# Patient Record
Sex: FEMALE | Race: WHITE | NOT HISPANIC OR LATINO | Employment: FULL TIME | URBAN - METROPOLITAN AREA
[De-identification: names, ages, dates, MRNs, and addresses within clinical notes are randomized per-mention and may not be internally consistent; named-entity substitution may affect disease eponyms.]

---

## 2017-02-26 ENCOUNTER — TELEPHONE (OUTPATIENT)
Dept: NEUROLOGY | Facility: CLINIC | Age: 47
End: 2017-02-26

## 2017-02-26 NOTE — TELEPHONE ENCOUNTER
Yesterday, L ear began humming. Today, humming is ongoing, and she is not able to hear as well out of her ear. The patient actually cut me off at this point, as she was waiting in line at the urgent care and had just been called in. She asked me to call back.    I did call back later this evening. She was told by the urgent care provider that she probably had menieres, and was given some antihistamines.    I explained that hearing loss, while not impossible, is very uncommon to result from multiple sclerosis. If her hearing loss does not improve, she should follow-up with her PCP, who can evaluate her hearing loss and refer her back to neurology, if indicated.

## 2017-02-27 ENCOUNTER — TELEPHONE (OUTPATIENT)
Dept: PEDIATRICS | Facility: CLINIC | Age: 47
End: 2017-02-27

## 2017-02-27 ENCOUNTER — TELEPHONE (OUTPATIENT)
Dept: ENDOCRINOLOGY | Facility: CLINIC | Age: 47
End: 2017-02-27

## 2017-02-27 DIAGNOSIS — H93.12 TINNITUS, LEFT: Primary | ICD-10-CM

## 2017-02-27 DIAGNOSIS — H91.92 HEARING LOSS, LEFT: ICD-10-CM

## 2017-02-27 NOTE — TELEPHONE ENCOUNTER
Reviewed.  I would recommend to continue the nasal steroid along with consulting ENT and audiology for further evaluation of her hearing loss/tinnitus   both the referrals made, Renea can call to schedule

## 2017-02-27 NOTE — TELEPHONE ENCOUNTER
[facility] Call Center    Phone Message    Name of Caller: SELF    Phone Number: Cell number on file:    Telephone Information:   Mobile [phone]       Best time to return call: Soon    May a detailed message be left on voicemail: yes    Relation to patient: Self    Reason for Call: Other: pt is requesting a call back from dr. reeves or care team (pt did not state what she wanted)     Action Taken: Message routed to:  Primary Care p [id number]

## 2017-02-27 NOTE — TELEPHONE ENCOUNTER
I called patient to get more information.  Patient just wants your opinion on the tinnitus diagnosis.  Referral to go somewhere else?  Do you have anymore insight?  She went to  [facility] urgent care on 02/26/17 because her ears were making a humming sound/mainly the left ear.  Noticing hearing loss.  Provider told her there was water in her left ear, otherwise everything looks perfect.  He put her on a nasal steroid and an allergy pill and said hopefully this will work.  Patient isn't having any other symptoms.      Message routed to Dr. Tyree Turner CMA

## 2017-02-27 NOTE — TELEPHONE ENCOUNTER
Patient was seen on Sunday 02/26/17 at  [facility].  Diagnosed with tinnitus and wants a call back from Dr. Clements to discuss this dx.    Nicole Turner CMA

## 2017-02-27 NOTE — TELEPHONE ENCOUNTER
Patient advised of the information below per Dr. Clements.  Patient states understanding.    Nicole Turner CMA

## 2017-02-28 ENCOUNTER — PRE VISIT (OUTPATIENT)
Dept: OTOLARYNGOLOGY | Facility: CLINIC | Age: 47
End: 2017-02-28

## 2017-02-28 NOTE — TELEPHONE ENCOUNTER
Was seen in [facility] a few days ago, patient is very concerned about having long term hearing loss.  I'm forwarding a message for the RN to speak with her Wed since her appointment is still 2 weeks out.        February 28, 2017              Marcelle Chaidez     Chief Complaint   Patient presents with     Pre Visit Planning - Unable To Reach       Pre-Visit Documentation: Renea Trotter is a 46 year old female      Current Outpatient Prescriptions   Medication Sig Dispense Refill     temazepam (RESTORIL) 15 MG capsule TAKE ONE TO TWO CAPSULES BY MOUTH AT BEDTIME 60 capsule 3     hydrochlorothiazide (HYDRODIURIL) 25 MG tablet Take 1 tablet (25 mg) by mouth daily 90 tablet 3     albuterol (PROAIR HFA, PROVENTIL HFA, VENTOLIN HFA) 108 (90 BASE) MCG/ACT inhaler Inhale 2 puffs into the lungs every 6 hours as needed for shortness of breath / dyspnea or wheezing 1 Inhaler 2     Glatiramer Acetate 40 MG/ML SOSY Inject 40 mg Subcutaneous three times a week 36 Syringe 3     Cholecalciferol (VITAMIN D3 PO) Take 5,000 Units by mouth daily       cyclobenzaprine (FLEXERIL) 5 MG tablet Take 0.5-1 tablets (2.5-5 mg) by mouth nightly as needed for muscle spasms 30 tablet 0     MULTIPLE VITAMIN PO Take 1 tablet by mouth daily.         ASSESSMENT / PLAN:  No diagnosis found. Vis

## 2017-03-30 ENCOUNTER — OFFICE VISIT (OUTPATIENT)
Dept: NEUROLOGY | Facility: CLINIC | Age: 47
End: 2017-03-30
Attending: PSYCHIATRY & NEUROLOGY
Payer: COMMERCIAL

## 2017-03-30 VITALS
HEIGHT: 63 IN | RESPIRATION RATE: 20 BRPM | SYSTOLIC BLOOD PRESSURE: 168 MMHG | BODY MASS INDEX: 26.56 KG/M2 | OXYGEN SATURATION: 96 % | WEIGHT: 149.9 LBS | HEART RATE: 125 BPM | DIASTOLIC BLOOD PRESSURE: 116 MMHG

## 2017-03-30 DIAGNOSIS — G35 MULTIPLE SCLEROSIS (H): Primary | ICD-10-CM

## 2017-03-30 PROCEDURE — 82306 VITAMIN D 25 HYDROXY: CPT | Performed by: PSYCHIATRY & NEUROLOGY

## 2017-03-30 PROCEDURE — 99212 OFFICE O/P EST SF 10 MIN: CPT

## 2017-03-30 PROCEDURE — 36415 COLL VENOUS BLD VENIPUNCTURE: CPT | Performed by: PSYCHIATRY & NEUROLOGY

## 2017-03-30 ASSESSMENT — PAIN SCALES - GENERAL: PAINLEVEL: NO PAIN (0)

## 2017-03-30 NOTE — MR AVS SNAPSHOT
After Visit Summary   3/30/2017    Renea Trotter    MRN: [id number]           Patient Information     Date Of Birth          1970        Visit Information        Provider Department      3/30/2017 6:00 PM Gustavo Butler DO [facility]        Today's Diagnoses     Multiple sclerosis (H)    -  1       Follow-ups after your visit        Follow-up notes from your care team     Return in about 6 months (around 9/30/2017).      Your next 10 appointments already scheduled     Sep 22, 2017  3:00 PM CDT   (Arrive by 2:45 PM)   New Multiple Sclerosis with SIM Glasgow CNP   [facility] ([facility])    [address]  [address] [facility] [address] [phone]              Who to contact     If you have questions or need follow up information about today's clinic visit or your schedule please contact [facility] MULTIPLE SCLEROSIS directly at [phone].  Normal or non-critical lab and imaging results will be communicated to you by [facility] [employer], letter or phone within 4 business days after the clinic has received the results. If you do not hear from us within 7 days, please contact the clinic through [employer] [employer] or phone. If you have a critical or abnormal lab result, we will notify you by phone as soon as possible.  Submit refill requests through [employer] or call your pharmacy and they will forward the refill request to us. Please allow 3 business days for your refill to be completed.          Additional Information About Your Visit        MyChart Information     [employer] gives you secure access to your electronic health record. If you see a primary care provider, you can also send messages to your care team and make appointments. If you have questions, please call your primary care clinic.  If you do not have a primary care provider, please call [phone] and they will assist you.        Care EveryWhere ID     This is your  "Care EveryWhere ID. This could be used by other organizations to access your [facility] medical records  [id number]        Your Vitals Were     Pulse Respirations Height Pulse Oximetry Breastfeeding? BMI (Body Mass Index)    125 20 1.6 m (5' 3\") 96% No 26.55 kg/m2       Blood Pressure from Last 3 Encounters:   03/30/17 (!) 168/116   10/12/16 131/78   09/20/16 139/88    Weight from Last 3 Encounters:   03/30/17 68 kg (149 lb 14.4 oz)   10/12/16 64.6 kg (142 lb 8 oz)   06/15/16 65.2 kg (143 lb 11.2 oz)              We Performed the Following     Vitamin D Deficiency        Primary Care Provider Office Phone # Fax #    Cara Clements [phone]       [facility] [address]  [facility] [address]        Thank you!     Thank you for choosing [facility] MULTIPLE SCLEROSIS  for your care. Our goal is always to provide you with excellent care. Hearing back from our patients is one way we can continue to improve our services. Please take a few minutes to complete the written survey that you may receive in the mail after your visit with us. Thank you!             Your Updated Medication List - Protect others around you: Learn how to safely use, store and throw away your medicines at www.disposemymeds.org.          This list is accurate as of: 3/30/17  6:51 PM.  Always use your most recent med list.                   Brand Name Dispense Instructions for use    albuterol 108 (90 BASE) MCG/ACT Inhaler    PROAIR HFA/PROVENTIL HFA/VENTOLIN HFA    1 Inhaler    Inhale 2 puffs into the lungs every 6 hours as needed for shortness of breath / dyspnea or wheezing       cyclobenzaprine 5 MG tablet    FLEXERIL    30 tablet    Take 0.5-1 tablets (2.5-5 mg) by mouth nightly as needed for muscle spasms       Glatiramer Acetate 40 MG/ML Sosy     36 Syringe    Inject 40 mg Subcutaneous three times a week       hydrochlorothiazide 25 MG tablet    HYDRODIURIL    90 tablet    Take 1 tablet (25 mg) by mouth " daily       MULTIPLE VITAMIN PO      Take 1 tablet by mouth daily.       temazepam 15 MG capsule    RESTORIL    60 capsule    TAKE ONE TO TWO CAPSULES BY MOUTH AT BEDTIME       VITAMIN D3 PO      Take 5,000 Units by mouth daily

## 2017-03-30 NOTE — LETTER
3/30/2017       RE: Renea Trotter  [address] [address]     Dear Colleague,    Thank you for referring your patient, Renea Trotter, to the [facility] at [facility]. Please see a copy of my visit note below.    This office note has been dictated.     Again, thank you for allowing me to participate in the care of your patient.      Sincerely,    Gustavo Butler, DO

## 2017-03-30 NOTE — LETTER
3/30/2017      RE: Renea Trotter  [address] [address]       This office note has been dictated.     INTERVAL HISTORY:   Followup appointment on Renea Trotter who I see for relapsing remitting multiple sclerosis.  Injections with Copaxone are going well.  MRI of the brain and cervical spine were stable in December.  The patient states that overall she feels like she pretty much about the same as when she last saw me.  She is upset as 15 minutes before she left come to her appointment today she found out that she was let go from her job so she is distraught over this, but she states that with her not working she is going to actually work harder training for the .  She states that she has noticed her mood has been worse, she is more irritable and we did explain that exercise certainly could be beneficial and she is happy about that aspect of it.  She states she has some urinary urgency but it is not to the point where she would want to be on any medication.  She has no other acute issues or concerns.      CURRENT MEDICATIONS:  Reviewed and up-to-date in Epic.      REVIEW OF SYSTEMS:  As per History of Present Illness.      PHYSICAL EXAMINATION:   VITAL SIGNS:  Blood pressure is elevated due to her stress from recently being let go from her job.  Pulse 125.  Respirations 27.   GENERAL:  Pleasant, well-developed, well-nourished female in no apparent distress.   CRANIAL NERVES:  Cranial nerves II-XII are intact.   MOTOR:  Strength is 5/5 all extremities.   CEREBELLAR:  Intact finger-to-nose.   GAIT:  Unremarkable.      IMPRESSION:  Relapsing remitting multiple sclerosis, responding favorably to glatiramer acetate.      PLAN:   We are going to go ahead and check a vitamin D level today.  She is going to follow up in 6 months.  Call if issues or concerns.      Gustavo Butler DO        D: 03/30/2017 18:48   T: 03/30/2017 21:43   MT: AKA      Name:     RENEA TROTTER   MRN:      [id number]         Account:      [id number]   :      1970           Service Date: 2017      Document: [id number]

## 2017-03-30 NOTE — NURSING NOTE
Chief Complaint   Patient presents with     RECHECK     [facility]- MS, 6 MONTHS F/U     Patient's Blood Pressure is considerably high today. She is feeling upset (crying) about issues related with her job.

## 2017-03-31 LAB — DEPRECATED CALCIDIOL+CALCIFEROL SERPL-MC: 81 UG/L (ref 20–75)

## 2017-03-31 NOTE — PROGRESS NOTES
INTERVAL HISTORY:   Followup appointment on Reinier Trotter who I see for relapsing remitting multiple sclerosis.  Injections with Copaxone are going well.  MRI of the brain and cervical spine were stable in December.  The patient states that overall she feels like she pretty much about the same as when she last saw me.  She is upset as 15 minutes before she left come to her appointment today she found out that she was let go from her job so she is distraught over this, but she states that with her not working she is going to actually work harder training for the .  She states that she has noticed her mood has been worse, she is more irritable and we did explain that exercise certainly could be beneficial and she is happy about that aspect of it.  She states she has some urinary urgency but it is not to the point where she would want to be on any medication.  She has no other acute issues or concerns.      CURRENT MEDICATIONS:  Reviewed and up-to-date in Epic.      REVIEW OF SYSTEMS:  As per History of Present Illness.      PHYSICAL EXAMINATION:   VITAL SIGNS:  Blood pressure is elevated due to her stress from recently being let go from her job.  Pulse 125.  Respirations 27.   GENERAL:  Pleasant, well-developed, well-nourished female in no apparent distress.   CRANIAL NERVES:  Cranial nerves II-XII are intact.   MOTOR:  Strength is 5/5 all extremities.   CEREBELLAR:  Intact finger-to-nose.   GAIT:  Unremarkable.      IMPRESSION:  Relapsing remitting multiple sclerosis, responding favorably to glatiramer acetate.      PLAN:   We are going to go ahead and check a vitamin D level today.  She is going to follow up in 6 months.  Call if issues or concerns.      DO MIRELA Underwood DO             D: 2017 18:48   T: 2017 21:43   MT: AKA      Name:     REINIER TROTTER   MRN:      [id number]        Account:      [id number]   :      1970           Service Date: 2017       Document: [id number]

## 2017-04-10 ENCOUNTER — RADIANT APPOINTMENT (OUTPATIENT)
Dept: MAMMOGRAPHY | Facility: CLINIC | Age: 47
End: 2017-04-10
Attending: FAMILY MEDICINE
Payer: COMMERCIAL

## 2017-04-10 DIAGNOSIS — Z12.31 VISIT FOR SCREENING MAMMOGRAM: ICD-10-CM

## 2017-04-10 DIAGNOSIS — Z00.01 ENCOUNTER FOR GENERAL ADULT MEDICAL EXAMINATION WITH ABNORMAL FINDINGS: ICD-10-CM

## 2017-04-10 PROCEDURE — G0202 SCR MAMMO BI INCL CAD: HCPCS | Performed by: RADIOLOGY

## 2017-05-19 DIAGNOSIS — G47.9 SLEEP DISORDER: ICD-10-CM

## 2017-05-19 RX ORDER — TEMAZEPAM 15 MG/1
CAPSULE ORAL
Qty: 60 CAPSULE | Refills: 3 | Status: SHIPPED | OUTPATIENT
Start: 2017-05-19 | End: 2017-09-28

## 2017-05-19 NOTE — TELEPHONE ENCOUNTER
Patient advised of information below per Dr. Clements.  Script for Restoril faxed to [facility].    Nicole Turner CMA

## 2017-05-19 NOTE — TELEPHONE ENCOUNTER
Routing refill request to provider for review/approval because:  Drug not on the [facility] refill protocol     temazepam (RESTORIL) 15 MG capsule  Last Written Prescription Date: 10/24/2016  Last Fill Quantity: 60,  # refills: 3   Last Office Visit with [facility] or [facility] prescribing provider: 10/12/2016    Perri Carrera RN

## 2017-06-16 DIAGNOSIS — G35 MULTIPLE SCLEROSIS (H): ICD-10-CM

## 2017-06-16 RX ORDER — GLATIRAMER 40 MG/ML
40 INJECTION, SOLUTION SUBCUTANEOUS
Qty: 36 SYRINGE | Refills: 3 | Status: SHIPPED | OUTPATIENT
Start: 2017-06-16 | End: 2018-03-15

## 2017-06-16 NOTE — TELEPHONE ENCOUNTER
Received faxed refill request for Copaxone from [facility]; Patient was last seen in March by Dr. Butler and has follow up appointment in September with Coni Gomez CNP; Refilled for 1 year per [place] refill protocol.    Gema Golden MS RN Care Coordinator

## 2017-07-07 ENCOUNTER — OFFICE VISIT (OUTPATIENT)
Dept: PEDIATRICS | Facility: CLINIC | Age: 47
End: 2017-07-07
Payer: COMMERCIAL

## 2017-07-07 VITALS
DIASTOLIC BLOOD PRESSURE: 80 MMHG | TEMPERATURE: 98.9 F | BODY MASS INDEX: 25.67 KG/M2 | WEIGHT: 144.9 LBS | OXYGEN SATURATION: 95 % | HEART RATE: 91 BPM | SYSTOLIC BLOOD PRESSURE: 124 MMHG

## 2017-07-07 DIAGNOSIS — L82.1 SEBORRHEIC KERATOSIS: ICD-10-CM

## 2017-07-07 DIAGNOSIS — I10 ESSENTIAL HYPERTENSION WITH GOAL BLOOD PRESSURE LESS THAN 140/90: ICD-10-CM

## 2017-07-07 DIAGNOSIS — L98.9 SKIN LESION: Primary | ICD-10-CM

## 2017-07-07 PROCEDURE — 99213 OFFICE O/P EST LOW 20 MIN: CPT | Performed by: FAMILY MEDICINE

## 2017-07-07 RX ORDER — HYDROCHLOROTHIAZIDE 25 MG/1
25 TABLET ORAL DAILY
Qty: 90 TABLET | Refills: 1 | Status: SHIPPED | OUTPATIENT
Start: 2017-07-07 | End: 2017-10-13

## 2017-07-07 ASSESSMENT — PAIN SCALES - GENERAL: PAINLEVEL: NO PAIN (0)

## 2017-07-07 NOTE — MR AVS SNAPSHOT
After Visit Summary   7/7/2017    Renea Trotter    MRN: [id number]           Patient Information     Date Of Birth          1970        Visit Information        Provider Department      7/7/2017 3:40 PM Cara Clements MD [facility]        Today's Diagnoses     Skin lesion    -  1      Care Instructions    Monitor the lesion for now and return for biopsy if itching or bleeding recurs..          Follow-ups after your visit        Your next 10 appointments already scheduled     Sep 22, 2017  3:00 PM CDT   (Arrive by 2:45 PM)   New Multiple Sclerosis with SIM Glasgow CNP   [facility] ([facility])    [address]  [address] [facility] [address] [phone]              Who to contact     If you have questions or need follow up information about today's clinic visit or your schedule please contact [facility] directly at [phone].  Normal or non-critical lab and imaging results will be communicated to you by W[employer]Xhart, letter or phone within 4 business days after the clinic has received the results. If you do not hear from us within 7 days, please contact the clinic through [employer] or phone. If you have a critical or abnormal lab result, we will notify you by phone as soon as possible.  Submit refill requests through ABS or call your pharmacy and they will forward the refill request to us. Please allow 3 business days for your refill to be completed.          Additional Information About Your Visit        WILEXhart Information     ABS gives you secure access to your electronic health record. If you see a primary care provider, you can also send messages to your care team and make appointments. If you have questions, please call your primary care clinic.  If you do not have a primary care provider, please call [phone] and they will assist you.      ABS is an electronic  gateway that provides easy, online access to your medical records. With [employer], you can request a clinic appointment, read your test results, renew a prescription or communicate with your care team.     To access your existing account, please contact your [facility] or call [phone] for assistance.        Care EveryWhere ID     This is your Care EveryWhere ID. This could be used by other organizations to access your [place] medical records  [id number]        Your Vitals Were     Pulse Temperature Pulse Oximetry BMI (Body Mass Index)          91 98.9  F (37.2  C) (Oral) 95% 25.67 kg/m2         Blood Pressure from Last 3 Encounters:   07/07/17 124/80   03/30/17 (!) 168/116   10/12/16 131/78    Weight from Last 3 Encounters:   07/07/17 144 lb 14.4 oz (65.7 kg)   03/30/17 149 lb 14.4 oz (68 kg)   10/12/16 142 lb 8 oz (64.6 kg)              Today, you had the following     No orders found for display       Primary Care Provider Office Phone # Fax #    Cara Clements [phone]       [facility] [address]  [facility] [address]        Equal Access to Services     BOUBACAR PAPPAS : Hadii aad ku hadasho Soomaali, waaxda luqadaha, qaybta kaalmada adeegyada, waxay idiin hayshajin mariceleg chelo lajoo . So [facility] [phone].    ATENCIÓN: Si habla español, tiene a ocampo disposición servicios gratuitos de asistencia lingüística. Llame al [phone].    We comply with applicable federal civil rights laws and [place] laws. We do not discriminate on the basis of race, color, national origin, age, disability sex, sexual orientation or gender identity.            Thank you!     Thank you for choosing [facility]  for your care. Our goal is always to provide you with excellent care. Hearing back from our patients is one way we can continue to improve our services. Please take a few minutes to complete the written survey that you may receive in  the mail after your visit with us. Thank you!             Your Updated Medication List - Protect others around you: Learn how to safely use, store and throw away your medicines at www.disposemymeds.org.          This list is accurate as of: 7/7/17  3:56 PM.  Always use your most recent med list.                   Brand Name Dispense Instructions for use Diagnosis    albuterol 108 (90 BASE) MCG/ACT Inhaler    PROAIR HFA/PROVENTIL HFA/VENTOLIN HFA    1 Inhaler    Inhale 2 puffs into the lungs every 6 hours as needed for shortness of breath / dyspnea or wheezing    Mild intermittent asthma, uncomplicated       cyclobenzaprine 5 MG tablet    FLEXERIL    30 tablet    Take 0.5-1 tablets (2.5-5 mg) by mouth nightly as needed for muscle spasms    Back strain, initial encounter       Glatiramer Acetate 40 MG/ML Sosy     36 Syringe    Inject 40 mg Subcutaneous three times a week    Multiple sclerosis (H)       hydrochlorothiazide 25 MG tablet    HYDRODIURIL    90 tablet    Take 1 tablet (25 mg) by mouth daily    Essential hypertension with goal blood pressure less than 140/90       MULTIPLE VITAMIN PO      Take 1 tablet by mouth daily.        temazepam 15 MG capsule    RESTORIL    60 capsule    TAKE ONE TO TWO CAPSULES BY MOUTH AT BEDTIME    Sleep disorder       VITAMIN D3 PO      Take 5,000 Units by mouth daily

## 2017-07-07 NOTE — PROGRESS NOTES
SUBJECTIVE:                                                    Renea Trotter is a 46 year old female who presents to clinic today for the following health issues:      Lesion check - Patient c/o mole on back that she recently scratched and had bleeding. Unable to view mole herself. Father has history of skin cancer.    Patient is here with concerns of having slight bleeding after scratching on at least a skin lesion that she had  For many years with no change on the right mid back.   Patient denies concerns for Itching, pigmentation, previous history of premalignant or malignant skin lesions          Problem list and histories reviewed & adjusted, as indicated.  Additional history: as documented    Patient Active Problem List   Diagnosis     Multiple sclerosis (H)     CARDIOVASCULAR SCREENING; LDL GOAL LESS THAN 160     BMI 26.0-26.9,adult     Urinary urgency     Headache     Low back pain without sciatica, unspecified back pain laterality     Sleep disorder     Plantar warts     Essential hypertension with goal blood pressure less than 140/90     Hemorrhoids, unspecified hemorrhoid type     Past Surgical History:   Procedure Laterality Date     HC TOOTH EXTRACTION W/FORCEP         Social History   Substance Use Topics     Smoking status: Never Smoker     Smokeless tobacco: Never Used     Alcohol use 0.6 - 1.2 oz/week     1 - 2 Standard drinks or equivalent per week      Comment: RARELY     Family History   Problem Relation Age of Onset     Hypertension Mother      Lipids Mother      Eye Disorder Father      CEREBROVASCULAR DISEASE Maternal Grandmother      CEREBROVASCULAR DISEASE Maternal Grandfather      Prostate Cancer Maternal Grandfather      Blood Disease Paternal Grandmother      Alzheimer Disease Paternal Grandfather          Current Outpatient Prescriptions   Medication Sig Dispense Refill     hydrochlorothiazide (HYDRODIURIL) 25 MG tablet Take 1 tablet (25 mg) by mouth daily Fill the prescription  when patient is due for refills 90 tablet 1     Glatiramer Acetate 40 MG/ML SOSY Inject 40 mg Subcutaneous three times a week 36 Syringe 3     temazepam (RESTORIL) 15 MG capsule TAKE ONE TO TWO CAPSULES BY MOUTH AT BEDTIME 60 capsule 3     albuterol (PROAIR HFA, PROVENTIL HFA, VENTOLIN HFA) 108 (90 BASE) MCG/ACT inhaler Inhale 2 puffs into the lungs every 6 hours as needed for shortness of breath / dyspnea or wheezing 1 Inhaler 2     Cholecalciferol (VITAMIN D3 PO) Take 5,000 Units by mouth daily       cyclobenzaprine (FLEXERIL) 5 MG tablet Take 0.5-1 tablets (2.5-5 mg) by mouth nightly as needed for muscle spasms 30 tablet 0     MULTIPLE VITAMIN PO Take 1 tablet by mouth daily.       [DISCONTINUED] hydrochlorothiazide (HYDRODIURIL) 25 MG tablet Take 1 tablet (25 mg) by mouth daily 90 tablet 3     No Known Allergies  Recent Labs   Lab Test  10/12/16   0732  10/07/15   0746  04/02/14   0717   04/12/12   1627   LDL  95  95  114   --    --    HDL  44*  49*  36*   --    --    TRIG  95  134  109   --    --    ALT  20   --    --    --    --    CR  0.98  0.80  0.84   < >   --    GFRESTIMATED  61  78  74   < >   --    GFRESTBLACK  74  >90   GFR Calc    90   < >   --    POTASSIUM  3.5  3.8  3.7   < >   --    TSH  1.38   --    --    --   1.44    < > = values in this interval not displayed.      BP Readings from Last 3 Encounters:   07/07/17 124/80   03/30/17 (!) 168/116   10/12/16 131/78    Wt Readings from Last 3 Encounters:   07/07/17 144 lb 14.4 oz (65.7 kg)   03/30/17 149 lb 14.4 oz (68 kg)   10/12/16 142 lb 8 oz (64.6 kg)                  Labs reviewed in EPIC    Reviewed and updated as needed this visit by clinical staff  Tobacco  Allergies  Meds  Med Hx  Surg Hx  Fam Hx  Soc Hx      Reviewed and updated as needed this visit by Provider         ROS:  C: NEGATIVE for fever, chills, change in weight  INTEGUMENTARY/SKIN: as above  CV: NEGATIVE for chest pain, palpitations or peripheral edema  CV: h/o  HTN    OBJECTIVE:     /80  Pulse 91  Temp 98.9  F (37.2  C) (Oral)  Wt 144 lb 14.4 oz (65.7 kg)  SpO2 95%  BMI 25.67 kg/m2  Body mass index is 25.67 kg/(m^2).  GENERAL: healthy, alert and no distress  SKIN: Seborrheic keratotic skin lesion ×2-Right mid back, one of them was partially removed from scratching  No abnormal pigmentation noted    Diagnostic Test Results:  none     ASSESSMENT/PLAN:             1. Skin lesion  Likely seborrheic keratotic skin lesion which was accidentally scratched Causing bleeding.   Otherwise Lesion looks benign, patient will monitor for now  ,, will return for excisional biopsy for recurrent concerns  Reviewed sunscreen use  2. Essential hypertension with goal blood pressure less than 140/90  Refills given per patient's request  - hydrochlorothiazide (HYDRODIURIL) 25 MG tablet; Take 1 tablet (25 mg) by mouth daily Fill the prescription when patient is due for refills  Dispense: 90 tablet; Refill: 1    3. Seborrheic keratosis  Reviewed the benign nature of the skin lesion, continue to monitor      Chart documentation done in part with Dragon Voice recognition Software. Although reviewed after completion, some word and grammatical error may remain.    See Patient Instructions    Cara Clements MD  [facility]

## 2017-07-07 NOTE — NURSING NOTE
"Chief Complaint   Patient presents with     Mole       Initial /80  Pulse 91  Temp 98.9  F (37.2  C) (Oral)  Wt 144 lb 14.4 oz (65.7 kg)  SpO2 95%  BMI 25.67 kg/m2 Estimated body mass index is 25.67 kg/(m^2) as calculated from the following:    Height as of 3/30/17: 5' 3\" (1.6 m).    Weight as of this encounter: 144 lb 14.4 oz (65.7 kg).  BP completed using cuff size: renetta Chaney CMA    "

## 2017-08-07 ENCOUNTER — TELEPHONE (OUTPATIENT)
Dept: PEDIATRICS | Facility: CLINIC | Age: 47
End: 2017-08-07

## 2017-08-07 NOTE — TELEPHONE ENCOUNTER
Summary:    Patient is due/failing the following:   PAP    Action needed:   Patient needs office visit for PAP.    Type of outreach:    Phone, spoke to patient.  patient will wait and complete in October     Questions for provider review:    None                                                                                                                                    Dorina Matamoros       Chart routed to Care Team .      Panel Management Review      Patient has the following on her problem list: None      Composite cancer screening  Chart review shows that this patient is due/due soon for the following Pap Smear

## 2017-08-15 ENCOUNTER — TELEPHONE (OUTPATIENT)
Dept: NEUROLOGY | Facility: CLINIC | Age: 47
End: 2017-08-15

## 2017-09-25 ENCOUNTER — TELEPHONE (OUTPATIENT)
Dept: PEDIATRICS | Facility: CLINIC | Age: 47
End: 2017-09-25

## 2017-09-25 DIAGNOSIS — Z13.29 SCREENING FOR THYROID DISORDER: ICD-10-CM

## 2017-09-25 DIAGNOSIS — Z00.00 ENCOUNTER FOR ROUTINE ADULT HEALTH EXAMINATION WITHOUT ABNORMAL FINDINGS: ICD-10-CM

## 2017-09-25 DIAGNOSIS — Z13.6 CARDIOVASCULAR SCREENING; LDL GOAL LESS THAN 130: ICD-10-CM

## 2017-09-25 DIAGNOSIS — G35 MS (MULTIPLE SCLEROSIS) (H): Primary | ICD-10-CM

## 2017-09-25 DIAGNOSIS — Z13.1 SCREENING FOR DIABETES MELLITUS: ICD-10-CM

## 2017-09-25 DIAGNOSIS — I10 ESSENTIAL HYPERTENSION WITH GOAL BLOOD PRESSURE LESS THAN 140/90: Primary | ICD-10-CM

## 2017-09-25 NOTE — TELEPHONE ENCOUNTER
[facility] Call Center    Phone Message    Name of Caller: Renea    Phone Number: Cell number on file:    Telephone Information:   Mobile [phone]       Best time to return call: ANY    May a detailed message be left on voicemail: yes    Relation to patient: Self    Reason for Call: Other: Patient needs a physical before the 15th of October for insurance purposes. No providers in [place] Have any openings for physicals before then. Patient asked that a message be sent. Please call to advise     Action Taken: Message routed to:  Primary Care p [phone]

## 2017-09-25 NOTE — PROGRESS NOTES
Date of Onset: 2003  Date of Diagnosis: 6/25/2012  Initial Clinical Course: Relapsing remitting  Current Clinical Course: Relapsing remitting  Past Disease Modifying Therapy(ies): NA   Current Disease Modifying Therapy(ies): Copaxone  Most Recent MRI of the Brain: 12/10/2016  Most Recent MRI of the Cervical Cord 12/2016  Most Recent MRI of the Thoracic Cord: NA  Most Recent Lumbar Puncture: 5/4/2012 Positive OCB        She first developed symptoms of numbness in the fingers of her right hand. She also had some numbness of her leg. These symptoms last for a couple of months. During this time she had a MRI with reportedly non-specific white spots. She did well until 11/2011. After going back to work, she had some increasing numbness in her right hand. Physical therapy and chiropracty did not help.   Because of this, the patient was referred to Neurology, was seen by Dr. Cielo Archuleta who evaluated the patient and the patient had EMG nerve conduction study which was performed 02/02, which was normal in the right upper extremity.  Next, she had an MRI of the brain that demonstrated greater than 30 T2 lesions in the white matter without  enhancement. She presented to Dr. Butler in 4/2012. Her MRI at the time demonstrated lesion in the cervical spine. She had an LP with OCBs.  She was started on Copaxone in June of 2012. Overall, she has done well on Copaxone.    MRI Cervical Spine 12/2016: A few T2 lesions without enhancement    MRI brain 12/10/2016: Multiple punctate T2 lesions without any enhancement. Lesions are consistent but not classic with the diagnosis of MS.

## 2017-09-25 NOTE — TELEPHONE ENCOUNTER
Last physical was 10/12/16 so patient isn't due until after 10/12/17 for her next physical. Patient is requesting work in for physical prior to next opening per below note.  Will route to Dr. Clements for review and orders.  Oneal Chaney, [facility]

## 2017-09-26 NOTE — TELEPHONE ENCOUNTER
Called patient on home number to inform. Unable to schedule patient with PCP for physical after the 10/12/17 and prior to the form due date of 10/15/17 due to provider out of office. Scheduled patient with another provider to complete physical with previsit fasting labs.    Future Office visit:    Next 5 appointments (look out 90 days)     Oct 13, 2017  7:50 AM CDT   Physical with SIM Zhang CNP   [facility]    [address] [place] [id number] [phone]                Routing to Milana Mcgregor NP for review and lab orders.    Oneal Chaney, CMA

## 2017-09-27 ENCOUNTER — PRE VISIT (OUTPATIENT)
Dept: NEUROLOGY | Facility: CLINIC | Age: 47
End: 2017-09-27

## 2017-09-27 NOTE — TELEPHONE ENCOUNTER
PREVISIT INFORMATION                                                    Renea Trotter scheduled for future visit at [facility] specialty clinics.    Patient is scheduled to see Dr hanna on 9/27  Reason for visit: MS, relapsing remitting. On Copaxone. No flairs since she was last seen.   Referring provider Re-establishing care  Has patient seen previous specialist? No  Medical Records:  Available in chart.  Patient was previously seen at a [facility] or [facility] facility.     REVIEW                                                      New patient packet mailed to patient: No, will   Medication reconciliation complete: Yes      Current Outpatient Prescriptions   Medication Sig Dispense Refill     hydrochlorothiazide (HYDRODIURIL) 25 MG tablet Take 1 tablet (25 mg) by mouth daily Fill the prescription when patient is due for refills 90 tablet 1     Glatiramer Acetate 40 MG/ML SOSY Inject 40 mg Subcutaneous three times a week 36 Syringe 3     temazepam (RESTORIL) 15 MG capsule TAKE ONE TO TWO CAPSULES BY MOUTH AT BEDTIME 60 capsule 3     albuterol (PROAIR HFA, PROVENTIL HFA, VENTOLIN HFA) 108 (90 BASE) MCG/ACT inhaler Inhale 2 puffs into the lungs every 6 hours as needed for shortness of breath / dyspnea or wheezing 1 Inhaler 2     Cholecalciferol (VITAMIN D3 PO) Take 5,000 Units by mouth daily       cyclobenzaprine (FLEXERIL) 5 MG tablet Take 0.5-1 tablets (2.5-5 mg) by mouth nightly as needed for muscle spasms 30 tablet 0     MULTIPLE VITAMIN PO Take 1 tablet by mouth daily.         Allergies: Review of patient's allergies indicates no known allergies.        PLAN/FOLLOW-UP NEEDED                                                      Previsit review complete.  Patient will see provider at future scheduled appointment.     Patient Reminders Given:  Please, make sure you bring an updated list of your medications.   If you are having a procedure, please, present 15 minutes early.  If  you need to cancel or reschedule,please call [phone].    Damari Rowe

## 2017-09-28 ENCOUNTER — OFFICE VISIT (OUTPATIENT)
Dept: NEUROLOGY | Facility: CLINIC | Age: 47
End: 2017-09-28
Payer: COMMERCIAL

## 2017-09-28 VITALS
OXYGEN SATURATION: 96 % | HEIGHT: 63 IN | BODY MASS INDEX: 26.58 KG/M2 | SYSTOLIC BLOOD PRESSURE: 137 MMHG | WEIGHT: 150 LBS | TEMPERATURE: 98.2 F | HEART RATE: 96 BPM | DIASTOLIC BLOOD PRESSURE: 89 MMHG

## 2017-09-28 DIAGNOSIS — G47.9 SLEEP DISORDER: ICD-10-CM

## 2017-09-28 DIAGNOSIS — Z23 NEED FOR PROPHYLACTIC VACCINATION AND INOCULATION AGAINST INFLUENZA: Primary | ICD-10-CM

## 2017-09-28 PROCEDURE — 99215 OFFICE O/P EST HI 40 MIN: CPT | Performed by: PSYCHIATRY & NEUROLOGY

## 2017-09-28 RX ORDER — TEMAZEPAM 15 MG/1
30 CAPSULE ORAL
Qty: 60 CAPSULE | Refills: 3 | Status: SHIPPED | OUTPATIENT
Start: 2017-09-28 | End: 2018-03-05

## 2017-09-28 ASSESSMENT — PAIN SCALES - GENERAL: PAINLEVEL: NO PAIN (0)

## 2017-09-28 NOTE — PROGRESS NOTES
MULTIPLE SCLEROSIS CLINIC AT THE [facility]  FOLLOWUP/ESTABLISHED PATIENT VISIT      PRINCIPAL NEUROLOGIC DIAGNOSIS: Multiple Sclerosis      Date of Onset: 2003  Date of Diagnosis: 6/25/2012  Initial Clinical Course: Relapsing remitting  Current Clinical Course: Relapsing remitting  Past Disease Modifying Therapy(ies): NA   Current Disease Modifying Therapy(ies): Copaxone  Most Recent MRI of the Brain: 12/10/2016  Most Recent MRI of the Cervical Cord 12/2016  Most Recent MRI of the Thoracic Cord: NA  Most Recent Lumbar Puncture: 5/4/2012 Positive OCB      CHIEF COMPLAINT: Multiple sclerosis    HPI:      She first developed symptoms of numbness in the fingers of her right hand. She also had some numbness of her leg. These symptoms last for a couple of months. During this time she had a MRI with reportedly non-specific white spots. She did well until 11/2011. After going back to work, she had some increasing numbness in her right hand. Physical therapy and chiropracty did not help.   Because of this, the patient was referred to Neurology, was seen by Dr. Cielo Archuleta who evaluated the patient and the patient had EMG nerve conduction study which was performed 02/12, which was normal in the right upper extremity.  Next, she had an MRI of the brain that demonstrated greater than 30 T2 lesions in the white matter without  enhancement. She presented to Dr. Butler in 4/2012. Her MRI at the time demonstrated lesion in the cervical spine. She had an LP with OCBs.  She was started on Copaxone in June of 2012. Overall, she has done well on Copaxone.       INTERVAL HISTORY:    Currently she report her symptoms are a little bit worse. She reports that she is having numbness in her arm. She still has a numbness a tingling in right hand. It does not bother her much. She describes     She reports that she has developed worsening weakness about a year ago. She reports that nothing makes her weakness better or wose.  This has slowly been getting worse over time.     Issues with current MS therapy: Tolerating copaxone without any side effects.    REVIEW OF SYSTEMS:    Mood: Ok, gets a little bit iratible occasionally at   Spasticity:None  Bladder: Frequency  Bowel: None problems  Pain related to today's visit:reviewed on nursing intake documentation  Fatigue: No complaints  Sleep: Difficulty falling asleep at night  Memory/Concentration: no change    PAST HISTORY was reviewed and updated:      MEDICATIONS and ALLERGIES were reviewed and updated.    SOCIAL HISTORY was reviewed and updated:    Current living situation: at home  Current vocational status: works    EXAM:    PHYSICAL EXAMINATION:   VITAL SIGNS:  B/P: 137/89, T: 98.2, P: 96, R: Data Unavailable    GENERAL: The patient is a well-nourished who presents to the evaluation alone.    NEUROLOGIC:   MENTAL STATUS: Alert,awake and oriented times four.   CRANIAL NERVES: Visual acuity is 20/20 in OD unable to see movement or shadows in OS. Visual fields are full to confrontation.  Extraocular movements are  intact with no  internuclear ophthalmoplegia. No nystagmus. Facial strength and sensation are  normal. Hearing is  normal. Palate elevation and tongue protrusion are normal.   POWER:     Motor    Upper      Right Left   Shoulder Abduction 5 5   Elbow Flexion 5 5   Elbow Extension 5 5   Wrist Extension 5 5   Digit Extension 5 5   Digit Flexion 4+ 5   APB 4+ 5   Tone 2 Normal 2 Normal   Lower       Right Left   Hip Flexion 5 5   Knee Extension 5 5   Knee Flexion 5 5   Foot Dorsiflexion 5 5   Foot Plantar Flexion 5 5   EH 5 5   Toe Flexion 5 5   Tone 2 Normal 2 Normal         SENSORY:   Light touch Intact  Vibration: Intact in all extremities  Temperature: Intact  \  REFLEXES:     Reflexes       Right  Left   Biceps 2  2   Triceps 2  2   Brachioradialis 2  2   Patellar  2  2   Achilles 2  2               MOTOR/CEREBELLAR:    Right Left   RRM 0 Normal 1 Abnormal   JANELL 0 Normal 1  Abnormal   FTN 0 Normal 1 Abnormal   RRM 0 Normal 0 Normal   HKS 0 Normal 0 Normal           GAIT: Gait is   narrow-based and steady and the patient is  able to walk on heels, toes and in tandem without difficulty.    [place] with eyes closed    RESULTS:  Monitoring labs:  LABS    Office Visit on 03/30/2017   Component Date Value Ref Range Status     Vitamin D Deficiency screening 03/30/2017 81* 20 - 75 ug/L Final   ]      MRI brain:    MRI Cervical Spine 12/2016: A few T2 lesions without enhancement     MRI brain 12/10/2016: Multiple punctate T2 lesions without any enhancement. Lesions are consistent but not classic with the diagnosis of MS.      ASSESSMENT/PLAN:  Patient is a 47-year-old woman with a past medical history multiple sclerosis is presented today establish care. She had previously seen by Dr. hunter. While her MRI of the brain is nonspecific, [place] in her cervical spine and positive all cold and strongly suggest that diagnosis is multiple sclerosis. Therefore, I agree with his diagnosis of multiple sclerosis and his management to date with contacts from. Patient has had no clinical relapses on Copaxone and her MRI has been stable. I will continue around by accident this time. Originally I wass planning on getting an MRI 2 months so she can have an annual MRI to ensure Copaxone s effectiveness. However, the patient wished to get an MRI when she followed up in 6 months. Consequently, I will follow the patient up in 6 months with member.    I will refer the patient sleeping aid at this time. Also per the patient s request, I will have the patient get her flu shot.  I spent extensive time counseling patients on issues of prognosis, menopause s effect on multiple sclerosis, potential alternative medicine treatments, diet, the flu shot and gut microbiom.    Plan  Referil Restoril  Get flu shot  Get MRI of brain in 6 months with follow up     I spent 60 minutes with the patient, greater than 50% in  counseling and coordination of care.    Regan Mcgregor MD [facility]  Staff Neurologist   09/28/17

## 2017-09-28 NOTE — PROGRESS NOTES
Injectable Influenza Immunization Documentation    1.  Is the person to be vaccinated sick today?   No    2. Does the person to be vaccinated have an allergy to a component   of the vaccine?   No    3. Has the person to be vaccinated ever had a serious reaction   to influenza vaccine in the past?   No    4. Has the person to be vaccinated ever had Guillain-Barré syndrome?   No    Form completed by Brandy Dawson CMA

## 2017-09-28 NOTE — MR AVS SNAPSHOT
After Visit Summary   9/28/2017    Renea Trotter    MRN: [id number]           Patient Information     Date Of Birth          1970        Visit Information        Provider Department      9/28/2017 9:00 AM Regan Mcgregor MD [facility]        Today's Diagnoses     Need for prophylactic vaccination and inoculation against influenza    -  1    Sleep disorder          Care Instructions    Will follow up 6 month    Will get a MRI in 6 months    You saw a neurology provider today at the [facility].  You may have also met with the MS RN Care Coordinator.  In order to get a message to your MS Center provider, you should contact [phone] option 3 for the triage nurse line.    You should contact us via this protocol if you have any of the following symptoms:    New or worsening neurologic symptoms that persist for 24-48 hours, such as:  o New onset of pain or marked worsening of pain  o Difficulty with speech, swallowing, or breathing  o New onset of vertigo or dizziness  o Change in bowel or bladder function (incontinence, difficulty urinating)    Increasing difficulty in self care    Marked changes in vision (double vision, blurred vision, graying of vision)    Change in mobility    Change in cognitive function    Falling    Worsening numbness, tingling or pain with a change in function    Worsening fatigue lasting more than 2 weeks  If you had labs completed today, we will contact you with the results.  If you are active in [employer], they will be released to you there.  Otherwise, your results will be provided to you via mail or telephone call.  Some results take up to 2 weeks for completion.  If you haven t heard anything about your lab results within 2 weeks, you can call or send a M[employer] message to obtain your results.  If you have an MRI scheduled in the week or two prior to your next appointment, we will go over the results at  your scheduled follow up appointment.  If you are not scheduled to see your MS Center provider within about 2 weeks after your MRI, please call or send a Hitlab message to obtain your results if you haven t heard anything within 2 weeks.  Please be aware that it takes at least 5 business days after routine MRIs for your results to be reviewed by both the radiologist and your doctor.  MRIs completed at facilities outside of the [facility] system take about 2 weeks in order for the MRI disc to be mailed to our clinic and uploaded into your medical record.    Prescription refills should be faxed to us by your pharmacy.  Our fax number for prescription refills is [phone].  Please do your best to come to your appointments, and to arrive 15 minutes early to allow time for checking in.  [facility] reserves the right to terminate care of established patients if a patient misses three or more appointments in a clinic without providing notification within a 12-month period.    Developing Your Care Team  Individuals living with chronic illnesses like MS may be unaware that they are at risk for the same range of medical problems as everyone else.  This is why you must establish a relationship with other health care providers in addition to your Multiple Sclerosis doctor.  It can be difficult at times to figure out whether a health concern is related to your MS, or whether it is related to something else, such as hormonal changes, pseduoexacerbations, changes in your core body temperature, flu-like reactions to interferons, exercise, or infections.  Urinary tract infections (UTIs) are common culprits that can cause fatigue, weakness, or other  MS attack -like symptoms without classic symptoms of a UTI.  For this reason, if you call or come in to discuss symptoms, you may be asked to get in touch with your primary provider or another specialist, so that you receive the comprehensive care you  need.  What is Multiple Sclerosis (MS)?  MS is a disease in which the nerve tissues in the brain and/or spinal cord are attacked by immune cells in the body.  These immune cells are present in everyone, and their normal role is to fight off infections.  In people with MS, these cells change the way they function and cross into the nervous system.  Once there, they cause inflammation that damages the myelin (or the protective coating of a nerve cell, much like the plastic covering on an electrical cord) and parts of the nerve cell itself.  So far, a clear cause for this immune system dysfunction has not been found.  MS often starts out as the  relapsing-remitting  form.  This means there are episodes when you have symptoms, and other times when you recover to normal or near-normal.  Over time, if the damage to the nervous system continues, the disease can cause additional disability, such as difficulty walking.  If the relapses and nerve damage can be prevented with available medications, many patients with MS can go many years between relapses and have relatively little disability.  Remember: MS is a condition that changes and must be evaluated on an ongoing basis!  What is a Relapse? (Also called flare-ups, attacks, or exacerbations)  Relapses are due to the occurrence of inflammation in some part of the brain and/or spinal cord.  A relapse is new or recurrent symptoms which persist for at least 24 hours and sometimes worsen over 48 hours.  New symptoms need to be  by at least 1 month in order to be considered separate relapses. Most of the time, symptoms reach their maximal intensity within 2 weeks and then begin to slowly resolve.  At times, your symptoms may not recover fully for up to 6 months, depending on the severity of the episode.  The frequency of relapses is generally higher early in the disease, but can vary greatly among individuals with MS.  Improvement of symptoms for an individual is  unpredictable with each relapse.    It is important to remember that an increase in symptoms and changes in function may not necessarily be a relapse.  There are other factors that contribute to such changes, such as hot weather, increased body temperature, infection/illness, stress and sleep deprivation.  The worsening of symptoms may feel like a relapse when in reality it is not.  These episodes are referred to as pseudorelapses.  Once the underlying cause is addressed, symptoms usually fade away and you feel better.  If you experience a worsening of symptoms that lasts more than 48 hours and does not improve with cooling down, decreasing stress, or treatment of an infection, please call us and we can help to better determine whether you are having a pseudorelapse versus a relapse.                Follow-ups after your visit        Follow-up notes from your care team     Return in about 1 year (around 9/28/2018).      Your next 10 appointments already scheduled     Oct 13, 2017  7:20 AM CDT   LAB with LAB FIRST FLOOR [facility])    [address] [place] [id number] [phone]           Patient must bring picture ID. Patient should be prepared to give a urine specimen  Please do not eat 10-12 hours before your appointment if you are coming in fasting for labs on lipids, cholesterol, or glucose (sugar). Pregnant women should follow their Care Team instructions. Water with medications is okay. Do not drink coffee or other fluids. If you have concerns about taking  your medications, please ask at office or if scheduling via Tipstar[employer], send a message by clicking on Secure Messaging, Message Your Care Team.            Oct 13, 2017  7:50 AM CDT   Physical with SIM Zhang [facility] ([facility])    [address] [place] [id number] [phone]            Mar 15, 2018  8:45 AM CDT    MR BRAIN W/O & W CONTRAST with MGMR1   [facility] ([facility])    [address] [address] [place] [id number] [phone]           Take your medicines as usual, unless your doctor tells you not to. Bring a list of your current medicines to your exam (including vitamins, minerals and over-the-counter drugs).  You will be given intravenous contrast for this exam. To prepare:   The day before your exam, drink extra fluids at least six 8-ounce glasses (unless your doctor tells you to restrict your fluids).   Have a blood test (creatinine test) within 30 days of your exam. Go to your clinic or Diagnostic Imaging Department for this test.  The MRI machine uses a strong magnet. Please wear clothes without metal (snaps, zippers). A sweatsuit works well, or we may give you a hospital gown.  Please remove any body piercings and hair extensions before you arrive. You will also remove watches, jewelry, hairpins, wallets, dentures, partial dental plates and hearing aids. You may wear contact lenses, and you may be able to wear your rings. We have a safe place to keep your personal items, but it is safer to leave them at home.   **IMPORTANT** THE INSTRUCTIONS BELOW ARE ONLY FOR THOSE PATIENTS WHO HAVE BEEN TOLD THEY WILL RECEIVE SEDATION OR GENERAL ANESTHESIA DURING THEIR MRI PROCEDURE:  IF YOU WILL RECEIVE SEDATION (take medicine to help you relax during your exam):   You must get the medicine from your doctor before you arrive. Bring the medicine to the exam. Do not take it at home.   Arrive one hour early. Bring someone who can take you home after the test. Your medicine will make you sleepy. After the exam, you may not drive, take a bus or take a taxi by yourself.   No eating 8 hours before your exam. You may have clear liquids up until 4 hours before your exam. (Clear liquids include water, clear tea, black coffee and fruit juice without pulp.)  IF YOU WILL RECEIVE ANESTHESIA (be  asleep for your exam):   Arrive 1 1/2 hours early. Bring someone who can take you home after the test. You may not drive, take a bus or take a taxi by yourself.   No eating 8 hours before your exam. You may have clear liquids up until 4 hours before your exam. (Clear liquids include water, clear tea, black coffee and fruit juice without pulp.)  Please call the Imaging Department at your exam site with any questions.            Mar 15, 2018 10:00 AM CDT   Return Visit with Regan Mcgregor MD   [facility]    [id number] [place] [id number] [phone]              Future tests that were ordered for you today     Open Future Orders        Priority Expected Expires Ordered    MR Brain w/o & w Contrast Routine 12/28/2017 9/28/2018 9/28/2017            Who to contact     If you have questions or need follow up information about today's clinic visit or your schedule please contact [facility] directly at [phone].  Normal or non-critical lab and imaging results will be communicated to you by Oktoposthart, letter or phone within 4 business days after the clinic has received the results. If you do not hear from us within 7 days, please contact the clinic through Invisible or phone. If you have a critical or abnormal lab result, we will notify you by phone as soon as possible.  Submit refill requests through Invisible or call your pharmacy and they will forward the refill request to us. Please allow 3 business days for your refill to be completed.          Additional Information About Your Visit        Invisible Information     Invisible gives you secure access to your electronic health record. If you see a primary care provider, you can also send messages to your care team and make appointments. If you have questions, please call your primary care clinic.  If you do not have a primary care provider, please call [phone] and they will assist  "you.      SpotXchange is an electronic gateway that provides easy, online access to your medical records. With SpotXchange, you can request a clinic appointment, read your test results, renew a prescription or communicate with your care team.     To access your existing account, please contact your [facility] or call [phone] for assistance.        Care EveryWhere ID     This is your Care EveryWhere ID. This could be used by other organizations to access your [facility] medical records  [id number]        Your Vitals Were     Pulse Temperature Height Pulse Oximetry BMI (Body Mass Index)       96 98.2  F (36.8  C) (Oral) 1.6 m (5' 3\") 96% 26.57 kg/m2        Blood Pressure from Last 3 Encounters:   09/28/17 137/89   07/07/17 124/80   03/30/17 (!) 168/116    Weight from Last 3 Encounters:   09/28/17 68 kg (150 lb)   07/07/17 65.7 kg (144 lb 14.4 oz)   03/30/17 68 kg (149 lb 14.4 oz)                 Today's Medication Changes          These changes are accurate as of: 9/28/17 10:03 AM.  If you have any questions, ask your nurse or doctor.               These medicines have changed or have updated prescriptions.        Dose/Directions    temazepam 15 MG capsule   Commonly known as:  RESTORIL   This may have changed:  See the new instructions.   Used for:  Sleep disorder   Changed by:  Regan Mcgregor MD        Dose:  30 mg   Take 2 capsules (30 mg) by mouth nightly as needed for sleep   Quantity:  60 capsule   Refills:  3            Where to get your medicines      Some of these will need a paper prescription and others can be bought over the counter.  Ask your nurse if you have questions.     Bring a paper prescription for each of these medications     temazepam 15 MG capsule                Primary Care Provider Office Phone # Fax #    Cara Clements [phone] [address]  [facility] [address]        Equal Access to Services     BOUBACAR PAPPAS AH: Hadtamar aad " karina Dominguez, wamatthewda luqadaha, qaybta kaalnilton rome, will lindsayin hayaan maricelmaxine whitney lakevoncallie dominic. So [facility] [phone].    ATENCIÓN: Si habla español, tiene a ocampo disposición servicios gratuitos de asistencia lingüística. Enriqueta al [phone].    We comply with applicable federal civil rights laws and [place] laws. We do not discriminate on the basis of race, color, national origin, age, disability sex, sexual orientation or gender identity.            Thank you!     Thank you for choosing [facility]  for your care. Our goal is always to provide you with excellent care. Hearing back from our patients is one way we can continue to improve our services. Please take a few minutes to complete the written survey that you may receive in the mail after your visit with us. Thank you!             Your Updated Medication List - Protect others around you: Learn how to safely use, store and throw away your medicines at www.disposemymeds.org.          This list is accurate as of: 9/28/17 10:03 AM.  Always use your most recent med list.                   Brand Name Dispense Instructions for use Diagnosis    albuterol 108 (90 BASE) MCG/ACT Inhaler    PROAIR HFA/PROVENTIL HFA/VENTOLIN HFA    1 Inhaler    Inhale 2 puffs into the lungs every 6 hours as needed for shortness of breath / dyspnea or wheezing    Mild intermittent asthma, uncomplicated       COCONUT OIL PO      Take 2,000 mg by mouth        Glatiramer Acetate 40 MG/ML Sosy     36 Syringe    Inject 40 mg Subcutaneous three times a week    Multiple sclerosis (H)       hydrochlorothiazide 25 MG tablet    HYDRODIURIL    90 tablet    Take 1 tablet (25 mg) by mouth daily Fill the prescription when patient is due for refills    Essential hypertension with goal blood pressure less than 140/90       MULTIPLE VITAMIN PO      Take 1 tablet by mouth daily.        temazepam 15 MG capsule    RESTORIL    60 capsule    Take 2 capsules (30 mg) by mouth nightly  as needed for sleep    Sleep disorder       VITAMIN D3 PO      Take 5,000 Units by mouth daily

## 2017-09-28 NOTE — LETTER
9/28/2017        RE: Renea Trotter  [address] [address] [address]          MULTIPLE SCLEROSIS CLINIC AT THE [facility]  FOLLOWUP/ESTABLISHED PATIENT VISIT      PRINCIPAL NEUROLOGIC DIAGNOSIS: Multiple Sclerosis      Date of Onset: 2003  Date of Diagnosis: 6/25/2012  Initial Clinical Course: Relapsing remitting  Current Clinical Course: Relapsing remitting  Past Disease Modifying Therapy(ies): NA   Current Disease Modifying Therapy(ies): Copaxone  Most Recent MRI of the Brain: 12/10/2016  Most Recent MRI of the Cervical Cord 12/2016  Most Recent MRI of the Thoracic Cord: NA  Most Recent Lumbar Puncture: 5/4/2012 Positive OCB      CHIEF COMPLAINT: Multiple sclerosis    HPI:      She first developed symptoms of numbness in the fingers of her right hand. She also had some numbness of her leg. These symptoms last for a couple of months. During this time she had a MRI with reportedly non-specific white spots. She did well until 11/2011. After going back to work, she had some increasing numbness in her right hand. Physical therapy and chiropracty did not help.   Because of this, the patient was referred to Neurology, was seen by Dr. Cielo Archuleta who evaluated the patient and the patient had EMG nerve conduction study which was performed 02/12, which was normal in the right upper extremity.  Next, she had an MRI of the brain that demonstrated greater than 30 T2 lesions in the white matter without  enhancement. She presented to Dr. Butler in 4/2012. Her MRI at the time demonstrated lesion in the cervical spine. She had an LP with OCBs.  She was started on Copaxone in June of 2012. Overall, she has done well on Copaxone.       INTERVAL HISTORY:    Currently she report her symptoms are a little bit worse. She reports that she is having numbness in her arm. She still has a numbness a tingling in right hand. It does not bother her much. She describes     She reports that she has developed  worsening weakness about a year ago. She reports that nothing makes her weakness better or wose. This has slowly been getting worse over time.     Issues with current MS therapy: Tolerating copaxone without any side effects.    REVIEW OF SYSTEMS:    Mood: Ok, gets a little bit iratible occasionally at   Spasticity:None  Bladder: Frequency  Bowel: None problems  Pain related to today's visit:reviewed on nursing intake documentation  Fatigue: No complaints  Sleep: Difficulty falling asleep at night  Memory/Concentration: no change    PAST HISTORY was reviewed and updated:      MEDICATIONS and ALLERGIES were reviewed and updated.    SOCIAL HISTORY was reviewed and updated:    Current living situation: at home  Current vocational status: works    EXAM:    PHYSICAL EXAMINATION:   VITAL SIGNS:  B/P: 137/89, T: 98.2, P: 96, R: Data Unavailable    GENERAL: The patient is a well-nourished who presents to the evaluation alone.    NEUROLOGIC:   MENTAL STATUS: Alert,awake and oriented times four.   CRANIAL NERVES: Visual acuity is 20/20 in OD unable to see movement or shadows in OS. Visual fields are full to confrontation.  Extraocular movements are  intact with no  internuclear ophthalmoplegia. No nystagmus. Facial strength and sensation are  normal. Hearing is  normal. Palate elevation and tongue protrusion are normal.   POWER:     Motor    Upper      Right Left   Shoulder Abduction 5 5   Elbow Flexion 5 5   Elbow Extension 5 5   Wrist Extension 5 5   Digit Extension 5 5   Digit Flexion 4+ 5   APB 4+ 5   Tone 2 Normal 2 Normal   Lower       Right Left   Hip Flexion 5 5   Knee Extension 5 5   Knee Flexion 5 5   Foot Dorsiflexion 5 5   Foot Plantar Flexion 5 5   EH 5 5   Toe Flexion 5 5   Tone 2 Normal 2 Normal         SENSORY:   Light touch Intact  Vibration: Intact in all extremities  Temperature: Intact  \  REFLEXES:     Reflexes       Right  Left   Biceps 2  2   Triceps 2  2   Brachioradialis 2  2   Patellar  2  2    Achilles 2  2               MOTOR/CEREBELLAR:    Right Left   RRM 0 Normal 1 Abnormal   JANELL 0 Normal 1 Abnormal   FTN 0 Normal 1 Abnormal   RRM 0 Normal 0 Normal   HKS 0 Normal 0 Normal           GAIT: Gait is   narrow-based and steady and the patient is  able to walk on heels, toes and in tandem without difficulty.    [place] with eyes closed    RESULTS:  Monitoring labs:  LABS    Office Visit on 03/30/2017   Component Date Value Ref Range Status     Vitamin D Deficiency screening 03/30/2017 81* 20 - 75 ug/L Final   ]      MRI brain:    MRI Cervical Spine 12/2016: A few T2 lesions without enhancement     MRI brain 12/10/2016: Multiple punctate T2 lesions without any enhancement. Lesions are consistent but not classic with the diagnosis of MS.      ASSESSMENT/PLAN:  Patient is a 47-year-old woman with a past medical history multiple sclerosis is presented today establish care. She had previously seen by Dr. hunter. While her MRI of the brain is nonspecific, [place] in her cervical spine and positive all cold and strongly suggest that diagnosis is multiple sclerosis. Therefore, I agree with his diagnosis of multiple sclerosis and his management to date with contacts from. Patient has had no clinical relapses on Copaxone and her MRI has been stable. I will continue around by accident this time. Originally I wass planning on getting an MRI 2 months so she can have an annual MRI to ensure Copaxone s effectiveness. However, the patient wished to get an MRI when she followed up in 6 months. Consequently, I will follow the patient up in 6 months with member.    I will refer the patient sleeping aid at this time. Also per the patient s request, I will have the patient get her flu shot.  I spent extensive time counseling patients on issues of prognosis, menopause s effect on multiple sclerosis, potential alternative medicine treatments, diet, the flu shot and gut microbiom.    Plan  Referil Restoril  Get flu  shot  Get MRI of brain in 6 months with follow up     I spent 60 minutes with the patient, greater than 50% in counseling and coordination of care.    Regan Mcgregor MD [facility]  Staff Neurologist   09/28/17               Injectable Influenza Immunization Documentation    1.  Is the person to be vaccinated sick today?   No    2. Does the person to be vaccinated have an allergy to a component   of the vaccine?   No    3. Has the person to be vaccinated ever had a serious reaction   to influenza vaccine in the past?   No    4. Has the person to be vaccinated ever had Guillain-Barré syndrome?   No    Form completed by Brandy Dawson CMA               Sincerely,        Regan Mcgregor MD

## 2017-09-28 NOTE — PATIENT INSTRUCTIONS
Will follow up 6 month    Will get a MRI in 6 months    You saw a neurology provider today at the [facility].  You may have also met with the MS RN Care Coordinator.  In order to get a message to your MS Center provider, you should contact [phone] option [phone] for the triage nurse line.    You should contact us via this protocol if you have any of the following symptoms:    New or worsening neurologic symptoms that persist for 24-48 hours, such as:  o New onset of pain or marked worsening of pain  o Difficulty with speech, swallowing, or breathing  o New onset of vertigo or dizziness  o Change in bowel or bladder function (incontinence, difficulty urinating)    Increasing difficulty in self care    Marked changes in vision (double vision, blurred vision, graying of vision)    Change in mobility    Change in cognitive function    Falling    Worsening numbness, tingling or pain with a change in function    Worsening fatigue lasting more than 2 weeks  If you had labs completed today, we will contact you with the results.  If you are active in [employer], they will be released to you there.  Otherwise, your results will be provided to you via mail or telephone call.  Some results take up to 2 weeks for completion.  If you haven t heard anything about your lab results within 2 weeks, you can call or send a [employer] message to obtain your results.  If you have an MRI scheduled in the week or two prior to your next appointment, we will go over the results at your scheduled follow up appointment.  If you are not scheduled to see your MS Center provider within about 2 weeks after your MRI, please call or send a [employer] message to obtain your results if you haven t heard anything within 2 weeks.  Please be aware that it takes at least 5 business days after routine MRIs for your results to be reviewed by both the radiologist and your doctor.  MRIs completed at facilities outside of the  [facility] system take about 2 weeks in order for the MRI disc to be mailed to our clinic and uploaded into your medical record.    Prescription refills should be faxed to us by your pharmacy.  Our fax number for prescription refills is [phone].  Please do your best to come to your appointments, and to arrive 15 minutes early to allow time for checking in.  [facility] reserves the right to terminate care of established patients if a patient misses three or more appointments in a clinic without providing notification within a 12-month period.    Developing Your Care Team  Individuals living with chronic illnesses like MS may be unaware that they are at risk for the same range of medical problems as everyone else.  This is why you must establish a relationship with other health care providers in addition to your Multiple Sclerosis doctor.  It can be difficult at times to figure out whether a health concern is related to your MS, or whether it is related to something else, such as hormonal changes, pseduoexacerbations, changes in your core body temperature, flu-like reactions to interferons, exercise, or infections.  Urinary tract infections (UTIs) are common culprits that can cause fatigue, weakness, or other  MS attack -like symptoms without classic symptoms of a UTI.  For this reason, if you call or come in to discuss symptoms, you may be asked to get in touch with your primary provider or another specialist, so that you receive the comprehensive care you need.  What is Multiple Sclerosis (MS)?  MS is a disease in which the nerve tissues in the brain and/or spinal cord are attacked by immune cells in the body.  These immune cells are present in everyone, and their normal role is to fight off infections.  In people with MS, these cells change the way they function and cross into the nervous system.  Once there, they cause inflammation that damages the myelin (or the protective coating of a  nerve cell, much like the plastic covering on an electrical cord) and parts of the nerve cell itself.  So far, a clear cause for this immune system dysfunction has not been found.  MS often starts out as the  relapsing-remitting  form.  This means there are episodes when you have symptoms, and other times when you recover to normal or near-normal.  Over time, if the damage to the nervous system continues, the disease can cause additional disability, such as difficulty walking.  If the relapses and nerve damage can be prevented with available medications, many patients with MS can go many years between relapses and have relatively little disability.  Remember: MS is a condition that changes and must be evaluated on an ongoing basis!  What is a Relapse? (Also called flare-ups, attacks, or exacerbations)  Relapses are due to the occurrence of inflammation in some part of the brain and/or spinal cord.  A relapse is new or recurrent symptoms which persist for at least 24 hours and sometimes worsen over 48 hours.  New symptoms need to be  by at least 1 month in order to be considered separate relapses. Most of the time, symptoms reach their maximal intensity within 2 weeks and then begin to slowly resolve.  At times, your symptoms may not recover fully for up to 6 months, depending on the severity of the episode.  The frequency of relapses is generally higher early in the disease, but can vary greatly among individuals with MS.  Improvement of symptoms for an individual is unpredictable with each relapse.    It is important to remember that an increase in symptoms and changes in function may not necessarily be a relapse.  There are other factors that contribute to such changes, such as hot weather, increased body temperature, infection/illness, stress and sleep deprivation.  The worsening of symptoms may feel like a relapse when in reality it is not.  These episodes are referred to as pseudorelapses.  Once the  underlying cause is addressed, symptoms usually fade away and you feel better.  If you experience a worsening of symptoms that lasts more than 48 hours and does not improve with cooling down, decreasing stress, or treatment of an infection, please call us and we can help to better determine whether you are having a pseudorelapse versus a relapse.

## 2017-10-07 ENCOUNTER — HEALTH MAINTENANCE LETTER (OUTPATIENT)
Age: 47
End: 2017-10-07

## 2017-10-13 ENCOUNTER — MYC MEDICAL ADVICE (OUTPATIENT)
Dept: NEUROLOGY | Facility: CLINIC | Age: 47
End: 2017-10-13

## 2017-10-13 ENCOUNTER — OFFICE VISIT (OUTPATIENT)
Dept: PEDIATRICS | Facility: CLINIC | Age: 47
End: 2017-10-13
Payer: COMMERCIAL

## 2017-10-13 VITALS
BODY MASS INDEX: 26.54 KG/M2 | WEIGHT: 149.8 LBS | DIASTOLIC BLOOD PRESSURE: 80 MMHG | OXYGEN SATURATION: 97 % | TEMPERATURE: 97.7 F | HEART RATE: 84 BPM | SYSTOLIC BLOOD PRESSURE: 120 MMHG

## 2017-10-13 DIAGNOSIS — E87.6 HYPOKALEMIA: ICD-10-CM

## 2017-10-13 DIAGNOSIS — M54.50 BILATERAL LOW BACK PAIN WITHOUT SCIATICA, UNSPECIFIED CHRONICITY: ICD-10-CM

## 2017-10-13 DIAGNOSIS — I10 ESSENTIAL HYPERTENSION WITH GOAL BLOOD PRESSURE LESS THAN 140/90: ICD-10-CM

## 2017-10-13 DIAGNOSIS — Z00.00 ENCOUNTER FOR ROUTINE ADULT HEALTH EXAMINATION WITHOUT ABNORMAL FINDINGS: Primary | ICD-10-CM

## 2017-10-13 DIAGNOSIS — Z00.00 ENCOUNTER FOR ROUTINE ADULT HEALTH EXAMINATION WITHOUT ABNORMAL FINDINGS: ICD-10-CM

## 2017-10-13 DIAGNOSIS — Z13.6 CARDIOVASCULAR SCREENING; LDL GOAL LESS THAN 130: ICD-10-CM

## 2017-10-13 DIAGNOSIS — J45.20 MILD INTERMITTENT ASTHMA, UNCOMPLICATED: ICD-10-CM

## 2017-10-13 DIAGNOSIS — Z12.4 SCREENING FOR MALIGNANT NEOPLASM OF CERVIX: ICD-10-CM

## 2017-10-13 DIAGNOSIS — Z13.29 SCREENING FOR THYROID DISORDER: ICD-10-CM

## 2017-10-13 DIAGNOSIS — G35 MS (MULTIPLE SCLEROSIS) (H): ICD-10-CM

## 2017-10-13 LAB
ALBUMIN SERPL-MCNC: 3.9 G/DL (ref 3.4–5)
ALP SERPL-CCNC: 64 U/L (ref 40–150)
ALT SERPL W P-5'-P-CCNC: 63 U/L (ref 0–50)
ANION GAP SERPL CALCULATED.3IONS-SCNC: 7 MMOL/L (ref 3–14)
AST SERPL W P-5'-P-CCNC: 42 U/L (ref 0–45)
BILIRUB SERPL-MCNC: 0.8 MG/DL (ref 0.2–1.3)
BUN SERPL-MCNC: 18 MG/DL (ref 7–30)
CALCIUM SERPL-MCNC: 9 MG/DL (ref 8.5–10.1)
CHLORIDE SERPL-SCNC: 101 MMOL/L (ref 94–109)
CHOLEST SERPL-MCNC: 199 MG/DL
CO2 SERPL-SCNC: 30 MMOL/L (ref 20–32)
CREAT SERPL-MCNC: 0.87 MG/DL (ref 0.52–1.04)
GFR SERPL CREATININE-BSD FRML MDRD: 69 ML/MIN/1.7M2
GLUCOSE SERPL-MCNC: 84 MG/DL (ref 70–99)
HDLC SERPL-MCNC: 52 MG/DL
LDLC SERPL CALC-MCNC: 113 MG/DL
NONHDLC SERPL-MCNC: 147 MG/DL
POTASSIUM SERPL-SCNC: 3.2 MMOL/L (ref 3.4–5.3)
PROT SERPL-MCNC: 7.8 G/DL (ref 6.8–8.8)
SODIUM SERPL-SCNC: 138 MMOL/L (ref 133–144)
TRIGL SERPL-MCNC: 169 MG/DL
TSH SERPL DL<=0.005 MIU/L-ACNC: 2.19 MU/L (ref 0.4–4)

## 2017-10-13 PROCEDURE — 99396 PREV VISIT EST AGE 40-64: CPT | Performed by: NURSE PRACTITIONER

## 2017-10-13 PROCEDURE — 87624 HPV HI-RISK TYP POOLED RSLT: CPT | Performed by: NURSE PRACTITIONER

## 2017-10-13 PROCEDURE — 80053 COMPREHEN METABOLIC PANEL: CPT | Performed by: PSYCHIATRY & NEUROLOGY

## 2017-10-13 PROCEDURE — 36415 COLL VENOUS BLD VENIPUNCTURE: CPT | Performed by: PSYCHIATRY & NEUROLOGY

## 2017-10-13 PROCEDURE — 84443 ASSAY THYROID STIM HORMONE: CPT | Performed by: PSYCHIATRY & NEUROLOGY

## 2017-10-13 PROCEDURE — 80061 LIPID PANEL: CPT | Performed by: PSYCHIATRY & NEUROLOGY

## 2017-10-13 PROCEDURE — G0145 SCR C/V CYTO,THINLAYER,RESCR: HCPCS | Performed by: NURSE PRACTITIONER

## 2017-10-13 RX ORDER — HYDROCHLOROTHIAZIDE 25 MG/1
25 TABLET ORAL DAILY
Qty: 90 TABLET | Refills: 1 | Status: SHIPPED | OUTPATIENT
Start: 2017-10-13 | End: 2018-04-11

## 2017-10-13 RX ORDER — ALBUTEROL SULFATE 90 UG/1
2 AEROSOL, METERED RESPIRATORY (INHALATION) EVERY 6 HOURS PRN
Qty: 1 INHALER | Refills: 2 | Status: SHIPPED | OUTPATIENT
Start: 2017-10-13 | End: 2019-05-04

## 2017-10-13 RX ORDER — POTASSIUM CHLORIDE 1125 MG/1
20 TABLET, EXTENDED RELEASE ORAL DAILY
Qty: 90 TABLET | Refills: 3 | Status: SHIPPED | OUTPATIENT
Start: 2017-10-13 | End: 2017-10-16

## 2017-10-13 NOTE — MR AVS SNAPSHOT
After Visit Summary   10/13/2017    Renea Trotter    MRN: [id number]           Patient Information     Date Of Birth          1970        Visit Information        Provider Department      10/13/2017 7:50 AM Milana Mcgregor APRN CNP [facility]        Today's Diagnoses     Encounter for routine adult health examination without abnormal findings    -  1    Essential hypertension with goal blood pressure less than 140/90        Screening for malignant neoplasm of cervix        Hypokalemia        Mild intermittent asthma, uncomplicated        Bilateral low back pain without sciatica, unspecified chronicity          Care Instructions    PLAN:   1.   Symptomatic therapy suggested: add on potassium once a day.    2.  Orders Placed This Encounter   Medications     hydrochlorothiazide (HYDRODIURIL) 25 MG tablet     Sig: Take 1 tablet (25 mg) by mouth daily Fill the prescription when patient is due for refills     Dispense:  90 tablet     Refill:  1     Potassium Chloride Drea CR 15 MEQ TBCR     Sig: Take 20 mEq by mouth daily     Dispense:  90 tablet     Refill:  3     albuterol (PROAIR HFA/PROVENTIL HFA/VENTOLIN HFA) 108 (90 BASE) MCG/ACT Inhaler     Sig: Inhale 2 puffs into the lungs every 6 hours as needed for shortness of breath / dyspnea or wheezing     Dispense:  1 Inhaler     Refill:  2     Orders Placed This Encounter   Procedures     Asthma Action Plan (AAP)     HPV High Risk Types DNA Cervical     Pap imaged thin layer screen with HPV - recommended age 30 - 65 years (select HPV order below)     PHYSICAL THERAPY REFERRAL       3. Patient needs to follow up in if no improvement,or sooner if worsening of symptoms or other symptoms develop.  Will follow up and/or notify patient of  results via My Chart to determine further need for followup  Follow up office visit in one year for annual health maintenance exam, sooner PRN.    Preventive Health Recommendations  Female Ages  40 to 49    Yearly exam:     See your health care provider every year in order to  1. Review health changes.   2. Discuss preventive care.    3. Review your medicines if your doctor prescribed any.      Get a Pap test every three years (unless you have an abnormal result and your provider advises testing more often).      If you get Pap tests with HPV test, you only need to test every 5 years, unless you have an abnormal result. You do not need a Pap test if your uterus was removed (hysterectomy) and you have not had cancer.      You should be tested each year for STDs (sexually transmitted diseases), if you're at risk.       Ask your doctor if you should have a mammogram.      Have a colonoscopy (test for colon cancer) if someone in your family has had colon cancer or polyps before age 50.       Have a cholesterol test every 5 years.       Have a diabetes test (fasting glucose) after age 45. If you are at risk for diabetes, you should have this test every 3 years.    Shots: Get a flu shot each year. Get a tetanus shot every 10 years.     Nutrition:     Eat at least 5 servings of fruits and vegetables each day.    Eat whole-grain bread, whole-wheat pasta and brown rice instead of white grains and rice.    Talk to your provider about Calcium and Vitamin D.     Lifestyle    Exercise at least 150 minutes a week (an average of 30 minutes a day, 5 days a week). This will help you control your weight and prevent disease.    Limit alcohol to one drink per day.    No smoking.     Wear sunscreen to prevent skin cancer.    See your dentist every six months for an exam and cleaning.  It was a pleasure seeing you today at the [facility]. Thank you for allowing us to care for you today. We truly hope we provided you with the excellent service you deserve. Please let us know if there is anything else we can do for you so we can be sure you are leaving completley satisfied with your care experience.        General information about your clinic   Clinic Hours Lab Hours (Appointments are required)   Mon-Thurs: 7:30 AM - 7 PM Mon-Thurs: 7:30 AM - 7 PM   Fri: 7:30 AM - 5 PM Fri: 7:30 AM - 5 PM        After Hours Nurse Advise & Appts:  [facility] Nurse Advisors: [phone]  [facility] On Call: to make appointments anytime: [phone] On Call Physician: call [phone] and answering service will page the on call physician.        For urgent appointments, please call [phone] and ask for the triage nurse or your care team clinic nurse.  How to contact my care team:  MyChart: [email or web address]   Phone: [phone]   Fax: [phone]       [facility]:   Phone: [phone]  Hours: 8:00 AM - 6:00 PM  Medication Refills:  Call your pharmacy and they will forward the refill to us. Please allow 3 business days for your refills to be completed.       Normal or non-critical lab and imaging results will be communicated to you by MyChart, letter or phone within 7 days.  If you do not hear from us within 10 days, please call the clinic. If you have a critical or abnormal lab result, we will notify you by phone as soon as possible.       We now have PWIC (Pediatric Walk in Care)  Monday-Friday from 7:30-4. Simply walk in and be seen for your urgent needs like cough, fever, rash, diarrhea or vomiting, pink eye, UTI. No appointments needed. Ask one of the team for more information      -Your Care Team:    Dr. John Jewell - Internal Medicine/Pediatrics   Dr. Cara Clements - Family Medicine  Dr. Henrietta Londono - Pediatrics  Dr. Massiel Krause - Pediatrics  Milana Mcgregor CNP - Family Practice Nurse Practitioner            Follow-ups after your visit        Additional Services     PHYSICAL THERAPY REFERRAL       *This therapy referral will be filtered to a centralized scheduling office at [facility] and the patient will receive a call to schedule an appointment at a [facility] location most  "convenient for them. *     [facility] provides Physical Therapy evaluation and treatment and many specialty services across the [facility] system.  If requesting a specialty program, please choose from the list below.    If you have not heard from the scheduling office within 2 business days, please call [phone] for all locations, with the exception of [place], please call [phone].  Treatment: Evaluation & Treatment  Special Instructions/Modalities:   Special Programs: None    Please be aware that coverage of these services is subject to the terms and limitations of your health insurance plan.  Call member services at your health plan with any benefit or coverage questions.      **Note to Provider:  If you are referring outside of [facility] for the therapy appointment, please list the name of the location in the \"special instructions\" above, print the referral and give to the patient to schedule the appointment.                  Your next 10 appointments already scheduled     Mar 15, 2018  8:45 AM CDT   MR BRAIN W/O & W CONTRAST with [facility]   [facility]    [address] [place] [id number] [phone]           Take your medicines as usual, unless your doctor tells you not to. Bring a list of your current medicines to your exam (including vitamins, minerals and over-the-counter drugs).  You will be given intravenous contrast for this exam. To prepare:   The day before your exam, drink extra fluids at least six 8-ounce glasses (unless your doctor tells you to restrict your fluids).   Have a blood test (creatinine test) within 30 days of your exam. Go to your clinic or Diagnostic Imaging Department for this test.  The MRI machine uses a strong magnet. Please wear clothes without metal (snaps, zippers). A sweatsuit works well, or we may give you a hospital gown.  Please remove any body piercings and hair extensions before you " arrive. You will also remove watches, jewelry, hairpins, wallets, dentures, partial dental plates and hearing aids. You may wear contact lenses, and you may be able to wear your rings. We have a safe place to keep your personal items, but it is safer to leave them at home.   **IMPORTANT** THE INSTRUCTIONS BELOW ARE ONLY FOR THOSE PATIENTS WHO HAVE BEEN TOLD THEY WILL RECEIVE SEDATION OR GENERAL ANESTHESIA DURING THEIR MRI PROCEDURE:  IF YOU WILL RECEIVE SEDATION (take medicine to help you relax during your exam):   You must get the medicine from your doctor before you arrive. Bring the medicine to the exam. Do not take it at home.   Arrive one hour early. Bring someone who can take you home after the test. Your medicine will make you sleepy. After the exam, you may not drive, take a bus or take a taxi by yourself.   No eating 8 hours before your exam. You may have clear liquids up until 4 hours before your exam. (Clear liquids include water, clear tea, black coffee and fruit juice without pulp.)  IF YOU WILL RECEIVE ANESTHESIA (be asleep for your exam):   Arrive 1 1/2 hours early. Bring someone who can take you home after the test. You may not drive, take a bus or take a taxi by yourself.   No eating 8 hours before your exam. You may have clear liquids up until 4 hours before your exam. (Clear liquids include water, clear tea, black coffee and fruit juice without pulp.)  Please call the Imaging Department at your exam site with any questions.            Mar 15, 2018 10:00 AM CDT   Return Visit with Regan Mcgregor MD   [facility] ([facility])    [id number] [place] [id number] [phone]              Who to contact     If you have questions or need follow up information about today's clinic visit or your schedule please contact [facility] directly at [phone].  Normal or non-critical lab and imaging results will be communicated to  you by MyChart, letter or phone within 4 business days after the clinic has received the results. If you do not hear from us within 7 days, please contact the clinic through [employer] or phone. If you have a critical or abnormal lab result, we will notify you by phone as soon as possible.  Submit refill requests through [employer] or call your pharmacy and they will forward the refill request to us. Please allow 3 business days for your refill to be completed.          Additional Information About Your Visit        [facility] [employer] Information     [employer] gives you secure access to your electronic health record. If you see a primary care provider, you can also send messages to your care team and make appointments. If you have questions, please call your primary care clinic.  If you do not have a primary care provider, please call [phone] and they will assist you.      [employer] is an electronic gateway that provides easy, online access to your medical records. With [employer], you can request a clinic appointment, read your test results, renew a prescription or communicate with your care team.     To access your existing account, please contact your [facility] or call [phone] for assistance.        Care EveryWhere ID     This is your Care EveryWhere ID. This could be used by other organizations to access your [place] medical records  [id number]        Your Vitals Were     Pulse Temperature Last Period Pulse Oximetry Breastfeeding? BMI (Body Mass Index)    84 97.7  F (36.5  C) (Temporal) 09/26/2017 97% No 26.54 kg/m2       Blood Pressure from Last 3 Encounters:   10/13/17 120/80   09/28/17 137/89   07/07/17 124/80    Weight from Last 3 Encounters:   10/13/17 149 lb 12.8 oz (67.9 kg)   09/28/17 150 lb (68 kg)   07/07/17 144 lb 14.4 oz (65.7 kg)              We Performed the Following     Asthma Action Plan (AAP)     HPV High Risk Types DNA Cervical     Pap imaged thin layer screen with HPV -  recommended age 30 - 65 years (select HPV order below)     PHYSICAL THERAPY REFERRAL          Today's Medication Changes          These changes are accurate as of: 10/13/17  8:35 AM.  If you have any questions, ask your nurse or doctor.               Start taking these medicines.        Dose/Directions    Potassium Chloride Drea CR 15 MEQ Tbcr   Commonly known as:  KLOR-CON   Used for:  Hypokalemia   Started by:  Milana Mcgregor APRN CNP        Dose:  20 mEq   Take 20 mEq by mouth daily   Quantity:  90 tablet   Refills:  3            Where to get your medicines      These medications were sent to [facility] - [place], [place] - [address] [place] [address]     Phone:  [phone]     albuterol 108 (90 BASE) MCG/ACT Inhaler    hydrochlorothiazide 25 MG tablet    Potassium Chloride Drea CR 15 MEQ Tbcr                Primary Care Provider Office Phone # Fax #    Cara Clements [phone] [address]  [facility] [address]        Equal Access to Services     [facility] AH: Hadii aad ku hadasho Soomaali, waaxda luqadaha, qaybta kaalmada adeegyada, waxay idiin hayshajin peter govea . So [facility] [phone].    ATENCIÓN: Si habla español, tiene a ocampo disposición servicios gratuitos de asistencia lingüística. [facility] [phone].    We comply with applicable federal civil rights laws and [place] laws. We do not discriminate on the basis of race, color, national origin, age, disability, sex, sexual orientation, or gender identity.            Thank you!     Thank you for choosing [facility]  for your care. Our goal is always to provide you with excellent care. Hearing back from our patients is one way we can continue to improve our services. Please take a few minutes to complete the written survey that you may receive in the mail after your visit with us. Thank you!             Your Updated Medication List -  Protect others around you: Learn how to safely use, store and throw away your medicines at www.disposemymeds.org.          This list is accurate as of: 10/13/17  8:35 AM.  Always use your most recent med list.                   Brand Name Dispense Instructions for use Diagnosis    albuterol 108 (90 BASE) MCG/ACT Inhaler    PROAIR HFA/PROVENTIL HFA/VENTOLIN HFA    1 Inhaler    Inhale 2 puffs into the lungs every 6 hours as needed for shortness of breath / dyspnea or wheezing    Mild intermittent asthma, uncomplicated       COCONUT OIL PO      Take 2,000 mg by mouth        Glatiramer Acetate 40 MG/ML Sosy     36 Syringe    Inject 40 mg Subcutaneous three times a week    Multiple sclerosis (H)       hydrochlorothiazide 25 MG tablet    HYDRODIURIL    90 tablet    Take 1 tablet (25 mg) by mouth daily Fill the prescription when patient is due for refills    Essential hypertension with goal blood pressure less than 140/90       MULTIPLE VITAMIN PO      Take 1 tablet by mouth daily.        Potassium Chloride Drea CR 15 MEQ Tbcr    KLOR-CON    90 tablet    Take 20 mEq by mouth daily    Hypokalemia       temazepam 15 MG capsule    RESTORIL    60 capsule    Take 2 capsules (30 mg) by mouth nightly as needed for sleep    Sleep disorder       VITAMIN D3 PO      Take 5,000 Units by mouth daily

## 2017-10-13 NOTE — PATIENT INSTRUCTIONS
PLAN:   1.   Symptomatic therapy suggested: add on potassium once a day.    2.  Orders Placed This Encounter   Medications     hydrochlorothiazide (HYDRODIURIL) 25 MG tablet     Sig: Take 1 tablet (25 mg) by mouth daily Fill the prescription when patient is due for refills     Dispense:  90 tablet     Refill:  1     Potassium Chloride Drea CR 15 MEQ TBCR     Sig: Take 20 mEq by mouth daily     Dispense:  90 tablet     Refill:  3     albuterol (PROAIR HFA/PROVENTIL HFA/VENTOLIN HFA) 108 (90 BASE) MCG/ACT Inhaler     Sig: Inhale 2 puffs into the lungs every 6 hours as needed for shortness of breath / dyspnea or wheezing     Dispense:  1 Inhaler     Refill:  2     Orders Placed This Encounter   Procedures     Asthma Action Plan (AAP)     HPV High Risk Types DNA Cervical     Pap imaged thin layer screen with HPV - recommended age 30 - 65 years (select HPV order below)     PHYSICAL THERAPY REFERRAL       3. Patient needs to follow up in if no improvement,or sooner if worsening of symptoms or other symptoms develop.  Will follow up and/or notify patient of  results via My Chart to determine further need for followup  Follow up office visit in one year for annual health maintenance exam, sooner PRN.    Preventive Health Recommendations  Female Ages 40 to 49    Yearly exam:     See your health care provider every year in order to  1. Review health changes.   2. Discuss preventive care.    3. Review your medicines if your doctor prescribed any.      Get a Pap test every three years (unless you have an abnormal result and your provider advises testing more often).      If you get Pap tests with HPV test, you only need to test every 5 years, unless you have an abnormal result. You do not need a Pap test if your uterus was removed (hysterectomy) and you have not had cancer.      You should be tested each year for STDs (sexually transmitted diseases), if you're at risk.       Ask your doctor if you should have a  mammogram.      Have a colonoscopy (test for colon cancer) if someone in your family has had colon cancer or polyps before age 50.       Have a cholesterol test every 5 years.       Have a diabetes test (fasting glucose) after age 45. If you are at risk for diabetes, you should have this test every 3 years.    Shots: Get a flu shot each year. Get a tetanus shot every 10 years.     Nutrition:     Eat at least 5 servings of fruits and vegetables each day.    Eat whole-grain bread, whole-wheat pasta and brown rice instead of white grains and rice.    Talk to your provider about Calcium and Vitamin D.     Lifestyle    Exercise at least 150 minutes a week (an average of 30 minutes a day, 5 days a week). This will help you control your weight and prevent disease.    Limit alcohol to one drink per day.    No smoking.     Wear sunscreen to prevent skin cancer.    See your dentist every six months for an exam and cleaning.  It was a pleasure seeing you today at the [facility]. Thank you for allowing us to care for you today. We truly hope we provided you with the excellent service you deserve. Please let us know if there is anything else we can do for you so we can be sure you are leaving completley satisfied with your care experience.       General information about your clinic   Clinic Hours Lab Hours (Appointments are required)   Mon-Thurs: 7:30 AM - 7 PM Mon-Thurs: 7:30 AM - 7 PM   Fri: 7:30 AM - 5 PM Fri: 7:30 AM - 5 PM        After Hours Nurse Advise & Appts:  [facility] Advisors: [phone]  [facility] On Call: to make appointments anytime: [phone] On Call Physician: call [phone] and answering service will page the on call physician.        For urgent appointments, please call [phone] and ask for the triage nurse or your care team clinic nurse.  How to contact my care team:  MyChart: [email or web address]   Phone: [phone]   Fax: [phone]       [facility]:   Phone: [phone]  Hours: 8:00 AM - 6:00 PM  Medication Refills:  Call your pharmacy and they will forward the refill to us. Please allow 3 business days for your refills to be completed.       Normal or non-critical lab and imaging results will be communicated to you by MyChart, letter or phone within 7 days.  If you do not hear from us within 10 days, please call the clinic. If you have a critical or abnormal lab result, we will notify you by phone as soon as possible.       We now have PWIC (Pediatric Walk in Care)  Monday-Friday from 7:30-4. Simply walk in and be seen for your urgent needs like cough, fever, rash, diarrhea or vomiting, pink eye, UTI. No appointments needed. Ask one of the team for more information      -Your Care Team:    Dr. John Jewell - Internal Medicine/Pediatrics   Dr. Cara Clements - Family Medicine  Dr. Henrietta Londono - Pediatrics  Dr. Massiel Krause - Pediatrics  Milana Mcgregor CNP - Family Practice Nurse Practitioner

## 2017-10-13 NOTE — NURSING NOTE
"Chief Complaint   Patient presents with     Physical     AFE       Initial /80 (BP Location: Right arm, Patient Position: Sitting, Cuff Size: Adult Regular)  Pulse 84  Temp 97.7  F (36.5  C) (Temporal)  Wt 149 lb 12.8 oz (67.9 kg)  LMP 09/26/2017  SpO2 97%  Breastfeeding? No  BMI 26.54 kg/m2 Estimated body mass index is 26.54 kg/(m^2) as calculated from the following:    Height as of 9/28/17: 5' 3\" (1.6 m).    Weight as of this encounter: 149 lb 12.8 oz (67.9 kg).  Medication Reconciliation: complete      MARTHA Gann      "

## 2017-10-13 NOTE — PROGRESS NOTES
SUBJECTIVE:   CC: Renea Trotter is an 47 year old woman who presents for preventive health visit.     Healthy Habits:    Do you get at least three servings of calcium containing foods daily (dairy, green leafy vegetables, etc.)? yes    Amount of exercise or daily activities, outside of work: varies on the weekend    Problems taking medications regularly No    Medication side effects: No    Have you had an eye exam in the past two years? yes    Do you see a dentist twice per year? yes    Do you have sleep apnea, excessive snoring or daytime drowsiness?no      Today's PHQ-2 Score:   PHQ-2 ( 1999 [employer]) 10/13/2017 10/12/2016   Q1: Little interest or pleasure in doing things 1 1   Q2: Feeling down, depressed or hopeless 1 1   PHQ-2 Score 2 2         Abuse: Current or Past(Physical, Sexual or Emotional)- No  Do you feel safe in your environment - Yes  Social History   Substance Use Topics     Smoking status: Never Smoker     Smokeless tobacco: Never Used     Alcohol use 0.6 - 1.2 oz/week     1 - 2 Standard drinks or equivalent per week      Comment: RARELY     The patient does not drink >3 drinks per day nor >7 drinks per week.    Reviewed orders with patient.  Reviewed health maintenance and updated orders accordingly - Yes  Labs reviewed in EPIC  BP Readings from Last 3 Encounters:   10/13/17 120/80   09/28/17 137/89   07/07/17 124/80    Wt Readings from Last 3 Encounters:   10/13/17 149 lb 12.8 oz (67.9 kg)   09/28/17 150 lb (68 kg)   07/07/17 144 lb 14.4 oz (65.7 kg)                  Patient Active Problem List   Diagnosis     Multiple sclerosis (H)     CARDIOVASCULAR SCREENING; LDL GOAL LESS THAN 160     BMI 26.0-26.9,adult     Urinary urgency     Headache     Low back pain without sciatica, unspecified back pain laterality     Sleep disorder     Plantar warts     Essential hypertension with goal blood pressure less than 140/90     Hemorrhoids, unspecified hemorrhoid type     Seborrheic keratosis     Past  Surgical History:   Procedure Laterality Date     HC TOOTH EXTRACTION W/FORCEP         Social History   Substance Use Topics     Smoking status: Never Smoker     Smokeless tobacco: Never Used     Alcohol use 0.6 - 1.2 oz/week     1 - 2 Standard drinks or equivalent per week      Comment: RARELY     Family History   Problem Relation Age of Onset     Hypertension Mother      Lipids Mother      Eye Disorder Father      CEREBROVASCULAR DISEASE Maternal Grandmother      CEREBROVASCULAR DISEASE Maternal Grandfather      Prostate Cancer Maternal Grandfather      Blood Disease Paternal Grandmother      Alzheimer Disease Paternal Grandfather      Multiple Sclerosis Cousin          Current Outpatient Prescriptions   Medication Sig Dispense Refill     hydrochlorothiazide (HYDRODIURIL) 25 MG tablet Take 1 tablet (25 mg) by mouth daily Fill the prescription when patient is due for refills 90 tablet 1     COCONUT OIL PO Take 2,000 mg by mouth       temazepam (RESTORIL) 15 MG capsule Take 2 capsules (30 mg) by mouth nightly as needed for sleep 60 capsule 3     Glatiramer Acetate 40 MG/ML SOSY Inject 40 mg Subcutaneous three times a week 36 Syringe 3     albuterol (PROAIR HFA, PROVENTIL HFA, VENTOLIN HFA) 108 (90 BASE) MCG/ACT inhaler Inhale 2 puffs into the lungs every 6 hours as needed for shortness of breath / dyspnea or wheezing 1 Inhaler 2     Cholecalciferol (VITAMIN D3 PO) Take 5,000 Units by mouth daily       MULTIPLE VITAMIN PO Take 1 tablet by mouth daily.       [DISCONTINUED] hydrochlorothiazide (HYDRODIURIL) 25 MG tablet Take 1 tablet (25 mg) by mouth daily Fill the prescription when patient is due for refills 90 tablet 1     No Known Allergies        Patient under age 50, mutual decision reflected in health maintenance.        Pertinent mammograms are reviewed under the imaging tab.  History of abnormal Pap smear: NO - age 30- 65 PAP every 3 years recommended    Review[facility] and [facility] as needed this visit [facility]  Meds  Med Hx  Surg Hx  Fam Hx  Soc Hx        Reviewed and updated as needed this visit by Provider        Past Medical History:   Diagnosis Date     HTN (hypertension)      MS (multiple sclerosis) (H)       Past Surgical History:   Procedure Laterality Date     HC TOOTH EXTRACTION W/FORCEP         ROS:  CONSTITUTIONAL:NEGATIVE for fever, chills, change in weight  INTEGUMENTARY/SKIN: NEGATIVE for worrisome rashes, moles or lesions  EYES: NEGATIVE for vision changes or irritation  ENT: NEGATIVE for ear, mouth and throat problems  RESP:NEGATIVE for significant cough or SOB  BREAST: NEGATIVE for masses, tenderness or discharge  CV: NEGATIVE for chest pain, palpitations or peripheral edema  GI: NEGATIVE for nausea, abdominal pain, heartburn, or change in bowel habits   female: normal menses, no unusual urinary symptoms and no unusual vaginal symptoms  MUSCULOSKELETAL:NEGATIVE for significant arthralgias or myalgia. Has issues with her low back almost every day   NEURO: NEGATIVE for weakness, dizziness or paresthesias  ENDOCRINE: NEGATIVE for temperature intolerance, skin/hair changes  HEME/ALLERGY/IMMUNE: NEGATIVE for bleeding problems  PSYCHIATRIC: NEGATIVE for changes in mood or affect     OBJECTIVE:   /80 (BP Location: Right arm, Patient Position: Sitting, Cuff Size: Adult Regular)  Pulse 84  Temp 97.7  F (36.5  C) (Temporal)  Wt 149 lb 12.8 oz (67.9 kg)  LMP 09/26/2017  SpO2 97%  Breastfeeding? No  BMI 26.54 kg/m2   Wt Readings from Last 4 Encounters:   10/13/17 149 lb 12.8 oz (67.9 kg)   09/28/17 150 lb (68 kg)   07/07/17 144 lb 14.4 oz (65.7 kg)   03/30/17 149 lb 14.4 oz (68 kg)       EXAM:  GENERAL: healthy, alert and no distress  EYES: Eyes grossly normal to inspection, PERRL and conjunctivae and sclerae normal  HENT: ear canals and TM's normal, nose and mouth without ulcers or lesions  NECK: no adenopathy, no asymmetry, masses, or scars and thyroid normal to palpation  RESP:  lungs clear to auscultation - no rales, rhonchi or wheezes  BREAST: normal without masses, tenderness or nipple discharge and no palpable axillary masses or adenopathy  CV: regular rate and rhythm, normal S1 S2, no S3 or S4, no murmur, click or rub, no peripheral edema and peripheral pulses strong  ABDOMEN: soft, nontender, no hepatosplenomegaly, no masses and bowel sounds normal   (female): normal female external genitalia, normal urethral meatus, vaginal mucosa pink, moist, well rugated, and normal cervix/adnexa/uterus without masses or discharge  MS: no gross musculoskeletal defects noted, no edema  Lumbosacral spine area reveals no local tenderness or mass.  Full and painless lumbosacral range of motion is noted. Straight leg raise is negative at 90 degrees on both sides. DTR's, motor strength and sensation normal, including heel and toe gait.  Peripheral pulses are palpable.SKIN: no suspicious lesions or rashes  NEURO: Normal strength and tone, mentation intact and speech normal  PSYCH: mentation appears normal, affect normal/bright  LYMPH: no cervical, supraclavicular, axillary, or inguinal adenopathy    ASSESSMENT/PLAN:   Renea was seen today for physical.    Diagnoses and all orders for this visit:    Encounter for routine adult health examination without abnormal findings    Essential hypertension with goal blood pressure less than 140/90  -     hydrochlorothiazide (HYDRODIURIL) 25 MG tablet; Take 1 tablet (25 mg) by mouth daily Fill the prescription when patient is due for refills    Screening for malignant neoplasm of cervix  -     HPV High Risk Types DNA Cervical  -     Pap imaged thin layer screen with HPV - recommended age 30 - 65 years (select HPV order below)    Hypokalemia  -     Add on :  Potassium Chloride Drea CR 15 MEQ TBCR; Take 20 mEq by mouth daily    Mild intermittent asthma, uncomplicated  -     albuterol (PROAIR HFA/PROVENTIL HFA/VENTOLIN HFA) 108 (90 BASE) MCG/ACT Inhaler; Inhale 2  "puffs into the lungs every 6 hours as needed for shortness of breath / dyspnea or wheezing  Continue current medications.    Bilateral low back pain without sciatica, unspecified chronicity  -     PHYSICAL THERAPY REFERRAL    PLAN:    Patient needs to follow up in if no improvement,or sooner if worsening of symptoms or other symptoms develop.  Will follow up and/or notify patient of  results via My Chart to determine further need for followup  Follow up office visit in one year for annual health maintenance exam, sooner PRN.    COUNSELING:   Reviewed preventive health counseling, as reflected in patient instructions  Special attention given to:        Regular exercise       Healthy diet/nutrition       Vision screening       Hearing screening       Contraception       Family planning       HIV screeninx in teen years, 1x in adult years, and at intervals if high risk       (Lily)menopause management       Consider lung cancer screening for ages 55-80 years and 30 pack-year smoking history        reports that she has never smoked. She has never used smokeless tobacco.    Estimated body mass index is 26.57 kg/(m^2) as calculated from the following:    Height as of 17: 5' 3\" (1.6 m).    Weight as of 17: 150 lb (68 kg).   Weight management plan: Discussed healthy diet and exercise guidelines and patient will follow up in 12 months in clinic to re-evaluate.    Counseling Resources:  ATP IV Guidelines  Pooled Cohorts Equation Calculator  Breast Cancer Risk Calculator  FRAX Risk Assessment  ICSI Preventive Guidelines  Dietary Guidelines for Americans, 2010  USDA's MyPlate  ASA Prophylaxis  Lung CA Screening    Milana Mcgregor, SIM CNP  M [facility]  "

## 2017-10-14 ASSESSMENT — ASTHMA QUESTIONNAIRES: ACT_TOTALSCORE: 23

## 2017-10-16 ENCOUNTER — TELEPHONE (OUTPATIENT)
Dept: PEDIATRICS | Facility: CLINIC | Age: 47
End: 2017-10-16

## 2017-10-16 DIAGNOSIS — E87.6 HYPOKALEMIA: ICD-10-CM

## 2017-10-16 RX ORDER — POTASSIUM CHLORIDE 1125 MG/1
15 TABLET, EXTENDED RELEASE ORAL DAILY
Qty: 90 TABLET | Refills: 3 | Status: SHIPPED | OUTPATIENT
Start: 2017-10-16 | End: 2018-09-18

## 2017-10-16 NOTE — PROGRESS NOTES
Shantell Trotter,    Attached are your test results.  -LDL(bad) cholesterol level is sightly elevated,  A diet high in fat and simple carbohydrates, genetics and being overweight can contribute to this. ADVISE: Exercise, a low fat, low carbohydrate diet and weight control are helpful to improve this.  Rechecking your fasting cholesterol panel in 12 months is recommended (Lipid w/ LDL reflex, DX:hyperlipidemia)  -TSH (thyroid stimulating hormone) level is normal which indicates normal thyroid function.   Please contact us if you have any questions.    Milana Mcgregor, CNP

## 2017-10-16 NOTE — TELEPHONE ENCOUNTER
"Received fax from pharmacy. They would like clarification for klor-con M15 ER tabs. \" please clarify the directions- potassium cloride 15 meq, but directions say to take 20 meq daily. Please clarify. Please advise    MARTHA Gann    "

## 2017-10-17 ENCOUNTER — NURSE TRIAGE (OUTPATIENT)
Dept: NURSING | Facility: CLINIC | Age: 47
End: 2017-10-17

## 2017-10-17 NOTE — TELEPHONE ENCOUNTER
"Rectal sex on Sunday (10/15/17), now for past two days Renea is having rectal bleeding with \"a couple small clots\".  No issues with constipation, does have hemorrhoids.  Slight discomfort also present.      Reason for Disposition    MILD rectal bleeding (more than just a few drops or streaks)    Protocols used: RECTAL BLEEDING-ADULT-    "

## 2017-10-18 LAB
COPATH REPORT: NORMAL
PAP: NORMAL

## 2017-10-20 LAB
FINAL DIAGNOSIS: NORMAL
HPV HR 12 DNA CVX QL NAA+PROBE: NEGATIVE
HPV16 DNA SPEC QL NAA+PROBE: NEGATIVE
HPV18 DNA SPEC QL NAA+PROBE: NEGATIVE
SPECIMEN DESCRIPTION: NORMAL

## 2017-10-24 ENCOUNTER — THERAPY VISIT (OUTPATIENT)
Dept: PHYSICAL THERAPY | Facility: CLINIC | Age: 47
End: 2017-10-24
Payer: COMMERCIAL

## 2017-10-24 DIAGNOSIS — G89.29 CHRONIC BILATERAL LOW BACK PAIN WITHOUT SCIATICA: Primary | ICD-10-CM

## 2017-10-24 DIAGNOSIS — M54.50 CHRONIC BILATERAL LOW BACK PAIN WITHOUT SCIATICA: Primary | ICD-10-CM

## 2017-10-24 PROCEDURE — 97112 NEUROMUSCULAR REEDUCATION: CPT | Mod: GP | Performed by: PHYSICAL THERAPIST

## 2017-10-24 PROCEDURE — 97161 PT EVAL LOW COMPLEX 20 MIN: CPT | Mod: GP | Performed by: PHYSICAL THERAPIST

## 2017-10-24 PROCEDURE — 97110 THERAPEUTIC EXERCISES: CPT | Mod: GP | Performed by: PHYSICAL THERAPIST

## 2017-10-24 NOTE — PROGRESS NOTES
HPI                        System    Physical Exam                                         Musculoskeletal:        Arms:      ROS

## 2017-10-24 NOTE — PROGRESS NOTES
[facility] Initial Evaluation -- Lumbar    Date: October 24, 2017  Renea Trotter is a 47 year old female with a lumbar condition.   Referral: primary care  Work mechanical stresses:  n/a  Employment status:  unemployed  Leisure mechanical stresses: yardwork  Functional disability score (DENISE/STarT Back):  See DENISE in flowsheet  VAS score (0-10): 2/10  Patient goals/expectations:  Decrease pain    HISTORY:    Present symptoms: low back stiffness, mild pain in central low back   Pain quality (sharp/shooting/stabbing/aching/burning/cramping):  achey   Paresthesia (yes/no):  R leg (associated with MS)    Present since (onset date): several years (saw MD for yearly physical in October 2017 and mentioned low back).     Symptoms (improving/unchanging/worsening):  unchanging.     Symptoms commenced as a result of: no event   Condition occurred in the following environment:   n/a     Symptoms at onset (back/thigh/leg): low back  Constant symptoms (back/thigh/leg): none  Intermittent symptoms (back/thigh/leg): low back/stiffness    Symptoms are made worse with the following: Sometimes Rising, sometimes biking, time of day not affected, sometimes a lot of bending/lifting  Symptoms are made better with the following: Other - movement    Disturbed sleep (yes/no):  sometimes Sleeping postures (prone/sup/side R/L): L side    Previous episodes (0/1-5/6-10/11+): none Year of first episode: around 2014    Previous history: none  Previous treatments: none      Specific Questions:  Cough/Sneeze/Strain (pos/neg): none  Bowel/Bladder (normal/abnormal): associated with MS   Gait (normal/abnormal): no  Medications (nil/NSAIDS/analg/steroids/anticoag/other):  Other - Sleep, High blood pressure and Disease modifying drug (for MS)  Medical allergies:  none  General health (excellent/good/fair/poor):  fair  Pertinent medical history:  High blood pressure and Multiple sclerosis  Imaging (None/Xray/MRI/Other):  xrays  "(DJD)  Recent or major surgery (yes/no):  no  Night pain (yes/no): no  Accidents (yes/no): no  Unexplained weight loss (yes/no): no  Barriers at home: no  Other red flags: no    EXAMINATION    Posture:   Sitting (good/fair/poor): fair  Standing (good/fair/poor):fair  Lordosis (red/acc/normal): normal  Correction of posture (better/worse/no effect): NE    Lateral Shift (right/left/nil): nil  Relevant (yes/no):  n/a  Other Observations: none    Neurological:    Motor deficit:  none  Reflexes:  n/a  Sensory deficit:  none  Dural signs:  n/a    Movement Loss:   Uli Mod Min Nil Pain   Flexion    x    Extension   x  P low back   Side Gliding R    x P low back   Side Gliding L    x P low back     Test Movements:   During: produces, abolishes, increases, decreases, no effect, centralizing, peripheralizing   After: better, worse, no better, no worse, no effect, centralized, peripheralized    Pretest symptoms standing:    Symptoms During Symptoms After ROM increased ROM decreased No Effect   FIS        Rep FIS        EIS        Rep EIS        Pretest symptoms lying: none    Symptoms During Symptoms After ROM increased ROM decreased No Effect   FREDERICK        Rep FREDERICK        EIL No Effect No Effect      Rep EIL \"Feels good\" Worse pain severity on flexion/extension AROM (centrally in low back) x     If required, pretest symptoms:    Symptoms During Symptoms After ROM increased ROM decreased No Effect   SGIS - R        Rep SGIS - R        SGIS - L        Rep SGIS - L          Static Tests:  Sitting slouched:  NE  Sitting erect:  Inc central LBP  Standing slouched n/a  Standing erect:    Lying prone in extension:  NE Long sitting:  n/a    Other Tests: 5 reps of seated lumbar flexion inc LBP    Provisional Classification:  Derangement - Bilateral, symmetrical, symptoms above knee    Principle of Management:  Education:  Lumbar support   Equipment provided:  none  Mechanical therapy (Y/N):  Y   Extension principle:  EIL with self " overpressure 10 reps every 2-3 hrs  Lateral Principle:    Flexion principle:    Other:      ASSESSMENT/PLAN:    Patient is a 47 year old female with lumbar complaints.    Patient has the following significant findings with corresponding treatment plan.                Diagnosis 1:  Central symmetrical lumbar derangement  Pain -  manual therapy, self management, education, directional preference exercise and home program  Decreased ROM/flexibility - manual therapy, therapeutic exercise, therapeutic activity and home program  Inflammation - self management/home program  Decreased function - therapeutic activities and home program  Impaired posture - neuro re-education, therapeutic activities and home program    Therapy Evaluation Codes:   1) History comprised of:   Personal factors that impact the plan of care:      None.    Comorbidity factors that impact the plan of care are:      Multiple sclerosis.     Medications impacting care: None.  2) Examination of Body Systems comprised of:   Body structures and functions that impact the plan of care:      Lumbar spine.   Activity limitations that impact the plan of care are:      Bending.  3) Clinical presentation characteristics are:   Stable/Uncomplicated.  4) Decision-Making    Low complexity using standardized patient assessment instrument and/or measureable assessment of functional outcome.  Cumulative Therapy Evaluation is: Low complexity.    Previous and current functional limitations:  (See Goal Flow Sheet for this information)    Short term and Long term goals: (See Goal Flow Sheet for this information)     Communication ability:  Patient appears to be able to clearly communicate and understand verbal and written communication and follow directions correctly.  Treatment Explanation - The following has been discussed with the patient:   RX ordered/plan of care  Anticipated outcomes  Possible risks and side effects  This patient would benefit from PT intervention to  resume normal activities.   Rehab potential is excellent.    Frequency:  1 X week, once daily  Duration:  for 4 weeks  Discharge Plan:  Achieve all LTG.  Independent in home treatment program.  Reach maximal therapeutic benefit.    Please refer to the daily flowsheet for treatment today, total treatment time and time spent performing 1:1 timed codes.

## 2017-10-24 NOTE — MR AVS SNAPSHOT
After Visit Summary   10/24/2017    Renea Trotter    MRN: [id number]           Patient Information     Date Of Birth          1970        Visit Information        Provider Department      10/24/2017 10:30 AM Salinas Meyer PT [facility]        Today's Diagnoses     Chronic bilateral low back pain without sciatica    -  1       Follow-ups after your visit        Your next 10 appointments already scheduled     Oct 31, 2017 10:30 AM CDT   CHRISTINA Spine with Salinas Meyer PT   [facility] ([facility]    [address]  [facility] [address]   [phone]            Mar 15, 2018  8:45 AM CDT   MR BRAIN W/O & W CONTRAST with MGMR1   [facility] ([facility])    [address] [place] [address] [phone]           Take your medicines as usual, unless your doctor tells you not to. Bring a list of your current medicines to your exam (including vitamins, minerals and over-the-counter drugs).  You will be given intravenous contrast for this exam. To prepare:   The day before your exam, drink extra fluids at least six 8-ounce glasses (unless your doctor tells you to restrict your fluids).   Have a blood test (creatinine test) within 30 days of your exam. Go to your clinic or Diagnostic Imaging Department for this test.  The MRI machine uses a strong magnet. Please wear clothes without metal (snaps, zippers). A sweatsuit works well, or we may give you a hospital gown.  Please remove any body piercings and hair extensions before you arrive. You will also remove watches, jewelry, hairpins, wallets, dentures, partial dental plates and hearing aids. You may wear contact lenses, and you may be able to wear your rings. We have a safe place to keep your personal items, but it is safer to leave them at home.   **IMPORTANT** THE  INSTRUCTIONS BELOW ARE ONLY FOR THOSE PATIENTS WHO HAVE BEEN TOLD THEY WILL RECEIVE SEDATION OR GENERAL ANESTHESIA DURING THEIR MRI PROCEDURE:  IF YOU WILL RECEIVE SEDATION (take medicine to help you relax during your exam):   You must get the medicine from your doctor before you arrive. Bring the medicine to the exam. Do not take it at home.   Arrive one hour early. Bring someone who can take you home after the test. Your medicine will make you sleepy. After the exam, you may not drive, take a bus or take a taxi by yourself.   No eating 8 hours before your exam. You may have clear liquids up until 4 hours before your exam. (Clear liquids include water, clear tea, black coffee and fruit juice without pulp.)  IF YOU WILL RECEIVE ANESTHESIA (be asleep for your exam):   Arrive 1 1/2 hours early. Bring someone who can take you home after the test. You may not drive, take a bus or take a taxi by yourself.   No eating 8 hours before your exam. You may have clear liquids up until 4 hours before your exam. (Clear liquids include water, clear tea, black coffee and fruit juice without pulp.)  Please call the Imaging Department at your exam site with any questions.            Mar 15, 2018 10:00 AM CDT   Return Visit with Regan Mcgregor MD   [facility] ([facility]    [address] [place] [address] [phone]              Who to contact     If you have questions or need follow up information about today's clinic visit or your schedule please contact [facility] PH[facility]ER[facility] directly at [phone].  Normal or non-critical lab and imaging results will be communicated to you by MyChart, letter or phone within 4 business days after the clinic has received the results. If you do not hear from us within 7 days, please contact the clinic through MyChart or phone. If you have a critical or abnormal lab result, we will notify you by  phone as soon as possible.  Submit refill requests through [email or web address] or call your pharmacy and they will forward the refill request to us. Please allow 3 business days for your refill to be completed.          Additional Information About Your Visit        Anytime DDhart Information     [email or web address] gives you secure access to your electronic health record. If you see a primary care provider, you can also send messages to your care team and make appointments. If you have questions, please call your primary care clinic.  If you do not have a primary care provider, please call [phone] and they will assist you.        Care EveryWhere ID     This is your Care EveryWhere ID. This could be used by other organizations to access your [place] medical records  [id number]        Your Vitals Were     Last Period                   09/26/2017            Blood Pressure from Last 3 Encounters:   10/13/17 120/80   09/28/17 137/89   07/07/17 124/80    Weight from Last 3 Encounters:   10/13/17 67.9 kg (149 lb 12.8 oz)   09/28/17 68 kg (150 lb)   07/07/17 65.7 kg (144 lb 14.4 oz)              We Performed the Following     HC PT EVAL, LOW COMPLEXITY     CHRISTINA INITIAL EVAL REPORT     NEUROMUSCULAR RE-EDUCATION     THERAPEUTIC EXERCISES        Primary Care Provider Office Phone # Fax #    Cara Clements [phone]       [address]  [facility] [address]        Equal Access to Services     [facility]: Hadii aad ku hadasho Soomaali, waaxda luqadaha, qaybta kaalmada peteryada, will alfaro. So [facility] [phone].    ATENCIÓN: Si habla español, tiene a ocampo disposición servicios gratuitos de asistencia lingüística. Enriqueta al [phone].    We comply with applicable federal civil rights laws and [place] laws. We do not discriminate on the basis of race, color, national origin, age, disability, sex, sexual orientation, or gender identity.            Thank you!     Thank you for choosing  [facility]  for your care. Our goal is always to provide you with excellent care. Hearing back from our patients is one way we can continue to improve our services. Please take a few minutes to complete the written survey that you may receive in the mail after your visit with us. Thank you!             Your Updated Medication List - Protect others around you: Learn how to safely use, store and throw away your medicines at www.disposemymeds.org.          This list is accurate as of: 10/24/17  2:12 PM.  Always use your most recent med list.                   Brand Name Dispense Instructions for use Diagnosis    albuterol 108 (90 BASE) MCG/ACT Inhaler    PROAIR HFA/PROVENTIL HFA/VENTOLIN HFA    1 Inhaler    Inhale 2 puffs into the lungs every 6 hours as needed for shortness of breath / dyspnea or wheezing    Mild intermittent asthma, uncomplicated       COCONUT OIL PO      Take 2,000 mg by mouth        Glatiramer Acetate 40 MG/ML Sosy     36 Syringe    Inject 40 mg Subcutaneous three times a week    Multiple sclerosis (H)       hydrochlorothiazide 25 MG tablet    HYDRODIURIL    90 tablet    Take 1 tablet (25 mg) by mouth daily Fill the prescription when patient is due for refills    Essential hypertension with goal blood pressure less than 140/90       MULTIPLE VITAMIN PO      Take 1 tablet by mouth daily.        Potassium Chloride Drea CR 15 MEQ Tbcr     90 tablet    Take 15 mEq by mouth daily    Hypokalemia       temazepam 15 MG capsule    RESTORIL    60 capsule    Take 2 capsules (30 mg) by mouth nightly as needed for sleep    Sleep disorder       VITAMIN D3 PO      Take 5,000 Units by mouth daily

## 2017-10-31 ENCOUNTER — THERAPY VISIT (OUTPATIENT)
Dept: PHYSICAL THERAPY | Facility: CLINIC | Age: 47
End: 2017-10-31
Payer: COMMERCIAL

## 2017-10-31 DIAGNOSIS — M54.50 CHRONIC BILATERAL LOW BACK PAIN WITHOUT SCIATICA: ICD-10-CM

## 2017-10-31 DIAGNOSIS — G89.29 CHRONIC BILATERAL LOW BACK PAIN WITHOUT SCIATICA: ICD-10-CM

## 2017-10-31 PROCEDURE — 97530 THERAPEUTIC ACTIVITIES: CPT | Mod: GP | Performed by: PHYSICAL THERAPIST

## 2017-10-31 PROCEDURE — 97110 THERAPEUTIC EXERCISES: CPT | Mod: GP | Performed by: PHYSICAL THERAPIST

## 2017-10-31 NOTE — MR AVS SNAPSHOT
After Visit Summary   10/31/2017    Renea Trotter    MRN: [id number]           Patient Information     Date Of Birth          1970        Visit Information        Provider Department      10/31/2017 10:30 AM Salinas Meyer, PT [facility]        Today's Diagnoses     Chronic bilateral low back pain without sciatica           Follow-ups after your visit        Your next 10 appointments already scheduled     Mar 15, 2018  8:45 AM CDT   MR BRAIN W/O & W CONTRAST with MGMR1   [facility] ([facility])    [address] [place] [address] [phone]           Take your medicines as usual, unless your doctor tells you not to. Bring a list of your current medicines to your exam (including vitamins, minerals and over-the-counter drugs).  You will be given intravenous contrast for this exam. To prepare:   The day before your exam, drink extra fluids at least six 8-ounce glasses (unless your doctor tells you to restrict your fluids).   Have a blood test (creatinine test) within 30 days of your exam. Go to your clinic or Diagnostic Imaging Department for this test.  The MRI machine uses a strong magnet. Please wear clothes without metal (snaps, zippers). A sweatsuit works well, or we may give you a hospital gown.  Please remove any body piercings and hair extensions before you arrive. You will also remove watches, jewelry, hairpins, wallets, dentures, partial dental plates and hearing aids. You may wear contact lenses, and you may be able to wear your rings. We have a safe place to keep your personal items, but it is safer to leave them at home.   **IMPORTANT** THE INSTRUCTIONS BELOW ARE ONLY FOR THOSE PATIENTS WHO HAVE BEEN TOLD THEY WILL RECEIVE SEDATION OR GENERAL ANESTHESIA DURING THEIR MRI PROCEDURE:  IF YOU WILL RECEIVE SEDATION (take medicine to help you relax during your exam):   You must get  the medicine from your doctor before you arrive. Bring the medicine to the exam. Do not take it at home.   Arrive one hour early. Bring someone who can take you home after the test. Your medicine will make you sleepy. After the exam, you may not drive, take a bus or take a taxi by yourself.   No eating 8 hours before your exam. You may have clear liquids up until 4 hours before your exam. (Clear liquids include water, clear tea, black coffee and fruit juice without pulp.)  IF YOU WILL RECEIVE ANESTHESIA (be asleep for your exam):   Arrive 1 1/2 hours early. Bring someone who can take you home after the test. You may not drive, take a bus or take a taxi by yourself.   No eating 8 hours before your exam. You may have clear liquids up until 4 hours before your exam. (Clear liquids include water, clear tea, black coffee and fruit juice without pulp.)  Please call the Imaging Department at your exam site with any questions.            Mar 15, 2018 10:00 AM CDT   Return Visit with Regan Mcgregor MD   [facility] ([facility])    [address] [place] [address] [phone]              Who to contact     If you have questions or need follow up information about today's clinic visit or your schedule please contact [facility] PHYSICAL THERAP[facility] directly at [phone].  Normal or non-critical lab and imaging results will be communicated to you by MyChart, letter or phone within 4 business days after the clinic has received the results. If you do not hear from us within 7 days, please contact the clinic through MyChart or phone. If you have a critical or abnormal lab result, we will notify you by phone as soon as possible.  Submit refill requests through [employer] or call your pharmacy and they will forward the refill request to us. Please allow 3 business days for your refill to be completed.          Additional Information About Your  Visit        [employer] Information     [employer] gives you secure access to your electronic health record. If you see a primary care provider, you can also send messages to your care team and make appointments. If you have questions, please call your primary care clinic.  If you do not have a primary care provider, please call [phone] and they will assist you.        Care EveryWhere ID     This is your Care EveryWhere ID. This could be used by other organizations to access your [place] medical records  [id number]        Your Vitals Were     Last Period                   09/26/2017            Blood Pressure from Last 3 Encounters:   10/13/17 120/80   09/28/17 137/89   07/07/17 124/80    Weight from Last 3 Encounters:   10/13/17 67.9 kg (149 lb 12.8 oz)   09/28/17 68 kg (150 lb)   07/07/17 65.7 kg (144 lb 14.4 oz)              We Performed the Following     CHRISTINA PROGRESS NOTES REPORT     THERAPEUTIC ACTIVITIES     THERAPEUTIC EXERCISES        Primary Care Provider Office Phone # Fax #    Cara Clements [phone]       [address]  [facility] [place] [address]        Equal Access to Services     [facility]: Hadii aad ku hadasho Soomaali, waaxda luqadaha, qaybta kaalmada adeegyada, will giraldo hayshajin peter govea . So [facility] [phone].    ATENCIÓN: Si habla español, tiene a ocampo disposición servicios gratuitos de asistencia lingüística. Llame al [phone].    We comply with applicable federal civil rights laws and [place] laws. We do not discriminate on the basis of race, color, national origin, age, disability, sex, sexual orientation, or gender identity.            Thank you!     Thank you for choosing [facility] PHYSICAL THERAP[facility]  for your care. Our goal is always to provide you with excellent care. Hearing back from our patients is one way we can continue to improve our services. Please take a few minutes to complete the written survey that you  may receive in the mail after your visit with us. Thank you!             Your Updated Medication List - Protect others around you: Learn how to safely use, store and throw away your medicines at www.disposemymeds.org.          This list is accurate as of: 10/31/17 11:07 AM.  Always use your most recent med list.                   Brand Name Dispense Instructions for use Diagnosis    albuterol 108 (90 BASE) MCG/ACT Inhaler    PROAIR HFA/PROVENTIL HFA/VENTOLIN HFA    1 Inhaler    Inhale 2 puffs into the lungs every 6 hours as needed for shortness of breath / dyspnea or wheezing    Mild intermittent asthma, uncomplicated       COCONUT OIL PO      Take 2,000 mg by mouth        Glatiramer Acetate 40 MG/ML Sosy     36 Syringe    Inject 40 mg Subcutaneous three times a week    Multiple sclerosis (H)       hydrochlorothiazide 25 MG tablet    HYDRODIURIL    90 tablet    Take 1 tablet (25 mg) by mouth daily Fill the prescription when patient is due for refills    Essential hypertension with goal blood pressure less than 140/90       MULTIPLE VITAMIN PO      Take 1 tablet by mouth daily.        Potassium Chloride Drea CR 15 MEQ Tbcr     90 tablet    Take 15 mEq by mouth daily    Hypokalemia       temazepam 15 MG capsule    RESTORIL    60 capsule    Take 2 capsules (30 mg) by mouth nightly as needed for sleep    Sleep disorder       VITAMIN D3 PO      Take 5,000 Units by mouth daily

## 2017-10-31 NOTE — PROGRESS NOTES
Subjective:    HPI                    Objective:    System    Physical Exam    General     ROS    Assessment/Plan:      SUBJECTIVE  Subjective changes as noted by pt:  Performing the pressup every 3 hrs. As as result, much improved. The pain has subsided significantly. Overall 90% to where she wants to be. Notices some mild sx when getting up from bed or getting up from chair.      Current pain level: 0/10     Changes in function:  Yes (See Goal flowsheet attached for changes in current functional level)     Adverse reaction to treatment or activity:  None    OBJECTIVE  Changes in objective findings:  Yes, See physical exam section and/or daily flowsheet for response to repeated movements.           ASSESSMENT  Renea continues to require intervention to meet STG and LTG's: PT  Patient is progressing as expected.  Response to therapy has shown an improvement in  pain level and function  Progress made towards STG/LTG?  Yes (See Goal flowsheet attached for updates on achievement of STG and LTG)    PLAN  Continue current treatment plan until patient demonstrates readiness to progress to higher level exercises.    PTA/ATC plan:  N/A    Please refer to the daily flowsheet for treatment today, total treatment time and time spent performing 1:1 timed codes.            .  DISCHARGE REPORT    Progress reporting period is from 10/24/17 to 10/31/17.     SUBJECTIVE                   ;   ,     The subjective and objective information are from the last SOAP note on this patient.    OBJECTIVE         ASSESSMENT/PLAN  Updated problem list and treatment plan: Diagnosis 1:  Central symmetrical lumbar derangement  Pain -  self management, education, home program   Decreased ROM/flexibility - manual therapy, therapeutic exercise and therapeutic activity  Inflammation - self management/home program  Decreased function - therapeutic activities and home program  Impaired posture - therapeutic activities and home program  STG/LTGs have  been met or progress has been made towards goals:  Yes (See Goal flow sheet completed today.)  Assessment of Progress: Patient is meeting short term goals and is progressing towards long term goals.  Self Management Plans:  Patient has been instructed in a home treatment program.  Patient  has been instructed in self management of symptoms.  I have re-evaluated this patient and find that the nature, scope, duration and intensity of the therapy is appropriate for the medical condition of the patient.  Renea continues to require the following intervention to meet STG and LTG's: PT intervention is no longer required to meet STG/LTG.  We will discharge this patient from PT.    Recommendations:  This patient is ready to be discharged from therapy and continue their home treatment program.    Please refer to the daily flowsheet for treatment today, total treatment time and time spent performing 1:1 timed codes.

## 2018-01-03 ENCOUNTER — TELEPHONE (OUTPATIENT)
Dept: NEUROLOGY | Facility: CLINIC | Age: 48
End: 2018-01-03

## 2018-01-03 NOTE — TELEPHONE ENCOUNTER
Prior Authorization Specialty Medication Request    Medication/Dose: Copaxone 40 mg  Diagnosis and ICD: Relapsing Remitting Multiple Sclerosis G35  New/Renewal/Insurance Change PA: Renewal    Important Lab Values: na    Previously Tried and Failed Therapies: Copaxone 20 mg     Rationale: Continuation of current disease modifying therapy for demyelinating disease, currently clinically stable on, please approve.    Would you like to include any research articles? na   If yes please include the hyperlink(s) below or fax @ [phone].    (Include Name and MRN)    If you received a fax notification from an outside Pharmacy;  Pharmacy Name:[facility]  Pharmacy #:  Pharmacy Fax:na

## 2018-01-03 NOTE — TELEPHONE ENCOUNTER
PA Initiation    Medication: Copaxone 40 mg  Insurance Company: [employer] - Phone [phone] Fax [phone]  Pharmacy Filling the Rx: [facility] - [place], [place] - [address] [place] [address]  Filling Pharmacy Phone: [phone]  Filling Pharmacy Fax: [phone]  Start Date: 1/3/2018      Case is pending in CMM will check tomorrow for additional questions.

## 2018-01-03 NOTE — TELEPHONE ENCOUNTER
Received phone call from patient who reports she is out of her Copaxone. PA  in 2017 and [facility] says they have sent over several requests to us for a renewal. I have not seen any such requests come through.  New PA submitted and sent to PA team to obtain insurance authorization.    Erum Bruce, RN Care Coordinator

## 2018-01-04 NOTE — TELEPHONE ENCOUNTER
Prior Authorization Approval    Authorization Effective Date: 12/5/2017  Authorization Expiration Date: 1/4/2019  Medication: Copaxone 40 mg  Approved Dose/Quantity:   Reference #: case # [id number]   Insurance Company: [employer] - Phone [phone] Fax [phone]  Expected CoPay: n/a     CoPay Card Available:      Foundation Assistance Needed:    Which Pharmacy is filling the prescription (Not needed for infusion/clinic administered): [facility] - [place], [place]  Pharmacy Notified: Yes  Patient Notified: Yes        Please document approval fax once received

## 2018-03-05 ENCOUNTER — MYC MEDICAL ADVICE (OUTPATIENT)
Dept: NEUROLOGY | Facility: CLINIC | Age: 48
End: 2018-03-05

## 2018-03-15 ENCOUNTER — OFFICE VISIT (OUTPATIENT)
Dept: NEUROLOGY | Facility: CLINIC | Age: 48
End: 2018-03-15
Payer: COMMERCIAL

## 2018-03-15 ENCOUNTER — RADIANT APPOINTMENT (OUTPATIENT)
Dept: MRI IMAGING | Facility: CLINIC | Age: 48
End: 2018-03-15
Attending: PSYCHIATRY & NEUROLOGY
Payer: COMMERCIAL

## 2018-03-15 VITALS
DIASTOLIC BLOOD PRESSURE: 88 MMHG | OXYGEN SATURATION: 98 % | BODY MASS INDEX: 25.69 KG/M2 | SYSTOLIC BLOOD PRESSURE: 128 MMHG | HEIGHT: 63 IN | WEIGHT: 145 LBS | HEART RATE: 88 BPM

## 2018-03-15 DIAGNOSIS — G47.9 SLEEP DISORDER: ICD-10-CM

## 2018-03-15 DIAGNOSIS — G35 MULTIPLE SCLEROSIS (H): ICD-10-CM

## 2018-03-15 LAB
ALBUMIN SERPL-MCNC: 3.9 G/DL (ref 3.4–5)
ALP SERPL-CCNC: 58 U/L (ref 40–150)
ALT SERPL W P-5'-P-CCNC: 17 U/L (ref 0–50)
ANION GAP SERPL CALCULATED.3IONS-SCNC: 4 MMOL/L (ref 3–14)
AST SERPL W P-5'-P-CCNC: 19 U/L (ref 0–45)
BASOPHILS # BLD AUTO: 0 10E9/L (ref 0–0.2)
BASOPHILS NFR BLD AUTO: 0.4 %
BILIRUB SERPL-MCNC: 0.7 MG/DL (ref 0.2–1.3)
BUN SERPL-MCNC: 18 MG/DL (ref 7–30)
CALCIUM SERPL-MCNC: 9.2 MG/DL (ref 8.5–10.1)
CHLORIDE SERPL-SCNC: 103 MMOL/L (ref 94–109)
CO2 SERPL-SCNC: 32 MMOL/L (ref 20–32)
CREAT SERPL-MCNC: 0.72 MG/DL (ref 0.52–1.04)
DEPRECATED CALCIDIOL+CALCIFEROL SERPL-MC: 66 UG/L (ref 20–75)
DIFFERENTIAL METHOD BLD: NORMAL
EOSINOPHIL # BLD AUTO: 0.3 10E9/L (ref 0–0.7)
EOSINOPHIL NFR BLD AUTO: 4.3 %
ERYTHROCYTE [DISTWIDTH] IN BLOOD BY AUTOMATED COUNT: 11.4 % (ref 10–15)
GFR SERPL CREATININE-BSD FRML MDRD: 86 ML/MIN/1.7M2
GLUCOSE SERPL-MCNC: 85 MG/DL (ref 70–99)
HCT VFR BLD AUTO: 42.4 % (ref 35–47)
HGB BLD-MCNC: 14.9 G/DL (ref 11.7–15.7)
IMM GRANULOCYTES # BLD: 0 10E9/L (ref 0–0.4)
IMM GRANULOCYTES NFR BLD: 0.4 %
LYMPHOCYTES # BLD AUTO: 2.1 10E9/L (ref 0.8–5.3)
LYMPHOCYTES NFR BLD AUTO: 28.5 %
MCH RBC QN AUTO: 31 PG (ref 26.5–33)
MCHC RBC AUTO-ENTMCNC: 35.1 G/DL (ref 31.5–36.5)
MCV RBC AUTO: 88 FL (ref 78–100)
MONOCYTES # BLD AUTO: 0.5 10E9/L (ref 0–1.3)
MONOCYTES NFR BLD AUTO: 6.7 %
NEUTROPHILS # BLD AUTO: 4.3 10E9/L (ref 1.6–8.3)
NEUTROPHILS NFR BLD AUTO: 59.7 %
PLATELET # BLD AUTO: 304 10E9/L (ref 150–450)
POTASSIUM SERPL-SCNC: 3.8 MMOL/L (ref 3.4–5.3)
PROT SERPL-MCNC: 7.6 G/DL (ref 6.8–8.8)
RBC # BLD AUTO: 4.8 10E12/L (ref 3.8–5.2)
SODIUM SERPL-SCNC: 139 MMOL/L (ref 133–144)
WBC # BLD AUTO: 7.3 10E9/L (ref 4–11)

## 2018-03-15 PROCEDURE — 99215 OFFICE O/P EST HI 40 MIN: CPT | Performed by: PSYCHIATRY & NEUROLOGY

## 2018-03-15 PROCEDURE — 70553 MRI BRAIN STEM W/O & W/DYE: CPT | Performed by: RADIOLOGY

## 2018-03-15 PROCEDURE — 36415 COLL VENOUS BLD VENIPUNCTURE: CPT | Performed by: PSYCHIATRY & NEUROLOGY

## 2018-03-15 PROCEDURE — 85025 COMPLETE CBC W/AUTO DIFF WBC: CPT | Performed by: PSYCHIATRY & NEUROLOGY

## 2018-03-15 PROCEDURE — 86480 TB TEST CELL IMMUN MEASURE: CPT | Performed by: PSYCHIATRY & NEUROLOGY

## 2018-03-15 PROCEDURE — A9585 GADOBUTROL INJECTION: HCPCS | Performed by: RADIOLOGY

## 2018-03-15 PROCEDURE — 80053 COMPREHEN METABOLIC PANEL: CPT | Performed by: PSYCHIATRY & NEUROLOGY

## 2018-03-15 PROCEDURE — 82306 VITAMIN D 25 HYDROXY: CPT | Performed by: PSYCHIATRY & NEUROLOGY

## 2018-03-15 RX ORDER — TEMAZEPAM 15 MG/1
CAPSULE ORAL
Qty: 45 CAPSULE | Refills: 5 | Status: SHIPPED | OUTPATIENT
Start: 2018-03-15 | End: 2018-09-10

## 2018-03-15 RX ORDER — DULOXETIN HYDROCHLORIDE 60 MG/1
60 CAPSULE, DELAYED RELEASE ORAL DAILY
Qty: 30 CAPSULE | Refills: 3 | Status: SHIPPED | OUTPATIENT
Start: 2018-03-15 | End: 2019-05-04

## 2018-03-15 RX ORDER — GADOBUTROL 604.72 MG/ML
7.5 INJECTION INTRAVENOUS ONCE
Status: COMPLETED | OUTPATIENT
Start: 2018-03-15 | End: 2018-03-15

## 2018-03-15 RX ORDER — GLATIRAMER 40 MG/ML
40 INJECTION, SOLUTION SUBCUTANEOUS
Qty: 36 SYRINGE | Refills: 3 | Status: SHIPPED | OUTPATIENT
Start: 2018-03-16 | End: 2018-06-11

## 2018-03-15 RX ADMIN — GADOBUTROL 7 ML: 604.72 INJECTION INTRAVENOUS at 09:23

## 2018-03-15 ASSESSMENT — PAIN SCALES - GENERAL: PAINLEVEL: NO PAIN (0)

## 2018-03-15 NOTE — LETTER
3/15/2018         RE: Renea Trotter  [id number] [facility] [id number]        Dear Colleague,    Thank you for referring your patient, Renea Trotter, to the [facility]. Please see a copy of my visit note below.    MULTIPLE SCLEROSIS CLINIC AT THE [facility]  FOLLOWUP/ESTABLISHED PATIENT VISIT    PRINCIPAL NEUROLOGIC DIAGNOSIS: Multiple Sclerosis        Date of Onset: 2003  Date of Diagnosis: 6/25/2012  Initial Clinical Course: Relapsing remitting  Current Clinical Course: Relapsing remitting  Past Disease Modifying Therapy(ies): NA   Current Disease Modifying Therapy(ies): Copaxone  Most Recent MRI of the Brain: 12/10/2016  Most Recent MRI of the Cervical Cord 12/2016  Most Recent MRI of the Thoracic Cord: NA  Most Recent Lumbar Puncture: 5/4/2012 Positive OCB         CHIEF COMPLAINT: Follow up on DMT      INTERVAL HISTORY:    Overall, the patient reports that she's having no new symptoms. The patient is having worsening mood. Patient was recently let go of her job. The patient reports that she wakes up early every night she doesn't take a sleeping aid. She cut that specifically say what keeps her from going back to sleep. I tried to ask if she has relations or other anxieties. She could not confirm or deny that. The patient reports that because of this she is tired, but she does not have any fatigue. Overall, this symptom appears to be situational according to the patient.    Issues with current MS therapy: Tolerating DMT without issue    REVIEW OF SYSTEMS:    Mood: worse and no suicidal or homicidal ideation  Spasticity:none  Bladder: none  Bowel: unchanged  Pain related to today's visit:reviewed on nursing intake documentation  Fatigue: unchanged  Sleep: needs medication to sleep. She will wake up in the middle of the night and can't get back to sleep  Memory/Concentration: unchanged    PAST HISTORY was reviewed and updated:      MEDICATIONS and ALLERGIES were  reviewed and updated.    SOCIAL HISTORY was reviewed and updated:        EXAM:    PHYSICAL EXAMINATION:   VITAL SIGNS:  B/P: 128/88, T: Data Unavailable, P: 88, R: Data Unavailable    GENERAL: The patient is a well-nourished  who presents to the evaluation alone.  NEUROLOGIC:   MENTAL STATUS: Alert,awake and  oriented times four.   CRANIAL NERVES:  Visual fields are full to confrontation. The pupils are  round and react to light and there is no Aaron Pwaan pupil. Extraocular movements are intact with notable exotropia with no  internuclear ophthalmoplegia. No nystagmus. Facial strength and sensation are  normal. Hearing is  normal. Palate elevation and tongue protrusion are  normal.   POWER:     Motor    Upper      Right Left   Shoulder Abduction 5 5   Elbow Flexion 5 5   Elbow Extension 5 5   Wrist Extension 5 5   Digit Extension 5 5   Digit Flexion 5 5   APB 5 5   Tone 0 0   Lower       Right Left   Hip Flexion 5 5   Knee Extension 5 5   Knee Flexion 5 5   Foot Dorsiflexion 5 5   Foot Plantar Flexion 5 5   EH 5 5   Toe Flexion 5 5   Tone 0 0           Grade Description   0 No increase in muscle tone   1 Slight increase in muscle tone, manifested by a catch and release or by minimal resistance at the end of the range of motion when the affected part(s) is moved in flexion or extension   1+ Slight increase in muscle tone, manifested by a catch, followed by minimal resistance throughout the remainder (less than half) of the ROM   2 More marked increase in muscle tone through most of the ROM, but affected part(s) easily moved   3 Considerable increase in muscle tone, passive movement difficult   4 Affected part(s) rigid in flexion or extension           SENSORY:     Light touch:  Asymmetrical impaired  and deminished in right hand otherwise intact.          MOTOR/CEREBELLAR:    Right Left   RRM 1 Abnormal 0 Normal   JANELL 1 Abnormal 0 Normal   FTN 1 Abnormal 0 Normal   RRM 0 Normal 0 Normal   HKS 0 Normal 0 Normal            GAIT: Gait is   narrow-based and steady and the patient is  able to walk on heels, toes and in tandem with only mild difficulty.    Romberg: Stable with eye(s) closed    RESULTS:  Monitoring labs:    Office Visit on 10/13/2017   Component Date Value Ref Range Status     HPV 16 DNA 10/13/2017 Negative  NEG^Negative Final     HPV 18 DNA 10/13/2017 Negative  NEG^Negative Final     Other HR HPV 10/13/2017 Negative  NEG^Negative Final     Final Diagnosis 10/13/2017 This patient's sample is negative for HPV DNA.   Final     Specimen Description 10/13/2017 Cervical Cells   Final     PAP 10/13/2017 NIL   Final     Copath Report 10/13/2017    Final                    Value:  Patient Name: REINIER LÓPEZ  MR#: [id number]  Specimen #: [id number]  Collected: 10/13/2017  Received: 10/16/2017  Reported: 10/18/2017 08:44  Ordering Phy(s): PASHA GAVIN    For improved result formatting, select 'View Enhanced Report Format'  under Linked Documents section.    SPECIMEN/STAIN PROCESS:  Pap imaged thin layer prep screening (Surepath, FocalPoint with guided  screening)       Pap-Cyto x 1, HPV ordered x 1    SOURCE: Cervical, endocervical  ----------------------------------------------------------------   Pap imaged thin layer prep screening (Surepath, FocalPoint with guided  screening)  SPECIMEN ADEQUACY:  Satisfactory for evaluation.  -Transformation zone component present.    CYTOLOGIC INTERPRETATION:    Negative for intraepithelial lesion or malignancy    Electronically signed out by:  RYLEY Feliz (ASCP)    Processed and screened at [facility],  [facility]    CLINICAL HISTORY:  LMP: 9/26/2017  Previous n                          ormal pap  Date of Last Pap: 8/6/2014,    Papanicolaou Test Limitations:  Cervical cytology is a screening test  with limited sensitivity; regular screening is critical for cancer  prevention; Pap tests are primarily effective for  the  diagnosis/prevention of squamous cell carcinoma, not adenocarcinomas or  other cancers.    TESTING LAB LOCATION:  [facility], [facility] [address]  [address] [place] [address]  [id number] [phone]    COLLECTION SITE:  Client:  [facility]  Location: [facility] (B)       Orders Only on 10/13/2017   Component Date Value Ref Range Status     Sodium 10/13/2017 138  133 - 144 mmol/L Final     Potassium 10/13/2017 3.2* 3.4 - 5.3 mmol/L Final     Chloride 10/13/2017 101  94 - 109 mmol/L Final     Carbon Dioxide 10/13/2017 30  20 - 32 mmol/L Final     Anion Gap 10/13/2017 7  3 - 14 mmol/L Final     Glucose 10/13/2017 84  70 - 99 mg/dL Final     Urea Nitrogen 10/13/2017 18  7 - 30 mg/dL Final     Creatinine 10/13/2017 0.87  0.52 - 1.04 mg/dL Final     GFR Estimate 10/13/2017 69  >60 mL/min/1.7m2 Final     GFR Estimate If Black 10/13/2017 84  >60 mL/min/1.7m2 Final     Calcium 10/13/2017 9.0  8.5 - 10.1 mg/dL Final     Bilirubin Total 10/13/2017 0.8  0.2 - 1.3 mg/dL Final     Albumin 10/13/2017 3.9  3.4 - 5.0 g/dL Final     Protein Total 10/13/2017 7.8  6.8 - 8.8 g/dL Final     Alkaline Phosphatase 10/13/2017 64  40 - 150 U/L Final     ALT 10/13/2017 63* 0 - 50 U/L Final     AST 10/13/2017 42  0 - 45 U/L Final     Cholesterol 10/13/2017 199  <200 mg/dL Final     Triglycerides 10/13/2017 169* <150 mg/dL Final     HDL Cholesterol 10/13/2017 52  >49 mg/dL Final     LDL Cholesterol Calculated 10/13/2017 113* <100 mg/dL Final     Non HDL Cholesterol 10/13/2017 147* <130 mg/dL Final     TSH 10/13/2017 2.19  0.40 - 4.00 mU/L Final   ]      MRI brain:    MRI Dated 3/15/18:  Mild to moderate  T2 disease burden with 2 possible  enhancion lesion(s).  compared to MRI on 9/20/16 there are 0 new T2 lesion(s). The two lesions with questionable enhancement do not have a corresponding lesion on flair or T2, therefore, I feel that this is more likely than not  artifact; however, I will be interest in seing what my colleagues in radiology think    ASSESSMENT/PLAN:  the patient is a 47-year-old female with a past medical history of relapsing multiple sclerosis who is presenting today as a follow-up. Overall, the patient appears to be clinically stable. The patient's MRI is somewhat questionable for potentially 2 to 3 new enhancing lesions. However, I feel that these lesions are more probably than not artifact, given the fact that they do not show up in multiple sequences. While it is still possible that these could be real lesions, I feel strongly that more probably than not these are not real. However, I will wait for the radiology read and if he disagrees, then I will discuss it with them. Assuming that I am correct that she does not have any lesions, then I will continue on the Copaxone. In regard to symptomatic management, the patient is complaining of sleeping troubles. I think based off of her overall demeanor that the patient is severely depressed. She does not have any thoughts of harming herself, so she does not need any acute psychiatric care. However, she would greatly benefit from going on an antidepressant. I spent a prolonged period of time discussing the potential benefits of various antidepressants. After discussing the potential of improving the numbness of hands, the patient expressed interest in trying Cymbalta. I explained the risks and benefits this medication with the patient. I also provided her with the patient pamphlet to review at her leisure. I also offered to send her to a sleep medicine specialist. The patient was not interested in this at this time. I will refill the patient's Restoril. I will check her blood to ensure that this medication is safe for her. Assuming that she does well, I'll follow her up in 6 months.    Will check cmp, cbc TB quant, vitamin d  Start Cymbalta  Follow up in 6 months        I spent 45 minutes in this visit, with  >50% direct patient time spent counseling about prognosis, treatment options, and coordination of care.    Regan Mcgregor MD [facility]  Staff Neurologist   03/15/18       (Chart documentation was completed in part with Dragon voice-recognition software. Even though reviewed, some grammatical, spelling, and word errors may remain.)         Again, thank you for allowing me to participate in the care of your patient.        Sincerely,        Regan Mcgregor MD

## 2018-03-15 NOTE — PROGRESS NOTES
MULTIPLE SCLEROSIS CLINIC AT THE [facility]  FOLLOWUP/ESTABLISHED PATIENT VISIT    PRINCIPAL NEUROLOGIC DIAGNOSIS: Multiple Sclerosis        Date of Onset: 2003  Date of Diagnosis: 6/25/2012  Initial Clinical Course: Relapsing remitting  Current Clinical Course: Relapsing remitting  Past Disease Modifying Therapy(ies): NA   Current Disease Modifying Therapy(ies): Copaxone  Most Recent MRI of the Brain: 12/10/2016  Most Recent MRI of the Cervical Cord 12/2016  Most Recent MRI of the Thoracic Cord: NA  Most Recent Lumbar Puncture: 5/4/2012 Positive OCB         CHIEF COMPLAINT: Follow up on DMT      INTERVAL HISTORY:    Overall, the patient reports that she's having no new symptoms. The patient is having worsening mood. Patient was recently let go of her job. The patient reports that she wakes up early every night she doesn't take a sleeping aid. She cut that specifically say what keeps her from going back to sleep. I tried to ask if she has relations or other anxieties. She could not confirm or deny that. The patient reports that because of this she is tired, but she does not have any fatigue. Overall, this symptom appears to be situational according to the patient.    Issues with current MS therapy: Tolerating DMT without issue    REVIEW OF SYSTEMS:    Mood: worse and no suicidal or homicidal ideation  Spasticity:none  Bladder: none  Bowel: unchanged  Pain related to today's visit:reviewed on nursing intake documentation  Fatigue: unchanged  Sleep: needs medication to sleep. She will wake up in the middle of the night and can't get back to sleep  Memory/Concentration: unchanged    PAST HISTORY was reviewed and updated:      MEDICATIONS and ALLERGIES were reviewed and updated.    SOCIAL HISTORY was reviewed and updated:        EXAM:    PHYSICAL EXAMINATION:   VITAL SIGNS:  B/P: 128/88, T: Data Unavailable, P: 88, R: Data Unavailable    GENERAL: The patient is a well-nourished  who presents to the  evaluation alone.  NEUROLOGIC:   MENTAL STATUS: Alert,awake and  oriented times four.   CRANIAL NERVES:  Visual fields are full to confrontation. The pupils are  round and react to light and there is no Aaron Pawan pupil. Extraocular movements are intact with notable exotropia with no  internuclear ophthalmoplegia. No nystagmus. Facial strength and sensation are  normal. Hearing is  normal. Palate elevation and tongue protrusion are  normal.   POWER:     Motor    Upper      Right Left   Shoulder Abduction 5 5   Elbow Flexion 5 5   Elbow Extension 5 5   Wrist Extension 5 5   Digit Extension 5 5   Digit Flexion 5 5   APB 5 5   Tone 0 0   Lower       Right Left   Hip Flexion 5 5   Knee Extension 5 5   Knee Flexion 5 5   Foot Dorsiflexion 5 5   Foot Plantar Flexion 5 5   EH 5 5   Toe Flexion 5 5   Tone 0 0           Grade Description   0 No increase in muscle tone   1 Slight increase in muscle tone, manifested by a catch and release or by minimal resistance at the end of the range of motion when the affected part(s) is moved in flexion or extension   1+ Slight increase in muscle tone, manifested by a catch, followed by minimal resistance throughout the remainder (less than half) of the ROM   2 More marked increase in muscle tone through most of the ROM, but affected part(s) easily moved   3 Considerable increase in muscle tone, passive movement difficult   4 Affected part(s) rigid in flexion or extension           SENSORY:     Light touch:  Asymmetrical impaired  and deminished in right hand otherwise intact.          MOTOR/CEREBELLAR:    Right Left   RRM 1 Abnormal 0 Normal   JANELL 1 Abnormal 0 Normal   FTN 1 Abnormal 0 Normal   RRM 0 Normal 0 Normal   HKS 0 Normal 0 Normal           GAIT: Gait is   narrow-based and steady and the patient is  able to walk on heels, toes and in tandem with only mild difficulty.    Romberg: Stable with eye(s) closed    RESULTS:  Monitoring labs:    Office Visit on 10/13/2017   Component  Date Value Ref Range Status     HPV 16 DNA 10/13/2017 Negative  NEG^Negative Final     HPV 18 DNA 10/13/2017 Negative  NEG^Negative Final     Other HR HPV 10/13/2017 Negative  NEG^Negative Final     Final Diagnosis 10/13/2017 This patient's sample is negative for HPV DNA.   Final     Specimen Description 10/13/2017 Cervical Cells   Final     PAP 10/13/2017 NIL   Final     Copath Report 10/13/2017    Final                    Value:  Patient Name: REINIER LÓPEZ  MR#: [id number]  Specimen #: [id number]  Collected: 10/13/2017  Received: 10/16/2017  Reported: 10/18/2017 08:44  Ordering Phy(s): PASHA GAVIN    For improved result formatting, select 'View Enhanced Report Format'  under Linked Documents section.    SPECIMEN/STAIN PROCESS:  Pap imaged thin layer prep screening (Surepath, FocalPoint with guided  screening)       Pap-Cyto x 1, HPV ordered x 1    SOURCE: Cervical, endocervical  ----------------------------------------------------------------   Pap imaged thin layer prep screening (Surepath, FocalPoint with guided  screening)  SPECIMEN ADEQUACY:  Satisfactory for evaluation.  -Transformation zone component present.    CYTOLOGIC INTERPRETATION:    Negative for intraepithelial lesion or malignancy    Electronically signed out by:  RYLEY Feliz (ASCP)    Processed and screened at [facility]    CLINICAL HISTORY:  LMP: 9/26/2017  Previous n                          ormal pap  Date of Last Pap: 8/6/2014,    Papanicolaou Test Limitations:  Cervical cytology is a screening test  with limited sensitivity; regular screening is critical for cancer  prevention; Pap tests are primarily effective for the  diagnosis/prevention of squamous cell carcinoma, not adenocarcinomas or  other cancers.    TESTING LAB LOCATION:  [facility], [address]   [id number] [phone]    COLLECTION SITE:  Client:  [facility] [place]  Location: [facility] (B)       Orders Only on 10/13/2017   Component Date Value Ref Range Status     Sodium 10/13/2017 138  133 - 144 mmol/L Final     Potassium 10/13/2017 3.2* 3.4 - 5.3 mmol/L Final     Chloride 10/13/2017 101  94 - 109 mmol/L Final     Carbon Dioxide 10/13/2017 30  20 - 32 mmol/L Final     Anion Gap 10/13/2017 7  3 - 14 mmol/L Final     Glucose 10/13/2017 84  70 - 99 mg/dL Final     Urea Nitrogen 10/13/2017 18  7 - 30 mg/dL Final     Creatinine 10/13/2017 0.87  0.52 - 1.04 mg/dL Final     GFR Estimate 10/13/2017 69  >60 mL/min/1.7m2 Final     GFR Estimate If Black 10/13/2017 84  >60 mL/min/1.7m2 Final     Calcium 10/13/2017 9.0  8.5 - 10.1 mg/dL Final     Bilirubin Total 10/13/2017 0.8  0.2 - 1.3 mg/dL Final     Albumin 10/13/2017 3.9  3.4 - 5.0 g/dL Final     Protein Total 10/13/2017 7.8  6.8 - 8.8 g/dL Final     Alkaline Phosphatase 10/13/2017 64  40 - 150 U/L Final     ALT 10/13/2017 63* 0 - 50 U/L Final     AST 10/13/2017 42  0 - 45 U/L Final     Cholesterol 10/13/2017 199  <200 mg/dL Final     Triglycerides 10/13/2017 169* <150 mg/dL Final     HDL Cholesterol 10/13/2017 52  >49 mg/dL Final     LDL Cholesterol Calculated 10/13/2017 113* <100 mg/dL Final     Non HDL Cholesterol 10/13/2017 147* <130 mg/dL Final     TSH 10/13/2017 2.19  0.40 - 4.00 mU/L Final   ]      MRI brain:    MRI Dated 3/15/18:  Mild to moderate  T2 disease burden with 2 possible  enhancion lesion(s).  compared to MRI on 9/20/16 there are 0 new T2 lesion(s). The two lesions with questionable enhancement do not have a corresponding lesion on flair or T2, therefore, I feel that this is more likely than not artifact; however, I will be interest in seing what my colleagues in radiology think    ASSESSMENT/PLAN:  the patient is a 47-year-old female with a past medical history of relapsing multiple sclerosis who is presenting today  as a follow-up. Overall, the patient appears to be clinically stable. The patient's MRI is somewhat questionable for potentially 2 to 3 new enhancing lesions. However, I feel that these lesions are more probably than not artifact, given the fact that they do not show up in multiple sequences. While it is still possible that these could be real lesions, I feel strongly that more probably than not these are not real. However, I will wait for the radiology read and if he disagrees, then I will discuss it with them. Assuming that I am correct that she does not have any lesions, then I will continue on the Copaxone. In regard to symptomatic management, the patient is complaining of sleeping troubles. I think based off of her overall demeanor that the patient is severely depressed. She does not have any thoughts of harming herself, so she does not need any acute psychiatric care. However, she would greatly benefit from going on an antidepressant. I spent a prolonged period of time discussing the potential benefits of various antidepressants. After discussing the potential of improving the numbness of hands, the patient expressed interest in trying Cymbalta. I explained the risks and benefits this medication with the patient. I also provided her with the patient pamphlet to review at her leisure. I also offered to send her to a sleep medicine specialist. The patient was not interested in this at this time. I will refill the patient's Restoril. I will check her blood to ensure that this medication is safe for her. Assuming that she does well, I'll follow her up in 6 months.    Will check cmp, cbc TB quant, vitamin d  Start Cymbalta  Follow up in 6 months        I spent 45 minutes in this visit, with >50% direct patient time spent counseling about prognosis, treatment options, and coordination of care.    Regan Mcgregor MD [facility]  Staff Neurologist   03/15/18       (Chart documentation was completed in part with Cait  voice-recognition software. Even though reviewed, some grammatical, spelling, and word errors may remain.)

## 2018-03-15 NOTE — PATIENT INSTRUCTIONS
Will start Cymbalta    Will check blood work    Will follow you up in 6 months    You saw a neurology provider, Regan Mcgregor, today at the [facility].  You may have also met with the MS RN Care Coordinator.  In order to get a message to your MS Center provider, you should contact [phone]. You should contact us via this protocol if you have any of the following symptoms:    New or worsening neurologic symptoms that persist for 24-48 hours, such as:  o New onset of pain or marked worsening of pain  o Difficulty with speech, swallowing, or breathing  o New onset of vertigo or dizziness  o Change in bowel or bladder function (incontinence, difficulty urinating)    Increasing difficulty in self care    Marked changes in vision (double vision, blurred vision, graying of vision)    Change in mobility    Change in cognitive function    Falling    Worsening numbness, tingling or pain with a change in function    Worsening fatigue lasting more than 2 weeks  If you had labs completed today, we will contact you with the results.  If you are active in [employer], they will be released to you there.  Otherwise, your results will be provided to you via mail or telephone call.  Some results take up to 2 weeks for completion.  If you haven t heard anything about your lab results within 2 weeks, you can call or send a [employer] message to obtain your results.  If you have an MRI scheduled in the week or two prior to your next appointment, we will go over the results at your scheduled follow up appointment.  If you are not scheduled to see your MS Center provider within about 2 weeks after your MRI, please call or send a [employer] message to obtain your results if you haven t heard anything within 2 weeks.  Please be aware that it takes at least 5 business days after routine MRIs for your results to be reviewed by both the radiologist and your doctor.  MRIs completed at facilities outside of  the [facility] system take about 2 weeks in order for the MRI disc to be mailed to our clinic and uploaded into your medical record.    Please contact your pharmacy to request refills of your medications.   Please do your best to come to your appointments, and to arrive 15 minutes early to allow time for checking in.  [facility] reserves the right to terminate care of established patients if a patient misses three or more appointments in a clinic without providing notification within a 12-month period.    Developing Your Care Team  Individuals living with chronic illnesses like MS may be unaware that they are at risk for the same range of medical problems as everyone else.  This is why you must establish a relationship with other health care providers in addition to your Multiple Sclerosis doctor.  It can be difficult at times to figure out whether a health concern is related to your MS, or whether it is related to something else, such as hormonal changes, pseduoexacerbations, changes in your core body temperature, flu-like reactions to interferons, exercise, or infections.  Urinary tract infections (UTIs) are common culprits that can cause fatigue, weakness, or other  MS attack -like symptoms without classic symptoms of a UTI.  For this reason, if you call or come in to discuss symptoms, you may be asked to get in touch with your primary provider or another specialist, so that you receive the comprehensive care you need.  What is Multiple Sclerosis (MS)?  MS is a disease in which the nerve tissues in the brain and/or spinal cord are attacked by immune cells in the body.  These immune cells are present in everyone, and their normal role is to fight off infections.  In people with MS, these cells change the way they function and cross into the nervous system.  Once there, they cause inflammation that damages the myelin (or the protective coating of a nerve cell, much like the plastic covering on  an electrical cord) and parts of the nerve cell itself.  So far, a clear cause for this immune system dysfunction has not been found.  MS often starts out as the  relapsing-remitting  form.  This means there are episodes when you have symptoms, and other times when you recover to normal or near-normal.  Over time, if the damage to the nervous system continues, the disease can cause additional disability, such as difficulty walking.  If the relapses and nerve damage can be prevented with available medications, many patients with MS can go many years between relapses and have relatively little disability.  Remember: MS is a condition that changes and must be evaluated on an ongoing basis!  What is a Relapse? (Also called flare-ups, attacks, or exacerbations)  Relapses are due to the occurrence of inflammation in some part of the brain and/or spinal cord.  A relapse is new or recurrent symptoms which persist for at least 24 hours and sometimes worsen over 48 hours.  New symptoms need to be  by at least 1 month in order to be considered separate relapses. Most of the time, symptoms reach their maximal intensity within 2 weeks and then begin to slowly resolve.  At times, your symptoms may not recover fully for up to 6 months, depending on the severity of the episode.  The frequency of relapses is generally higher early in the disease, but can vary greatly among individuals with MS.  Improvement of symptoms for an individual is unpredictable with each relapse.    It is important to remember that an increase in symptoms and changes in function may not necessarily be a relapse.  There are other factors that contribute to such changes, such as hot weather, increased body temperature, infection/illness, stress and sleep deprivation.  The worsening of symptoms may feel like a relapse when in reality it is not.  These episodes are referred to as pseudorelapses.  Once the underlying cause is addressed, symptoms usually  fade away and you feel better.  If you experience a worsening of symptoms that lasts more than 48 hours and does not improve with cooling down, decreasing stress, or treatment of an infection, please call us and we can help to better determine whether you are having a pseudorelapse versus a relapse.        Duloxetine Oral capsule, gastro-resistant pellets  What is this medicine?  DULOXETINE (doo LOX e teen) is used to treat depression, anxiety, and different types of chronic pain.  This medicine may be used for other purposes; ask your health care provider or pharmacist if you have questions.  What should I tell my health care provider before I take this medicine?  They need to know if you have any of these conditions:    bipolar disorder or a family history of bipolar disorder    glaucoma    kidney disease    liver disease    suicidal thoughts or a previous suicide attempt    taken medicines called MAOIs like Carbex, Eldepryl, Marplan, Nardil, and Parnate within 14 days    an unusual reaction to duloxetine, other medicines, foods, dyes, or preservatives    pregnant or trying to get pregnant    breast-feeding  How should I use this medicine?  Take this medicine by mouth with a glass of water. Follow the directions on the prescription label. Do not cut, crush or chew this medicine. You can take this medicine with or without food. Take your medicine at regular intervals. Do not take your medicine more often than directed. Do not stop taking this medicine suddenly except upon the advice of your doctor. Stopping this medicine too quickly may cause serious side effects or your condition may worsen.  A special MedGuide will be given to you by the pharmacist with each prescription and refill. Be sure to read this information carefully each time.  Talk to your pediatrician regarding the use of this medicine in children. While this drug may be prescribed for children as young as 7 years of age for selected conditions,  precautions do apply.  Overdosage: If you think you have taken too much of this medicine contact a poison control center or emergency room at once.  NOTE: This medicine is only for you. Do not share this medicine with others.  What if I miss a dose?  If you miss a dose, take it as soon as you can. If it is almost time for your next dose, take only that dose. Do not take double or extra doses.  What may interact with this medicine?  Do not take this medicine with any of the following medications:    certain diet drugs like dexfenfluramine, fenfluramine    desvenlafaxine    linezolid    MAOIs like Azilect, Carbex, Eldepryl, Marplan, Nardil, and Parnate    methylene blue (intravenous)    milnacipran    thioridazine    venlafaxine  This medicine may also interact with the following medications:    alcohol    aspirin and aspirin-like medicines    certain antibiotics like ciprofloxacin and enoxacin    certain medicines for blood pressure, heart disease, irregular heart beat    certain medicines for depression, anxiety, or psychotic disturbances    certain medicines for migraine headache like almotriptan, eletriptan, frovatriptan, naratriptan, rizatriptan, sumatriptan, zolmitriptan    certain medicines that treat or prevent blood clots like warfarin, enoxaparin, and dalteparin    cimetidine    fentanyl    lithium    NSAIDS, medicines for pain and inflammation, like ibuprofen or naproxen    phentermine    procarbazine    sibutramine    [place]'s wort    theophylline    tramadol    tryptophan  This list may not describe all possible interactions. Give your health care provider a list of all the medicines, herbs, non-prescription drugs, or dietary supplements you use. Also tell them if you smoke, drink alcohol, or use illegal drugs. Some items may interact with your medicine.  What should I watch for while using this medicine?  Tell your doctor if your symptoms do not get better or if they get worse. Visit your doctor or  health care professional for regular checks on your progress. Because it may take several weeks to see the full effects of this medicine, it is important to continue your treatment as prescribed by your doctor.  Patients and their families should watch out for new or worsening thoughts of suicide or depression. Also watch out for sudden changes in feelings such as feeling anxious, agitated, panicky, irritable, hostile, aggressive, impulsive, severely restless, overly excited and hyperactive, or not being able to sleep. If this happens, especially at the beginning of treatment or after a change in dose, call your health care professional.  You may get drowsy or dizzy. Do not drive, use machinery, or do anything that needs mental alertness until you know how this medicine affects you. Do not stand or sit up quickly, especially if you are an older patient. This reduces the risk of dizzy or fainting spells. Alcohol may interfere with the effect of this medicine. Avoid alcoholic drinks.  This medicine can cause an increase in blood pressure. This medicine can also cause a sudden drop in your blood pressure, which may make you feel faint and increase the chance of a fall. These effects are most common when you first start the medicine or when the dose is increased, or during use of other medicines that can cause a sudden drop in blood pressure. Check with your doctor for instructions on monitoring your blood pressure while taking this medicine.  Your mouth may get dry. Chewing sugarless gum or sucking hard candy, and drinking plenty of water may help. Contact your doctor if the problem does not go away or is severe.  What side effects may I notice from receiving this medicine?  Side effects that you should report to your doctor or health care professional as soon as possible:    allergic reactions like skin rash, itching or hives, swelling of the face, lips, or tongue    changes in blood pressure    confusion    dark  urine    dizziness    fast talking and excited feelings or actions that are out of control    fast, irregular heartbeat    fever    general ill feeling or flu-like symptoms    hallucination, loss of contact with reality    light-colored stools    loss of balance or coordination    redness, blistering, peeling or loosening of the skin, including inside the mouth    right upper belly pain    seizures    suicidal thoughts or other mood changes    trouble concentrating    trouble passing urine or change in the amount of urine    unusual bleeding or bruising    unusually weak or tired    yellowing of the eyes or skin  Side effects that usually do not require medical attention (report to your doctor or health care professional if they continue or are bothersome):    blurred vision    change in appetite    change in sex drive or performance    headache    increased sweating    nausea  This list may not describe all possible side effects. Call your doctor for medical advice about side effects. You may report side effects to FDA at 0-191-FDA-0557.  Where should I keep my medicine?  Keep out of the reach of children.  Store at room temperature between 15 and 30 degrees C (59 and 86 degrees F). Throw away any unused medicine after the expiration date.  NOTE:This sheet is a summary. It may not cover all possible information. If you have questions about this medicine, talk to your doctor, pharmacist, or health care provider. Copyright  2016 [employer]

## 2018-03-15 NOTE — NURSING NOTE
"Renea Trotter's goals for this visit include: recheck  She requests these members of her care team be copied on today's visit information:     PCP: Cara Clements    Referring Provider:  No referring provider defined for this encounter.    Chief Complaint   Patient presents with     RECHECK     following MRI       Initial /88  Pulse 88  Ht 1.6 m (5' 3\")  Wt 65.8 kg (145 lb)  SpO2 98%  BMI 25.69 kg/m2 Estimated body mass index is 25.69 kg/(m^2) as calculated from the following:    Height as of this encounter: 1.6 m (5' 3\").    Weight as of this encounter: 65.8 kg (145 lb).  Medication Reconciliation: complete    "

## 2018-03-15 NOTE — MR AVS SNAPSHOT
After Visit Summary   3/15/2018    Renea Trotter    MRN: [id number]           Patient Information     Date Of Birth          1970        Visit Information        Provider Department      3/15/2018 10:00 AM Regan Mcgregor MD [facility]        Today's Diagnoses     Sleep disorder        Multiple sclerosis (H)          Care Instructions      Will start Cymbalta    Will check blood work    Will follow you up in 6 months    You saw a neurology provider, Regan Mcgregor, today at the [facility].  You may have also met with the MS RN Care Coordinator.  In order to get a message to your MS Center provider, you should contact [phone]. You should contact us via this protocol if you have any of the following symptoms:    New or worsening neurologic symptoms that persist for 24-48 hours, such as:  o New onset of pain or marked worsening of pain  o Difficulty with speech, swallowing, or breathing  o New onset of vertigo or dizziness  o Change in bowel or bladder function (incontinence, difficulty urinating)    Increasing difficulty in self care    Marked changes in vision (double vision, blurred vision, graying of vision)    Change in mobility    Change in cognitive function    Falling    Worsening numbness, tingling or pain with a change in function    Worsening fatigue lasting more than 2 weeks  If you had labs completed today, we will contact you with the results.  If you are active in [employer], they will be released to you there.  Otherwise, your results will be provided to you via mail or telephone call.  Some results take up to 2 weeks for completion.  If you haven t heard anything about your lab results within 2 weeks, you can call or send a [employer] message to obtain your results.  If you have an MRI scheduled in the week or two prior to your next appointment, we will go over the results at your scheduled follow up appointment.   If you are not scheduled to see your MS Center provider within about 2 weeks after your MRI, please call or send a [employer] message to obtain your results if you haven t heard anything within 2 weeks.  Please be aware that it takes at least 5 business days after routine MRIs for your results to be reviewed by both the radiologist and your doctor.  MRIs completed at facilities outside of the [facility] system take about 2 weeks in order for the MRI disc to be mailed to our clinic and uploaded into your medical record.    Please contact your pharmacy to request refills of your medications.   Please do your best to come to your appointments, and to arrive 15 minutes early to allow time for checking in.  [facility] reserves the right to terminate care of established patients if a patient misses three or more appointments in a clinic without providing notification within a 12-month period.    Developing Your Care Team  Individuals living with chronic illnesses like MS may be unaware that they are at risk for the same range of medical problems as everyone else.  This is why you must establish a relationship with other health care providers in addition to your Multiple Sclerosis doctor.  It can be difficult at times to figure out whether a health concern is related to your MS, or whether it is related to something else, such as hormonal changes, pseduoexacerbations, changes in your core body temperature, flu-like reactions to interferons, exercise, or infections.  Urinary tract infections (UTIs) are common culprits that can cause fatigue, weakness, or other  MS attack -like symptoms without classic symptoms of a UTI.  For this reason, if you call or come in to discuss symptoms, you may be asked to get in touch with your primary provider or another specialist, so that you receive the comprehensive care you need.  What is Multiple Sclerosis (MS)?  MS is a disease in which the nerve tissues in the brain  and/or spinal cord are attacked by immune cells in the body.  These immune cells are present in everyone, and their normal role is to fight off infections.  In people with MS, these cells change the way they function and cross into the nervous system.  Once there, they cause inflammation that damages the myelin (or the protective coating of a nerve cell, much like the plastic covering on an electrical cord) and parts of the nerve cell itself.  So far, a clear cause for this immune system dysfunction has not been found.  MS often starts out as the  relapsing-remitting  form.  This means there are episodes when you have symptoms, and other times when you recover to normal or near-normal.  Over time, if the damage to the nervous system continues, the disease can cause additional disability, such as difficulty walking.  If the relapses and nerve damage can be prevented with available medications, many patients with MS can go many years between relapses and have relatively little disability.  Remember: MS is a condition that changes and must be evaluated on an ongoing basis!  What is a Relapse? (Also called flare-ups, attacks, or exacerbations)  Relapses are due to the occurrence of inflammation in some part of the brain and/or spinal cord.  A relapse is new or recurrent symptoms which persist for at least 24 hours and sometimes worsen over 48 hours.  New symptoms need to be  by at least 1 month in order to be considered separate relapses. Most of the time, symptoms reach their maximal intensity within 2 weeks and then begin to slowly resolve.  At times, your symptoms may not recover fully for up to 6 months, depending on the severity of the episode.  The frequency of relapses is generally higher early in the disease, but can vary greatly among individuals with MS.  Improvement of symptoms for an individual is unpredictable with each relapse.    It is important to remember that an increase in symptoms and  changes in function may not necessarily be a relapse.  There are other factors that contribute to such changes, such as hot weather, increased body temperature, infection/illness, stress and sleep deprivation.  The worsening of symptoms may feel like a relapse when in reality it is not.  These episodes are referred to as pseudorelapses.  Once the underlying cause is addressed, symptoms usually fade away and you feel better.  If you experience a worsening of symptoms that lasts more than 48 hours and does not improve with cooling down, decreasing stress, or treatment of an infection, please call us and we can help to better determine whether you are having a pseudorelapse versus a relapse.        Duloxetine Oral capsule, gastro-resistant pellets  What is this medicine?  DULOXETINE (doo LOX e teen) is used to treat depression, anxiety, and different types of chronic pain.  This medicine may be used for other purposes; ask your health care provider or pharmacist if you have questions.  What should I tell my health care provider before I take this medicine?  They need to know if you have any of these conditions:    bipolar disorder or a family history of bipolar disorder    glaucoma    kidney disease    liver disease    suicidal thoughts or a previous suicide attempt    taken medicines called MAOIs like Carbex, Eldepryl, Marplan, Nardil, and Parnate within 14 days    an unusual reaction to duloxetine, other medicines, foods, dyes, or preservatives    pregnant or trying to get pregnant    breast-feeding  How should I use this medicine?  Take this medicine by mouth with a glass of water. Follow the directions on the prescription label. Do not cut, crush or chew this medicine. You can take this medicine with or without food. Take your medicine at regular intervals. Do not take your medicine more often than directed. Do not stop taking this medicine suddenly except upon the advice of your doctor. Stopping this medicine too  quickly may cause serious side effects or your condition may worsen.  A special MedGuide will be given to you by the pharmacist with each prescription and refill. Be sure to read this information carefully each time.  Talk to your pediatrician regarding the use of this medicine in children. While this drug may be prescribed for children as young as 7 years of age for selected conditions, precautions do apply.  Overdosage: If you think you have taken too much of this medicine contact a poison control center or emergency room at once.  NOTE: This medicine is only for you. Do not share this medicine with others.  What if I miss a dose?  If you miss a dose, take it as soon as you can. If it is almost time for your next dose, take only that dose. Do not take double or extra doses.  What may interact with this medicine?  Do not take this medicine with any of the following medications:    certain diet drugs like dexfenfluramine, fenfluramine    desvenlafaxine    linezolid    MAOIs like Azilect, Carbex, Eldepryl, Marplan, Nardil, and Parnate    methylene blue (intravenous)    milnacipran    thioridazine    venlafaxine  This medicine may also interact with the following medications:    alcohol    aspirin and aspirin-like medicines    certain antibiotics like ciprofloxacin and enoxacin    certain medicines for blood pressure, heart disease, irregular heart beat    certain medicines for depression, anxiety, or psychotic disturbances    certain medicines for migraine headache like almotriptan, eletriptan, frovatriptan, naratriptan, rizatriptan, sumatriptan, zolmitriptan    certain medicines that treat or prevent blood clots like warfarin, enoxaparin, and dalteparin    cimetidine    fentanyl    lithium    NSAIDS, medicines for pain and inflammation, like ibuprofen or naproxen    phentermine    procarbazine    sibutramine    [place]'s wort    theophylline    tramadol    tryptophan  This list may not describe all possible  interactions. Give your health care provider a list of all the medicines, herbs, non-prescription drugs, or dietary supplements you use. Also tell them if you smoke, drink alcohol, or use illegal drugs. Some items may interact with your medicine.  What should I watch for while using this medicine?  Tell your doctor if your symptoms do not get better or if they get worse. Visit your doctor or health care professional for regular checks on your progress. Because it may take several weeks to see the full effects of this medicine, it is important to continue your treatment as prescribed by your doctor.  Patients and their families should watch out for new or worsening thoughts of suicide or depression. Also watch out for sudden changes in feelings such as feeling anxious, agitated, panicky, irritable, hostile, aggressive, impulsive, severely restless, overly excited and hyperactive, or not being able to sleep. If this happens, especially at the beginning of treatment or after a change in dose, call your health care professional.  You may get drowsy or dizzy. Do not drive, use machinery, or do anything that needs mental alertness until you know how this medicine affects you. Do not stand or sit up quickly, especially if you are an older patient. This reduces the risk of dizzy or fainting spells. Alcohol may interfere with the effect of this medicine. Avoid alcoholic drinks.  This medicine can cause an increase in blood pressure. This medicine can also cause a sudden drop in your blood pressure, which may make you feel faint and increase the chance of a fall. These effects are most common when you first start the medicine or when the dose is increased, or during use of other medicines that can cause a sudden drop in blood pressure. Check with your doctor for instructions on monitoring your blood pressure while taking this medicine.  Your mouth may get dry. Chewing sugarless gum or sucking hard candy, and drinking plenty  of water may help. Contact your doctor if the problem does not go away or is severe.  What side effects may I notice from receiving this medicine?  Side effects that you should report to your doctor or health care professional as soon as possible:    allergic reactions like skin rash, itching or hives, swelling of the face, lips, or tongue    changes in blood pressure    confusion    dark urine    dizziness    fast talking and excited feelings or actions that are out of control    fast, irregular heartbeat    fever    general ill feeling or flu-like symptoms    hallucination, loss of contact with reality    light-colored stools    loss of balance or coordination    redness, blistering, peeling or loosening of the skin, including inside the mouth    right upper belly pain    seizures    suicidal thoughts or other mood changes    trouble concentrating    trouble passing urine or change in the amount of urine    unusual bleeding or bruising    unusually weak or tired    yellowing of the eyes or skin  Side effects that usually do not require medical attention (report to your doctor or health care professional if they continue or are bothersome):    blurred vision    change in appetite    change in sex drive or performance    headache    increased sweating    nausea  This list may not describe all possible side effects. Call your doctor for medical advice about side effects. You may report side effects to FDA at 7-216-FDA-3641.  Where should I keep my medicine?  Keep out of the reach of children.  Store at room temperature between 15 and 30 degrees C (59 and 86 degrees F). Throw away any unused medicine after the expiration date.  NOTE:This sheet is a summary. It may not cover all possible information. If you have questions about this medicine, talk to your doctor, pharmacist, or health care provider. Copyright  2016 [employer] Standard                Follow-ups after your visit        Follow-up notes from your care team      Return in about 6 months (around 9/15/2018).      Your next 10 appointments already scheduled     Sep 10, 2018  4:00 PM CDT   Return Visit with Regan Mcgregor MD   [facility]    [address] [place] [address] [id number] [phone]              Who to contact     If you have questions or need follow up information about today's clinic visit or your schedule please contact [facility] directly at [phone].  Normal or non-critical lab and imaging results will be communicated to you by [employer], letter or phone within 4 business days after the clinic has received the results. If you do not hear from us within 7 days, please contact the clinic through [employer] or phone. If you have a critical or abnormal lab result, we will notify you by phone as soon as possible.  Submit refill requests through [employer] or call your pharmacy and they will forward the refill request to us. Please allow 3 business days for your refill to be completed.          Additional Information About Your Visit        [employer] Information     [employer] gives you secure access to your electronic health record. If you see a primary care provider, you can also send messages to your care team and make appointments. If you have questions, please call your primary care clinic.  If you do not have a primary care provider, please call [phone] and they will assist you.      [employer] is an electronic gateway that provides easy, online access to your medical records. With [employer], you can request a clinic appointment, read your test results, renew a prescription or communicate with your care team.     To access your existing account, please contact your [facility] or call [phone] for assistance.        Care EveryWhere ID     This is your Care EveryWhere ID. This could be used by other organizations to access your [facility]  "records  [id number]        Your Vitals Were     Pulse Height Pulse Oximetry BMI (Body Mass Index)          88 1.6 m (5' 3\") 98% 25.69 kg/m2         Blood Pressure from Last 3 Encounters:   03/15/18 128/88   10/13/17 120/80   09/28/17 137/89    Weight from Last 3 Encounters:   03/15/18 65.8 kg (145 lb)   10/13/17 67.9 kg (149 lb 12.8 oz)   09/28/17 68 kg (150 lb)              We Performed the Following     CBC with platelets differential     Comprehensive metabolic panel     M Tuberculosis by Quantiferon     Vitamin D Deficiency          Today's Medication Changes          These changes are accurate as of 3/15/18 10:38 AM.  If you have any questions, ask your nurse or doctor.               Start taking these medicines.        Dose/Directions    DULoxetine 60 MG EC capsule   Commonly known as:  CYMBALTA   Used for:  Sleep disorder   Started by:  Regan Mcgregor MD        Dose:  60 mg   Take 1 capsule (60 mg) by mouth daily   Quantity:  30 capsule   Refills:  3         These medicines have changed or have updated prescriptions.        Dose/Directions    temazepam 15 MG capsule   Commonly known as:  RESTORIL   This may have changed:  See the new instructions.   Used for:  Sleep disorder   Changed by:  Regna Mcgregor MD        TAKE ONE TO TWO CAPSULES BY MOUTH ONCE DAILY AT BEDTIME   Quantity:  45 capsule   Refills:  5            Where to get your medicines      These medications were sent to [facility] [address] [place] [address]     Phone:  [phone]     DULoxetine 60 MG EC capsule    Glatiramer Acetate 40 MG/ML Sosy         Some of these will need a paper prescription and others can be bought over the counter.  Ask your nurse if you have questions.     Bring a paper prescription for each of these medications     temazepam 15 MG capsule                Primary Care Provider Office Phone # Fax #    Cara Clements MD " [phone]       [address]  [facility] [address]        Equal Access to Services     BOUBACAR PAPPAS : Hadii kasi collins rehana Dominguez, wamatthewda luqagueda, qaybta kaalmada latricia, will alfaro. So [place] -[phone].    ATENCIÓN: Si habla español, tiene a ocampo disposición servicios gratuitos de asistencia lingüística. Llame al [phone].    We comply with applicable federal civil rights laws and [place] laws. We do not discriminate on the basis of race, color, national origin, age, disability, sex, sexual orientation, or gender identity.            Thank you!     Thank you for choosing [facility]  for your care. Our goal is always to provide you with excellent care. Hearing back from our patients is one way we can continue to improve our services. Please take a few minutes to complete the written survey that you may receive in the mail after your visit with us. Thank you!             Your Updated Medication List - Protect others around you: Learn how to safely use, store and throw away your medicines at www.disposemymeds.org.          This list is accurate as of 3/15/18 10:38 AM.  Always use your most recent med list.                   Brand Name Dispense Instructions for use Diagnosis    albuterol 108 (90 BASE) MCG/ACT Inhaler    PROAIR HFA/PROVENTIL HFA/VENTOLIN HFA    1 Inhaler    Inhale 2 puffs into the lungs every 6 hours as needed for shortness of breath / dyspnea or wheezing    Mild intermittent asthma, uncomplicated       COCONUT OIL PO      Take 2,000 mg by mouth        DULoxetine 60 MG EC capsule    CYMBALTA    30 capsule    Take 1 capsule (60 mg) by mouth daily    Sleep disorder       Glatiramer Acetate 40 MG/ML Sosy   Start taking on:  3/16/2018     36 Syringe    Inject 40 mg Subcutaneous three times a week    Multiple sclerosis (H)       hydrochlorothiazide 25 MG tablet    HYDRODIURIL    90 tablet    Take 1 tablet (25 mg) by mouth daily  Fill the prescription when patient is due for refills    Essential hypertension with goal blood pressure less than 140/90       MULTIPLE VITAMIN PO      Take 1 tablet by mouth daily.        Potassium Chloride Drea CR 15 MEQ Tbcr     90 tablet    Take 15 mEq by mouth daily    Hypokalemia       temazepam 15 MG capsule    RESTORIL    45 capsule    TAKE ONE TO TWO CAPSULES BY MOUTH ONCE DAILY AT BEDTIME    Sleep disorder       VITAMIN D3 PO      Take 5,000 Units by mouth daily

## 2018-03-19 LAB
M TB TUBERC IFN-G BLD QL: NEGATIVE
M TB TUBERC IFN-G/MITOGEN IGNF BLD: 0.05 IU/ML

## 2018-03-20 ENCOUNTER — TELEPHONE (OUTPATIENT)
Dept: NEUROLOGY | Facility: CLINIC | Age: 48
End: 2018-03-20

## 2018-03-20 NOTE — TELEPHONE ENCOUNTER
"Per call center: Pt of Dr. Mcgregor calling to discuss current adverse side effects from the Cymbalta she's been taking and is requesting a call back today.   Pt can be reached at [phone].     Called patient to discuss. She took the Cymbalta around 3pm yesterday, this was her first dose. She did not sleep at all last night, became nauseous and began getting hot/cold, hot/cold. These feelings are persisting. \"My head feels fuzzy and I have a headache.\"  She has just had the one dose. Dr. Mcgregor, could these symptoms be r/t to the medication? Patient says she can't do anything when she feels this way. What do you recommend?       "

## 2018-03-20 NOTE — TELEPHONE ENCOUNTER
Called and spoke with patient on Dr. Mcgregor's input. She will not take the medication for a day or 2 and will see if symptoms resolve. She will call us in a couple of days to let us know how she is feeling. Dr. Mcgregor said we will develop a plan moving forward at that time.   Patient is agreeable to this.

## 2018-03-20 NOTE — TELEPHONE ENCOUNTER
Hi    I think it could be related to the medication. I would not take it today and see if she improves. If she becomes very concerned about the symptoms she should got to an urgent care/  ER.    Regan Mcgregor MD [facility]  Staff Neurologist   03/20/18

## 2018-03-22 ENCOUNTER — MYC MEDICAL ADVICE (OUTPATIENT)
Dept: NEUROLOGY | Facility: CLINIC | Age: 48
End: 2018-03-22

## 2018-03-22 DIAGNOSIS — R20.2 PARESTHESIA: Primary | ICD-10-CM

## 2018-03-22 RX ORDER — AMITRIPTYLINE HYDROCHLORIDE 10 MG/1
TABLET ORAL
Qty: 90 TABLET | Refills: 0 | Status: SHIPPED | OUTPATIENT
Start: 2018-03-22 | End: 2019-05-04

## 2018-03-22 RX ORDER — AMITRIPTYLINE HYDROCHLORIDE 10 MG/1
TABLET ORAL
Qty: 90 TABLET | Refills: 0 | Status: SHIPPED | OUTPATIENT
Start: 2018-03-22 | End: 2018-03-22

## 2018-04-11 ENCOUNTER — OFFICE VISIT (OUTPATIENT)
Dept: PEDIATRICS | Facility: CLINIC | Age: 48
End: 2018-04-11
Payer: COMMERCIAL

## 2018-04-11 VITALS
SYSTOLIC BLOOD PRESSURE: 132 MMHG | WEIGHT: 147.5 LBS | HEART RATE: 88 BPM | BODY MASS INDEX: 26.13 KG/M2 | DIASTOLIC BLOOD PRESSURE: 90 MMHG | OXYGEN SATURATION: 97 % | TEMPERATURE: 98 F

## 2018-04-11 DIAGNOSIS — R82.90 NONSPECIFIC FINDING ON EXAMINATION OF URINE: ICD-10-CM

## 2018-04-11 DIAGNOSIS — I10 ESSENTIAL HYPERTENSION WITH GOAL BLOOD PRESSURE LESS THAN 140/90: ICD-10-CM

## 2018-04-11 DIAGNOSIS — N76.0 BACTERIAL VAGINITIS: Primary | ICD-10-CM

## 2018-04-11 DIAGNOSIS — B96.89 BACTERIAL VAGINITIS: Primary | ICD-10-CM

## 2018-04-11 DIAGNOSIS — R35.0 URINARY FREQUENCY: ICD-10-CM

## 2018-04-11 DIAGNOSIS — N89.8 VAGINAL ODOR: ICD-10-CM

## 2018-04-11 LAB
ALBUMIN UR-MCNC: NEGATIVE MG/DL
APPEARANCE UR: CLEAR
BACTERIA #/AREA URNS HPF: ABNORMAL /HPF
BILIRUB UR QL STRIP: NEGATIVE
COLOR UR AUTO: ABNORMAL
GLUCOSE UR STRIP-MCNC: NEGATIVE MG/DL
HGB UR QL STRIP: NEGATIVE
KETONES UR STRIP-MCNC: NEGATIVE MG/DL
LEUKOCYTE ESTERASE UR QL STRIP: ABNORMAL
NITRATE UR QL: NEGATIVE
NON-SQ EPI CELLS #/AREA URNS LPF: ABNORMAL /LPF
PH UR STRIP: 7.5 PH (ref 5–7)
RBC #/AREA URNS AUTO: ABNORMAL /HPF
SOURCE: ABNORMAL
SP GR UR STRIP: 1 (ref 1–1.03)
SPECIMEN SOURCE: ABNORMAL
UROBILINOGEN UR STRIP-MCNC: NORMAL MG/DL (ref 0–2)
WBC #/AREA URNS AUTO: ABNORMAL /HPF
WET PREP SPEC: ABNORMAL

## 2018-04-11 PROCEDURE — 99213 OFFICE O/P EST LOW 20 MIN: CPT | Performed by: FAMILY MEDICINE

## 2018-04-11 PROCEDURE — 87086 URINE CULTURE/COLONY COUNT: CPT | Performed by: FAMILY MEDICINE

## 2018-04-11 PROCEDURE — 81001 URINALYSIS AUTO W/SCOPE: CPT | Performed by: FAMILY MEDICINE

## 2018-04-11 PROCEDURE — 87210 SMEAR WET MOUNT SALINE/INK: CPT | Performed by: FAMILY MEDICINE

## 2018-04-11 RX ORDER — METRONIDAZOLE 500 MG/1
500 TABLET ORAL 2 TIMES DAILY
Qty: 14 TABLET | Refills: 0 | Status: SHIPPED | OUTPATIENT
Start: 2018-04-11 | End: 2018-09-10

## 2018-04-11 ASSESSMENT — PAIN SCALES - GENERAL: PAINLEVEL: NO PAIN (0)

## 2018-04-11 NOTE — NURSING NOTE
"Chief Complaint   Patient presents with     Vaginal Problem       Initial /90 (BP Location: Right arm, Patient Position: Sitting, Cuff Size: Adult Regular)  Pulse 88  Temp 98  F (36.7  C) (Oral)  Wt 147 lb 8 oz (66.9 kg)  LMP 04/05/2018 (Exact Date)  SpO2 97%  BMI 26.13 kg/m2 Estimated body mass index is 26.13 kg/(m^2) as calculated from the following:    Height as of 3/15/18: 5' 3\" (1.6 m).    Weight as of this encounter: 147 lb 8 oz (66.9 kg).  Medication Reconciliation: complete  Oneal Chaney, ROM    "

## 2018-04-11 NOTE — MR AVS SNAPSHOT
After Visit Summary   4/11/2018    Renea Trotter    MRN: [id number]           Patient Information     Date Of Birth          1970        Visit Information        Provider Department      4/11/2018 10:50 AM Cara Clements MD [facility]        Today's Diagnoses     Urinary frequency    -  1    Vaginal odor           Follow-ups after your visit        Your next 10 appointments already scheduled     Sep 10, 2018  4:00 PM CDT   Return Visit with Regan Mcgregor MD   [facility] ([facility]    [id number] [place] [address] [id number] [phone]              Who to contact     If you have questions or need follow up information about today's clinic visit or your schedule please contact [facility] directly at [phone].  Normal or non-critical lab and imaging results will be communicated to you by MindBiteshart, letter or phone within 4 business days after the clinic has received the results. If you do not hear from us within 7 days, please contact the clinic through MindBiteshart or phone. If you have a critical or abnormal lab result, we will notify you by phone as soon as possible.  Submit refill requests through [facility] or call your pharmacy and they will forward the refill request to us. Please allow 3 business days for your refill to be completed.          Additional Information About Your Visit        MyChart Information     [facility] gives you secure access to your electronic health record. If you see a primary care provider, you can also send messages to your care team and make appointments. If you have questions, please call your primary care clinic.  If you do not have a primary care provider, please call [phone] and they will assist you.      [facility] is an electronic gateway that provides easy, online access to your medical records. With [facility], you can request a clinic appointment, read your test  results, renew a prescription or communicate with your care team.     To access your existing account, please contact your [facility] or call [phone] for assistance.        Care EveryWhere ID     This is your Care EveryWhere ID. This could be used by other organizations to access your [place] medical records  [id number]        Your Vitals Were     Pulse Temperature Last Period Pulse Oximetry BMI (Body Mass Index)       88 98  F (36.7  C) (Oral) 04/05/2018 (Exact Date) 97% 26.13 kg/m2        Blood Pressure from Last 3 Encounters:   04/11/18 132/90   03/15/18 128/88   10/13/17 120/80    Weight from Last 3 Encounters:   04/11/18 147 lb 8 oz (66.9 kg)   03/15/18 145 lb (65.8 kg)   10/13/17 149 lb 12.8 oz (67.9 kg)              We Performed the Following     UA with Microscopic reflex to Culture     Wet prep        Primary Care Provider Office Phone # Fax #    Cara Clements [phone]       [address]  [facility] [address]        Equal Access to Services     [facility]: Hadii aad ku hadasho Soomaali, waaxda luqadaha, qaybta kaalmada ademaxineyada, will govea . So [facility] [phone].    ATENCIÓN: Si habla español, tiene a ocampo disposición servicios gratuitos de asistencia lingüística. Ll[facility] [phone].    We comply with applicable federal civil rights laws and [place] laws. We do not discriminate on the basis of race, color, national origin, age, disability, sex, sexual orientation, or gender identity.            Thank you!     Thank you for choosing [facility]  for your care. Our goal is always to provide you with excellent care. Hearing back from our patients is one way we can continue to improve our services. Please take a few minutes to complete the written survey that you may receive in the mail after your visit with us. Thank you!             Your Updated Medication List - Protect others around you:  Learn how to safely use, store and throw away your medicines at www.disposemymeds.org.          This list is accurate as of 4/11/18 11:20 AM.  Always use your most recent med list.                   Brand Name Dispense Instructions for use Diagnosis    albuterol 108 (90 Base) MCG/ACT Inhaler    PROAIR HFA/PROVENTIL HFA/VENTOLIN HFA    1 Inhaler    Inhale 2 puffs into the lungs every 6 hours as needed for shortness of breath / dyspnea or wheezing    Mild intermittent asthma, uncomplicated       amitriptyline 10 MG tablet    ELAVIL    90 tablet    Start at 1 tab QHS for 3 days, then 2 tabs QHS for 3 days, then 3 tabs QHS.    Paresthesia       COCONUT OIL PO      Take 2,000 mg by mouth        DULoxetine 60 MG EC capsule    CYMBALTA    30 capsule    Take 1 capsule (60 mg) by mouth daily    Sleep disorder       Glatiramer Acetate 40 MG/ML Sosy     36 Syringe    Inject 40 mg Subcutaneous three times a week    Multiple sclerosis (H)       hydrochlorothiazide 25 MG tablet    HYDRODIURIL    90 tablet    Take 1 tablet (25 mg) by mouth daily Fill the prescription when patient is due for refills    Essential hypertension with goal blood pressure less than 140/90       MULTIPLE VITAMIN PO      Take 1 tablet by mouth daily.        Potassium Chloride Drea CR 15 MEQ Tbcr     90 tablet    Take 15 mEq by mouth daily    Hypokalemia       temazepam 15 MG capsule    RESTORIL    45 capsule    TAKE ONE TO TWO CAPSULES BY MOUTH ONCE DAILY AT BEDTIME    Sleep disorder       VITAMIN D3 PO      Take 5,000 Units by mouth daily

## 2018-04-11 NOTE — PROGRESS NOTES
SUBJECTIVE:   Renea Trotter is a 47 year old female who presents to clinic today for the following health issues:      Vaginal Symptoms  Patient with past medical history significant for multiple sclerosis, hypertension is here with concerns of having minimal vaginal discharge with strong fishy odor for the past 3-4 days after she was done with her menstrual cycle but she used a vaginal cup that stayed in there for a long time.  Patient denies vaginal itching, recent use of antibiotics, history of recurrent vaginitis in the past.  She also has urinary frequency for 2 days with no associated dysuria, hematuria, urgency,history of recurrent UTI, flank or low back pain, fevers, chills, nausea, vomiting, abdominal pain.  Onset: Sunday    Description:  Vaginal Discharge: blood unsure if vaginal or in urine  Itching (Pruritis): no   Burning sensation:  no   Odor: YES    Accompanying Signs & Symptoms:  Pain with Urination: no , but notes onset of urgency and blood since yesterday  Abdominal Pain: no   Fever: no     History:   Sexually active: YES  New Partner: no   Possibility of Pregnancy:  No LMP 4/5/18    Precipitating factors:   Recent Antibiotic Use: no     Alleviating factors:  none    Therapies Tried and outcome: none        Problem list and histories reviewed & adjusted, as indicated.  Additional history: as documented    Patient Active Problem List   Diagnosis     Multiple sclerosis (H)     CARDIOVASCULAR SCREENING; LDL GOAL LESS THAN 160     BMI 26.0-26.9,adult     Urinary urgency     Headache     Low back pain without sciatica, unspecified back pain laterality     Sleep disorder     Plantar warts     Essential hypertension with goal blood pressure less than 140/90     Hemorrhoids, unspecified hemorrhoid type     Seborrheic keratosis     Mild intermittent asthma     Past Surgical History:   Procedure Laterality Date     HC TOOTH EXTRACTION W/FORCEP         Social History   Substance Use Topics     Smoking  status: Never Smoker     Smokeless tobacco: Never Used     Alcohol use 0.6 - 1.2 oz/week     1 - 2 Standard drinks or equivalent per week      Comment: RARELY     Family History   Problem Relation Age of Onset     Hypertension Mother      Lipids Mother      Eye Disorder Father      CEREBROVASCULAR DISEASE Maternal Grandmother      CEREBROVASCULAR DISEASE Maternal Grandfather      Prostate Cancer Maternal Grandfather      Blood Disease Paternal Grandmother      Alzheimer Disease Paternal Grandfather      Multiple Sclerosis Cousin          Current Outpatient Prescriptions   Medication Sig Dispense Refill     metroNIDAZOLE (FLAGYL) 500 MG tablet Take 1 tablet (500 mg) by mouth 2 times daily 14 tablet 0     amitriptyline (ELAVIL) 10 MG tablet Start at 1 tab QHS for 3 days, then 2 tabs QHS for 3 days, then 3 tabs QHS. 90 tablet 0     temazepam (RESTORIL) 15 MG capsule TAKE ONE TO TWO CAPSULES BY MOUTH ONCE DAILY AT BEDTIME 45 capsule 5     DULoxetine (CYMBALTA) 60 MG EC capsule Take 1 capsule (60 mg) by mouth daily 30 capsule 3     Glatiramer Acetate 40 MG/ML SOSY Inject 40 mg Subcutaneous three times a week 36 Syringe 3     Potassium Chloride Drea CR 15 MEQ TBCR Take 15 mEq by mouth daily 90 tablet 3     hydrochlorothiazide (HYDRODIURIL) 25 MG tablet Take 1 tablet (25 mg) by mouth daily Fill the prescription when patient is due for refills 90 tablet 1     albuterol (PROAIR HFA/PROVENTIL HFA/VENTOLIN HFA) 108 (90 BASE) MCG/ACT Inhaler Inhale 2 puffs into the lungs every 6 hours as needed for shortness of breath / dyspnea or wheezing 1 Inhaler 2     COCONUT OIL PO Take 2,000 mg by mouth       Cholecalciferol (VITAMIN D3 PO) Take 5,000 Units by mouth daily       MULTIPLE VITAMIN PO Take 1 tablet by mouth daily.       No Known Allergies  Recent Labs   Lab Test  03/15/18   1041  10/13/17   0727  10/12/16   0732  10/07/15   0746   LDL   --   113*  95  95   HDL   --   52  44*  49*   TRIG   --   169*  95  134   ALT  17  63*  20    --    CR  0.72  0.87  0.98  0.80   GFRESTIMATED  86  69  61  78   GFRESTBLACK  >90  84  74  >90   GFR Calc     POTASSIUM  3.8  3.2*  3.5  3.8   TSH   --   2.19  1.38   --       BP Readings from Last 3 Encounters:   04/11/18 132/90   03/15/18 128/88   10/13/17 120/80    Wt Readings from Last 3 Encounters:   04/11/18 147 lb 8 oz (66.9 kg)   03/15/18 145 lb (65.8 kg)   10/13/17 149 lb 12.8 oz (67.9 kg)                  Labs reviewed in EPIC    Reviewed and updated as needed this visit by clinical staff  Tobacco  Allergies  Meds  Med Hx  Surg Hx  Fam Hx  Soc Hx      Reviewed and updated as needed this visit by Provider         ROS:  CONSTITUTIONAL: NEGATIVE for fever, chills, change in weight  RESP: NEGATIVE for significant cough or SOB  CV: NEGATIVE for chest pain, palpitations or peripheral edema  CV: Hx HTN  : as above  NEURO: NEGATIVE for weakness, dizziness or paresthesias and history of multiple sclerosis  PSYCHIATRIC: NEGATIVE for changes in mood or affect    OBJECTIVE:     /90 (BP Location: Right arm, Patient Position: Sitting, Cuff Size: Adult Regular)  Pulse 88  Temp 98  F (36.7  C) (Oral)  Wt 147 lb 8 oz (66.9 kg)  LMP 04/05/2018 (Exact Date)  SpO2 97%  BMI 26.13 kg/m2  Body mass index is 26.13 kg/(m^2).  GENERAL: healthy, alert and no distress  ABDOMEN: soft, nontender, no hepatosplenomegaly, no masses and bowel sounds normal   (female): normal female external genitalia, normal urethral meatus, vaginal mucosa, normal cervix/adnexa/uterus without masses , scant brown discharge with no odor  SKIN: no suspicious lesions or rashes  BACK: no CVA tenderness, no paralumbar tenderness  PSYCH: mentation appears normal, affect normal/bright    Diagnostic Test Results:  Results for orders placed or performed in visit on 04/11/18 (from the past 24 hour(s))   UA with Microscopic reflex to Culture   Result Value Ref Range    Color Urine Light Yellow     Appearance Urine Clear      Glucose Urine Negative NEG^Negative mg/dL    Bilirubin Urine Negative NEG^Negative    Ketones Urine Negative NEG^Negative mg/dL    Specific Gravity Urine 1.004 1.003 - 1.035    Blood Urine Negative NEG^Negative    pH Urine 7.5 (H) 5.0 - 7.0 pH    Protein Albumin Urine Negative NEG^Negative mg/dL    Urobilinogen mg/dL Normal 0.0 - 2.0 mg/dL    Nitrite Urine Negative NEG^Negative    Leukocyte Esterase Urine Large (A) NEG^Negative    Source Midstream Urine     WBC Urine 0 - 5 OTO5^0 - 5 /HPF    RBC Urine O - 2 OTO2^O - 2 /HPF    Bacteria Urine Moderate (A) NEG^Negative /HPF    Squamous Epithelial /LPF Urine Few FEW^Few /LPF   Wet prep   Result Value Ref Range    Specimen Description Vagina     Wet Prep No Trichomonas seen     Wet Prep Clue cells seen (A)     Wet Prep No yeast seen        ASSESSMENT/PLAN:             1. Bacterial vaginitis    Reviewed blood test results-bacterial vaginitis, will start on metronidazole twice daily for 7 days, reviewed local hygiene.  Dosing and potential medication side effects discussed.  Patient verbalised understanding and is agreeable to the plan.    - metroNIDAZOLE (FLAGYL) 500 MG tablet; Take 1 tablet (500 mg) by mouth 2 times daily  Dispense: 14 tablet; Refill: 0    2. Urinary frequency  ddx-vaginitis/UTI  Ua-abnormal showing moderate bacteria  Will follow up on urine culture  Will f/u on results and call with recommendations.    - UA with Microscopic reflex to Culture    3. Vaginal odor  as above    - Wet prep    4. Nonspecific finding on examination of urine  as above    - Urine Culture Aerobic Bacterial    5. Essential hypertension with goal blood pressure less than 140/90  BP Readings from Last 6 Encounters:   04/11/18 132/90   03/15/18 128/88   10/13/17 120/80   09/28/17 137/89   07/07/17 124/80   03/30/17 (!) 168/116     Blood pressure was found to be slightly elevated today, patient has not had her antihypertensive for this morning  Recheck at the next visit      Chart  documentation done in part with Dragon Voice recognition Software. Although reviewed after completion, some word and grammatical error may remain.    See Patient Instructions    Cara Clements MD  [facility]

## 2018-04-12 LAB
BACTERIA SPEC CULT: NO GROWTH
SPECIMEN SOURCE: NORMAL

## 2018-04-12 RX ORDER — HYDROCHLOROTHIAZIDE 25 MG/1
TABLET ORAL
Qty: 90 TABLET | Refills: 1 | Status: SHIPPED | OUTPATIENT
Start: 2018-04-12 | End: 2018-10-09

## 2018-04-12 NOTE — TELEPHONE ENCOUNTER
hydrochlorothiazide (HYDRODIURIL) 25 MG tablet 90 tablet 1 10/13/2017  No   Sig: Take 1 tablet (25 mg) by mouth daily Fill the prescription when patient is due for refills     Last OV with Dr. Clements: 4/11/2018    No future apts.     BP Readings from Last 3 Encounters:   04/11/18 132/90   03/15/18 128/88   10/13/17 120/80     Creatinine   Date Value Ref Range Status   03/15/2018 0.72 0.52 - 1.04 mg/dL Final     Potassium   Date Value Ref Range Status   03/15/2018 3.8 3.4 - 5.3 mmol/L Final     Sodium 139  133 - 144 mmol/L Final 03/15/2018 10:41 AM 59     Diuretics (Including Combos) Protocol Passed4/11 1:04 AM   Blood pressure under 140/90 in past 12 months    Recent (12 mo) or future (30 days) visit within the authorizing provider's specialty    Patient is age 18 or older    No active pregancy on record    Normal serum creatinine on file in past 12 months    Normal serum potassium on file in past 12 months    Normal serum sodium on file in past 12 months    No positive pregnancy test in past 12 months     Refilled per [facility] protocol.    Perri Carrera RN

## 2018-06-11 DIAGNOSIS — G35 MULTIPLE SCLEROSIS (H): ICD-10-CM

## 2018-06-11 RX ORDER — GLATIRAMER 40 MG/ML
40 INJECTION, SOLUTION SUBCUTANEOUS
Qty: 36 SYRINGE | Refills: 3 | Status: SHIPPED | OUTPATIENT
Start: 2018-06-11 | End: 2019-02-13

## 2018-06-16 ENCOUNTER — HEALTH MAINTENANCE LETTER (OUTPATIENT)
Age: 48
End: 2018-06-16

## 2018-08-14 ENCOUNTER — MYC MEDICAL ADVICE (OUTPATIENT)
Dept: PEDIATRICS | Facility: CLINIC | Age: 48
End: 2018-08-14

## 2018-08-14 DIAGNOSIS — N76.0 BACTERIAL VAGINAL INFECTION: Primary | ICD-10-CM

## 2018-08-14 DIAGNOSIS — B96.89 BACTERIAL VAGINAL INFECTION: Primary | ICD-10-CM

## 2018-08-14 RX ORDER — METRONIDAZOLE 500 MG/1
500 TABLET ORAL 2 TIMES DAILY
Qty: 14 TABLET | Refills: 0 | Status: SHIPPED | OUTPATIENT
Start: 2018-08-14 | End: 2018-09-10

## 2018-08-14 NOTE — TELEPHONE ENCOUNTER
Routing to provider to please review and advise.     metroNIDAZOLE (FLAGYL) 500 MG tablet 14 tablet 0 4/11/2018  --   Sig - Route: Take 1 tablet (500 mg) by mouth 2 times daily - Oral     Associated Diagnoses   Bacterial vaginitis [N76.0, B96.89]  - Primary           Perri Carrera RN

## 2018-08-14 NOTE — TELEPHONE ENCOUNTER
metroNIDAZOLE (FLAGYL) 500 MG tablet 14 tablet 0 8/14/2018  --   Sig - Route: Take 1 tablet (500 mg) by mouth 2 times daily - Oral     Pharmacy   [facility] [id number] [address] [place] [address]     Patient informed. Perri Carrera RN

## 2018-08-14 NOTE — TELEPHONE ENCOUNTER
Rx sent.  Please inform patient to use the you visit in the future for symptoms  RTC in 1week if no better by then or sooner prn.

## 2018-09-10 ENCOUNTER — OFFICE VISIT (OUTPATIENT)
Dept: NEUROLOGY | Facility: CLINIC | Age: 48
End: 2018-09-10
Payer: COMMERCIAL

## 2018-09-10 VITALS
HEIGHT: 63 IN | OXYGEN SATURATION: 95 % | WEIGHT: 139 LBS | SYSTOLIC BLOOD PRESSURE: 129 MMHG | BODY MASS INDEX: 24.63 KG/M2 | DIASTOLIC BLOOD PRESSURE: 85 MMHG | HEART RATE: 94 BPM

## 2018-09-10 DIAGNOSIS — G35 MS (MULTIPLE SCLEROSIS) (H): Primary | ICD-10-CM

## 2018-09-10 DIAGNOSIS — G47.9 SLEEP DISORDER: ICD-10-CM

## 2018-09-10 PROCEDURE — 99214 OFFICE O/P EST MOD 30 MIN: CPT | Performed by: PSYCHIATRY & NEUROLOGY

## 2018-09-10 RX ORDER — TEMAZEPAM 15 MG/1
CAPSULE ORAL
Qty: 45 CAPSULE | Refills: 5 | Status: SHIPPED | OUTPATIENT
Start: 2018-09-10 | End: 2019-05-04

## 2018-09-10 RX ORDER — TEMAZEPAM 15 MG/1
CAPSULE ORAL
Qty: 45 CAPSULE | Refills: 5 | Status: SHIPPED | OUTPATIENT
Start: 2018-09-10 | End: 2019-03-21

## 2018-09-10 NOTE — LETTER
9/10/2018         RE: Renea Trotter  [id number] [facility] [id number]        Dear Colleague,    Thank you for referring your patient, Renea Trotter, to the [facility]. Please see a copy of my visit note below.    MULTIPLE SCLEROSIS CLINIC AT THE [facility]  FOLLOWUP/ESTABLISHED PATIENT VISIT      PRINCIPAL NEUROLOGIC DIAGNOSIS: Multiple Sclerosis          Date of Onset: 2003  Date of Diagnosis: 6/25/2012  Initial Clinical Course: Relapsing remitting  Current Clinical Course: Relapsing remitting  Past Disease Modifying Therapy(ies): NA   Current Disease Modifying Therapy(ies): Copaxone  Most Recent MRI of the Brain: 3/15/18  Most Recent MRI of the Cervical Cord 12/2016  Most Recent MRI of the Thoracic Cord: NA  Most Recent Lumbar Puncture: 5/4/2012 Positive OCB           CHIEF COMPLAINT: Follow up on DMT         INTERVAL HISTORY:    The patient reports that she was unable to tolerate the cymblata due to nausea. Otherwise she has had ups and downs. The patient still has issues sleeping    Issues with current MS therapy: Tolerating DMT without issue    REVIEW OF SYSTEMS:    Mood: unchanged  Spasticity:none, the patient occasionally have some jitters in her right leg 3 out 7 nights in a week. The patient reports the sugar makes it worse. The patient reports that it does not wake her up after she goes a sleep. The patient will sometimes walk around until it goes away. Nothing else make it better or worse. This has been going on for about a year. Nothing makes it better or worse.   Bladder: unchanged  Bowel: unchanged  Pain related to today's visit:reviewed on nursing intake documentation  Fatigue: unchanged  Sleep: insomnia  and wakes up early  Memory/Concentration: unchanged      Otherwise 10 point ROS was neg other than the symptoms noted above.    PAST HISTORY was reviewed and updated:      MEDICATIONS and ALLERGIES were reviewed and updated.    SOCIAL HISTORY was  reviewed and updated:        EXAM:    PHYSICAL EXAMINATION:   VITAL SIGNS:  B/P: 129/85, T: Data Unavailable, P: 94, R: Data Unavailable    GENERAL: The patient is a well-nourished  who presents to the evaluation alone.  NEUROLOGIC:   MENTAL STATUS: Alert,awake and  oriented times four.   CRANIAL NERVES:  Visual fields are full to confrontation. The pupils are  round and react to light and there is no Aaron Pawan pupil. Extraocular movements are intact with notable exotropia with no  internuclear ophthalmoplegia. No nystagmus. Facial strength and sensation are  normal. Hearing is  normal. Palate elevation and tongue protrusion are  normal.   POWER:      Motor     Upper         Right Left   Shoulder Abduction 5 5   Elbow Flexion 5 5   Elbow Extension 5 5   Wrist Extension 5 5   Digit Extension 5 5   Digit Flexion 5 5   APB 5 5   Tone 0 0   Lower          Right Left   Hip Flexion 5 5   Knee Extension 5 5   Knee Flexion 5 5   Foot Dorsiflexion 5 5   Foot Plantar Flexion 5 5   EH 5 5   Toe Flexion 5 5   Tone 0 0             Grade Description   0 No increase in muscle tone   1 Slight increase in muscle tone, manifested by a catch and release or by minimal resistance at the end of the range of motion when the affected part(s) is moved in flexion or extension   1+ Slight increase in muscle tone, manifested by a catch, followed by minimal resistance throughout the remainder (less than half) of the ROM   2 More marked increase in muscle tone through most of the ROM, but affected part(s) easily moved   3 Considerable increase in muscle tone, passive movement difficult   4 Affected part(s) rigid in flexion or extension               SENSORY:      Light touch:  Asymmetrical impaired  and deminished in right hand otherwise intact.              MOTOR/CEREBELLAR:     Right Left   RRM 1 Abnormal 0 Normal   JANELL 1 Abnormal 0 Normal   FTN 1 Abnormal 0 Normal   RRM 0 Normal 0 Normal   HKS 0 Normal 0 Normal               GAIT: Gait is    narrow-based and steady and the patient is  able to walk on heels, toes and in tandem with only mild difficulty.    Romberg: Stable with eye(s) closed      RESULTS:  Monitoring labs:    Office Visit on 04/11/2018   Component Date Value Ref Range Status     Color Urine 04/11/2018 Light Yellow   Final     Appearance Urine 04/11/2018 Clear   Final     Glucose Urine 04/11/2018 Negative  NEG^Negative mg/dL Final     Bilirubin Urine 04/11/2018 Negative  NEG^Negative Final     Ketones Urine 04/11/2018 Negative  NEG^Negative mg/dL Final     Specific Gravity Urine 04/11/2018 1.004  1.003 - 1.035 Final     Blood Urine 04/11/2018 Negative  NEG^Negative Final     pH Urine 04/11/2018 7.5* 5.0 - 7.0 pH Final     Protein Albumin Urine 04/11/2018 Negative  NEG^Negative mg/dL Final     Urobilinogen mg/dL 04/11/2018 Normal  0.0 - 2.0 mg/dL Final     Nitrite Urine 04/11/2018 Negative  NEG^Negative Final     Leukocyte Esterase Urine 04/11/2018 Large* NEG^Negative Final     Source 04/11/2018 Midstream Urine   Final     WBC Urine 04/11/2018 0 - 5  OTO5^0 - 5 /HPF Final     RBC Urine 04/11/2018 O - 2  OTO2^O - 2 /HPF Final     Bacteria Urine 04/11/2018 Moderate* NEG^Negative /HPF Final     Squamous Epithelial /LPF Urine 04/11/2018 Few  FEW^Few /LPF Final     Specimen Description 04/11/2018 Vagina   Final     Wet Prep 04/11/2018 No Trichomonas seen   Final     Wet Prep 04/11/2018 Clue cells seen*  Final     Wet Prep 04/11/2018 No yeast seen   Final     Specimen Description 04/11/2018 Midstream Urine   Final     Culture Micro 04/11/2018 No growth   Final   Office Visit on 03/15/2018   Component Date Value Ref Range Status     WBC 03/15/2018 7.3  4.0 - 11.0 10e9/L Final     RBC Count 03/15/2018 4.80  3.8 - 5.2 10e12/L Final     Hemoglobin 03/15/2018 14.9  11.7 - 15.7 g/dL Final     Hematocrit 03/15/2018 42.4  35.0 - 47.0 % Final     MCV 03/15/2018 88  78 - 100 fl Final     MCH 03/15/2018 31.0  26.5 - 33.0 pg Final     MCHC 03/15/2018 35.1   31.5 - 36.5 g/dL Final     RDW 03/15/2018 11.4  10.0 - 15.0 % Final     Platelet Count 03/15/2018 304  150 - 450 10e9/L Final     Diff Method 03/15/2018 Automated Method   Final     % Neutrophils 03/15/2018 59.7  % Final     % Lymphocytes 03/15/2018 28.5  % Final     % Monocytes 03/15/2018 6.7  % Final     % Eosinophils 03/15/2018 4.3  % Final     % Basophils 03/15/2018 0.4  % Final     % Immature Granulocytes 03/15/2018 0.4  % Final     Absolute Neutrophil 03/15/2018 4.3  1.6 - 8.3 10e9/L Final     Absolute Lymphocytes 03/15/2018 2.1  0.8 - 5.3 10e9/L Final     Absolute Monocytes 03/15/2018 0.5  0.0 - 1.3 10e9/L Final     Absolute Eosinophils 03/15/2018 0.3  0.0 - 0.7 10e9/L Final     Absolute Basophils 03/15/2018 0.0  0.0 - 0.2 10e9/L Final     Abs Immature Granulocytes 03/15/2018 0.0  0 - 0.4 10e9/L Final     Sodium 03/15/2018 139  133 - 144 mmol/L Final     Potassium 03/15/2018 3.8  3.4 - 5.3 mmol/L Final     Chloride 03/15/2018 103  94 - 109 mmol/L Final     Carbon Dioxide 03/15/2018 32  20 - 32 mmol/L Final     Anion Gap 03/15/2018 4  3 - 14 mmol/L Final     Glucose 03/15/2018 85  70 - 99 mg/dL Final     Urea Nitrogen 03/15/2018 18  7 - 30 mg/dL Final     Creatinine 03/15/2018 0.72  0.52 - 1.04 mg/dL Final     GFR Estimate 03/15/2018 86  >60 mL/min/1.7m2 Final     GFR Estimate If Black 03/15/2018 >90  >60 mL/min/1.7m2 Final     Calcium 03/15/2018 9.2  8.5 - 10.1 mg/dL Final     Bilirubin Total 03/15/2018 0.7  0.2 - 1.3 mg/dL Final     Albumin 03/15/2018 3.9  3.4 - 5.0 g/dL Final     Protein Total 03/15/2018 7.6  6.8 - 8.8 g/dL Final     Alkaline Phosphatase 03/15/2018 58  40 - 150 U/L Final     ALT 03/15/2018 17  0 - 50 U/L Final     AST 03/15/2018 19  0 - 45 U/L Final     M Tuberculosis Result 03/15/2018 Negative  NEG^Negative Final     M Tuberculosis Antigen Value 03/15/2018 0.05  IU/mL Final     Vitamin D Deficiency screening 03/15/2018 66  20 - 75 ug/L Final   ]      MRI brain:    no new MRI to  review    ASSESSMENT/PLAN:  The patient is a 48-year-old woman with a past medical history of relapsing remitting multiple sclerosis who is presenting today as a follow-up.  Overall, the patient appears to be clinically stable.  Patient is not interested in going on any medications for symptoms of stiffness.  She only really feels comfortable using her Restoril to help her sleep.  It may be worthwhile in the future to consider a benzodiazepine that has a longer half-life that may help her sleep through the night better.  However, the patient is not interested in a change at this time.  I spent time reviewing the patient's treatment options with her.  The patient had several questions about Ocrevus.  At this time, patient is not interested in changing her medication.  I will tentatively follow the patient up in 6 months with an MRI of the brain and cervical spine.  I instructed patient to call my office with any questions concerns or issues.    Follow up in 6 months with a MRI of the brain and cervical cord.        I spent 25 minutes in this visit, with >50% direct patient time spent counseling about prognosis, treatment options, and coordination of care.    Regan Mcgregor MD [facility]  Staff Neurologist   09/10/18       (Chart documentation was completed in part with Dragon voice-recognition software. Even though reviewed, some grammatical, spelling, and word errors may remain.)         Again, thank you for allowing me to participate in the care of your patient.        Sincerely,        Regan Mcgregor MD

## 2018-09-10 NOTE — NURSING NOTE
"Renea Trotter's goals for this visit include:   Chief Complaint   Patient presents with     RECHECK     6 month      She requests these members of her care team be copied on today's visit information: pcp    PCP: Cara Clements    Referring Provider:  No referring provider defined for this encounter.    /85  Pulse 94  Ht 1.6 m (5' 3\")  Wt 63 kg (139 lb)  SpO2 95%  BMI 24.62 kg/m2    Do you need any medication refills at today's visit? n  "

## 2018-09-10 NOTE — PATIENT INSTRUCTIONS
Will get a MRI of the neck and brain when you follow up in 6 months    You saw a neurology provider, Regan Mcgregor, today at the [facility].  You may have also met with the MS RN Care Coordinator.  In order to get a message to your MS Center provider, you should contact [phone]. You should contact us via this protocol if you have any of the following symptoms:    New or worsening neurologic symptoms that persist for 24-48 hours, such as:  o New onset of pain or marked worsening of pain  o Difficulty with speech, swallowing, or breathing  o New onset of vertigo or dizziness  o Change in bowel or bladder function (incontinence, difficulty urinating)    Increasing difficulty in self care    Marked changes in vision (double vision, blurred vision, graying of vision)    Change in mobility    Change in cognitive function    Falling    Worsening numbness, tingling or pain with a change in function    Worsening fatigue lasting more than 2 weeks  If you had labs completed today, we will contact you with the results.  If you are active in [employer], they will be released to you there.  Otherwise, your results will be provided to you via mail or telephone call.  Some results take up to 2 weeks for completion.  If you haven t heard anything about your lab results within 2 weeks, you can call or send a [employer] message to obtain your results.  If you have an MRI scheduled in the week or two prior to your next appointment, we will go over the results at your scheduled follow up appointment.  If you are not scheduled to see your MS Center provider within about 2 weeks after your MRI, please call or send a [employer] message to obtain your results if you haven t heard anything within 2 weeks.  Please be aware that it takes at least 5 business days after routine MRIs for your results to be reviewed by both the radiologist and your doctor.  MRIs completed at facilities outside of the [facility]  system take about 2 weeks in order for the MRI disc to be mailed to our clinic and uploaded into your medical record.    Please contact your pharmacy to request refills of your medications.   Please do your best to come to your appointments, and to arrive 15 minutes early to allow time for checking in.  [facility] reserves the right to terminate care of established patients if a patient misses three or more appointments in a clinic without providing notification within a 12-month period.    Developing Your Care Team  Individuals living with chronic illnesses like MS may be unaware that they are at risk for the same range of medical problems as everyone else.  This is why you must establish a relationship with other health care providers in addition to your Multiple Sclerosis doctor.  It can be difficult at times to figure out whether a health concern is related to your MS, or whether it is related to something else, such as hormonal changes, pseduoexacerbations, changes in your core body temperature, flu-like reactions to interferons, exercise, or infections.  Urinary tract infections (UTIs) are common culprits that can cause fatigue, weakness, or other  MS attack -like symptoms without classic symptoms of a UTI.  For this reason, if you call or come in to discuss symptoms, you may be asked to get in touch with your primary provider or another specialist, so that you receive the comprehensive care you need.  What is Multiple Sclerosis (MS)?  MS is a disease in which the nerve tissues in the brain and/or spinal cord are attacked by immune cells in the body.  These immune cells are present in everyone, and their normal role is to fight off infections.  In people with MS, these cells change the way they function and cross into the nervous system.  Once there, they cause inflammation that damages the myelin (or the protective coating of a nerve cell, much like the plastic covering on an  electrical cord) and parts of the nerve cell itself.  So far, a clear cause for this immune system dysfunction has not been found.  MS often starts out as the  relapsing-remitting  form.  This means there are episodes when you have symptoms, and other times when you recover to normal or near-normal.  Over time, if the damage to the nervous system continues, the disease can cause additional disability, such as difficulty walking.  If the relapses and nerve damage can be prevented with available medications, many patients with MS can go many years between relapses and have relatively little disability.  Remember: MS is a condition that changes and must be evaluated on an ongoing basis!  What is a Relapse? (Also called flare-ups, attacks, or exacerbations)  Relapses are due to the occurrence of inflammation in some part of the brain and/or spinal cord.  A relapse is new or recurrent symptoms which persist for at least 24 hours and sometimes worsen over 48 hours.  New symptoms need to be  by at least 1 month in order to be considered separate relapses. Most of the time, symptoms reach their maximal intensity within 2 weeks and then begin to slowly resolve.  At times, your symptoms may not recover fully for up to 6 months, depending on the severity of the episode.  The frequency of relapses is generally higher early in the disease, but can vary greatly among individuals with MS.  Improvement of symptoms for an individual is unpredictable with each relapse.    It is important to remember that an increase in symptoms and changes in function may not necessarily be a relapse.  There are other factors that contribute to such changes, such as hot weather, increased body temperature, infection/illness, stress and sleep deprivation.  The worsening of symptoms may feel like a relapse when in reality it is not.  These episodes are referred to as pseudorelapses.  Once the underlying cause is addressed, symptoms usually  fade away and you feel better.  If you experience a worsening of symptoms that lasts more than 48 hours and does not improve with cooling down, decreasing stress, or treatment of an infection, please call us and we can help to better determine whether you are having a pseudorelapse versus a relapse.

## 2018-09-10 NOTE — PROGRESS NOTES
"MULTIPLE SCLEROSIS CLINIC AT THE [facility]  FOLLOWUP/ESTABLISHED PATIENT VISIT      PRINCIPAL NEUROLOGIC DIAGNOSIS: Multiple Sclerosis    Date of Onset: 2003  Date of Diagnosis: 6/25/2012  Initial Clinical Course: Relapsing remitting  Current Clinical Course: Relapsing remitting  Past Disease Modifying Therapy(ies): NA   Current Disease Modifying Therapy(ies): Copaxone  Most Recent MRI of the Brain: 3/15/18  Most Recent MRI of the Cervical Cord 12/2016  Most Recent MRI of the Thoracic Cord: NA  Most Recent Lumbar Puncture: 5/4/2012 Positive OCB    CHIEF COMPLAINT: Follow up on DMT      INTERVAL HISTORY:    ***    Issues with current MS therapy: {alsdmtfp:817840}    REVIEW OF SYSTEMS:    Mood: {alsmood:434024::\"unchanged\"}  Spasticity:{alsspasms:253211::\"none\"}  Bladder: {alsurinarysymptom:373181::\"none\"}  Bowel: {alsbowel:886081::\"unchanged\"}  Pain related to today's visit:{PAIN REVIEWED:826139::\"reviewed on nursing intake documentation\"}  Fatigue: {alsfatigue:220053::\"unchanged\"}  Sleep: {alssleep:602379::\"well/ no problem\"}  Memory/Concentration: {alsmemoryconc:301784::\"unchanged\"}      Otherwise 10 point ROS was neg other than the symptoms noted above.    PAST HISTORY was reviewed and updated:      MEDICATIONS and ALLERGIES were reviewed and updated.    SOCIAL HISTORY was reviewed and updated:    Current living situation: {LIVING SITUATION:1302}  Current vocational status: {MS VOCATIONAL:370712}    EXAM:    PHYSICAL EXAMINATION:   VITAL SIGNS:  B/P: Data Unavailable, T: Data Unavailable, P: Data Unavailable, R: Data Unavailable    GENERAL: The patient is a well-nourished *** who presents to the evaluation {alsalonewith:404849::\"with ***\"}.  NEUROLOGIC:   MENTAL STATUS: Alert,awake and *** oriented times four.   CRANIAL NERVES: Visual acuity is *** 20/20 in both eyes with a near card ({alscorrection:954339::\"without\"}). Visual fields are {alsvisualfields:816107::\"full to confrontation\"}. The pupils " "are *** round and react to light and there is {[facility]:943079} Aaron Pawan pupil. Funduscopic examination demonstrates *** no optic pallor, papilledema or retinal hemorrhage/exudate. Extraocular movements are *** intact with no *** internuclear ophthalmoplegia. ***No nystagmus. Facial strength and sensation are *** normal. Hearing is *** normal. Palate elevation and tongue protrusion are *** normal.   POWER:     Motor    Upper      Right Left   Shoulder Abduction {0-5,MOTOR:768640409::\"5\"} {0-5,MOTOR:178844880::\"5\"}   Elbow Flexion {0-5,MOTOR:593489638::\"5\"} {0-5,MOTOR:235661739::\"5\"}   Elbow Extension {0-5,MOTOR:902045246::\"5\"} {0-5,MOTOR:090483772::\"5\"}   Wrist Extension {0-5,MOTOR:125930686::\"5\"} {0-5,MOTOR:632127640::\"5\"}   Digit Extension {0-5,MOTOR:663680577::\"5\"} {0-5,MOTOR:819891484::\"5\"}   Digit Flexion {0-5,MOTOR:780551651::\"5\"} {0-5,MOTOR:958126152::\"5\"}   APB {0-5,MOTOR:777560895::\"5\"} {0-5,MOTOR:830504072::\"5\"}   Tone {alstone:518708::\"0\"} {alstone:321997::\"0\"}   Lower       Right Left   Hip Flexion {0-5,MOTOR:881126733::\"5\"} {0-5,MOTOR:258290305::\"5\"}   Knee Extension {0-5,MOTOR:786832920::\"5\"} {0-5,MOTOR:535544868::\"5\"}   Knee Flexion {0-5,MOTOR:945073716::\"5\"} {0-5,MOTOR:474190238::\"5\"}   Foot Dorsiflexion {0-5,MOTOR:092836503::\"5\"} {0-5,MOTOR:104856862::\"5\"}   Foot Plantar Flexion {0-5,MOTOR:542648694::\"5\"} {0-5,MOTOR:025602214::\"5\"}   EH {0-5,MOTOR:652590355::\"5\"} {0-5,MOTOR:881164994::\"5\"}   Toe Flexion {0-5,MOTOR:527030355::\"5\"} {0-5,MOTOR:535540337::\"5\"}   Tone {alstone:259163::\"0\"} {alstone:235269::\"0\"}           Grade Description   0 No increase in muscle tone   1 Slight increase in muscle tone, manifested by a catch and release or by minimal resistance at the end of the range of motion when the affected part(s) is moved in flexion or extension   1+ Slight increase in muscle tone, manifested by a catch, followed by minimal resistance throughout the remainder (less than half) of the ROM   2 " "More marked increase in muscle tone through most of the ROM, but affected part(s) easily moved   3 Considerable increase in muscle tone, passive movement difficult   4 Affected part(s) rigid in flexion or extension           SENSORY:     {alsexamsensory:239426}    REFLEXES:     Reflexes       Right  Left   Biceps {1-4,REFLEXES:126367578}  {1-4,REFLEXES:568030233}   Triceps {1-4,REFLEXES:188028682}  {1-4,REFLEXES:916630782}   Brachioradialis {1-4,REFLEXES:918766912}  {1-4,REFLEXES:233055296}   Patellar  {1-4,REFLEXES:204206483}  {1-4,REFLEXES:762890511}   Achilles {1-4,REFLEXES:862336123}  {1-4,REFLEXES:756611390}   Babinski {alsupdownmutewithdrawal:602321::\"down\"}  {alsupdownmutewithdrawal:812802::\"down\"}       MOTOR/CEREBELLAR:    Right Left   RRM {0-2,NEURO:182220619} {0-2,NEURO:450720893}   JANELL {0-2,NEURO:863380323} {0-2,NEURO:054091379}   FTN {0-2,NEURO:456595972} {0-2,NEURO:598234421}   RRM {0-2,NEURO:992953994} {0-2,NEURO:704258024}   HKS {0-2,NEURO:174613429} {0-2,NEURO:731580114}           GAIT: Gait is ***  narrow-based and steady and the patient is *** able to walk on heels, toes and in tandem without difficulty.    Romberg: {alsromber::\"Stable with eye(s) closed\"}    RESULTS:  Monitoring labs:    Office Visit on 2018   Component Date Value Ref Range Status     Color Urine 2018 Light Yellow   Final     Appearance Urine 2018 Clear   Final     Glucose Urine 2018 Negative  NEG^Negative mg/dL Final     Bilirubin Urine 2018 Negative  NEG^Negative Final     Ketones Urine 2018 Negative  NEG^Negative mg/dL Final     Specific Gravity Urine 2018 1.004  1.003 - 1.035 Final     Blood Urine 2018 Negative  NEG^Negative Final     pH Urine 2018 7.5* 5.0 - 7.0 pH Final     Protein Albumin Urine 2018 Negative  NEG^Negative mg/dL Final     Urobilinogen mg/dL 2018 Normal  0.0 - 2.0 mg/dL Final     Nitrite Urine 2018 Negative  NEG^Negative Final "     Leukocyte Esterase Urine 04/11/2018 Large* NEG^Negative Final     Source 04/11/2018 Midstream Urine   Final     WBC Urine 04/11/2018 0 - 5  OTO5^0 - 5 /HPF Final     RBC Urine 04/11/2018 O - 2  OTO2^O - 2 /HPF Final     Bacteria Urine 04/11/2018 Moderate* NEG^Negative /HPF Final     Squamous Epithelial /LPF Urine 04/11/2018 Few  FEW^Few /LPF Final     Specimen Description 04/11/2018 Vagina   Final     Wet Prep 04/11/2018 No Trichomonas seen   Final     Wet Prep 04/11/2018 Clue cells seen*  Final     Wet Prep 04/11/2018 No yeast seen   Final     Specimen Description 04/11/2018 Midstream Urine   Final     Culture Micro 04/11/2018 No growth   Final   Office Visit on 03/15/2018   Component Date Value Ref Range Status     WBC 03/15/2018 7.3  4.0 - 11.0 10e9/L Final     RBC Count 03/15/2018 4.80  3.8 - 5.2 10e12/L Final     Hemoglobin 03/15/2018 14.9  11.7 - 15.7 g/dL Final     Hematocrit 03/15/2018 42.4  35.0 - 47.0 % Final     MCV 03/15/2018 88  78 - 100 fl Final     MCH 03/15/2018 31.0  26.5 - 33.0 pg Final     MCHC 03/15/2018 35.1  31.5 - 36.5 g/dL Final     RDW 03/15/2018 11.4  10.0 - 15.0 % Final     Platelet Count 03/15/2018 304  150 - 450 10e9/L Final     Diff Method 03/15/2018 Automated Method   Final     % Neutrophils 03/15/2018 59.7  % Final     % Lymphocytes 03/15/2018 28.5  % Final     % Monocytes 03/15/2018 6.7  % Final     % Eosinophils 03/15/2018 4.3  % Final     % Basophils 03/15/2018 0.4  % Final     % Immature Granulocytes 03/15/2018 0.4  % Final     Absolute Neutrophil 03/15/2018 4.3  1.6 - 8.3 10e9/L Final     Absolute Lymphocytes 03/15/2018 2.1  0.8 - 5.3 10e9/L Final     Absolute Monocytes 03/15/2018 0.5  0.0 - 1.3 10e9/L Final     Absolute Eosinophils 03/15/2018 0.3  0.0 - 0.7 10e9/L Final     Absolute Basophils 03/15/2018 0.0  0.0 - 0.2 10e9/L Final     Abs Immature Granulocytes 03/15/2018 0.0  0 - 0.4 10e9/L Final     Sodium 03/15/2018 139  133 - 144 mmol/L Final     Potassium 03/15/2018 3.8   "3.4 - 5.3 mmol/L Final     Chloride 03/15/2018 103  94 - 109 mmol/L Final     Carbon Dioxide 03/15/2018 32  20 - 32 mmol/L Final     Anion Gap 03/15/2018 4  3 - 14 mmol/L Final     Glucose 03/15/2018 85  70 - 99 mg/dL Final     Urea Nitrogen 03/15/2018 18  7 - 30 mg/dL Final     Creatinine 03/15/2018 0.72  0.52 - 1.04 mg/dL Final     GFR Estimate 03/15/2018 86  >60 mL/min/1.7m2 Final     GFR Estimate If Black 03/15/2018 >90  >60 mL/min/1.7m2 Final     Calcium 03/15/2018 9.2  8.5 - 10.1 mg/dL Final     Bilirubin Total 03/15/2018 0.7  0.2 - 1.3 mg/dL Final     Albumin 03/15/2018 3.9  3.4 - 5.0 g/dL Final     Protein Total 03/15/2018 7.6  6.8 - 8.8 g/dL Final     Alkaline Phosphatase 03/15/2018 58  40 - 150 U/L Final     ALT 03/15/2018 17  0 - 50 U/L Final     AST 03/15/2018 19  0 - 45 U/L Final     M Tuberculosis Result 03/15/2018 Negative  NEG^Negative Final     M Tuberculosis Antigen Value 03/15/2018 0.05  IU/mL Final     Vitamin D Deficiency screening 03/15/2018 66  20 - 75 ug/L Final   ]      [facility]    [facility]    ASSESSMENT/PLAN:  {MSASSESSMENTPLANCHOICES:116795}    Patient Health Education Discussed at Visit: {MS HEALTH PROMOTION TOPICS:1364}    Follow-up: In {FOLLOW UP:250333} at {LOCATION:150232::\"[place]\"} with [facility]    I spent *** minutes in this visit, with >50% direct patient time spent counseling about prognosis, treatment options, and coordination of care.    Regan Mcgregor MD [facility]  Staff Neurologist   09/10/18       (Chart documentation was completed in part with Dragon voice-recognition software. Even though reviewed, some grammatical, spelling, and word errors may remain.)       "

## 2018-09-10 NOTE — MR AVS SNAPSHOT
After Visit Summary   9/10/2018    Renea Trotter    MRN: [id number]           Patient Information     Date Of Birth          1970        Visit Information        Provider Department      9/10/2018 4:00 PM Regan Mcgregor MD [facility]        Today's Diagnoses     MS (multiple sclerosis) (H)    -  1    Sleep disorder          Care Instructions    Will get a MRI of the neck and brain when you follow up in 6 months    You saw a neurology provider, Regan Mcgregor, today at the [facility].  You may have also met with the MS RN Care Coordinator.  In order to get a message to your MS Center provider, you should contact [phone]. You should contact us via this protocol if you have any of the following symptoms:    New or worsening neurologic symptoms that persist for 24-48 hours, such as:  o New onset of pain or marked worsening of pain  o Difficulty with speech, swallowing, or breathing  o New onset of vertigo or dizziness  o Change in bowel or bladder function (incontinence, difficulty urinating)    Increasing difficulty in self care    Marked changes in vision (double vision, blurred vision, graying of vision)    Change in mobility    Change in cognitive function    Falling    Worsening numbness, tingling or pain with a change in function    Worsening fatigue lasting more than 2 weeks  If you had labs completed today, we will contact you with the results.  If you are active in [facility], they will be released to you there.  Otherwise, your results will be provided to you via mail or telephone call.  Some results take up to 2 weeks for completion.  If you haven t heard anything about your lab results within 2 weeks, you can call or send a [facility] message to obtain your results.  If you have an MRI scheduled in the week or two prior to your next appointment, we will go over the results at your scheduled follow up appointment.  If  you are not scheduled to see your MS Center provider within about 2 weeks after your MRI, please call or send a [facility] message to obtain your results if you haven t heard anything within 2 weeks.  Please be aware that it takes at least 5 business days after routine MRIs for your results to be reviewed by both the radiologist and your doctor.  MRIs completed at facilities outside of the [facility] system take about 2 weeks in order for the MRI disc to be mailed to our clinic and uploaded into your medical record.    Please contact your pharmacy to request refills of your medications.   Please do your best to come to your appointments, and to arrive 15 minutes early to allow time for checking in.  [facility] reserves the right to terminate care of established patients if a patient misses three or more appointments in a clinic without providing notification within a 12-month period.    Developing Your Care Team  Individuals living with chronic illnesses like MS may be unaware that they are at risk for the same range of medical problems as everyone else.  This is why you must establish a relationship with other health care providers in addition to your Multiple Sclerosis doctor.  It can be difficult at times to figure out whether a health concern is related to your MS, or whether it is related to something else, such as hormonal changes, pseduoexacerbations, changes in your core body temperature, flu-like reactions to interferons, exercise, or infections.  Urinary tract infections (UTIs) are common culprits that can cause fatigue, weakness, or other  MS attack -like symptoms without classic symptoms of a UTI.  For this reason, if you call or come in to discuss symptoms, you may be asked to get in touch with your primary provider or another specialist, so that you receive the comprehensive care you need.  What is Multiple Sclerosis (MS)?  MS is a disease in which the nerve tissues in the brain  and/or spinal cord are attacked by immune cells in the body.  These immune cells are present in everyone, and their normal role is to fight off infections.  In people with MS, these cells change the way they function and cross into the nervous system.  Once there, they cause inflammation that damages the myelin (or the protective coating of a nerve cell, much like the plastic covering on an electrical cord) and parts of the nerve cell itself.  So far, a clear cause for this immune system dysfunction has not been found.  MS often starts out as the  relapsing-remitting  form.  This means there are episodes when you have symptoms, and other times when you recover to normal or near-normal.  Over time, if the damage to the nervous system continues, the disease can cause additional disability, such as difficulty walking.  If the relapses and nerve damage can be prevented with available medications, many patients with MS can go many years between relapses and have relatively little disability.  Remember: MS is a condition that changes and must be evaluated on an ongoing basis!  What is a Relapse? (Also called flare-ups, attacks, or exacerbations)  Relapses are due to the occurrence of inflammation in some part of the brain and/or spinal cord.  A relapse is new or recurrent symptoms which persist for at least 24 hours and sometimes worsen over 48 hours.  New symptoms need to be  by at least 1 month in order to be considered separate relapses. Most of the time, symptoms reach their maximal intensity within 2 weeks and then begin to slowly resolve.  At times, your symptoms may not recover fully for up to 6 months, depending on the severity of the episode.  The frequency of relapses is generally higher early in the disease, but can vary greatly among individuals with MS.  Improvement of symptoms for an individual is unpredictable with each relapse.    It is important to remember that an increase in symptoms and  changes in function may not necessarily be a relapse.  There are other factors that contribute to such changes, such as hot weather, increased body temperature, infection/illness, stress and sleep deprivation.  The worsening of symptoms may feel like a relapse when in reality it is not.  These episodes are referred to as pseudorelapses.  Once the underlying cause is addressed, symptoms usually fade away and you feel better.  If you experience a worsening of symptoms that lasts more than 48 hours and does not improve with cooling down, decreasing stress, or treatment of an infection, please call us and we can help to better determine whether you are having a pseudorelapse versus a relapse.                Follow-ups after your visit        Your next 10 appointments already scheduled     Mar 07, 2019  5:30 PM CST   (Arrive by 5:00 PM)   MR BRAIN W/O CONTRAST with MGMR1   [facility] ([facility])    [address] [place] [id number] [phone]           Take your medicines as usual, unless your doctor tells you not to. Bring a list of your current medicines to your exam (including vitamins, minerals and over-the-counter drugs). Also bring the results of similar scans you may have had.  Please remove any body piercings and hair extensions before you arrive.  Follow your doctor s orders. If you do not, we may have to postpone your exam.  You may or may not receive IV contrast for this exam pending the discretion of the Radiologist.  You do not need to do anything special to prepare.  The MRI machine uses a strong magnet. Please wear clothes without metal (snaps, zippers). A sweatsuit works well, or we may give you a hospital gown.   **IMPORTANT** THE INSTRUCTIONS BELOW ARE ONLY FOR THOSE PATIENTS WHO HAVE BEEN PRESCRIBED SEDATION OR GENERAL ANESTHESIA DURING THEIR MRI PROCEDURE:  IF YOUR DOCTOR PRESCRIBED ORAL SEDATION (take medicine to help you relax during your exam):    You must get the medicine from your doctor (oral medication) before you arrive. Bring the medicine to the exam. Do not take it at home. You ll be told when to take it upon arriving for your exam.   Arrive one hour early. Bring someone who can take you home after the test. Your medicine will make you sleepy. After the exam, you may not drive, take a bus or take a taxi by yourself.  IF YOUR DOCTOR PRESCRIBED IV SEDATION:   Arrive one hour early. Bring someone who can take you home after the test. Your medicine will make you sleepy. After the exam, you may not drive, take a bus or take a taxi by yourself.   No eating 6 hours before your exam. You may have clear liquids up until 4 hours before your exam. (Clear liquids include water, clear tea, black coffee and fruit juice without pulp.)  IF YOUR DOCTOR PRESCRIBED ANESTHESIA (be asleep for your exam):   Arrive 1 1/2 hours early. Bring someone who can take you home after the test. You may not drive, take a bus or take a taxi by yourself.   No eating 8 hours before your exam. You may have clear liquids up until 4 hours before your exam. (Clear liquids include water, clear tea, black coffee and fruit juice without pulp.)   You will spend four to five hours in the recovery room.  Please call the Imaging Department at your exam site with any questions.            Mar 07, 2019  6:15 PM CST   MR CERVICAL SPINE W/O CONTRAST with MGMR1   [facility]    [address] [place] [id number] [phone]           Take your medicines as usual, unless your doctor tells you not to. Bring a list of your current medicines to your exam (including vitamins, minerals and over-the-counter drugs). Also bring the results of similar scans you may have had.  Please remove any body piercings and hair extensions before you arrive.  Follow your doctor s orders. If you do not, we may have to postpone your exam.  You may or may not  receive IV contrast for this exam pending the discretion of the Radiologist.  You do not need to do anything special to prepare.  The MRI machine uses a strong magnet. Please wear clothes without metal (snaps, zippers). A sweatsuit works well, or we may give you a hospital gown.   **IMPORTANT** THE INSTRUCTIONS BELOW ARE ONLY FOR THOSE PATIENTS WHO HAVE BEEN PRESCRIBED SEDATION OR GENERAL ANESTHESIA DURING THEIR MRI PROCEDURE:  IF YOUR DOCTOR PRESCRIBED ORAL SEDATION (take medicine to help you relax during your exam):   You must get the medicine from your doctor (oral medication) before you arrive. Bring the medicine to the exam. Do not take it at home. You ll be told when to take it upon arriving for your exam.   Arrive one hour early. Bring someone who can take you home after the test. Your medicine will make you sleepy. After the exam, you may not drive, take a bus or take a taxi by yourself.  IF YOUR DOCTOR PRESCRIBED IV SEDATION:   Arrive one hour early. Bring someone who can take you home after the test. Your medicine will make you sleepy. After the exam, you may not drive, take a bus or take a taxi by yourself.   No eating 6 hours before your exam. You may have clear liquids up until 4 hours before your exam. (Clear liquids include water, clear tea, black coffee and fruit juice without pulp.)  IF YOUR DOCTOR PRESCRIBED ANESTHESIA (be asleep for your exam):   Arrive 1 1/2 hours early. Bring someone who can take you home after the test. You may not drive, take a bus or take a taxi by yourself.   No eating 8 hours before your exam. You may have clear liquids up until 4 hours before your exam. (Clear liquids include water, clear tea, black coffee and fruit juice without pulp.)   You will spend four to five hours in the recovery room.  Please call the Imaging Department at your exam site with any questions.            Mar 14, 2019  4:30 PM CDT   Return Visit with Regan Mcgregor MD   [facility]  "Clinics ([facility])    [address] [place] [id number] [phone]              Who to contact     If you have questions or need follow up information about today's clinic visit or your schedule please contact [facility] directly at [phone].  Normal or non-critical lab and imaging results will be communicated to you by MyChart, letter or phone within 4 business days after the clinic has received the results. If you do not hear from us within 7 days, please contact the clinic through [facility] or phone. If you have a critical or abnormal lab result, we will notify you by phone as soon as possible.  Submit refill requests through [facility] or call your pharmacy and they will forward the refill request to us. Please allow 3 business days for your refill to be completed.          Additional Information About Your Visit        [facility] Information     [employer] gives you secure access to your electronic health record. If you see a primary care provider, you can also send messages to your care team and make appointments. If you have questions, please call your primary care clinic.  If you do not have a primary care provider, please call [phone] and they will assist you.      [employer] is an electronic gateway that provides easy, online access to your medical records. With [employer], you can request a clinic appointment, read your test results, renew a prescription or communicate with your care team.     To access your existing account, please contact your [facility] or call [phone] for assistance.        Care EveryWhere ID     This is your Care EveryWhere ID. This could be used by other organizations to access your [facility] medical records  [id number]        Your Vitals Were     Pulse Height Pulse Oximetry BMI (Body Mass Index)          94 1.6 m (5' 3\") 95% 24.62 kg/m2         Blood Pressure from Last 3 Encounters:   09/10/18 " 129/85   04/11/18 132/90   03/15/18 128/88    Weight from Last 3 Encounters:   09/10/18 63 kg (139 lb)   04/11/18 66.9 kg (147 lb 8 oz)   03/15/18 65.8 kg (145 lb)                 Today's Medication Changes          These changes are accurate as of 9/10/18 11:59 PM.  If you have any questions, ask your nurse or doctor.               Start taking these medicines.        Dose/Directions    * temazepam 15 MG capsule   Commonly known as:  RESTORIL   Used for:  Sleep disorder   Started by:  Regan Mcgregor MD        TAKE ONE TO TWO CAPSULES BY MOUTH ONCE DAILY AT BEDTIME   Quantity:  45 capsule   Refills:  5       * temazepam 15 MG capsule   Commonly known as:  RESTORIL   Used for:  Sleep disorder   Started by:  Regan Mcgregor MD        TAKE ONE TO TWO CAPSULES BY MOUTH ONCE DAILY AT BEDTIME   Quantity:  45 capsule   Refills:  5       * Notice:  This list has 2 medication(s) that are the same as other medications prescribed for you. Read the directions carefully, and ask your doctor or other care provider to review them with you.         Where to get your medicines      Some of these will need a paper prescription and others can be bought over the counter.  Ask your nurse if you have questions.     Bring a paper prescription for each of these medications     temazepam 15 MG capsule    temazepam 15 MG capsule                Primary Care Provider Office Phone # Fax #    Cara Clements [phone]       [address]  [place] [id number]        Equal Access to Services     [facility] AH: Hadii kasi collins hadcorao Sohannah, waaxda luqadaha, qaybta kaalmada adeegyada, will alfaro. So [facility] [phone].    ATENCIÓN: Si habla español, tiene a ocampo disposición servicios gratuitos de asistencia lingüística. Llame al [phone].    We comply with applicable federal civil rights laws and [place] laws. We do not discriminate on the basis of race, color, national  origin, age, disability, sex, sexual orientation, or gender identity.            Thank you!     Thank you for choosing [facility]  for your care. Our goal is always to provide you with excellent care. Hearing back from our patients is one way we can continue to improve our services. Please take a few minutes to complete the written survey that you may receive in the mail after your visit with us. Thank you!             Your Updated Medication List - Protect others around you: Learn how to safely use, store and throw away your medicines at www.disposemymeds.org.          This list is accurate as of 9/10/18 11:59 PM.  Always use your most recent med list.                   Brand Name Dispense Instructions for use Diagnosis    albuterol 108 (90 Base) MCG/ACT inhaler    PROAIR HFA/PROVENTIL HFA/VENTOLIN HFA    1 Inhaler    Inhale 2 puffs into the lungs every 6 hours as needed for shortness of breath / dyspnea or wheezing    Mild intermittent asthma, uncomplicated       amitriptyline 10 MG tablet    ELAVIL    90 tablet    Start at 1 tab QHS for 3 days, then 2 tabs QHS for 3 days, then 3 tabs QHS.    Paresthesia       COCONUT OIL PO      Take 2,000 mg by mouth        DULoxetine 60 MG EC capsule    CYMBALTA    30 capsule    Take 1 capsule (60 mg) by mouth daily    Sleep disorder       Glatiramer Acetate 40 MG/ML Sosy     36 Syringe    Inject 40 mg Subcutaneous three times a week    Multiple sclerosis (H)       hydrochlorothiazide 25 MG tablet    HYDRODIURIL    90 tablet    TAKE 1 TABLET DAILY    Essential hypertension with goal blood pressure less than 140/90       MULTIPLE VITAMIN PO      Take 1 tablet by mouth daily.        Potassium Chloride Drea CR 15 MEQ Tbcr     90 tablet    Take 15 mEq by mouth daily    Hypokalemia       * temazepam 15 MG capsule    RESTORIL    45 capsule    TAKE ONE TO TWO CAPSULES BY MOUTH ONCE DAILY AT BEDTIME    Sleep disorder       * temazepam 15 MG capsule    RESTORIL    45  capsule    TAKE ONE TO TWO CAPSULES BY MOUTH ONCE DAILY AT BEDTIME    Sleep disorder       VITAMIN D3 PO      Take 5,000 Units by mouth daily        * Notice:  This list has 2 medication(s) that are the same as other medications prescribed for you. Read the directions carefully, and ask your doctor or other care provider to review them with you.

## 2018-09-10 NOTE — PROGRESS NOTES
MULTIPLE SCLEROSIS CLINIC AT THE [facility]  FOLLOWUP/ESTABLISHED PATIENT VISIT      PRINCIPAL NEUROLOGIC DIAGNOSIS: Multiple Sclerosis          Date of Onset: 2003  Date of Diagnosis: 6/25/2012  Initial Clinical Course: Relapsing remitting  Current Clinical Course: Relapsing remitting  Past Disease Modifying Therapy(ies): NA   Current Disease Modifying Therapy(ies): Copaxone  Most Recent MRI of the Brain: 3/15/18  Most Recent MRI of the Cervical Cord 12/2016  Most Recent MRI of the Thoracic Cord: NA  Most Recent Lumbar Puncture: 5/4/2012 Positive OCB           CHIEF COMPLAINT: Follow up on DMT         INTERVAL HISTORY:    The patient reports that she was unable to tolerate the cymblata due to nausea. Otherwise she has had ups and downs. The patient still has issues sleeping    Issues with current MS therapy: Tolerating DMT without issue    REVIEW OF SYSTEMS:    Mood: unchanged  Spasticity:none, the patient occasionally have some jitters in her right leg 3 out 7 nights in a week. The patient reports the sugar makes it worse. The patient reports that it does not wake her up after she goes a sleep. The patient will sometimes walk around until it goes away. Nothing else make it better or worse. This has been going on for about a year. Nothing makes it better or worse.   Bladder: unchanged  Bowel: unchanged  Pain related to today's visit:reviewed on nursing intake documentation  Fatigue: unchanged  Sleep: insomnia  and wakes up early  Memory/Concentration: unchanged      Otherwise 10 point ROS was neg other than the symptoms noted above.    PAST HISTORY was reviewed and updated:      MEDICATIONS and ALLERGIES were reviewed and updated.    SOCIAL HISTORY was reviewed and updated:        EXAM:    PHYSICAL EXAMINATION:   VITAL SIGNS:  B/P: 129/85, T: Data Unavailable, P: 94, R: Data Unavailable    GENERAL: The patient is a well-nourished  who presents to the evaluation alone.  NEUROLOGIC:   MENTAL STATUS:  Alert,awake and  oriented times four.   CRANIAL NERVES:  Visual fields are full to confrontation. The pupils are  round and react to light and there is no Aaron Pawan pupil. Extraocular movements are intact with notable exotropia with no  internuclear ophthalmoplegia. No nystagmus. Facial strength and sensation are  normal. Hearing is  normal. Palate elevation and tongue protrusion are  normal.   POWER:      Motor     Upper         Right Left   Shoulder Abduction 5 5   Elbow Flexion 5 5   Elbow Extension 5 5   Wrist Extension 5 5   Digit Extension 5 5   Digit Flexion 5 5   APB 5 5   Tone 0 0   Lower          Right Left   Hip Flexion 5 5   Knee Extension 5 5   Knee Flexion 5 5   Foot Dorsiflexion 5 5   Foot Plantar Flexion 5 5   EH 5 5   Toe Flexion 5 5   Tone 0 0             Grade Description   0 No increase in muscle tone   1 Slight increase in muscle tone, manifested by a catch and release or by minimal resistance at the end of the range of motion when the affected part(s) is moved in flexion or extension   1+ Slight increase in muscle tone, manifested by a catch, followed by minimal resistance throughout the remainder (less than half) of the ROM   2 More marked increase in muscle tone through most of the ROM, but affected part(s) easily moved   3 Considerable increase in muscle tone, passive movement difficult   4 Affected part(s) rigid in flexion or extension               SENSORY:      Light touch:  Asymmetrical impaired  and deminished in right hand otherwise intact.              MOTOR/CEREBELLAR:     Right Left   RRM 1 Abnormal 0 Normal   JANELL 1 Abnormal 0 Normal   FTN 1 Abnormal 0 Normal   RRM 0 Normal 0 Normal   HKS 0 Normal 0 Normal               GAIT: Gait is   narrow-based and steady and the patient is  able to walk on heels, toes and in tandem with only mild difficulty.    Romberg: Stable with eye(s) closed      RESULTS:  Monitoring labs:    Office Visit on 04/11/2018   Component Date Value Ref Range  Status     Color Urine 04/11/2018 Light Yellow   Final     Appearance Urine 04/11/2018 Clear   Final     Glucose Urine 04/11/2018 Negative  NEG^Negative mg/dL Final     Bilirubin Urine 04/11/2018 Negative  NEG^Negative Final     Ketones Urine 04/11/2018 Negative  NEG^Negative mg/dL Final     Specific Gravity Urine 04/11/2018 1.004  1.003 - 1.035 Final     Blood Urine 04/11/2018 Negative  NEG^Negative Final     pH Urine 04/11/2018 7.5* 5.0 - 7.0 pH Final     Protein Albumin Urine 04/11/2018 Negative  NEG^Negative mg/dL Final     Urobilinogen mg/dL 04/11/2018 Normal  0.0 - 2.0 mg/dL Final     Nitrite Urine 04/11/2018 Negative  NEG^Negative Final     Leukocyte Esterase Urine 04/11/2018 Large* NEG^Negative Final     Source 04/11/2018 Midstream Urine   Final     WBC Urine 04/11/2018 0 - 5  OTO5^0 - 5 /HPF Final     RBC Urine 04/11/2018 O - 2  OTO2^O - 2 /HPF Final     Bacteria Urine 04/11/2018 Moderate* NEG^Negative /HPF Final     Squamous Epithelial /LPF Urine 04/11/2018 Few  FEW^Few /LPF Final     Specimen Description 04/11/2018 Vagina   Final     Wet Prep 04/11/2018 No Trichomonas seen   Final     Wet Prep 04/11/2018 Clue cells seen*  Final     Wet Prep 04/11/2018 No yeast seen   Final     Specimen Description 04/11/2018 Midstream Urine   Final     Culture Micro 04/11/2018 No growth   Final   Office Visit on 03/15/2018   Component Date Value Ref Range Status     WBC 03/15/2018 7.3  4.0 - 11.0 10e9/L Final     RBC Count 03/15/2018 4.80  3.8 - 5.2 10e12/L Final     Hemoglobin 03/15/2018 14.9  11.7 - 15.7 g/dL Final     Hematocrit 03/15/2018 42.4  35.0 - 47.0 % Final     MCV 03/15/2018 88  78 - 100 fl Final     MCH 03/15/2018 31.0  26.5 - 33.0 pg Final     MCHC 03/15/2018 35.1  31.5 - 36.5 g/dL Final     RDW 03/15/2018 11.4  10.0 - 15.0 % Final     Platelet Count 03/15/2018 304  150 - 450 10e9/L Final     Diff Method 03/15/2018 Automated Method   Final     % Neutrophils 03/15/2018 59.7  % Final     % Lymphocytes  03/15/2018 28.5  % Final     % Monocytes 03/15/2018 6.7  % Final     % Eosinophils 03/15/2018 4.3  % Final     % Basophils 03/15/2018 0.4  % Final     % Immature Granulocytes 03/15/2018 0.4  % Final     Absolute Neutrophil 03/15/2018 4.3  1.6 - 8.3 10e9/L Final     Absolute Lymphocytes 03/15/2018 2.1  0.8 - 5.3 10e9/L Final     Absolute Monocytes 03/15/2018 0.5  0.0 - 1.3 10e9/L Final     Absolute Eosinophils 03/15/2018 0.3  0.0 - 0.7 10e9/L Final     Absolute Basophils 03/15/2018 0.0  0.0 - 0.2 10e9/L Final     Abs Immature Granulocytes 03/15/2018 0.0  0 - 0.4 10e9/L Final     Sodium 03/15/2018 139  133 - 144 mmol/L Final     Potassium 03/15/2018 3.8  3.4 - 5.3 mmol/L Final     Chloride 03/15/2018 103  94 - 109 mmol/L Final     Carbon Dioxide 03/15/2018 32  20 - 32 mmol/L Final     Anion Gap 03/15/2018 4  3 - 14 mmol/L Final     Glucose 03/15/2018 85  70 - 99 mg/dL Final     Urea Nitrogen 03/15/2018 18  7 - 30 mg/dL Final     Creatinine 03/15/2018 0.72  0.52 - 1.04 mg/dL Final     GFR Estimate 03/15/2018 86  >60 mL/min/1.7m2 Final     GFR Estimate If Black 03/15/2018 >90  >60 mL/min/1.7m2 Final     Calcium 03/15/2018 9.2  8.5 - 10.1 mg/dL Final     Bilirubin Total 03/15/2018 0.7  0.2 - 1.3 mg/dL Final     Albumin 03/15/2018 3.9  3.4 - 5.0 g/dL Final     Protein Total 03/15/2018 7.6  6.8 - 8.8 g/dL Final     Alkaline Phosphatase 03/15/2018 58  40 - 150 U/L Final     ALT 03/15/2018 17  0 - 50 U/L Final     AST 03/15/2018 19  0 - 45 U/L Final     M Tuberculosis Result 03/15/2018 Negative  NEG^Negative Final     M Tuberculosis Antigen Value 03/15/2018 0.05  IU/mL Final     Vitamin D Deficiency screening 03/15/2018 66  20 - 75 ug/L Final   ]      MRI brain:    no new MRI to review    ASSESSMENT/PLAN:  The patient is a 48-year-old woman with a past medical history of relapsing remitting multiple sclerosis who is presenting today as a follow-up.  Overall, the patient appears to be clinically stable.  Patient is not  interested in going on any medications for symptoms of stiffness.  She only really feels comfortable using her Restoril to help her sleep.  It may be worthwhile in the future to consider a benzodiazepine that has a longer half-life that may help her sleep through the night better.  However, the patient is not interested in a change at this time.  I spent time reviewing the patient's treatment options with her.  The patient had several questions about Ocrevus.  At this time, patient is not interested in changing her medication.  I will tentatively follow the patient up in 6 months with an MRI of the brain and cervical spine.  I instructed patient to call my office with any questions concerns or issues.    Follow up in 6 months with a MRI of the brain and cervical cord.        I spent 25 minutes in this visit, with >50% direct patient time spent counseling about prognosis, treatment options, and coordination of care.    Regan Mcgregor MD [facility]  Staff Neurologist   09/10/18       (Chart documentation was completed in part with Dragon voice-recognition software. Even though reviewed, some grammatical, spelling, and word errors may remain.)

## 2018-09-11 ASSESSMENT — PATIENT HEALTH QUESTIONNAIRE - PHQ9: SUM OF ALL RESPONSES TO PHQ QUESTIONS 1-9: 8

## 2018-09-18 DIAGNOSIS — E87.6 HYPOKALEMIA: ICD-10-CM

## 2018-09-19 RX ORDER — POTASSIUM CHLORIDE 1125 MG/1
TABLET, EXTENDED RELEASE ORAL
Qty: 90 TABLET | Refills: 1 | Status: SHIPPED | OUTPATIENT
Start: 2018-09-19 | End: 2019-03-17

## 2018-10-09 DIAGNOSIS — I10 ESSENTIAL HYPERTENSION WITH GOAL BLOOD PRESSURE LESS THAN 140/90: ICD-10-CM

## 2018-10-09 RX ORDER — HYDROCHLOROTHIAZIDE 25 MG/1
TABLET ORAL
Qty: 90 TABLET | Refills: 1 | Status: SHIPPED | OUTPATIENT
Start: 2018-10-09 | End: 2019-04-07

## 2018-10-09 NOTE — TELEPHONE ENCOUNTER
hydrochlorothiazide (HYDRODIURIL) 25 MG tablet 90 tablet 1 4/12/2018  No   Sig: TAKE 1 TABLET DAILY     Last OV with Dr. Clements: 4/11/18     No future apts.     BP Readings from Last 3 Encounters:   09/10/18 129/85   04/11/18 132/90   03/15/18 128/88     Creatinine   Date Value Ref Range Status   03/15/2018 0.72 0.52 - 1.04 mg/dL Final     Potassium   Date Value Ref Range Status   03/15/2018 3.8 3.4 - 5.3 mmol/L Final     Diuretics (Including Combos) Protocol [id number]/9 12:24 AM   Blood pressure under 140/90 in past 12 months    Recent (12 mo) or future (30 days) visit within the authorizing provider's specialty    Patient is age 18 or older    No active pregancy on record    Normal serum creatinine on file in past 12 months    Normal serum potassium on file in past 12 months    Normal serum sodium on file in past 12 months    No positive pregnancy test in past 12 months     Refilled per [facility] protocol.    Perri Carrera RN

## 2018-11-09 ENCOUNTER — TRANSFERRED RECORDS (OUTPATIENT)
Dept: HEALTH INFORMATION MANAGEMENT | Facility: CLINIC | Age: 48
End: 2018-11-09

## 2018-12-17 ENCOUNTER — NURSE TRIAGE (OUTPATIENT)
Dept: NURSING | Facility: CLINIC | Age: 48
End: 2018-12-17

## 2018-12-17 NOTE — TELEPHONE ENCOUNTER
"Call transferred from [facility]:  \"I have a horrible headache\".  Caller is rating pain 8/10 and not responding to OTC medications.  Had to leave work due to pain.     Caller is unsure that she will go in.     Reason for Disposition    [1] SEVERE headache (e.g., excruciating) AND [2] \"worst headache\" of life    Additional Information    Negative: Difficult to awaken or acting confused  (e.g., disoriented, slurred speech)    Negative: [1] Weakness of the face, arm or leg on one side of the body AND [2] new onset    Negative: [1] Numbness of the face, arm or leg on one side of the body AND [2] new onset    Negative: [1] Loss of speech or garbled speech AND [2] new onset    Negative: Passed out (i.e., lost consciousness, collapsed and was not responding)    Negative: Sounds like a life-threatening emergency to the triager    Negative: Followed a head injury within last 3 days    Negative: Pregnant    Negative: Traumatic Brain Injury (TBI) is suspected    Negative: Unable to walk, or can only walk with assistance (e.g., requires support)    Negative: Stiff neck (can't touch chin to chest)    Negative: Severe pain in one eye    Negative: [1] Other family members (or roommates) with headaches AND [2] possibility of carbon monoxide exposure    Protocols used: HEADACHE-ADULT-AH    Rosalina Logan RN  [facility]      "

## 2019-01-09 ENCOUNTER — MYC MEDICAL ADVICE (OUTPATIENT)
Dept: PEDIATRICS | Facility: CLINIC | Age: 49
End: 2019-01-09

## 2019-01-10 ENCOUNTER — E-VISIT (OUTPATIENT)
Dept: PEDIATRICS | Facility: CLINIC | Age: 49
End: 2019-01-10

## 2019-01-10 DIAGNOSIS — N76.0 BACTERIAL VAGINITIS: Primary | ICD-10-CM

## 2019-01-10 DIAGNOSIS — B96.89 BACTERIAL VAGINITIS: Primary | ICD-10-CM

## 2019-01-10 PROCEDURE — 99444 ZZC PHYSICIAN ONLINE EVALUATION & MANAGEMENT SERVICE: CPT | Performed by: FAMILY MEDICINE

## 2019-01-10 NOTE — TELEPHONE ENCOUNTER
When patient had these symptoms in August, Dr. Clements sent Flagyl for 1 week and asked for future symptoms to use Evisit.  Patient was informed of this over [employer] on 8/14/18    RN sent message back to patient with this advisement.    Nida Bell RN, [facility]

## 2019-01-10 NOTE — TELEPHONE ENCOUNTER
Patient is following up on this message that was sent yesterday.  She is requesting an antibiotic for her symptoms.  Patient is requesting a response today.  She was informed that Dr. Clements is not in on Thursdays.  Please advise.

## 2019-01-11 RX ORDER — METRONIDAZOLE 500 MG/1
500 TABLET ORAL 2 TIMES DAILY
Qty: 14 TABLET | Refills: 0 | Status: SHIPPED | OUTPATIENT
Start: 2019-01-11 | End: 2019-03-21

## 2019-02-04 ENCOUNTER — MEDICAL CORRESPONDENCE (OUTPATIENT)
Dept: HEALTH INFORMATION MANAGEMENT | Facility: CLINIC | Age: 49
End: 2019-02-04

## 2019-02-07 ENCOUNTER — TELEPHONE (OUTPATIENT)
Dept: NEUROLOGY | Facility: CLINIC | Age: 49
End: 2019-02-07

## 2019-02-07 NOTE — TELEPHONE ENCOUNTER
PA Initiation    Medication: Glatiramer 40mg/ mL syringes - RENEWAL  Insurance Company: [employer] - Phone [phone] Fax [phone]  Pharmacy Filling the Rx: [facility] [address]  Filling Pharmacy Phone: [phone]  Filling Pharmacy Fax:    Start Date: 2019    ** Previous PA  19; new ins

## 2019-02-07 NOTE — TELEPHONE ENCOUNTER
Prior Authorization Approval    Authorization Effective Date: 2/7/2019  Authorization Expiration Date: 2/7/2021  Medication: Glatiramer 40mg/ mL syringes  Approved Dose/Quantity: 12 per 28 days  Reference #: [facility] key# [id number]   Insurance Company: [employer] - Phone [phone] Fax [phone]  Expected CoPay:       CoPay Card Available: Yes    Foundation Assistance Needed:    Which Pharmacy is filling the prescription (Not needed for infusion/clinic administered): [address]  Pharmacy Notified: Yes  Patient Notified:      ** Approval faxed to [facility] pharmacy

## 2019-02-12 ENCOUNTER — MYC MEDICAL ADVICE (OUTPATIENT)
Dept: NEUROLOGY | Facility: CLINIC | Age: 49
End: 2019-02-12

## 2019-02-12 DIAGNOSIS — G35 MULTIPLE SCLEROSIS (H): ICD-10-CM

## 2019-02-13 RX ORDER — GLATIRAMER 40 MG/ML
40 INJECTION, SOLUTION SUBCUTANEOUS
Qty: 36 SYRINGE | Refills: 3 | Status: SHIPPED | OUTPATIENT
Start: 2019-02-13 | End: 2019-06-01

## 2019-03-04 ENCOUNTER — NURSE TRIAGE (OUTPATIENT)
Dept: NURSING | Facility: CLINIC | Age: 49
End: 2019-03-04

## 2019-03-04 NOTE — TELEPHONE ENCOUNTER
Daughter positive for influenza A on Sat, Caller now has chills cough headache body aches no fever at this time. Patient is high risk due to having MS. Plan explained to Renea, I will page the Dr wise, and if she doesn't hear back from someone in 30 min to call back.     Paged Dr Moore who is on for Dr Tyree alcaraz at 3/4 1800.  Perscription for Tamiflu  sent to [facility] pharmacy in [place] # [id number] [phone]. Called Renea back and told her the information from the DR. Kori Roberson RN/ [facility]        Reason for Disposition    Patient is HIGH RISK (e.g., age > 64 years, pregnant, HIV+, or chronic medical condition)    Additional Information    Negative: Fever > 104 F (40 C)    Negative: [1] Difficulty breathing AND [2] not severe AND [3] not from stuffy nose (e.g., not relieved by cleaning out the nose)    Negative: Patient sounds very sick or weak to the triager    Negative: Chest pain  (Exception: MILD central chest pain, present only when coughing)    Negative: [1] Headache AND [2] stiff neck (can't touch chin to chest)    Negative: [1] Fever > 101 F (38.3 C) AND [2] age > 60    Negative: [1] Fever > 101 F (38.3 C) AND [2] bedridden (e.g., nursing home patient, CVA, chronic illness, recovering from surgery)    Negative: [1] Fever > 100.5 F (38.1 C) AND [2] diabetes mellitus or weak immune system (e.g., HIV positive, cancer chemo, splenectomy, organ transplant, chronic steroids)    Negative: Severe difficulty breathing (e.g., struggling for each breath, speaks in single words)    Negative: Bluish lips, tongue, or face now    Negative: Shock suspected (e.g., cold/pale/clammy skin, too weak to stand, low BP, rapid pulse)    Negative: Sounds like a life-threatening emergency to the triager    Protocols used: INFLUENZA - SEASONAL-ADULT-

## 2019-03-05 ENCOUNTER — TELEPHONE (OUTPATIENT)
Dept: PEDIATRICS | Facility: CLINIC | Age: 49
End: 2019-03-05

## 2019-03-05 NOTE — TELEPHONE ENCOUNTER
Please call patient to clarify on her flu-did patient have influenza or flulike symptoms ?  Was she treated?  Does she still have symptoms?

## 2019-03-05 NOTE — TELEPHONE ENCOUNTER
[facility] Call Center    Phone Message    May a detailed message be left on voicemail: yes    Reason for Call: Other: Pt states she has the flu and is wondering how long she will be contagious for and when she would be able to go back to work. Pt would like a call back from the clinic to discuss.      Action Taken: Message routed to:  Primary Care p [phone]

## 2019-03-06 NOTE — TELEPHONE ENCOUNTER
If patient does not have concerns for fever, chills, she can return to work with continuing and finishing the Tamiflu as prescribed  She will follow flu precautions

## 2019-03-06 NOTE — TELEPHONE ENCOUNTER
Per chart review, patient was triaged on 3/4/19 for Flu like sx after exposure from her daughter. Due to being high risk, she was given Tamiflu.    Called patient today, states she started the Tamiflu on Monday and is feeling much better today. She had a temp of 100 yesterday, has not checked today but feels like it may be gone. She has a productive cough but feels she is recovering. No trouble breathing, she is eating and drinking ok.     Encouraged the patient to push fluids and get plenty of rest. Advised her that the flu can last from a few days to a couple of weeks. It is important for her to cover her cough and do plenty of hand washing.    Patient is wondering when she should return to work. Routing to provider to review and advise.     Katalina Aguilar RN   .[facility]

## 2019-03-12 ENCOUNTER — ANCILLARY PROCEDURE (OUTPATIENT)
Dept: MRI IMAGING | Facility: CLINIC | Age: 49
End: 2019-03-12
Attending: PSYCHIATRY & NEUROLOGY
Payer: COMMERCIAL

## 2019-03-12 DIAGNOSIS — G35 MS (MULTIPLE SCLEROSIS) (H): ICD-10-CM

## 2019-03-12 PROCEDURE — A9585 GADOBUTROL INJECTION: HCPCS

## 2019-03-12 PROCEDURE — 72156 MRI NECK SPINE W/O & W/DYE: CPT | Mod: TC

## 2019-03-12 PROCEDURE — 70553 MRI BRAIN STEM W/O & W/DYE: CPT | Mod: TC

## 2019-03-12 RX ORDER — GADOBUTROL 604.72 MG/ML
7.5 INJECTION INTRAVENOUS ONCE
Status: COMPLETED | OUTPATIENT
Start: 2019-03-12 | End: 2019-03-12

## 2019-03-12 RX ADMIN — GADOBUTROL 6 ML: 604.72 INJECTION INTRAVENOUS at 18:42

## 2019-03-21 ENCOUNTER — OFFICE VISIT (OUTPATIENT)
Dept: NEUROLOGY | Facility: CLINIC | Age: 49
End: 2019-03-21
Payer: COMMERCIAL

## 2019-03-21 VITALS
HEART RATE: 81 BPM | WEIGHT: 147.6 LBS | OXYGEN SATURATION: 100 % | SYSTOLIC BLOOD PRESSURE: 134 MMHG | DIASTOLIC BLOOD PRESSURE: 88 MMHG | BODY MASS INDEX: 26.15 KG/M2

## 2019-03-21 DIAGNOSIS — G35 MULTIPLE SCLEROSIS (H): Primary | ICD-10-CM

## 2019-03-21 PROCEDURE — 99215 OFFICE O/P EST HI 40 MIN: CPT | Performed by: PSYCHIATRY & NEUROLOGY

## 2019-03-21 RX ORDER — GLATIRAMER 40 MG/ML
40 INJECTION, SOLUTION SUBCUTANEOUS
Qty: 45 SYRINGE | Refills: 3 | Status: SHIPPED | OUTPATIENT
Start: 2019-03-22 | End: 2020-01-13

## 2019-03-21 RX ORDER — CEPHALEXIN 500 MG/1
500 CAPSULE ORAL 3 TIMES DAILY
COMMUNITY
End: 2019-05-04

## 2019-03-21 ASSESSMENT — PAIN SCALES - GENERAL: PAINLEVEL: SEVERE PAIN (6)

## 2019-03-21 NOTE — NURSING NOTE
Renea Trotter's goals for this visit include:   Chief Complaint   Patient presents with     RECHECK     vision concerns      She requests these members of her care team be copied on today's visit information: pcp    PCP: Cara Clements    Referring Provider:  No referring provider defined for this encounter.    /88 (BP Location: Left arm, Patient Position: Sitting, Cuff Size: Adult Regular)   Pulse 81   Wt 67 kg (147 lb 9.6 oz)   SpO2 100%   BMI 26.15 kg/m      Do you need any medication refills at today's visit? YES

## 2019-03-21 NOTE — PATIENT INSTRUCTIONS
Will follow up in 6 months    Call my if your copaxone gets denies.     You saw a neurology provider, Regan Mcgregor, today at the [facility].  You may have also met with the MS RN Care Coordinator.  In order to get a message to your MS Center provider, you should contact [phone]. You should contact us via this protocol if you have any of the following symptoms:    New or worsening neurologic symptoms that persist for 24-48 hours, such as:  o New onset of pain or marked worsening of pain  o Difficulty with speech, swallowing, or breathing  o New onset of vertigo or dizziness  o Change in bowel or bladder function (incontinence, difficulty urinating)    Increasing difficulty in self care    Marked changes in vision (double vision, blurred vision, graying of vision)    Change in mobility    Change in cognitive function    Falling    Worsening numbness, tingling or pain with a change in function    Worsening fatigue lasting more than 2 weeks  If you had labs completed today, we will contact you with the results.  If you are active in [facility], they will be released to you there.  Otherwise, your results will be provided to you via mail or telephone call.  Some results take up to 2 weeks for completion.  If you haven t heard anything about your lab results within 2 weeks, you can call or send a [employer] message to obtain your results.  If you have an MRI scheduled in the week or two prior to your next appointment, we will go over the results at your scheduled follow up appointment.  If you are not scheduled to see your MS Center provider within about 2 weeks after your MRI, please call or send a [facility] message to obtain your results if you haven t heard anything within 2 weeks.  Please be aware that it takes at least 5 business days after routine MRIs for your results to be reviewed by both the radiologist and your doctor.  MRIs completed at facilities outside of the [facility]  system take about 2 weeks in order for the MRI disc to be mailed to our clinic and uploaded into your medical record.    Please contact your pharmacy to request refills of your medications.   Please do your best to come to your appointments, and to arrive 15 minutes early to allow time for checking in.  [facility] reserves the right to terminate care of established patients if a patient misses three or more appointments in a clinic without providing notification within a 12-month period.    Developing Your Care Team  Individuals living with chronic illnesses like MS may be unaware that they are at risk for the same range of medical problems as everyone else.  This is why you must establish a relationship with other health care providers in addition to your Multiple Sclerosis doctor.  It can be difficult at times to figure out whether a health concern is related to your MS, or whether it is related to something else, such as hormonal changes, pseduoexacerbations, changes in your core body temperature, flu-like reactions to interferons, exercise, or infections.  Urinary tract infections (UTIs) are common culprits that can cause fatigue, weakness, or other  MS attack -like symptoms without classic symptoms of a UTI.  For this reason, if you call or come in to discuss symptoms, you may be asked to get in touch with your primary provider or another specialist, so that you receive the comprehensive care you need.  What is Multiple Sclerosis (MS)?  MS is a disease in which the nerve tissues in the brain and/or spinal cord are attacked by immune cells in the body.  These immune cells are present in everyone, and their normal role is to fight off infections.  In people with MS, these cells change the way they function and cross into the nervous system.  Once there, they cause inflammation that damages the myelin (or the protective coating of a nerve cell, much like the plastic covering on an  electrical cord) and parts of the nerve cell itself.  So far, a clear cause for this immune system dysfunction has not been found.  MS often starts out as the  relapsing-remitting  form.  This means there are episodes when you have symptoms, and other times when you recover to normal or near-normal.  Over time, if the damage to the nervous system continues, the disease can cause additional disability, such as difficulty walking.  If the relapses and nerve damage can be prevented with available medications, many patients with MS can go many years between relapses and have relatively little disability.  Remember: MS is a condition that changes and must be evaluated on an ongoing basis!  What is a Relapse? (Also called flare-ups, attacks, or exacerbations)  Relapses are due to the occurrence of inflammation in some part of the brain and/or spinal cord.  A relapse is new or recurrent symptoms which persist for at least 24 hours and sometimes worsen over 48 hours.  New symptoms need to be  by at least 1 month in order to be considered separate relapses. Most of the time, symptoms reach their maximal intensity within 2 weeks and then begin to slowly resolve.  At times, your symptoms may not recover fully for up to 6 months, depending on the severity of the episode.  The frequency of relapses is generally higher early in the disease, but can vary greatly among individuals with MS.  Improvement of symptoms for an individual is unpredictable with each relapse.    It is important to remember that an increase in symptoms and changes in function may not necessarily be a relapse.  There are other factors that contribute to such changes, such as hot weather, increased body temperature, infection/illness, stress and sleep deprivation.  The worsening of symptoms may feel like a relapse when in reality it is not.  These episodes are referred to as pseudorelapses.  Once the underlying cause is addressed, symptoms usually  fade away and you feel better.  If you experience a worsening of symptoms that lasts more than 48 hours and does not improve with cooling down, decreasing stress, or treatment of an infection, please call us and we can help to better determine whether you are having a pseudorelapse versus a relapse.

## 2019-03-21 NOTE — LETTER
3/21/2019         RE: Renea Trotter  [address] [facility] [id number]        Dear Colleague,    Thank you for referring your patient, Renea Trotter, to the [facility]. Please see a copy of my visit note below.    MULTIPLE SCLEROSIS CLINIC AT THE [facility]  FOLLOWUP/ESTABLISHED PATIENT VISIT    PRINCIPAL NEUROLOGIC DIAGNOSIS: Multiple Sclerosis          Date of Onset: 2003  Date of Diagnosis: 6/25/2012  Initial Clinical Course: Relapsing remitting  Current Clinical Course: Relapsing remitting  Past Disease Modifying Therapy(ies): NA   Current Disease Modifying Therapy(ies): Copaxone  Most Recent MRI of the Brain:  3/12/19  Most Recent MRI of the Cervical Cord 3/19/2019  Most Recent MRI of the Thoracic Cord: NA  Most Recent Lumbar Puncture: 5/4/2012 Positive OCB         CHIEF COMPLAINT: Follow up on DMT      INTERVAL HISTORY:    The patient had three episodes of double vision. She could see fine far away, but had difficulties looking up close. She was generally under stress with these episodes. They lasted at most three hours. The patient reports that she double vision only affects the one eye.     The patient patient had one really bad headaches where she had to go to the ED. She reports that she had not had a bad headache 15 years ago. She had a negative CT scan in the ED. She was given a cocktail of medication which relieved her headache. The patient reportedly will get an average headaches once a week to every couple of weeks. The patient can not think of any triggers. The patient generally does not get light or sound sensivity with the headaches.     The patient also has an issue with her toe. She stubbed her two a couple of months ago. She felt like she had an ingrown toe nail and it improved with topical antibiotics. She reports that it came back a couple of weeks ago. She went to urgent care last night because she was having tingling in her foot. She  reports that the pain comes and goes. She was given an antibiotic. At worse it is a 7/10.     Issues with current MS therapy: Tolerating DMT without issue    REVIEW OF SYSTEMS:    Mood: unchanged  Spasticity:unchanged  Bladder: unchanged  Bowel: unchanged  Pain related to today's visit:reviewed on nursing intake documentation  Fatigue: unchanged  Sleep: well/ no problem  Memory/Concentration: unchanged      Otherwise 10 point ROS was neg other than the symptoms noted above.    PAST HISTORY was reviewed and updated:      MEDICATIONS and ALLERGIES were reviewed and updated.    SOCIAL HISTORY was reviewed and updated:        EXAM:    PHYSICAL EXAMINATION:   VITAL SIGNS:  B/P: 134/88, T: Data Unavailable, P: 81, R: Data Unavailable    GENERAL: The patient is a well-nourished  who presents to the evaluation alone.  NEUROLOGIC:   MENTAL STATUS: Alert,awake and  oriented times four.   The pupils are  round and react to light and there is no Aaron Pawan pupil. Extraocular movements are intact with notable exotropia with no  internuclear ophthalmoplegia. No nystagmus. Facial strength and sensation are  normal. Hearing is  normal. Palate elevation and tongue protrusion are  normal.   POWER:      Motor     Upper         Right Left   Shoulder Abduction 5 5   Elbow Flexion 5 5   Elbow Extension 5 5   Wrist Extension 5 5   Digit Extension 5 5   Digit Flexion 5 5   APB 5 5   Tone 0 0   Lower          Right Left   Hip Flexion 5 5   Knee Extension 5 5   Knee Flexion 5 5   Foot Dorsiflexion 5 5   Foot Plantar Flexion 5 5   EH 5 5   Toe Flexion 5 5   Tone 0 0             Grade Description   0 No increase in muscle tone   1 Slight increase in muscle tone, manifested by a catch and release or by minimal resistance at the end of the range of motion when the affected part(s) is moved in flexion or extension   1+ Slight increase in muscle tone, manifested by a catch, followed by minimal resistance throughout the remainder (less than half)  of the ROM   2 More marked increase in muscle tone through most of the ROM, but affected part(s) easily moved   3 Considerable increase in muscle tone, passive movement difficult   4 Affected part(s) rigid in flexion or extension               SENSORY:      Light touch:  Asymmetrical impaired  and deminished in right hand otherwise intact.              MOTOR/CEREBELLAR:     Right Left   RRM 1 Abnormal 0 Normal   JANELL 1 Abnormal 0 Normal   FTN 1 Abnormal 0 Normal   RRM 0 Normal 0 Normal   HKS 0 Normal 0 Normal               GAIT: Gait is   narrow-based and steady and the patient is  able to walk on heels, toes and in tandem with only mild difficulty.    Romberg: Stable with eye(s) closed       RESULTS:  Monitoring labs:    No visits with results within 6 Month(s) from this visit.   Latest known visit with results is:   Office Visit on 04/11/2018   Component Date Value Ref Range Status     Color Urine 04/11/2018 Light Yellow   Final     Appearance Urine 04/11/2018 Clear   Final     Glucose Urine 04/11/2018 Negative  NEG^Negative mg/dL Final     Bilirubin Urine 04/11/2018 Negative  NEG^Negative Final     Ketones Urine 04/11/2018 Negative  NEG^Negative mg/dL Final     Specific Gravity Urine 04/11/2018 1.004  1.003 - 1.035 Final     Blood Urine 04/11/2018 Negative  NEG^Negative Final     pH Urine 04/11/2018 7.5* 5.0 - 7.0 pH Final     Protein Albumin Urine 04/11/2018 Negative  NEG^Negative mg/dL Final     Urobilinogen mg/dL 04/11/2018 Normal  0.0 - 2.0 mg/dL Final     Nitrite Urine 04/11/2018 Negative  NEG^Negative Final     Leukocyte Esterase Urine 04/11/2018 Large* NEG^Negative Final     Source 04/11/2018 Midstream Urine   Final     WBC Urine 04/11/2018 0 - 5  OTO5^0 - 5 /HPF Final     RBC Urine 04/11/2018 O - 2  OTO2^O - 2 /HPF Final     Bacteria Urine 04/11/2018 Moderate* NEG^Negative /HPF Final     Squamous Epithelial /LPF Urine 04/11/2018 Few  FEW^Few /LPF Final     Specimen Description 04/11/2018 Vagina   Final      Wet Prep 04/11/2018 No Trichomonas seen   Final     Wet Prep 04/11/2018 Clue cells seen*  Final     Wet Prep 04/11/2018 No yeast seen   Final     Specimen Description 04/11/2018 Midstream Urine   Final     Culture Micro 04/11/2018 No growth   Final   ]      MRI brain:    MRI Dated 3/12/19:  Mild to moderate  T2 disease burden with  0 enhancion lesion(s).  compared to MRI on 3/15/18 there are   0 new T2 lesion(s).    ASSESSMENT/PLAN:  The patient is a 48-year-old female with a past medical history of relapsing remitting multiple sclerosis who is presenting today as a follow-up.  Overall she continues to do well on generic Copaxone.  The patient is having issues with her insurance continuing to get this.  I requested the patient request a formal denial explaining why she can no longer have this medication.  I think that given the patient that has had such a prolonged benefit with relatively no side effects from the generic Copaxone, I feel that this is the best medication for her.  Compared to all other MS medications, but this is by far the safest medication.  I feel that requiring her to go on a different medication without her failing this 1 first, we exposed her to an unnecessary risk in order to control her disease.  I spent time counseling her on recrudescence symptoms particularly when patients are febrile told her that this is called U tough's phenomena.  If the patient continues to do well, then I will see her again in 6 months.  I also advised the patient to see her eye doctor about her vision problems.  I also discussed with the patient several other ways to treat some of her symptoms, but she was not interested in going on a new medication at this time.  I instructed her to call my office with any questions, concerns, issues or problems.    Continue generic Copaxone  Follow-up in 6 months  I spent 45 minutes in this visit, with >50% direct patient time spent counseling about prognosis, treatment options,  and coordination of care.    Regan Mcgregor MD [facility]  Staff Neurologist   03/21/19       (Chart documentation was completed in part with Dragon voice-recognition software. Even though reviewed, some grammatical, spelling, and word errors may remain.)         Again, thank you for allowing me to participate in the care of your patient.        Sincerely,        Regan Mcgregor MD

## 2019-03-21 NOTE — PROGRESS NOTES
MULTIPLE SCLEROSIS CLINIC AT THE [facility]  FOLLOWUP/ESTABLISHED PATIENT VISIT    PRINCIPAL NEUROLOGIC DIAGNOSIS: Multiple Sclerosis          Date of Onset: 2003  Date of Diagnosis: 6/25/2012  Initial Clinical Course: Relapsing remitting  Current Clinical Course: Relapsing remitting  Past Disease Modifying Therapy(ies): NA   Current Disease Modifying Therapy(ies): Copaxone  Most Recent MRI of the Brain: 3/12/19  Most Recent MRI of the Cervical Cord 3/19/2019  Most Recent MRI of the Thoracic Cord: NA  Most Recent Lumbar Puncture: 5/4/2012 Positive OCB         CHIEF COMPLAINT: Follow up on DMT      INTERVAL HISTORY:    The patient had three episodes of double vision. She could see fine far away, but had difficulties looking up close. She was generally under stress with these episodes. They lasted at most three hours. The patient reports that she double vision only affects the one eye.     The patient patient had one really bad headaches where she had to go to the ED. She reports that she had not had a bad headache 15 years ago. She had a negative CT scan in the ED. She was given a cocktail of medication which relieved her headache. The patient reportedly will get an average headaches once a week to every couple of weeks. The patient can not think of any triggers. The patient generally does not get light or sound sensivity with the headaches.     The patient also has an issue with her toe. She stubbed her two a couple of months ago. She felt like she had an ingrown toe nail and it improved with topical antibiotics. She reports that it came back a couple of weeks ago. She went to urgent care last night because she was having tingling in her foot. She reports that the pain comes and goes. She was given an antibiotic. At worse it is a 7/10.     Issues with current MS therapy: Tolerating DMT without issue    REVIEW OF SYSTEMS:    Mood: unchanged  Spasticity:unchanged  Bladder: unchanged  Bowel:  unchanged  Pain related to today's visit:reviewed on nursing intake documentation  Fatigue: unchanged  Sleep: well/ no problem  Memory/Concentration: unchanged      Otherwise 10 point ROS was neg other than the symptoms noted above.    PAST HISTORY was reviewed and updated:      MEDICATIONS and ALLERGIES were reviewed and updated.    SOCIAL HISTORY was reviewed and updated:        EXAM:    PHYSICAL EXAMINATION:   VITAL SIGNS:  B/P: 134/88, T: Data Unavailable, P: 81, R: Data Unavailable    GENERAL: The patient is a well-nourished  who presents to the evaluation alone.  NEUROLOGIC:   MENTAL STATUS: Alert,awake and  oriented times four.   The pupils are  round and react to light and there is no Aaron Pawan pupil. Extraocular movements are intact with notable exotropia with no  internuclear ophthalmoplegia. No nystagmus. Facial strength and sensation are  normal. Hearing is  normal. Palate elevation and tongue protrusion are  normal.   POWER:      Motor     Upper         Right Left   Shoulder Abduction 5 5   Elbow Flexion 5 5   Elbow Extension 5 5   Wrist Extension 5 5   Digit Extension 5 5   Digit Flexion 5 5   APB 5 5   Tone 0 0   Lower          Right Left   Hip Flexion 5 5   Knee Extension 5 5   Knee Flexion 5 5   Foot Dorsiflexion 5 5   Foot Plantar Flexion 5 5   EH 5 5   Toe Flexion 5 5   Tone 0 0             Grade Description   0 No increase in muscle tone   1 Slight increase in muscle tone, manifested by a catch and release or by minimal resistance at the end of the range of motion when the affected part(s) is moved in flexion or extension   1+ Slight increase in muscle tone, manifested by a catch, followed by minimal resistance throughout the remainder (less than half) of the ROM   2 More marked increase in muscle tone through most of the ROM, but affected part(s) easily moved   3 Considerable increase in muscle tone, passive movement difficult   4 Affected part(s) rigid in flexion or extension                SENSORY:      Light touch:  Asymmetrical impaired  and deminished in right hand otherwise intact.              MOTOR/CEREBELLAR:     Right Left   RRM 1 Abnormal 0 Normal   JANELL 1 Abnormal 0 Normal   FTN 1 Abnormal 0 Normal   RRM 0 Normal 0 Normal   HKS 0 Normal 0 Normal               GAIT: Gait is   narrow-based and steady and the patient is  able to walk on heels, toes and in tandem with only mild difficulty.    Romberg: Stable with eye(s) closed       RESULTS:  Monitoring labs:    No visits with results within 6 Month(s) from this visit.   Latest known visit with results is:   Office Visit on 04/11/2018   Component Date Value Ref Range Status     Color Urine 04/11/2018 Light Yellow   Final     Appearance Urine 04/11/2018 Clear   Final     Glucose Urine 04/11/2018 Negative  NEG^Negative mg/dL Final     Bilirubin Urine 04/11/2018 Negative  NEG^Negative Final     Ketones Urine 04/11/2018 Negative  NEG^Negative mg/dL Final     Specific Gravity Urine 04/11/2018 1.004  1.003 - 1.035 Final     Blood Urine 04/11/2018 Negative  NEG^Negative Final     pH Urine 04/11/2018 7.5* 5.0 - 7.0 pH Final     Protein Albumin Urine 04/11/2018 Negative  NEG^Negative mg/dL Final     Urobilinogen mg/dL 04/11/2018 Normal  0.0 - 2.0 mg/dL Final     Nitrite Urine 04/11/2018 Negative  NEG^Negative Final     Leukocyte Esterase Urine 04/11/2018 Large* NEG^Negative Final     Source 04/11/2018 Midstream Urine   Final     WBC Urine 04/11/2018 0 - 5  OTO5^0 - 5 /HPF Final     RBC Urine 04/11/2018 O - 2  OTO2^O - 2 /HPF Final     Bacteria Urine 04/11/2018 Moderate* NEG^Negative /HPF Final     Squamous Epithelial /LPF Urine 04/11/2018 Few  FEW^Few /LPF Final     Specimen Description 04/11/2018 Vagina   Final     Wet Prep 04/11/2018 No Trichomonas seen   Final     Wet Prep 04/11/2018 Clue cells seen*  Final     Wet Prep 04/11/2018 No yeast seen   Final     Specimen Description 04/11/2018 Midstream Urine   Final     Culture Micro  04/11/2018 No growth   Final   ]      MRI brain:    MRI Dated 3/12/19:  Mild to moderate  T2 disease burden with  0 enhancion lesion(s).  compared to MRI on 3/15/18 there are   0 new T2 lesion(s).    ASSESSMENT/PLAN:  The patient is a 48-year-old female with a past medical history of relapsing remitting multiple sclerosis who is presenting today as a follow-up.  Overall she continues to do well on generic Copaxone.  The patient is having issues with her insurance continuing to get this.  I requested the patient request a formal denial explaining why she can no longer have this medication.  I think that given the patient that has had such a prolonged benefit with relatively no side effects from the generic Copaxone, I feel that this is the best medication for her.  Compared to all other MS medications, but this is by far the safest medication.  I feel that requiring her to go on a different medication without her failing this 1 first, we exposed her to an unnecessary risk in order to control her disease.  I spent time counseling her on recrudescence symptoms particularly when patients are febrile told her that this is called U tough's phenomena.  If the patient continues to do well, then I will see her again in 6 months.  I also advised the patient to see her eye doctor about her vision problems.  I also discussed with the patient several other ways to treat some of her symptoms, but she was not interested in going on a new medication at this time.  I instructed her to call my office with any questions, concerns, issues or problems.    Continue generic Copaxone  Follow-up in 6 months  I spent 45 minutes in this visit, with >50% direct patient time spent counseling about prognosis, treatment options, and coordination of care.    Regan Mcgregor MD [facility]  Staff Neurologist   03/21/19       (Chart documentation was completed in part with Dragon voice-recognition software. Even though reviewed, some grammatical,  spelling, and word errors may remain.)

## 2019-04-07 DIAGNOSIS — I10 ESSENTIAL HYPERTENSION WITH GOAL BLOOD PRESSURE LESS THAN 140/90: ICD-10-CM

## 2019-04-08 RX ORDER — HYDROCHLOROTHIAZIDE 25 MG/1
TABLET ORAL
Qty: 30 TABLET | Refills: 0 | Status: SHIPPED | OUTPATIENT
Start: 2019-04-08 | End: 2019-05-04

## 2019-04-08 NOTE — TELEPHONE ENCOUNTER
LOVV- 4/11/18. Due for a physical with fasting labs, sent diana x1. A letter was sent in 3/17 encounter.  Lluvia Hawthorne RN

## 2019-05-03 DIAGNOSIS — I10 ESSENTIAL HYPERTENSION WITH GOAL BLOOD PRESSURE LESS THAN 140/90: ICD-10-CM

## 2019-05-03 RX ORDER — HYDROCHLOROTHIAZIDE 25 MG/1
TABLET ORAL
Qty: 90 TABLET | Refills: 3 | OUTPATIENT
Start: 2019-05-03

## 2019-05-03 NOTE — TELEPHONE ENCOUNTER
Multiple attempts have been made to contact patient in 3/17 encounter and a diana with another letter was given on 4/8. Patient is not currently scheduled. Left a detailed VM that she is due for a physical with fasting labs and gave our scheduling number.  Lluvia Hawthorne RN

## 2019-05-03 NOTE — TELEPHONE ENCOUNTER
Pt states she only has a couple pills left, looking for this script to be sent to [facility] in [facility]. Pt would like a call when script has been sent to pharmacy, ok to leave message.

## 2019-05-04 ENCOUNTER — OFFICE VISIT (OUTPATIENT)
Dept: PEDIATRICS | Facility: CLINIC | Age: 49
End: 2019-05-04
Payer: COMMERCIAL

## 2019-05-04 VITALS
WEIGHT: 143 LBS | OXYGEN SATURATION: 96 % | HEART RATE: 87 BPM | SYSTOLIC BLOOD PRESSURE: 134 MMHG | HEIGHT: 63 IN | DIASTOLIC BLOOD PRESSURE: 95 MMHG | TEMPERATURE: 98.9 F | BODY MASS INDEX: 25.34 KG/M2

## 2019-05-04 DIAGNOSIS — R53.83 FATIGUE, UNSPECIFIED TYPE: ICD-10-CM

## 2019-05-04 DIAGNOSIS — G35 RELAPSING REMITTING MULTIPLE SCLEROSIS (H): ICD-10-CM

## 2019-05-04 DIAGNOSIS — G47.9 SLEEP DISORDER: ICD-10-CM

## 2019-05-04 DIAGNOSIS — J45.20 MILD INTERMITTENT ASTHMA WITHOUT COMPLICATION: ICD-10-CM

## 2019-05-04 DIAGNOSIS — Z12.31 VISIT FOR SCREENING MAMMOGRAM: ICD-10-CM

## 2019-05-04 DIAGNOSIS — B35.3 TINEA PEDIS OF RIGHT FOOT: ICD-10-CM

## 2019-05-04 DIAGNOSIS — I10 ESSENTIAL HYPERTENSION WITH GOAL BLOOD PRESSURE LESS THAN 140/90: ICD-10-CM

## 2019-05-04 DIAGNOSIS — Z00.00 ANNUAL PHYSICAL EXAM: Primary | ICD-10-CM

## 2019-05-04 PROCEDURE — 99213 OFFICE O/P EST LOW 20 MIN: CPT | Mod: 25 | Performed by: INTERNAL MEDICINE

## 2019-05-04 PROCEDURE — 99396 PREV VISIT EST AGE 40-64: CPT | Performed by: INTERNAL MEDICINE

## 2019-05-04 RX ORDER — TEMAZEPAM 15 MG/1
CAPSULE ORAL
Qty: 45 CAPSULE | Refills: 5 | Status: SHIPPED | OUTPATIENT
Start: 2019-05-04 | End: 2020-07-17

## 2019-05-04 RX ORDER — ALBUTEROL SULFATE 90 UG/1
2 AEROSOL, METERED RESPIRATORY (INHALATION) EVERY 4 HOURS PRN
Qty: 8.5 G | Refills: 3 | Status: SHIPPED | OUTPATIENT
Start: 2019-05-04 | End: 2020-09-18

## 2019-05-04 RX ORDER — LISINOPRIL/HYDROCHLOROTHIAZIDE 10-12.5 MG
1 TABLET ORAL DAILY
Qty: 90 TABLET | Refills: 3 | Status: SHIPPED | OUTPATIENT
Start: 2019-05-04 | End: 2020-04-24

## 2019-05-04 ASSESSMENT — MIFFLIN-ST. JEOR: SCORE: 1243.8

## 2019-05-04 NOTE — PROGRESS NOTES
SUBJECTIVE:   CC: Renea Trotter is an 48 year old woman who presents for preventive health visit.     HPI  48-year-old young lady has come in for a annual physical examination and refill of her medication.  She has had intermittent relapsing multiple sclerosis for 7 years.  She is under the care of neurology.  Her disease has been somewhat stable with recent MRI of the cervical spine and brain showing no new lesions.  She remains quite active.  She has noticed that her blood pressure has been running low high lately.  In recent clinic visits she has noticed the diastolic has been above 90 quite a few times.  She is on hydrochlorothiazide 25 mg daily for hypertension.  She had a fungal infection of the right foot and has been using clotrimazole with betamethasone cream.    She is in the process of going through divorce so feels extremely stressed.  Also she recently started a new job.  She has been little bit more fatigue lately.  She is a non-smoker and does not use alcohol except on rare occasion.  She usually exercises regularly and is a biker.        Healthy Habits:    Do you get at least three servings of calcium containing foods daily (dairy, green leafy vegetables, etc.)? yes    Amount of exercise or daily activities, outside of work: 0 day(s) per week    Problems taking medications regularly No    Medication side effects: No    Have you had an eye exam in the past two years? yes    Do you see a dentist twice per year? yes    Do you have sleep apnea, excessive snoring or daytime drowsiness?no          Today's PHQ-2 Score:   PHQ-2 ( 1999 [employer]) 9/10/2018 3/15/2018   Q1: Little interest or pleasure in doing things 0 1   Q2: Feeling down, depressed or hopeless 2 1   PHQ-2 Score 2 2       Abuse: Current or Past(Physical, Sexual or Emotional)- No  Do you feel safe in your environment? Yes    Social History     Tobacco Use     Smoking status: Never Smoker     Smokeless tobacco: Never Used   Substance Use  Topics     Alcohol use: Yes     Alcohol/week: 0.6 - 1.2 oz     Types: 1 - 2 Standard drinks or equivalent per week     Comment: RARELY     If you drink alcohol do you typically have >3 drinks per day or >7 drinks per week? No                     Reviewed orders with patient.  Reviewed health maintenance and updated orders accordingly - Yes  BP Readings from Last 3 Encounters:   05/04/19 (!) 134/95   03/21/19 134/88   09/10/18 129/85    Wt Readings from Last 3 Encounters:   05/04/19 64.9 kg (143 lb)   03/21/19 67 kg (147 lb 9.6 oz)   09/10/18 63 kg (139 lb)                  Patient Active Problem List   Diagnosis     Relapsing remitting multiple sclerosis (H)     CARDIOVASCULAR SCREENING; LDL GOAL LESS THAN 160     BMI 26.0-26.9,adult     Urinary urgency     Headache     Low back pain without sciatica, unspecified back pain laterality     Sleep disorder     Plantar warts     Essential hypertension with goal blood pressure less than 140/90     Hemorrhoids, unspecified hemorrhoid type     Seborrheic keratosis     Mild intermittent asthma     Past Surgical History:   Procedure Laterality Date     HC TOOTH EXTRACTION W/FORCEP         Social History     Tobacco Use     Smoking status: Never Smoker     Smokeless tobacco: Never Used   Substance Use Topics     Alcohol use: Yes     Alcohol/week: 0.6 - 1.2 oz     Types: 1 - 2 Standard drinks or equivalent per week     Comment: RARELY     Family History   Problem Relation Age of Onset     Hypertension Mother      Lipids Mother      Eye Disorder Father      Cerebrovascular Disease Maternal Grandmother      Cerebrovascular Disease Maternal Grandfather      Prostate Cancer Maternal Grandfather      Blood Disease Paternal Grandmother      Alzheimer Disease Paternal Grandfather      Multiple Sclerosis Cousin      Pulmonary Embolism Daughter          Current Outpatient Medications   Medication Sig Dispense Refill     albuterol (PROAIR HFA/PROVENTIL HFA/VENTOLIN HFA) 108 (90 Base)  MCG/ACT inhaler Inhale 2 puffs into the lungs every 4 hours as needed for shortness of breath / dyspnea or wheezing 8.5 g 3     Cholecalciferol (VITAMIN D3 PO) Take 5,000 Units by mouth daily       Glatiramer Acetate 40 MG/ML SOSY Inject 40 mg Subcutaneous three times a week 45 Syringe 3     KLOR-CON M15 15 MEQ CR tablet TAKE 1 TABLET DAILY 90 tablet 1     lisinopril-hydrochlorothiazide (PRINZIDE/ZESTORETIC) 10-12.5 MG tablet Take 1 tablet by mouth daily 90 tablet 3     MULTIPLE VITAMIN PO Take 1 tablet by mouth daily.       temazepam (RESTORIL) 15 MG capsule TAKE ONE TO TWO CAPSULES BY MOUTH ONCE DAILY AT BEDTIME 45 capsule 5     Glatiramer Acetate 40 MG/ML SOSY Inject 40 mg Subcutaneous three times a week 36 Syringe 3     No Known Allergies        Pertinent mammograms are reviewed under the imaging tab.  History of abnormal Pap smear:   PAP / HPV Latest Ref Rng & Units 10/13/2017 8/6/2014   PAP - NIL NIL   HPV 16 DNA NEG:Negative Negative -   HPV 18 DNA NEG:Negative Negative -   OTHER HR HPV NEG:Negative Negative -     Reviewed and updated as needed this visit by clinical staff  Tobacco  Allergies  Meds  Med Hx  Surg Hx  Fam Hx  Soc Hx        Reviewed and updated as needed this visit by Provider  Tobacco  Fam Hx  Soc Hx       Past Medical History:   Diagnosis Date     HTN (hypertension)      Mild intermittent asthma      MS (multiple sclerosis) (H)       Past Surgical History:   Procedure Laterality Date     HC TOOTH EXTRACTION W/FORCEP         ROS:  CONSTITUTIONAL: NEGATIVE for fever, chills, change in weight  INTEGUMENTARU/SKIN: NEGATIVE for worrisome rashes, moles or lesions  EYES: NEGATIVE for vision changes or irritation  ENT: NEGATIVE for ear, mouth and throat problems  RESP: NEGATIVE for significant cough or SOB  BREAST: NEGATIVE for masses, tenderness or discharge  CV: NEGATIVE for chest pain, palpitations or peripheral edema  GI: NEGATIVE for nausea, abdominal pain, heartburn, or change in bowel  "habits  : NEGATIVE for unusual urinary or vaginal symptoms. Periods are regular.  MUSCULOSKELETAL: NEGATIVE for significant arthralgias or myalgia  NEURO: NEGATIVE for weakness, dizziness or paresthesias  ENDOCRINE: NEGATIVE for temperature intolerance, skin/hair changes  HEME/ALLERGY/IMMUNE: NEGATIVE for bleeding problems  PSYCHIATRIC: NEGATIVE for changes in mood or affect    OBJECTIVE:   BP (!) 134/95   Pulse 87   Temp 98.9  F (37.2  C) (Temporal)   Ht 1.594 m (5' 2.75\")   Wt 64.9 kg (143 lb)   SpO2 96%   BMI 25.53 kg/m    EXAM:  GENERAL: healthy, alert and no distress  EYES: Eyes grossly normal to inspection, PERRL and conjunctivae and sclerae normal  HENT: ear canals and TM's normal, nose and mouth without ulcers or lesions  NECK: no adenopathy, no asymmetry, masses, or scars and thyroid normal to palpation  RESP: lungs clear to auscultation - no rales, rhonchi or wheezes  BREAST: normal without masses, tenderness or nipple discharge and no palpable axillary masses or adenopathy  CV: regular rate and rhythm, normal S1 S2, no S3 or S4, no murmur, click or rub, no peripheral edema and peripheral pulses strong  ABDOMEN: soft, nontender, no hepatosplenomegaly, no masses and bowel sounds normal  MS: no gross musculoskeletal defects noted, no edema  SKIN: no suspicious lesions or rashes  NEURO: Normal strength and tone, mentation intact and speech normal  PSYCH: mentation appears normal, affect normal/bright  LYMPH: no cervical, supraclavicular, axillary, or inguinal adenopathy    Diagnostic Test Results:  none     ASSESSMENT/PLAN:     1.  Physical examination completed and is good.  2.  Essential hypertension with blood pressure recently been elevated.  I switched her from hydrochlorothiazide to lisinopril HCT 10/12.5 daily.  She will return in 4 weeks and have BMP and a CBC along with TSH check.  3.  Mild intermittent asthma stable.  Albuterol refill.  4.  Relapsing intermitting multiple sclerosis for past " "7 years currently being treated with Glatiramer 3 times a week.  5.  Primary insomnia problem for which she uses temazepam somewhat on a as needed basis.  6.  Tinea pedis right foot looks like it is improving and pretty much resolved.  She will continue with tramadol.  7.  Negative Pap smear in 2017.  She has been in a single relationship.  She will have a repeat Pap smear next year.  8.  Screening mammogram to be performed.  Last one was 2 years ago.  There is no family history of breast cancer.      Patient will return in 4 weeks to check up on hypertension and check labs at that time.  Did talk to her about Tdap but did not administer.  We discussed it again in 4 weeks.      COUNSELING:   Reviewed preventive health counseling, as reflected in patient instructions       Regular exercise       Healthy diet/nutrition       Vision screening    BP Readings from Last 1 Encounters:   05/04/19 (!) 134/95     Estimated body mass index is 25.53 kg/m  as calculated from the following:    Height as of this encounter: 1.594 m (5' 2.75\").    Weight as of this encounter: 64.9 kg (143 lb).           reports that she has never smoked. She has never used smokeless tobacco.      Counseling Resources:  ATP IV Guidelines  Pooled Cohorts Equation Calculator  Breast Cancer Risk Calculator  FRAX Risk Assessment  ICSI Preventive Guidelines  Dietary Guidelines for Americans, 2010  USDA's MyPlate  ASA Prophylaxis  Lung CA Screening    Paulino Terrazas MD  [facility]  "

## 2019-05-04 NOTE — PATIENT INSTRUCTIONS
Schedule for Mammogram [phone]        Preventive Health Recommendations  Female Ages 40 to 49    Yearly exam:     See your health care provider every year in order to  1. Review health changes.   2. Discuss preventive care.    3. Review your medicines if your doctor prescribed any.      Get a Pap test every three years (unless you have an abnormal result and your provider advises testing more often).      If you get Pap tests with HPV test, you only need to test every 5 years, unless you have an abnormal result. You do not need a Pap test if your uterus was removed (hysterectomy) and you have not had cancer.      You should be tested each year for STDs (sexually transmitted diseases), if you're at risk.     Ask your doctor if you should have a mammogram.      Have a colonoscopy (test for colon cancer) if someone in your family has had colon cancer or polyps before age 50.       Have a cholesterol test every 5 years.       Have a diabetes test (fasting glucose) after age 45. If you are at risk for diabetes, you should have this test every 3 years.    Shots: Get a flu shot each year. Get a tetanus shot every 10 years.     Nutrition:     Eat at least 5 servings of fruits and vegetables each day.    Eat whole-grain bread, whole-wheat pasta and brown rice instead of white grains and rice.    Get adequate Calcium and Vitamin D.      Lifestyle    Exercise at least 150 minutes a week (an average of 30 minutes a day, 5 days a week). This will help you control your weight and prevent disease.    Limit alcohol to one drink per day.    No smoking.     Wear sunscreen to prevent skin cancer.    See your dentist every six months for an exam and cleaning.

## 2019-06-01 ENCOUNTER — OFFICE VISIT (OUTPATIENT)
Dept: PEDIATRICS | Facility: CLINIC | Age: 49
End: 2019-06-01
Payer: COMMERCIAL

## 2019-06-01 VITALS
SYSTOLIC BLOOD PRESSURE: 124 MMHG | DIASTOLIC BLOOD PRESSURE: 84 MMHG | TEMPERATURE: 98.5 F | OXYGEN SATURATION: 97 % | BODY MASS INDEX: 25.43 KG/M2 | WEIGHT: 142.4 LBS | HEART RATE: 80 BPM

## 2019-06-01 DIAGNOSIS — I10 ESSENTIAL HYPERTENSION WITH GOAL BLOOD PRESSURE LESS THAN 140/90: ICD-10-CM

## 2019-06-01 DIAGNOSIS — J45.20 MILD INTERMITTENT ASTHMA WITHOUT COMPLICATION: ICD-10-CM

## 2019-06-01 DIAGNOSIS — G35 RELAPSING REMITTING MULTIPLE SCLEROSIS (H): ICD-10-CM

## 2019-06-01 DIAGNOSIS — R53.83 FATIGUE, UNSPECIFIED TYPE: ICD-10-CM

## 2019-06-01 DIAGNOSIS — I10 ESSENTIAL HYPERTENSION WITH GOAL BLOOD PRESSURE LESS THAN 140/90: Primary | ICD-10-CM

## 2019-06-01 LAB
ALBUMIN SERPL-MCNC: 4 G/DL (ref 3.4–5)
ALP SERPL-CCNC: 61 U/L (ref 40–150)
ALT SERPL W P-5'-P-CCNC: 22 U/L (ref 0–50)
ANION GAP SERPL CALCULATED.3IONS-SCNC: 8 MMOL/L (ref 3–14)
AST SERPL W P-5'-P-CCNC: 18 U/L (ref 0–45)
BILIRUB SERPL-MCNC: 0.5 MG/DL (ref 0.2–1.3)
BUN SERPL-MCNC: 19 MG/DL (ref 7–30)
CALCIUM SERPL-MCNC: 9.2 MG/DL (ref 8.5–10.1)
CHLORIDE SERPL-SCNC: 106 MMOL/L (ref 94–109)
CO2 SERPL-SCNC: 29 MMOL/L (ref 20–32)
CREAT SERPL-MCNC: 0.85 MG/DL (ref 0.52–1.04)
ERYTHROCYTE [DISTWIDTH] IN BLOOD BY AUTOMATED COUNT: 11.3 % (ref 10–15)
GFR SERPL CREATININE-BSD FRML MDRD: 81 ML/MIN/{1.73_M2}
GLUCOSE SERPL-MCNC: 93 MG/DL (ref 70–99)
HCT VFR BLD AUTO: 38.7 % (ref 35–47)
HGB BLD-MCNC: 13.9 G/DL (ref 11.7–15.7)
MCH RBC QN AUTO: 31.4 PG (ref 26.5–33)
MCHC RBC AUTO-ENTMCNC: 35.9 G/DL (ref 31.5–36.5)
MCV RBC AUTO: 88 FL (ref 78–100)
PLATELET # BLD AUTO: 274 10E9/L (ref 150–450)
POTASSIUM SERPL-SCNC: 4 MMOL/L (ref 3.4–5.3)
PROT SERPL-MCNC: 7.8 G/DL (ref 6.8–8.8)
RBC # BLD AUTO: 4.42 10E12/L (ref 3.8–5.2)
SODIUM SERPL-SCNC: 143 MMOL/L (ref 133–144)
TSH SERPL DL<=0.005 MIU/L-ACNC: 1.63 MU/L (ref 0.4–4)
WBC # BLD AUTO: 6.9 10E9/L (ref 4–11)

## 2019-06-01 PROCEDURE — 99213 OFFICE O/P EST LOW 20 MIN: CPT | Performed by: NURSE PRACTITIONER

## 2019-06-01 PROCEDURE — 84443 ASSAY THYROID STIM HORMONE: CPT | Performed by: INTERNAL MEDICINE

## 2019-06-01 PROCEDURE — 80053 COMPREHEN METABOLIC PANEL: CPT | Performed by: INTERNAL MEDICINE

## 2019-06-01 PROCEDURE — 85027 COMPLETE CBC AUTOMATED: CPT | Performed by: INTERNAL MEDICINE

## 2019-06-01 PROCEDURE — 36415 COLL VENOUS BLD VENIPUNCTURE: CPT | Performed by: INTERNAL MEDICINE

## 2019-06-01 NOTE — PROGRESS NOTES
Subjective     Renea Trotter is a 48 year old female who presents to clinic today for the following health issues:    HPI   Hypertension Follow-up      Do you check your blood pressure regularly outside of the clinic? No     Are you following a low salt diet? No    Are your blood pressures ever more than 140 on the top number (systolic) OR more   than 90 on the bottom number (diastolic), for example 140/90? No    Amount of exercise or physical activity: None    Problems taking medications regularly: No    Medication side effects: none    Diet: regular (no restrictions)    Patient Active Problem List   Diagnosis     Relapsing remitting multiple sclerosis (H)     CARDIOVASCULAR SCREENING; LDL GOAL LESS THAN 160     BMI 26.0-26.9,adult     Urinary urgency     Headache     Low back pain without sciatica, unspecified back pain laterality     Sleep disorder     Plantar warts     Essential hypertension with goal blood pressure less than 140/90     Hemorrhoids, unspecified hemorrhoid type     Seborrheic keratosis     Mild intermittent asthma     Past Surgical History:   Procedure Laterality Date     HC TOOTH EXTRACTION W/FORCEP         Social History     Tobacco Use     Smoking status: Never Smoker     Smokeless tobacco: Never Used   Substance Use Topics     Alcohol use: Yes     Alcohol/week: 0.6 - 1.2 oz     Types: 1 - 2 Standard drinks or equivalent per week     Comment: RARELY     Family History   Problem Relation Age of Onset     Hypertension Mother      Lipids Mother      Eye Disorder Father      Cerebrovascular Disease Maternal Grandmother      Cerebrovascular Disease Maternal Grandfather      Prostate Cancer Maternal Grandfather      Blood Disease Paternal Grandmother      Alzheimer Disease Paternal Grandfather      Multiple Sclerosis Cousin      Pulmonary Embolism Daughter          Current Outpatient Medications   Medication Sig Dispense Refill     albuterol (PROAIR HFA/PROVENTIL HFA/VENTOLIN HFA) 108 (90 Base)  MCG/ACT inhaler Inhale 2 puffs into the lungs every 4 hours as needed for shortness of breath / dyspnea or wheezing 8.5 g 3     Cholecalciferol (VITAMIN D3 PO) Take 5,000 Units by mouth daily       Glatiramer Acetate 40 MG/ML SOSY Inject 40 mg Subcutaneous three times a week 45 Syringe 3     lisinopril-hydrochlorothiazide (PRINZIDE/ZESTORETIC) 10-12.5 MG tablet Take 1 tablet by mouth daily 90 tablet 3     MULTIPLE VITAMIN PO Take 1 tablet by mouth daily.       temazepam (RESTORIL) 15 MG capsule TAKE ONE TO TWO CAPSULES BY MOUTH ONCE DAILY AT BEDTIME 45 capsule 5     No Known Allergies  BP Readings from Last 3 Encounters:   06/01/19 124/84   05/04/19 (!) 134/95   03/21/19 134/88    Wt Readings from Last 3 Encounters:   06/01/19 64.6 kg (142 lb 6.4 oz)   05/04/19 64.9 kg (143 lb)   03/21/19 67 kg (147 lb 9.6 oz)              Reviewed and updated as needed this visit by Provider         Review of Systems   ROS COMP: Constitutional, HEENT, cardiovascular, pulmonary, gi and gu systems are negative, except as otherwise noted.      Objective    /84 (BP Location: Right arm, Patient Position: Chair, Cuff Size: Adult Regular)   Pulse 80   Temp 98.5  F (36.9  C) (Temporal)   Wt 64.6 kg (142 lb 6.4 oz)   SpO2 97%   BMI 25.43 kg/m    Body mass index is 25.43 kg/m .  Physical Exam   GENERAL APPEARANCE: alert, active and no distress  NECK: no adenopathy, no asymmetry, masses, or scars and thyroid normal to palpation  RESP: lungs clear to auscultation - no rales, rhonchi or wheezes  CV: regular rates and rhythm and no murmur, click or rub  ABDOMEN: soft, non-tender  MS: extremities normal- no gross deformities noted  SKIN: no suspicious lesions or rashes  NEURO: Normal strength and tone, mentation intact and speech normal  PSYCH: mentation appears normal and affect normal/bright    Diagnostic Test Results:  Results for orders placed or performed in visit on 06/01/19   TSH   Result Value Ref Range    TSH 1.63 0.40 - 4.00  "mU/L   Comprehensive metabolic panel   Result Value Ref Range    Sodium 143 133 - 144 mmol/L    Potassium 4.0 3.4 - 5.3 mmol/L    Chloride 106 94 - 109 mmol/L    Carbon Dioxide 29 20 - 32 mmol/L    Anion Gap 8 3 - 14 mmol/L    Glucose 93 70 - 99 mg/dL    Urea Nitrogen 19 7 - 30 mg/dL    Creatinine 0.85 0.52 - 1.04 mg/dL    GFR Estimate 81 >60 mL/min/[1.73_m2]    GFR Estimate If Black >90 >60 mL/min/[1.73_m2]    Calcium 9.2 8.5 - 10.1 mg/dL    Bilirubin Total 0.5 0.2 - 1.3 mg/dL    Albumin 4.0 3.4 - 5.0 g/dL    Protein Total 7.8 6.8 - 8.8 g/dL    Alkaline Phosphatase 61 40 - 150 U/L    ALT 22 0 - 50 U/L    AST 18 0 - 45 U/L   CBC with platelets   Result Value Ref Range    WBC 6.9 4.0 - 11.0 10e9/L    RBC Count 4.42 3.8 - 5.2 10e12/L    Hemoglobin 13.9 11.7 - 15.7 g/dL    Hematocrit 38.7 35.0 - 47.0 %    MCV 88 78 - 100 fl    MCH 31.4 26.5 - 33.0 pg    MCHC 35.9 31.5 - 36.5 g/dL    RDW 11.3 10.0 - 15.0 %    Platelet Count 274 150 - 450 10e9/L           Assessment & Plan     Renea was seen today for hypertension.    Diagnoses and all orders for this visit:    Essential hypertension with goal blood pressure less than 140/90  HTN Plan:  1)  Medication: continue current medication regimen unchanged  2)  Dietary sodium restriction  3)  Regular aerobic exercise  4)  Recheck in 1 year, sooner should new symptoms or   problems arise.  5) See todays orders.    Patient Education: Reviewed risks of hypertension and principles of   treatment.    BMI:   Estimated body mass index is 25.43 kg/m  as calculated from the following:    Height as of 5/4/19: 1.594 m (5' 2.75\").    Weight as of this encounter: 64.6 kg (142 lb 6.4 oz).       See Patient Instructions    No follow-ups on file.    Milana Mcgregor, SIM [facility]      "

## 2019-06-01 NOTE — PATIENT INSTRUCTIONS
PLAN:   1.   Symptomatic therapy suggested: Continue current medication regimen unchanged.    2. Patient needs to follow up in if no improvement,or sooner if worsening of symptoms or other symptoms develop.  Follow up office visit in one year for annual health maintenance exam, sooner PRN.    Results for orders placed or performed in visit on 06/01/19   TSH   Result Value Ref Range    TSH 1.63 0.40 - 4.00 mU/L   Comprehensive metabolic panel   Result Value Ref Range    Sodium 143 133 - 144 mmol/L    Potassium 4.0 3.4 - 5.3 mmol/L    Chloride 106 94 - 109 mmol/L    Carbon Dioxide 29 20 - 32 mmol/L    Anion Gap 8 3 - 14 mmol/L    Glucose 93 70 - 99 mg/dL    Urea Nitrogen 19 7 - 30 mg/dL    Creatinine 0.85 0.52 - 1.04 mg/dL    GFR Estimate 81 >60 mL/min/[1.73_m2]    GFR Estimate If Black >90 >60 mL/min/[1.73_m2]    Calcium 9.2 8.5 - 10.1 mg/dL    Bilirubin Total 0.5 0.2 - 1.3 mg/dL    Albumin 4.0 3.4 - 5.0 g/dL    Protein Total 7.8 6.8 - 8.8 g/dL    Alkaline Phosphatase 61 40 - 150 U/L    ALT 22 0 - 50 U/L    AST 18 0 - 45 U/L   CBC with platelets   Result Value Ref Range    WBC 6.9 4.0 - 11.0 10e9/L    RBC Count 4.42 3.8 - 5.2 10e12/L    Hemoglobin 13.9 11.7 - 15.7 g/dL    Hematocrit 38.7 35.0 - 47.0 %    MCV 88 78 - 100 fl    MCH 31.4 26.5 - 33.0 pg    MCHC 35.9 31.5 - 36.5 g/dL    RDW 11.3 10.0 - 15.0 %    Platelet Count 274 150 - 450 10e9/L

## 2019-10-03 ENCOUNTER — HEALTH MAINTENANCE LETTER (OUTPATIENT)
Age: 49
End: 2019-10-03

## 2019-11-11 ENCOUNTER — OFFICE VISIT (OUTPATIENT)
Dept: NEUROLOGY | Facility: CLINIC | Age: 49
End: 2019-11-11
Payer: COMMERCIAL

## 2019-11-11 VITALS
HEART RATE: 72 BPM | OXYGEN SATURATION: 99 % | SYSTOLIC BLOOD PRESSURE: 121 MMHG | DIASTOLIC BLOOD PRESSURE: 78 MMHG | WEIGHT: 142.4 LBS | BODY MASS INDEX: 25.43 KG/M2

## 2019-11-11 DIAGNOSIS — G35 MS (MULTIPLE SCLEROSIS) (H): Primary | ICD-10-CM

## 2019-11-11 PROCEDURE — 99214 OFFICE O/P EST MOD 30 MIN: CPT | Performed by: PSYCHIATRY & NEUROLOGY

## 2019-11-11 RX ORDER — METRONIDAZOLE 500 MG/1
500 TABLET ORAL 2 TIMES DAILY
Qty: 14 TABLET | Refills: 0 | Status: SHIPPED | OUTPATIENT
Start: 2019-11-11 | End: 2020-02-12

## 2019-11-11 ASSESSMENT — PAIN SCALES - GENERAL: PAINLEVEL: NO PAIN (0)

## 2019-11-11 NOTE — NURSING NOTE
Renea Trotter's goals for this visit include:   Chief Complaint   Patient presents with     RECHECK       She requests these members of her care team be copied on today's visit information:     PCP: Cara Clements    Referring Provider:  No referring provider defined for this encounter.    /78 (BP Location: Left arm, Patient Position: Sitting, Cuff Size: Adult Regular)   Pulse 72   Wt 64.6 kg (142 lb 6.4 oz)   SpO2 99%   BMI 25.43 kg/m      Do you need any medication refills at today's visit? Yes

## 2019-11-11 NOTE — LETTER
11/11/2019         RE: Renea Trotter  [address] [facility] [id number]        Dear Colleague,    Thank you for referring your patient, Renea Trotter, to the [facility]. Please see a copy of my visit note below.    MULTIPLE SCLEROSIS CLINIC AT THE [facility]  FOLLOWUP/ESTABLISHED PATIENT VISIT      PRINCIPAL NEUROLOGIC DIAGNOSIS: Multiple Sclerosis          Date of Onset: 2003  Date of Diagnosis: 6/25/2012  Initial Clinical Course: Relapsing remitting  Current Clinical Course: Relapsing remitting  Past Disease Modifying Therapy(ies): NA   Current Disease Modifying Therapy(ies): Copaxone  Most Recent MRI of the Brain: 3/12/19  Most Recent MRI of the Cervical Cord 3/19/2019  Most Recent MRI of the Thoracic Cord: NA  Most Recent Lumbar Puncture: 5/4/2012 Positive OCB           CHIEF COMPLAINT: Follow up on DMT      INTERVAL HISTORY:      The patient reports that her legs will get very irriatable. She will get up and her leg will stop. She will walk it out and it will get better. She reports that it is only the right leg.    The patient was having a rash in her toe. It has gotten worse and then it get better. She report that she has been getting worse. She has had blisters but has an area of hyperpigmented.     Issues with current MS therapy: Tolerating DMT without issue    REVIEW OF SYSTEMS:    Mood: unchanged, high stress  Spasticity:none  Bladder: unchanged and stress incontience and has urgency  Bowel: unchanged  Pain related to today's visit:reviewed on nursing intake documentation  Fatigue: unchanged  Sleep: insomnia  and interrupted.   Memory/Concentration: unchanged      Otherwise 10 point ROS was neg other than the symptoms noted above.    PAST HISTORY was reviewed and updated:      MEDICATIONS and ALLERGIES were reviewed and updated.    SOCIAL HISTORY was reviewed and updated:        EXAM:    PHYSICAL EXAMINATION:   VITAL SIGNS:  Vital signs:      BP: 121/78  "Pulse: 72     SpO2: 99 %(RA)       Weight: 64.6 kg (142 lb 6.4 oz)  Estimated body mass index is 25.43 kg/m  as calculated from the following:    Height as of 5/4/19: 1.594 m (5' 2.75\").    Weight as of this encounter: 64.6 kg (142 lb 6.4 oz).            GENERAL: The patient is a well-nourished  who presents to the evaluation alone.  NEUROLOGIC:   MENTAL STATUS: Alert,awake and  oriented times four.   CRANIAL NERVES: Alert,awake and  oriented times four.   The pupils are  round and react to light and there is no Aaorn Pawan pupil. Extraocular movements are intact with notable exotropia with no  internuclear ophthalmoplegia. No nystagmus. Facial strength and sensation are  normal. Hearing is  normal. Palate elevation and tongue protrusion are  normal.   POWER:      Motor     Upper         Right Left   Shoulder Abduction 5 5   Elbow Flexion 5 5   Elbow Extension 5 5   Wrist Extension 5 5   Digit Extension 5 5   Digit Flexion 5 5   APB 5 5   Tone 0 0   Lower          Right Left   Hip Flexion 5 5   Knee Extension 5 5   Knee Flexion 5 5   Foot Dorsiflexion 5 5   Foot Plantar Flexion 5 5   EH 5 5   Toe Flexion 5 5   Tone 0 0             Grade Description   0 No increase in muscle tone   1 Slight increase in muscle tone, manifested by a catch and release or by minimal resistance at the end of the range of motion when the affected part(s) is moved in flexion or extension   1+ Slight increase in muscle tone, manifested by a catch, followed by minimal resistance throughout the remainder (less than half) of the ROM   2 More marked increase in muscle tone through most of the ROM, but affected part(s) easily moved   3 Considerable increase in muscle tone, passive movement difficult   4 Affected part(s) rigid in flexion or extension               SENSORY:      Light touch:  Asymmetrical impaired  and deminished in right hand otherwise intact.              MOTOR/CEREBELLAR:     Right Left   RRM 1 Abnormal 0 Normal   JANELL 1 Abnormal " 0 Normal   FTN 1 Abnormal 0 Normal   RRM 0 Normal 0 Normal   HKS 0 Normal 0 Normal               GAIT: Gait is   narrow-based and steady and the patient is  able to walk on heels, toes and in tandem with only mild difficulty.    Romberg: Stable with eye(s) closed      RESULTS:  Monitoring labs:    Orders Only on 06/01/2019   Component Date Value Ref Range Status     TSH 06/01/2019 1.63  0.40 - 4.00 mU/L Final     Sodium 06/01/2019 143  133 - 144 mmol/L Final     Potassium 06/01/2019 4.0  3.4 - 5.3 mmol/L Final     Chloride 06/01/2019 106  94 - 109 mmol/L Final     Carbon Dioxide 06/01/2019 29  20 - 32 mmol/L Final     Anion Gap 06/01/2019 8  3 - 14 mmol/L Final     Glucose 06/01/2019 93  70 - 99 mg/dL Final     Urea Nitrogen 06/01/2019 19  7 - 30 mg/dL Final     Creatinine 06/01/2019 0.85  0.52 - 1.04 mg/dL Final     GFR Estimate 06/01/2019 81  >60 mL/min/[1.73_m2] Final     GFR Estimate If Black 06/01/2019 >90  >60 mL/min/[1.73_m2] Final     Calcium 06/01/2019 9.2  8.5 - 10.1 mg/dL Final     Bilirubin Total 06/01/2019 0.5  0.2 - 1.3 mg/dL Final     Albumin 06/01/2019 4.0  3.4 - 5.0 g/dL Final     Protein Total 06/01/2019 7.8  6.8 - 8.8 g/dL Final     Alkaline Phosphatase 06/01/2019 61  40 - 150 U/L Final     ALT 06/01/2019 22  0 - 50 U/L Final     AST 06/01/2019 18  0 - 45 U/L Final     WBC 06/01/2019 6.9  4.0 - 11.0 10e9/L Final     RBC Count 06/01/2019 4.42  3.8 - 5.2 10e12/L Final     Hemoglobin 06/01/2019 13.9  11.7 - 15.7 g/dL Final     Hematocrit 06/01/2019 38.7  35.0 - 47.0 % Final     MCV 06/01/2019 88  78 - 100 fl Final     MCH 06/01/2019 31.4  26.5 - 33.0 pg Final     MCHC 06/01/2019 35.9  31.5 - 36.5 g/dL Final     RDW 06/01/2019 11.3  10.0 - 15.0 % Final     Platelet Count 06/01/2019 274  150 - 450 10e9/L Final   ]      MRI brain:    no new MRI to review    ASSESSMENT/PLAN:  The patient is a 49-year-old female with a past medical history of relapsing remitting multiple sclerosis who is presenting today  as a follow-up.  Overall, the patient is clinically unchanged since last time I seen her.  I will continue the patient on glatiramer acetate.  I once again reviewed strategies that we could try to help decrease her spasms in the evening.  The patient was not interested in going on any therapy or doing anything different.  I offered the patient counseling going through divorce.  The patient expressed no interest in this at this time. I advised her that if she wants to reach out and be given resources, then she should call my office.  I will have her follow back up in 6 months with a MRI.  I instructed the patient to call my office with any questions, concerns, issues or problems.    Follow-up in 6 months with a repeat MRI    I spent 25 minutes in this visit, with >50% direct patient time spent counseling about prognosis, treatment options, and coordination of care.    Regan Mcgregor MD, MD [facility]  Staff Neurologist   11/11/19       (Chart documentation was completed in part with Dragon voice-recognition software. Even though reviewed, some grammatical, spelling, and word errors may remain.)         Again, thank you for allowing me to participate in the care of your patient.        Sincerely,        Regan Mcgregor MD

## 2019-11-11 NOTE — PROGRESS NOTES
"MULTIPLE SCLEROSIS CLINIC AT THE [facility]  FOLLOWUP/ESTABLISHED PATIENT VISIT      PRINCIPAL NEUROLOGIC DIAGNOSIS: Multiple Sclerosis          Date of Onset: 2003  Date of Diagnosis: 6/25/2012  Initial Clinical Course: Relapsing remitting  Current Clinical Course: Relapsing remitting  Past Disease Modifying Therapy(ies): NA   Current Disease Modifying Therapy(ies): Copaxone  Most Recent MRI of the Brain: 3/12/19  Most Recent MRI of the Cervical Cord 3/19/2019  Most Recent MRI of the Thoracic Cord: NA  Most Recent Lumbar Puncture: 5/4/2012 Positive OCB           CHIEF COMPLAINT: Follow up on DMT      INTERVAL HISTORY:      The patient reports that her legs will get very irriatable. She will get up and her leg will stop. She will walk it out and it will get better. She reports that it is only the right leg.    The patient was having a rash in her toe. It has gotten worse and then it get better. She report that she has been getting worse. She has had blisters but has an area of hyperpigmented.     Issues with current MS therapy: Tolerating DMT without issue    REVIEW OF SYSTEMS:    Mood: unchanged, high stress  Spasticity:none  Bladder: unchanged and stress incontience and has urgency  Bowel: unchanged  Pain related to today's visit:reviewed on nursing intake documentation  Fatigue: unchanged  Sleep: insomnia  and interrupted.   Memory/Concentration: unchanged      Otherwise 10 point ROS was neg other than the symptoms noted above.    PAST HISTORY was reviewed and updated:      MEDICATIONS and ALLERGIES were reviewed and updated.    SOCIAL HISTORY was reviewed and updated:        EXAM:    PHYSICAL EXAMINATION:   VITAL SIGNS:  Vital signs:      BP: 121/78 Pulse: 72     SpO2: 99 %(RA)       Weight: 64.6 kg (142 lb 6.4 oz)  Estimated body mass index is 25.43 kg/m  as calculated from the following:    Height as of 5/4/19: 1.594 m (5' 2.75\").    Weight as of this encounter: 64.6 kg (142 lb 6.4 " oz).            GENERAL: The patient is a well-nourished  who presents to the evaluation alone.  NEUROLOGIC:   MENTAL STATUS: Alert,awake and  oriented times four.   CRANIAL NERVES: Alert,awake and  oriented times four.   The pupils are  round and react to light and there is no Aaron Pawan pupil. Extraocular movements are intact with notable exotropia with no  internuclear ophthalmoplegia. No nystagmus. Facial strength and sensation are  normal. Hearing is  normal. Palate elevation and tongue protrusion are  normal.   POWER:      Motor     Upper         Right Left   Shoulder Abduction 5 5   Elbow Flexion 5 5   Elbow Extension 5 5   Wrist Extension 5 5   Digit Extension 5 5   Digit Flexion 5 5   APB 5 5   Tone 0 0   Lower          Right Left   Hip Flexion 5 5   Knee Extension 5 5   Knee Flexion 5 5   Foot Dorsiflexion 5 5   Foot Plantar Flexion 5 5   EH 5 5   Toe Flexion 5 5   Tone 0 0             Grade Description   0 No increase in muscle tone   1 Slight increase in muscle tone, manifested by a catch and release or by minimal resistance at the end of the range of motion when the affected part(s) is moved in flexion or extension   1+ Slight increase in muscle tone, manifested by a catch, followed by minimal resistance throughout the remainder (less than half) of the ROM   2 More marked increase in muscle tone through most of the ROM, but affected part(s) easily moved   3 Considerable increase in muscle tone, passive movement difficult   4 Affected part(s) rigid in flexion or extension               SENSORY:      Light touch:  Asymmetrical impaired  and deminished in right hand otherwise intact.              MOTOR/CEREBELLAR:     Right Left   RRM 1 Abnormal 0 Normal   JANELL 1 Abnormal 0 Normal   FTN 1 Abnormal 0 Normal   RRM 0 Normal 0 Normal   HKS 0 Normal 0 Normal               GAIT: Gait is   narrow-based and steady and the patient is  able to walk on heels, toes and in tandem with only mild difficulty.    Romberg:  Stable with eye(s) closed      RESULTS:  Monitoring labs:    Orders Only on 06/01/2019   Component Date Value Ref Range Status     TSH 06/01/2019 1.63  0.40 - 4.00 mU/L Final     Sodium 06/01/2019 143  133 - 144 mmol/L Final     Potassium 06/01/2019 4.0  3.4 - 5.3 mmol/L Final     Chloride 06/01/2019 106  94 - 109 mmol/L Final     Carbon Dioxide 06/01/2019 29  20 - 32 mmol/L Final     Anion Gap 06/01/2019 8  3 - 14 mmol/L Final     Glucose 06/01/2019 93  70 - 99 mg/dL Final     Urea Nitrogen 06/01/2019 19  7 - 30 mg/dL Final     Creatinine 06/01/2019 0.85  0.52 - 1.04 mg/dL Final     GFR Estimate 06/01/2019 81  >60 mL/min/[1.73_m2] Final     GFR Estimate If Black 06/01/2019 >90  >60 mL/min/[1.73_m2] Final     Calcium 06/01/2019 9.2  8.5 - 10.1 mg/dL Final     Bilirubin Total 06/01/2019 0.5  0.2 - 1.3 mg/dL Final     Albumin 06/01/2019 4.0  3.4 - 5.0 g/dL Final     Protein Total 06/01/2019 7.8  6.8 - 8.8 g/dL Final     Alkaline Phosphatase 06/01/2019 61  40 - 150 U/L Final     ALT 06/01/2019 22  0 - 50 U/L Final     AST 06/01/2019 18  0 - 45 U/L Final     WBC 06/01/2019 6.9  4.0 - 11.0 10e9/L Final     RBC Count 06/01/2019 4.42  3.8 - 5.2 10e12/L Final     Hemoglobin 06/01/2019 13.9  11.7 - 15.7 g/dL Final     Hematocrit 06/01/2019 38.7  35.0 - 47.0 % Final     MCV 06/01/2019 88  78 - 100 fl Final     MCH 06/01/2019 31.4  26.5 - 33.0 pg Final     MCHC 06/01/2019 35.9  31.5 - 36.5 g/dL Final     RDW 06/01/2019 11.3  10.0 - 15.0 % Final     Platelet Count 06/01/2019 274  150 - 450 10e9/L Final   ]      MRI brain:    no new MRI to review    ASSESSMENT/PLAN:  The patient is a 49-year-old female with a past medical history of relapsing remitting multiple sclerosis who is presenting today as a follow-up.  Overall, the patient is clinically unchanged since last time I seen her.  I will continue the patient on glatiramer acetate.  I once again reviewed strategies that we could try to help decrease her spasms in the evening.   The patient was not interested in going on any therapy or doing anything different.  I offered the patient counseling going through divorce.  The patient expressed no interest in this at this time. I advised her that if she wants to reach out and be given resources, then she should call my office.  I will have her follow back up in 6 months with a MRI.  I instructed the patient to call my office with any questions, concerns, issues or problems.    Follow-up in 6 months with a repeat MRI    I spent 25 minutes in this visit, with >50% direct patient time spent counseling about prognosis, treatment options, and coordination of care.    Regan Mcgregor MD, MD A [facility]  Staff Neurologist   11/11/19       (Chart documentation was completed in part with Dragon voice-recognition software. Even though reviewed, some grammatical, spelling, and word errors may remain.)

## 2019-11-20 ENCOUNTER — TELEPHONE (OUTPATIENT)
Dept: NEUROLOGY | Facility: CLINIC | Age: 49
End: 2019-11-20

## 2019-11-20 NOTE — TELEPHONE ENCOUNTER
2nd attempt to reach the patient, a [employer] message was sent but has not been read by the patient. Need to schedule a brain MRI wo contrast per Dr. Mcgregor prior to the appointment with Dr. Herrera in May 2020; claude.    [facility]  Adult Med Spec/Surg Spec   [phone]

## 2019-12-23 NOTE — TELEPHONE ENCOUNTER
3rd attempt to reach the patient to schedule the MRI, sent an appointment letter. Final attempt, [employer] message has not been read.    [facility]  Adult Med Spec/Surg Spec   [phone]

## 2020-01-13 DIAGNOSIS — G35 MULTIPLE SCLEROSIS (H): ICD-10-CM

## 2020-01-13 RX ORDER — GLATIRAMER 40 MG/ML
40 INJECTION, SOLUTION SUBCUTANEOUS
Qty: 45 SYRINGE | Refills: 3 | Status: SHIPPED | OUTPATIENT
Start: 2020-01-13 | End: 2020-12-14

## 2020-01-13 NOTE — TELEPHONE ENCOUNTER
Refill request for Glatiramer Acetate 40mg/mL.     Last Fill: 3/22/19 Dr. Mcgregor   Last Visit: 11/11/19 Dr. Mcgregor   Next Visit: 5/4/20 with Dr. Herrera      Will route to Dr. Herrera to review.     Raegan Palmer RN   Pulmonary/CORE Care Coordinator  [facility]

## 2020-01-14 ENCOUNTER — OFFICE VISIT (OUTPATIENT)
Dept: DERMATOLOGY | Facility: CLINIC | Age: 50
End: 2020-01-14
Payer: COMMERCIAL

## 2020-01-14 DIAGNOSIS — B35.3 TINEA PEDIS OF RIGHT FOOT: Primary | ICD-10-CM

## 2020-01-14 PROCEDURE — 99202 OFFICE O/P NEW SF 15 MIN: CPT | Performed by: DERMATOLOGY

## 2020-01-14 RX ORDER — KETOCONAZOLE 20 MG/G
CREAM TOPICAL
Qty: 30 G | Refills: 11 | Status: SHIPPED | OUTPATIENT
Start: 2020-01-14 | End: 2021-12-14

## 2020-01-14 ASSESSMENT — PAIN SCALES - GENERAL: PAINLEVEL: NO PAIN (0)

## 2020-01-14 NOTE — PROGRESS NOTES
[facility] Dermatology Note    Dermatology Problem List:  1. Relevant medial history   - MS, currently treating with Glatiramer   2. Tinea pedis/tinea incognito, right 3rd toe  -Floridly positive KOH 1/14/20  -Current t/x: ketoconazole 2% cream BID  -Previous t/x: clotrimazole betamethasone (prescribed by urgent care)    Encounter Date: Jan 14, 2020    CC:  Chief Complaint   Patient presents with     Derm Problem     toe irritation - middle toe right foot x 1 year     History of Present Illness:  Ms. Renea Trotter is a 49 year old female who presents in self referral for a skin check. She is new to our clinic today and has no personal history of skin cancer but unknown type of skin cancer in a family member. She also has a relevant medical history of MS. Today she reports that her middle toe on her right foot has been extremely irritated for the past year. 1 year ago she stubbed this toe which caused some irritation in that area but this turned into a blister in the area (only on the stubbed toe). She notes that this is itchy sometimes. She previously was prescribed clotrimazole betamethasone by urgent care with no improvement of condition other than some itch relief. She has also been using a eczema gold bond lotion, denies using Neosporin on the area (at any point).  No other concerns addressed today.    Past Medical History:   Patient Active Problem List   Diagnosis     Relapsing remitting multiple sclerosis (H)     CARDIOVASCULAR SCREENING; LDL GOAL LESS THAN 160     BMI 26.0-26.9,adult     Urinary urgency     Headache     Low back pain without sciatica, unspecified back pain laterality     Sleep disorder     Plantar warts     Essential hypertension with goal blood pressure less than 140/90     Hemorrhoids, unspecified hemorrhoid type     Seborrheic keratosis     Mild intermittent asthma     Past Medical History:   Diagnosis Date     HTN (hypertension)      Mild intermittent asthma       MS (multiple sclerosis) (H)      Past Surgical History:   Procedure Laterality Date     HC TOOTH EXTRACTION W/FORCEP       Social History:  Patient works in sales. Drinks a few alcoholic drinks about every other week.     Family History:  History of unknown type of NMSC in a family member    Medications:  Current Outpatient Medications   Medication Sig Dispense Refill     albuterol (PROAIR HFA/PROVENTIL HFA/VENTOLIN HFA) 108 (90 Base) MCG/ACT inhaler Inhale 2 puffs into the lungs every 4 hours as needed for shortness of breath / dyspnea or wheezing 8.5 g 3     Cholecalciferol (VITAMIN D3 PO) Take 5,000 Units by mouth daily       Glatiramer Acetate 40 MG/ML SOSY Inject 40 mg Subcutaneous three times a week 45 Syringe 3     lisinopril-hydrochlorothiazide (PRINZIDE/ZESTORETIC) 10-12.5 MG tablet Take 1 tablet by mouth daily 90 tablet 3     MULTIPLE VITAMIN PO Take 1 tablet by mouth daily.       temazepam (RESTORIL) 15 MG capsule TAKE ONE TO TWO CAPSULES BY MOUTH ONCE DAILY AT BEDTIME 45 capsule 5       No Known Allergies    Review of Systems:  -Constitutional: Patient is otherwise feeling well, in usual state of health.   -Skin: As above in HPI. No additional skin concerns.    Physical exam:  Vitals: There were no vitals taken for this visit.  GEN: This is a well developed, well-nourished female in no acute distress, in a pleasant mood.    SKIN: Focused examination of the head/face, bilateral feet was performed.  - Hedrick Type II  - Fissure in the right 2nd and 3rd toe web spaces with overlying yellow crust   - Small vesicles extending onto dorsal right third toe   - No other lesions of concern on areas examined.     Impression/Plan:    1.  Tinea pedis/tinea incognito, right 3rd toe. Floridly positive for KOH. Previously treated with clotrimazole betamethasone (prescribed by urgent care) which has lead tinea incognito. Provided patient the option to treat this condition with an anti fungal either topically or  orally. Risks and side effects of both were discussed in detail, patient reassured that this is not an auto immune and not associated with MS diagnosis. Patient opts to treat topically.   - Prescribed ketoconazole 2% cream BID. Discussed that it may take up to 4-6 weeks for improvement to be noted. Patient will follow-up in 6 weeks to reassess condition.   - Fungal scraping: floridly KOH positive     Follow-up in 6 weeks for tinea pedis, earlier for new or changing lesions.     Staff Involved:  Scribe/Staff    Scribe Disclosure  I, Deirdre Dawson, am serving as a scribe to document services personally performed by Dr. Sejal Gagnon MD, based on data collection and the provider's statements to me.     Provider Disclosure:   The documentation recorded by the scribe accurately reflects the services I personally performed and the decisions made by me.    Sejal Gagnon MD    Department of Dermatology  [facility]: Phone: [phone], Fax:[phone]  [facility]: Phone: [phone], Fax: [phone]

## 2020-01-14 NOTE — LETTER
1/14/2020         RE: Renea Trotter  [address] [facility] [address]        Dear Colleague,    Thank you for referring your patient, Renea Trotter, to the [facility]. Please see a copy of my visit note below.    [facility] Dermatology Note    Dermatology Problem List:  1. Relevant medial history   - MS, currently treating with Glatiramer   2. Tinea pedis/tinea incognito, right 3rd toe  -Floridly positive KOH 1/14/20  -Current t/x: ketoconazole 2% cream BID  -Previous t/x: clotrimazole betamethasone (prescribed by urgent care)    Encounter Date: Jan 14, 2020    CC:  Chief Complaint   Patient presents with     Derm Problem     toe irritation - middle toe right foot x 1 year     History of Present Illness:  Ms. Renea Trotter is a 49 year old female who presents in self referral for a skin check. She is new to our clinic today and has no personal history of skin cancer but unknown type of skin cancer in a family member. She also has a relevant medical history of MS. Today she reports that her middle toe on her right foot has been extremely irritated for the past year. 1 year ago she stubbed this toe which caused some irritation in that area but this turned into a blister in the area (only on the stubbed toe). She notes that this is itchy sometimes. She previously was prescribed clotrimazole betamethasone by urgent care with no improvement of condition other than some itch relief. She has also been using a eczema gold bond lotion, denies using Neosporin on the area (at any point).  No other concerns addressed today.    Past Medical History:   Patient Active Problem List   Diagnosis     Relapsing remitting multiple sclerosis (H)     CARDIOVASCULAR SCREENING; LDL GOAL LESS THAN 160     BMI 26.0-26.9,adult     Urinary urgency     Headache     Low back pain without sciatica, unspecified back pain laterality     Sleep disorder     Plantar warts     Essential  hypertension with goal blood pressure less than 140/90     Hemorrhoids, unspecified hemorrhoid type     Seborrheic keratosis     Mild intermittent asthma     Past Medical History:   Diagnosis Date     HTN (hypertension)      Mild intermittent asthma      MS (multiple sclerosis) (H)      Past Surgical History:   Procedure Laterality Date     HC TOOTH EXTRACTION W/FORCEP       Social History:  Patient works in sales. Drinks a few alcoholic drinks about every other week.     Family History:  History of unknown type of NMSC in a family member    Medications:  Current Outpatient Medications   Medication Sig Dispense Refill     albuterol (PROAIR HFA/PROVENTIL HFA/VENTOLIN HFA) 108 (90 Base) MCG/ACT inhaler Inhale 2 puffs into the lungs every 4 hours as needed for shortness of breath / dyspnea or wheezing 8.5 g 3     Cholecalciferol (VITAMIN D3 PO) Take 5,000 Units by mouth daily       Glatiramer Acetate 40 MG/ML SOSY Inject 40 mg Subcutaneous three times a week 45 Syringe 3     lisinopril-hydrochlorothiazide (PRINZIDE/ZESTORETIC) 10-12.5 MG tablet Take 1 tablet by mouth daily 90 tablet 3     MULTIPLE VITAMIN PO Take 1 tablet by mouth daily.       temazepam (RESTORIL) 15 MG capsule TAKE ONE TO TWO CAPSULES BY MOUTH ONCE DAILY AT BEDTIME 45 capsule 5       No Known Allergies    Review of Systems:  -Constitutional: Patient is otherwise feeling well, in usual state of health.   -Skin: As above in HPI. No additional skin concerns.    Physical exam:  Vitals: There were no vitals taken for this visit.  GEN: This is a well developed, well-nourished female in no acute distress, in a pleasant mood.    SKIN: Focused examination of the head/face, bilateral feet was performed.  - Hedrick Type II  - Fissure in the right 2nd and 3rd toe web spaces with overlying yellow crust   - Small vesicles extending onto dorsal right third toe   - No other lesions of concern on areas examined.     Impression/Plan:    1.  Tinea pedis/tinea  incognito, right 3rd toe. Floridly positive for KOH. Previously treated with clotrimazole betamethasone (prescribed by urgent care) which has lead tinea incognito. Provided patient the option to treat this condition with an anti fungal either topically or orally. Risks and side effects of both were discussed in detail, patient reassured that this is not an auto immune and not associated with MS diagnosis. Patient opts to treat topically.   - Prescribed ketoconazole 2% cream BID. Discussed that it may take up to 4-6 weeks for improvement to be noted. Patient will follow-up in 6 weeks to reassess condition.   - Fungal scraping: floridly KOH positive     Follow-up in 6 weeks for tinea pedis, earlier for new or changing lesions.     Staff Involved:  Scribe/Staff    Scribe Disclosure  I, Deirdre Dawson, am serving as a scribe to document services personally performed by Dr. Sejal Gagnon MD, based on data collection and the provider's statements to me.     Provider Disclosure:   The documentation recorded by the scribe accurately reflects the services I personally performed and the decisions made by me.    Sejal Gagnon MD    Department of Dermatology  [facility]: Phone: [phone], Fax:[phone]  [facility]: Phone: [phone], Fax: [phone]                Again, thank you for allowing me to participate in the care of your patient.        Sincerely,        Sejal Gagnon MD

## 2020-01-14 NOTE — NURSING NOTE
@Renea Trotter's goals for this visit include:   Chief Complaint   Patient presents with     Derm Problem     toe irritation - middle toe right foot x 1 year       She requests these members of her care team be copied on today's visit information: NO    PCP: Cara Clements    Referring Provider:  No referring provider defined for this encounter.    There were no vitals taken for this visit.    Do you need any medication refills at today's visit? NO    Connie Browning CMA

## 2020-01-28 RX ORDER — GLATIRAMER 40 MG/ML
INJECTION, SOLUTION SUBCUTANEOUS
OUTPATIENT
Start: 2020-01-28 | End: 2024-08-08

## 2020-02-12 ENCOUNTER — OFFICE VISIT (OUTPATIENT)
Dept: PEDIATRICS | Facility: CLINIC | Age: 50
End: 2020-02-12
Payer: COMMERCIAL

## 2020-02-12 VITALS
OXYGEN SATURATION: 97 % | HEIGHT: 63 IN | BODY MASS INDEX: 25.8 KG/M2 | HEART RATE: 75 BPM | SYSTOLIC BLOOD PRESSURE: 116 MMHG | DIASTOLIC BLOOD PRESSURE: 78 MMHG | WEIGHT: 145.6 LBS | TEMPERATURE: 97.6 F

## 2020-02-12 DIAGNOSIS — N76.0 BACTERIAL VAGINITIS: Primary | ICD-10-CM

## 2020-02-12 DIAGNOSIS — N89.8 VAGINAL DISCHARGE: ICD-10-CM

## 2020-02-12 DIAGNOSIS — B96.89 BACTERIAL VAGINITIS: Primary | ICD-10-CM

## 2020-02-12 LAB
SPECIMEN SOURCE: ABNORMAL
WET PREP SPEC: ABNORMAL

## 2020-02-12 PROCEDURE — 99213 OFFICE O/P EST LOW 20 MIN: CPT | Performed by: FAMILY MEDICINE

## 2020-02-12 PROCEDURE — 87210 SMEAR WET MOUNT SALINE/INK: CPT | Performed by: FAMILY MEDICINE

## 2020-02-12 ASSESSMENT — MIFFLIN-ST. JEOR: SCORE: 1250.6

## 2020-02-12 ASSESSMENT — PAIN SCALES - GENERAL: PAINLEVEL: NO PAIN (0)

## 2020-02-12 NOTE — PROGRESS NOTES
Subjective     Renea Trotter is a 49 year old female who presents to clinic today for the following health issues:    HPI     Vaginal Symptoms      Duration: 1 weeks, recurrent for 3 months    Description  odor    Intensity:  moderate    Accompanying signs and symptoms (fever/dysuria/abdominal or back pain): None    History  Sexually active: yes, single partner, contraception - not needed  Possibility of pregnancy: No  Recent antibiotic use: YES    Precipitating or alleviating factors: None    Therapies tried and outcome: none   Outcome: metronadazole      Patient Active Problem List   Diagnosis     Relapsing remitting multiple sclerosis (H)     CARDIOVASCULAR SCREENING; LDL GOAL LESS THAN 160     BMI 26.0-26.9,adult     Urinary urgency     Headache     Low back pain without sciatica, unspecified back pain laterality     Sleep disorder     Plantar warts     Essential hypertension with goal blood pressure less than 140/90     Hemorrhoids, unspecified hemorrhoid type     Seborrheic keratosis     Mild intermittent asthma     Past Surgical History:   Procedure Laterality Date     HC TOOTH EXTRACTION W/FORCEP         Social History     Tobacco Use     Smoking status: Never Smoker     Smokeless tobacco: Never Used   Substance Use Topics     Alcohol use: Yes     Alcohol/week: 1.0 - 2.0 standard drinks     Types: 1 - 2 Standard drinks or equivalent per week     Comment: RARELY     Family History   Problem Relation Age of Onset     Hypertension Mother      Lipids Mother      Eye Disorder Father      Cerebrovascular Disease Maternal Grandmother      Cerebrovascular Disease Maternal Grandfather      Prostate Cancer Maternal Grandfather      Blood Disease Paternal Grandmother      Alzheimer Disease Paternal Grandfather      Multiple Sclerosis Cousin      Pulmonary Embolism Daughter          Current Outpatient Medications   Medication Sig Dispense Refill     [START ON 2/13/2020] boric acid 600 mg vaginal suppository -  PHARMACY TO MIX COMPOUND Place 1 suppository (600 mg) vaginally twice a week 2 suppository 0     clindamycin (CLEOCIN 1 DOSE) 2 % vaginal cream 2% vaginally at bedtime for 7 days. 5 g 0     albuterol (PROAIR HFA/PROVENTIL HFA/VENTOLIN HFA) 108 (90 Base) MCG/ACT inhaler Inhale 2 puffs into the lungs every 4 hours as needed for shortness of breath / dyspnea or wheezing 8.5 g 3     Cholecalciferol (VITAMIN D3 PO) Take 5,000 Units by mouth daily       Glatiramer Acetate 40 MG/ML SOSY Inject 40 mg Subcutaneous three times a week 45 Syringe 3     ketoconazole (NIZORAL) 2 % external cream Apply thin layer to the right third toe and surrounding area. 30 g 11     lisinopril-hydrochlorothiazide (PRINZIDE/ZESTORETIC) 10-12.5 MG tablet Take 1 tablet by mouth daily 90 tablet 3     MULTIPLE VITAMIN PO Take 1 tablet by mouth daily.       temazepam (RESTORIL) 15 MG capsule TAKE ONE TO TWO CAPSULES BY MOUTH ONCE DAILY AT BEDTIME 45 capsule 5     No Known Allergies  Recent Labs   Lab Test 06/01/19  0803 03/15/18  1041 10/13/17  0727 10/12/16  0732  10/07/15  0746   LDL  --   --  113* 95  --  95   HDL  --   --  52 44*  --  49*   TRIG  --   --  169* 95  --  134   ALT 22 17 63* 20   < >  --    CR 0.85 0.72 0.87 0.98  --  0.80   GFRESTIMATED 81 86 69 61  --  78   GFRESTBLACK >90 >90 84 74  --  >90   GFR Calc     POTASSIUM 4.0 3.8 3.2* 3.5  --  3.8   TSH 1.63  --  2.19 1.38  --   --     < > = values in this interval not displayed.      BP Readings from Last 3 Encounters:   02/12/20 116/78   11/11/19 121/78   06/01/19 124/84    Wt Readings from Last 3 Encounters:   02/12/20 66 kg (145 lb 9.6 oz)   11/11/19 64.6 kg (142 lb 6.4 oz)   06/01/19 64.6 kg (142 lb 6.4 oz)                    Reviewed and updated as needed this visit by Provider  Med Hx  Surg Hx  Fam Hx         Review of Systems   ROS COMP: Constitutional, HEENT, cardiovascular, pulmonary, GI, , musculoskeletal, neuro, skin, endocrine and psych systems are  "negative, except as otherwise noted.      Objective    /78   Pulse 75   Temp 97.6  F (36.4  C) (Oral)   Ht 1.594 m (5' 2.75\")   Wt 66 kg (145 lb 9.6 oz)   LMP 02/06/2020   SpO2 97%   BMI 26.00 kg/m    Body mass index is 26 kg/m .  Physical Exam   GENERAL: healthy, alert and no distress  ABDOMEN: soft, nontender, no hepatosplenomegaly, no masses and bowel sounds normal  ABDOMEN: soft, nontender, without hepatosplenomegaly or masses and bowel sounds normal   (female): normal female external genitalia, normal urethral meatus, vaginal mucosa, normal cervix/adnexa/uterus without masses or discharge    Results for orders placed or performed in visit on 02/12/20   Wet prep     Status: Abnormal   Result Value Ref Range    Specimen Description Vagina     Wet Prep No Trichomonas seen     Wet Prep Moderate  Clue cells seen   (A)     Wet Prep No yeast seen     Wet Prep Moderate  WBC'S seen                Assessment & Plan     1. Bacterial vaginitis  - clindamycin (CLEOCIN 1 DOSE) 2 % vaginal cream; 2% vaginally at bedtime for 7 days.  Dispense: 5 g; Refill: 0  - boric acid 600 mg vaginal suppository - PHARMACY TO MIX COMPOUND; Place 1 suppository (600 mg) vaginally twice a week  Dispense: 2 suppository; Refill: 0    2. Vaginal discharge  - Wet prep     Return if symptoms worsen or fail to improve.    Loni Cifuentes MD  [facility]    "

## 2020-02-12 NOTE — PATIENT INSTRUCTIONS

## 2020-03-25 ENCOUNTER — NURSE TRIAGE (OUTPATIENT)
Dept: NURSING | Facility: CLINIC | Age: 50
End: 2020-03-25

## 2020-03-25 NOTE — TELEPHONE ENCOUNTER
"Patient reports abdominal pain that started yesterday,  Patient says pain level was minimal but has worsened today.  Current pain is level 8/10.  Location: central and lower right quad.  Patient says it's an \"achy\" type of pain.  Patient denies fever but says had chills earlier today.  Patient reports being bent makes it a little bit better.  Patient is concerned about going to the ED because her immune system is compromised.  Paged on-call provider, Dr. Moctezuma, at 5:40 pm via page  to call FNA back re: abdominal pain.  FN spoke to Dr. Moctezuma who says patient should be evaluated.  FNA advised patient to go in.  Patient is heading to the [facility].      Reason for Disposition    [1] SEVERE pain (e.g., excruciating) AND [2] present > 1 hour    Additional Information    Negative: Shock suspected (e.g., cold/pale/clammy skin, too weak to stand, low BP, rapid pulse)    Negative: Difficult to awaken or acting confused (e.g., disoriented, slurred speech)    Negative: Passed out (i.e., lost consciousness, collapsed and was not responding)    Negative: Sounds like a life-threatening emergency to the triager    Negative: Chest pain    Negative: Pain is mainly in upper abdomen  (if needed ask: \"is it mainly above the belly button?\")    Negative: Followed an abdomen (stomach) injury    Negative: [1] Abdominal pain AND [2] pregnant < 20 weeks    Negative: [1] Abdominal pain AND [2] pregnant > 20 weeks    Negative: [1] Abdominal pain AND [2] postpartum < 1 month since delivery    Protocols used: ABDOMINAL PAIN - FEMALE-A-AH      "

## 2020-04-22 ENCOUNTER — TELEPHONE (OUTPATIENT)
Dept: NEUROLOGY | Facility: CLINIC | Age: 50
End: 2020-04-22

## 2020-04-22 NOTE — TELEPHONE ENCOUNTER
I called patient and rescheduled appt to video appt.  appt downloaded to her smart phone    Sandie Pérez LPN

## 2020-04-24 DIAGNOSIS — I10 ESSENTIAL HYPERTENSION WITH GOAL BLOOD PRESSURE LESS THAN 140/90: ICD-10-CM

## 2020-04-24 RX ORDER — LISINOPRIL/HYDROCHLOROTHIAZIDE 10-12.5 MG
TABLET ORAL
Qty: 90 TABLET | Refills: 0 | Status: SHIPPED | OUTPATIENT
Start: 2020-04-24 | End: 2020-07-30

## 2020-05-04 ENCOUNTER — VIRTUAL VISIT (OUTPATIENT)
Dept: NEUROLOGY | Facility: CLINIC | Age: 50
End: 2020-05-04
Payer: COMMERCIAL

## 2020-05-04 DIAGNOSIS — G25.81 RESTLESS LEGS SYNDROME: ICD-10-CM

## 2020-05-04 DIAGNOSIS — G35 MS (MULTIPLE SCLEROSIS) (H): Primary | ICD-10-CM

## 2020-05-04 PROCEDURE — 99214 OFFICE O/P EST MOD 30 MIN: CPT | Mod: GT | Performed by: PSYCHIATRY & NEUROLOGY

## 2020-05-04 NOTE — Clinical Note
"    5/4/2020         RE: Renea Trotter  [address] [address] [facility] [address]        Dear Colleague,    Thank you for referring your patient, Renea Trotter, to the [facility]. Please see a copy of my visit note below.    Renea Trotter is a 49 year old female who is being evaluated via a billable video visit.      The patient has been notified of following:     \"This video visit will be conducted via a call between you and your physician/provider. We have found that certain health care needs can be provided without the need for an in-person physical exam.  This service lets us provide the care you need with a video conversation.  If a prescription is necessary we can send it directly to your pharmacy.  If lab work is needed we can place an order for that and you can then stop by our lab to have the test done at a later time.    Video visits are billed at different rates depending on your insurance coverage.  Please reach out to your insurance provider with any questions.    If during the course of the call the physician/provider feels a video visit is not appropriate, you will not be charged for this service.\"    Patient has given verbal consent for Video visit? Yes    How would you like to obtain your AVS? [facility]    Patient would like the video invitation sent by: Text to cell phone: [phone]    Will anyone else be joining your video visit? No    Provider notes:     Referral source: Established patient of Dr. Regan Mcgregor    Chief complaint: Multiple sclerosis    History of the Present Illness: Ms. Renea Trotter is a 49 year old woman who is evaluated in the Multiple Sclerosis Clinic today for the purpose of establishing care with me after Dr. Mcgregor's recent depature.    Due to the recommendations of public health authorities, previously scheduled office visit was converted to a video visit to limit unnecessary exposure in light of the COVID-19 pandemic.    The patient has been " followed in this clinic for a number of years, initially by my former colleague, Dr. Gustavo Butler, prior to Dr. Mcgregor.    Her history is as documented in the chart. Briefly, in 2003 she noted some numbness of digits 3 and 4 on the right hand as well as some difficulty with the right leg that became apparent while walking. An MRI at that time demonstrated white matter abnormalities that were felt to be non-specific, apparently.    In 2011, she had recurrent numbness in the right hand. The next year she established care with Dr. Butler and was complaining of left as well as right hand numbness. A cervical cord MRI demonstrated findings consistent with multiple sclerosis, and a CSF exam was also positive for oligoclonal bands. She was placed on disease modifying therapy with glatiramer acetate, and remains on that treatment up until the present time. MRI imaging was stable from 1026-4740.    Today, she denies any new episodic changes in vision, balance, strength or sensation suggestive of new MS relapse since her last visit to this clinic.    She is noticing that her right side is gradually getting weaker over time, more noticeably so in the arm. For example, she finds it more challenging to open a can than it was in the past.    Review of Systems: She endorses an intermittent restless sensation in the legs.    Past Medical History:   1. 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 Gestational diabetes mellitus  3. Hypertension  4. Asthma  5. Status post appendectomy March 2020    Medications:  1. Glatiramer acetate 40 mg injected subcutaneously three times weekly  2. Lisinopril-hydrochlorothiazide 10-12.5 mg po daily  3. Albuterol  4. Temazepam 15 mg po at bedtime as needed for sleep    Family History: There is a family history of MS in a first cousin. There is no family history of autoimmunity in first degree relatives of which she is aware.    Social History: The patient is employed in a sales support position. She is  currently working from home in light of the COVID-19 pandemic. She is a lifelong non-smoker.    Physical Examination: The patient is awake, alert and oriented. Mental status appears to be within age appropriate normal limits. There is no facial droop and no evident dysarthria. She is blind in the left eye; no other gross focal neurologic deficit is apparent.    Remainder of neurological examination cannot be completed via video visit.    Assessment/plan:     1. Multiple sclerosis  The patient remains clinically stable as regards any evidence of active inflammatory demyelination on disease modifying therapy with glatiramer acetate.    Based on her description and probable duration of disease, it is likely that there is an early component of motor progression. I discussed this aspect of MS pathology with her, and explained that this is primarily due to injury to the underlying nerve fibers that accumulates in a gradual fashion. It does not indicate that inflammation is not being controlled.    I will plan to see her back in six months for a review. Given long term stability on current disease modifying therapy, I think that repeat MRI imaging can be deferred for an additional 1-2 years.    2. Restless leg sensation  We will observe this for now. We will check a ferritin level at her next in person visit if this is not getting better.        Video-Visit Details    Type of service:  Video Visit    Video Start Time: 12:27 pm  Video End Time: 12:57 pm    Originating Location (pt. Location): Home  Distant Location (provider location):  [facility]     Platform used for Video Visit: Jen          Again, thank you for allowing me to participate in the care of your patient.        Sincerely,        David Herrera MD

## 2020-05-04 NOTE — PROGRESS NOTES
"Renea Trotter is a 49 year old female who is being evaluated via a billable video visit.      The patient has been notified of following:     \"This video visit will be conducted via a call between you and your physician/provider. We have found that certain health care needs can be provided without the need for an in-person physical exam.  This service lets us provide the care you need with a video conversation.  If a prescription is necessary we can send it directly to your pharmacy.  If lab work is needed we can place an order for that and you can then stop by our lab to have the test done at a later time.    Video visits are billed at different rates depending on your insurance coverage.  Please reach out to your insurance provider with any questions.    If during the course of the call the physician/provider feels a video visit is not appropriate, you will not be charged for this service.\"    Patient has given verbal consent for Video visit? Yes    How would you like to obtain your AVS? [facility]    Patient would like the video invitation sent by: Text to cell phone: [phone]    Will anyone else be joining your video visit? No    Provider notes:     Referral source: Established patient of Dr. Regan Mcgregor    Chief complaint: Multiple sclerosis    History of the Present Illness: Ms. Renea Trotter is a 49 year old woman who is evaluated in the Multiple Sclerosis Clinic today for the purpose of establishing care with me after Dr. Mcgregor's recent depature.    Due to the recommendations of public health authorities, previously scheduled office visit was converted to a video visit to limit unnecessary exposure in light of the COVID-19 pandemic.    The patient has been followed in this clinic for a number of years, initially by my former colleague, Dr. Gustavo Butler, prior to Dr. Mcgregor.    Her history is as documented in the chart. Briefly, in 2003 she noted some numbness of digits 3 and 4 on the right hand as well as " some difficulty with the right leg that became apparent while walking. An MRI at that time demonstrated white matter abnormalities that were felt to be non-specific, apparently.    In 2011, she had recurrent numbness in the right hand. The next year she established care with Dr. Butler and was complaining of left as well as right hand numbness. A cervical cord MRI demonstrated findings consistent with multiple sclerosis, and a CSF exam was also positive for oligoclonal bands. She was placed on disease modifying therapy with glatiramer acetate, and remains on that treatment up until the present time. MRI imaging was stable from 4888-6826.    Today, she denies any new episodic changes in vision, balance, strength or sensation suggestive of new MS relapse since her last visit to this clinic.    She is noticing that her right side is gradually getting weaker over time, more noticeably so in the arm. For example, she finds it more challenging to open a can than it was in the past.    Review of Systems: She endorses an intermittent restless sensation in the legs.    Past Medical History:   1. 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 Gestational diabetes mellitus  3. Hypertension  4. Asthma  5. Status post appendectomy March 2020    Medications:  1. Glatiramer acetate 40 mg injected subcutaneously three times weekly  2. Lisinopril-hydrochlorothiazide 10-12.5 mg po daily  3. Albuterol  4. Temazepam 15 mg po at bedtime as needed for sleep    Family History: There is a family history of MS in a first cousin. There is no family history of autoimmunity in first degree relatives of which she is aware.    Social History: The patient is employed in a sales support position. She is currently working from home in light of the COVID-19 pandemic. She is a lifelong non-smoker.    Physical Examination: The patient is awake, alert and oriented. Mental status appears to be within age appropriate normal limits. There is no facial droop  and no evident dysarthria. She is blind in the left eye; no other gross focal neurologic deficit is apparent.    Remainder of neurological examination cannot be completed via video visit.    Assessment/plan:     1. Multiple sclerosis  The patient remains clinically stable as regards any evidence of active inflammatory demyelination on disease modifying therapy with glatiramer acetate.    Based on her description and probable duration of disease, it is likely that there is an early component of motor progression. I discussed this aspect of MS pathology with her, and explained that this is primarily due to injury to the underlying nerve fibers that accumulates in a gradual fashion. It does not indicate that inflammation is not being controlled.    I will plan to see her back in six months for a review. Given long term stability on current disease modifying therapy, I think that repeat MRI imaging can be deferred for an additional 1-2 years.    2. Restless leg sensation  We will observe this for now. We will check a ferritin level at her next in person visit if this is not getting better.        Video-Visit Details    Type of service:  Video Visit    Video Start Time: 12:27 pm  Video End Time: 12:57 pm    Originating Location (pt. Location): Home  Distant Location (provider location):  [facility]     [facility] for [facility] Visit: [facility]

## 2020-05-04 NOTE — LETTER
5/4/2020       RE: Renea Trotter  [address] [address] [address]     Referral source: Established patient of Dr. Regan Mcgregor    Chief complaint: Multiple sclerosis    History of the Present Illness: Ms. Renea Trotter is a 49 year old woman who is evaluated in the Multiple Sclerosis Clinic today for the purpose of establishing care with me after Dr. Mcgregor's recent depature.    Due to the recommendations of public health authorities, previously scheduled office visit was converted to a video visit to limit unnecessary exposure in light of the COVID-19 pandemic.    The patient has been followed in this clinic for a number of years, initially by my former colleague, Dr. Gustavo Butler, prior to Dr. Mcgregor.    Her history is as documented in the chart. Briefly, in 2003 she noted some numbness of digits 3 and 4 on the right hand as well as some difficulty with the right leg that became apparent while walking. An MRI at that time demonstrated white matter abnormalities that were felt to be non-specific, apparently.    In 2011, she had recurrent numbness in the right hand. The next year she established care with Dr. Butler and was complaining of left as well as right hand numbness. A cervical cord MRI demonstrated findings consistent with multiple sclerosis, and a CSF exam was also positive for oligoclonal bands. She was placed on disease modifying therapy with glatiramer acetate, and remains on that treatment up until the present time. MRI imaging was stable from 2050-3155.    Today, she denies any new episodic changes in vision, balance, strength or sensation suggestive of new MS relapse since her last visit to this clinic.    She is noticing that her right side is gradually getting weaker over time, more noticeably so in the arm. For example, she finds it more challenging to open a can than it was in the past.    Review of Systems: She endorses an intermittent restless sensation in the legs.    Past Medical  History:   1. 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 Gestational diabetes mellitus  3. Hypertension  4. Asthma  5. Status post appendectomy March 2020    Medications:  1. Glatiramer acetate 40 mg injected subcutaneously three times weekly  2. Lisinopril-hydrochlorothiazide 10-12.5 mg po daily  3. Albuterol  4. Temazepam 15 mg po at bedtime as needed for sleep    Family History: There is a family history of MS in a first cousin. There is no family history of autoimmunity in first degree relatives of which she is aware.    Social History: The patient is employed in a sales support position. She is currently working from home in light of the COVID-19 pandemic. She is a lifelong non-smoker.    Physical Examination: The patient is awake, alert and oriented. Mental status appears to be within age appropriate normal limits. There is no facial droop and no evident dysarthria. She is blind in the left eye; no other gross focal neurologic deficit is apparent.    Remainder of neurological examination cannot be completed via video visit.    Assessment/plan:     1. Multiple sclerosis  The patient remains clinically stable as regards any evidence of active inflammatory demyelination on disease modifying therapy with glatiramer acetate.    Based on her description and probable duration of disease, it is likely that there is an early component of motor progression. I discussed this aspect of MS pathology with her, and explained that this is primarily due to injury to the underlying nerve fibers that accumulates in a gradual fashion. It does not indicate that inflammation is not being controlled.    I will plan to see her back in six months for a review. Given long term stability on current disease modifying therapy, I think that repeat MRI imaging can be deferred for an additional 1-2 years.    2. Restless leg sensation  We will observe this for now. We will check a ferritin level at her next in person visit if this is not  getting better.    David Herrera MD   of Neurology  [facility]    Cc:  Cara Clements MD (PCP)  Patient

## 2020-05-26 ENCOUNTER — TELEPHONE (OUTPATIENT)
Dept: PEDIATRICS | Facility: CLINIC | Age: 50
End: 2020-05-26

## 2020-05-26 NOTE — TELEPHONE ENCOUNTER
"Called patient.      he states she has been working from home for a while, she sits on a hard chair.   As a habit, she tucks her right leg under her left thigh when she sits.  Her right outer ankle is on the hard chair.  She notices after a while that it gets painful, so she removes it an puts her leg on the floor.  She has noticed this for over a week.     She noticed that her right outer ankle is red, swollen about 1 1/2\" and tender to touch.  It is not warm to touch.     She is now limping when she gets up to walk, eventually it gets better, hurst when going downstairs.    Routing to provider to advise visit type.    Nida Bell RN, [facility]    "

## 2020-05-26 NOTE — TELEPHONE ENCOUNTER
Will help to be seen due to symptoms isntead of video visit- please schedule this week if posisble

## 2020-05-26 NOTE — TELEPHONE ENCOUNTER
[facility] Call Center    Phone Message    May a detailed message be left on voicemail: no     Reason for Call: Symptoms or Concerns     If patient has red-flag symptoms, warm transfer to triage line    Current symptom or concern: Swelling of right ankle    Symptoms have been present for:  1 week(s)        Action Taken: Message routed to:  Primary Care p [phone]    Travel Screening:

## 2020-05-27 NOTE — TELEPHONE ENCOUNTER
Spoke with patient, Dr. Clements had 2:10 appt available today, patient works until 4 PM.  Scheduled with Dr. Jewell tomorrow in clinic.  Patient states that the area is reddened and swollen, but denies warmth to the touch.  Advised patient if reddened area increases, is warm to the touch or if increase in swelling, to be seen in urgent care prior to appointment.      Disha Murillo RN

## 2020-05-28 ENCOUNTER — OFFICE VISIT (OUTPATIENT)
Dept: PEDIATRICS | Facility: CLINIC | Age: 50
End: 2020-05-28
Payer: COMMERCIAL

## 2020-05-28 VITALS
TEMPERATURE: 98.8 F | WEIGHT: 148.1 LBS | OXYGEN SATURATION: 99 % | SYSTOLIC BLOOD PRESSURE: 120 MMHG | BODY MASS INDEX: 26.44 KG/M2 | DIASTOLIC BLOOD PRESSURE: 90 MMHG | HEART RATE: 84 BPM

## 2020-05-28 DIAGNOSIS — M25.471 RIGHT ANKLE SWELLING: Primary | ICD-10-CM

## 2020-05-28 PROCEDURE — 99213 OFFICE O/P EST LOW 20 MIN: CPT | Performed by: INTERNAL MEDICINE

## 2020-05-28 NOTE — NURSING NOTE
"Chief Complaint   Patient presents with     Musculoskeletal Problem     right sided ankle pain within the last 2-3 weeks       Initial BP (!) 120/90 (BP Location: Right arm, Patient Position: Sitting, Cuff Size: Adult Regular)   Pulse 84   Temp 98.8  F (37.1  C) (Temporal)   Wt 67.2 kg (148 lb 1.6 oz)   LMP  (LMP Unknown)   SpO2 99%   Breastfeeding No   BMI 26.44 kg/m   Estimated body mass index is 26.44 kg/m  as calculated from the following:    Height as of 2/12/20: 1.594 m (5' 2.75\").    Weight as of this encounter: 67.2 kg (148 lb 1.6 oz).  Medication Reconciliation: complete      MARTHA Gann      "

## 2020-05-28 NOTE — PATIENT INSTRUCTIONS
Make appointment(s) for:   -- physical and fasting lab appointments with Dr. Clements in one month.       Wear compression/pressure elastic stockings for 1-2 weeks until swelling subsides.

## 2020-05-28 NOTE — PROGRESS NOTES
Subjective     Renea Trotter is a 49 year old female who presents to clinic today for the following health issues:    HPI   Joint Pain    Onset: 2-3 week    Description:   Location: right ankle  Character: Dull ache    Intensity: moderate    Progression of Symptoms: same    Accompanying Signs & Symptoms:  Other symptoms: tingling, swelling and redness, swelling has decreased in the last week    History:   Previous similar pain: no       Precipitating factors:   Trauma or overuse: no, only is painful when she initially starts walking. Has been working from home and not sitting more at home.    Alleviating factors:  Improved by: nothing    Therapies Tried and outcome: none      Patient worked from home for 2 months where she sat with the right foot tucked under the left thigh. The outside of the right foot ankle was against the surface of the chair almost 8 hours a day. The surface of the chair is hard.     She had intermittent pain from this but goes away when she walked around. About 2-3 weeks ago, she had significant swelling around the right ankle on the outside. Not particularly painful. She returned to work 1 1/2 weeks ago. Since then, the swelling is gradually getting better. It is still quite noticeable. She denies pain in the ankle. When she walks, she notices mild pain on the lateral side of the foot.     Denies fall, twisting ankle or other injury.     ROS:  Constitutional, HEENT, cardiovascular, pulmonary, gi and gu systems are negative, except as otherwise noted.       albuterol (PROAIR HFA/PROVENTIL HFA/VENTOLIN HFA) 108 (90 Base) MCG/ACT inhaler, Inhale 2 puffs into the lungs every 4 hours as needed for shortness of breath / dyspnea or wheezing  Cholecalciferol (VITAMIN D3 PO), Take 5,000 Units by mouth daily  Glatiramer Acetate 40 MG/ML SOSY, Inject 40 mg Subcutaneous three times a week  ketoconazole (NIZORAL) 2 % external cream, Apply thin layer to the right third toe and surrounding  area.  lisinopril-hydrochlorothiazide (ZESTORETIC) 10-12.5 MG tablet, Take 1 tablet by mouth once daily  MULTIPLE VITAMIN PO, Take 1 tablet by mouth daily.  temazepam (RESTORIL) 15 MG capsule, TAKE ONE TO TWO CAPSULES BY MOUTH ONCE DAILY AT BEDTIME    No current facility-administered medications on file prior to visit.          Patient Active Problem List   Diagnosis     Relapsing remitting multiple sclerosis (H)     CARDIOVASCULAR SCREENING; LDL GOAL LESS THAN 160     BMI 26.0-26.9,adult     Urinary urgency     Headache     Low back pain without sciatica, unspecified back pain laterality     Sleep disorder     Plantar warts     Essential hypertension with goal blood pressure less than 140/90     Hemorrhoids, unspecified hemorrhoid type     Seborrheic keratosis     Mild intermittent asthma     Past Surgical History:   Procedure Laterality Date     HC TOOTH EXTRACTION W/FORCEP         Social History     Tobacco Use     Smoking status: Never Smoker     Smokeless tobacco: Never Used   Substance Use Topics     Alcohol use: Yes     Alcohol/week: 1.0 - 2.0 standard drinks     Types: 1 - 2 Standard drinks or equivalent per week     Comment: RARELY     Family History   Problem Relation Age of Onset     Hypertension Mother      Lipids Mother      Eye Disorder Father      Cerebrovascular Disease Maternal Grandmother      Cerebrovascular Disease Maternal Grandfather      Prostate Cancer Maternal Grandfather      Blood Disease Paternal Grandmother      Alzheimer Disease Paternal Grandfather      Multiple Sclerosis Cousin      Pulmonary Embolism Daughter              Problem list, Medication list, Allergies, and Medical/Social/Surgical histories reviewed in EPIC and updated as appropriate.    OBJECTIVE:                                                    BP (!) 120/90 (BP Location: Right arm, Patient Position: Sitting, Cuff Size: Adult Regular)   Pulse 84   Temp 98.8  F (37.1  C) (Temporal)   Wt 67.2 kg (148 lb 1.6 oz)   LMP   (LMP Unknown)   SpO2 99%   Breastfeeding No   BMI 26.44 kg/m      GENERAL: healthy, alert and no distress  Right ankle: swelling over the right malleolus, no tenderness to palpation over malleolus, no redness or warmth, no tenderness to palpation over the mid foot, heel. Ankle ROM normal without pain. No calf tenderness or swelling.      Diagnostic test results:  No results found for any visits on 05/28/20.      ASSESSMENT/PLAN:                                                      49 year old female with the following diagnoses and treatment plan:      ICD-10-CM    1. Right ankle swelling  M25.471        -- likely this is from pressure and positional obstruction of venous return. It has been slowly improving per patient since she went back to work. Advised to use compression/elastic stocking to facilitate venous return and avoid sitting with foot tuck under. No sign for blood clot, arthritis/gout or other pathology.     Will call or return to clinic if worsening or symptoms not improving as discussed.  See Patient Instructions.      John Jewell MD-PhD  [facility] [place]    (Note: Chart documentation was done in part with Dragon Voice Recognition software. Although reviewed after completion, some word and grammatical errors may remain.)

## 2020-06-24 ENCOUNTER — E-VISIT (OUTPATIENT)
Dept: PEDIATRICS | Facility: CLINIC | Age: 50
End: 2020-06-24
Payer: COMMERCIAL

## 2020-06-24 DIAGNOSIS — N76.0 BV (BACTERIAL VAGINOSIS): Primary | ICD-10-CM

## 2020-06-24 DIAGNOSIS — B96.89 BV (BACTERIAL VAGINOSIS): Primary | ICD-10-CM

## 2020-06-24 PROCEDURE — 99421 OL DIG E/M SVC 5-10 MIN: CPT | Performed by: FAMILY MEDICINE

## 2020-06-24 RX ORDER — METRONIDAZOLE 500 MG/1
500 TABLET ORAL 2 TIMES DAILY
Qty: 14 TABLET | Refills: 0 | Status: SHIPPED | OUTPATIENT
Start: 2020-06-24 | End: 2020-07-01

## 2020-07-27 DIAGNOSIS — I10 ESSENTIAL HYPERTENSION WITH GOAL BLOOD PRESSURE LESS THAN 140/90: ICD-10-CM

## 2020-07-28 NOTE — TELEPHONE ENCOUNTER
Patient is scheduled for 9/18/2020 for fasting labs and Physical with Dr Clements.    Patient states she is almost out of this medication.    Sabi Everett  Primary Care   [facility] [place]

## 2020-07-30 RX ORDER — LISINOPRIL/HYDROCHLOROTHIAZIDE 10-12.5 MG
1 TABLET ORAL DAILY
Qty: 90 TABLET | Refills: 0 | Status: SHIPPED | OUTPATIENT
Start: 2020-07-30 | End: 2020-09-18

## 2020-07-30 NOTE — TELEPHONE ENCOUNTER
Last Clinic Visit: 5/28/2020  [facility]    Appointment with lab draw scheduled for September 2020

## 2020-08-03 ENCOUNTER — TELEPHONE (OUTPATIENT)
Dept: NEUROLOGY | Facility: CLINIC | Age: 50
End: 2020-08-03

## 2020-08-03 NOTE — TELEPHONE ENCOUNTER
Betseyr attempted to reach the patient and schedule an appointment with Dr. Herrera.    Appointment has been scheduled for 12/21/2020 at 8:30 a.m. due to limited availability.    Lvm to return clinics call and confirm.    [facility]  Adult Med Spec/Surg Spec   [phone]

## 2020-08-14 ENCOUNTER — ANCILLARY PROCEDURE (OUTPATIENT)
Dept: MAMMOGRAPHY | Facility: CLINIC | Age: 50
End: 2020-08-14
Attending: FAMILY MEDICINE
Payer: COMMERCIAL

## 2020-08-14 DIAGNOSIS — Z12.31 VISIT FOR SCREENING MAMMOGRAM: ICD-10-CM

## 2020-08-14 PROCEDURE — 77067 SCR MAMMO BI INCL CAD: CPT | Performed by: STUDENT IN AN ORGANIZED HEALTH CARE EDUCATION/TRAINING PROGRAM

## 2020-09-04 DIAGNOSIS — Z00.00 ROUTINE GENERAL MEDICAL EXAMINATION AT A HEALTH CARE FACILITY: Primary | ICD-10-CM

## 2020-09-04 DIAGNOSIS — E83.52 HYPERCALCEMIA: ICD-10-CM

## 2020-09-04 DIAGNOSIS — I10 ESSENTIAL HYPERTENSION WITH GOAL BLOOD PRESSURE LESS THAN 140/90: ICD-10-CM

## 2020-09-04 DIAGNOSIS — Z13.1 SCREENING FOR DIABETES MELLITUS (DM): ICD-10-CM

## 2020-09-04 DIAGNOSIS — Z13.6 CARDIOVASCULAR SCREENING; LDL GOAL LESS THAN 160: ICD-10-CM

## 2020-09-04 DIAGNOSIS — Z13.0 SCREENING FOR DEFICIENCY ANEMIA: ICD-10-CM

## 2020-09-18 ENCOUNTER — OFFICE VISIT (OUTPATIENT)
Dept: PEDIATRICS | Facility: CLINIC | Age: 50
End: 2020-09-18
Payer: COMMERCIAL

## 2020-09-18 VITALS
WEIGHT: 148.3 LBS | OXYGEN SATURATION: 95 % | BODY MASS INDEX: 26.28 KG/M2 | HEART RATE: 77 BPM | TEMPERATURE: 98.3 F | SYSTOLIC BLOOD PRESSURE: 122 MMHG | DIASTOLIC BLOOD PRESSURE: 84 MMHG | HEIGHT: 63 IN

## 2020-09-18 DIAGNOSIS — E83.52 HYPERCALCEMIA: ICD-10-CM

## 2020-09-18 DIAGNOSIS — G35 RELAPSING REMITTING MULTIPLE SCLEROSIS (H): ICD-10-CM

## 2020-09-18 DIAGNOSIS — I10 ESSENTIAL HYPERTENSION WITH GOAL BLOOD PRESSURE LESS THAN 140/90: ICD-10-CM

## 2020-09-18 DIAGNOSIS — Z13.6 CARDIOVASCULAR SCREENING; LDL GOAL LESS THAN 160: ICD-10-CM

## 2020-09-18 DIAGNOSIS — Z23 NEED FOR TDAP VACCINATION: ICD-10-CM

## 2020-09-18 DIAGNOSIS — J45.20 MILD INTERMITTENT ASTHMA WITHOUT COMPLICATION: ICD-10-CM

## 2020-09-18 DIAGNOSIS — Z13.1 SCREENING FOR DIABETES MELLITUS (DM): ICD-10-CM

## 2020-09-18 DIAGNOSIS — Z00.00 ROUTINE GENERAL MEDICAL EXAMINATION AT A HEALTH CARE FACILITY: Primary | ICD-10-CM

## 2020-09-18 DIAGNOSIS — Z12.4 CERVICAL CANCER SCREENING: ICD-10-CM

## 2020-09-18 DIAGNOSIS — G47.9 SLEEP DISORDER: ICD-10-CM

## 2020-09-18 DIAGNOSIS — Z00.00 ROUTINE GENERAL MEDICAL EXAMINATION AT A HEALTH CARE FACILITY: ICD-10-CM

## 2020-09-18 DIAGNOSIS — L98.9 SKIN LESION: ICD-10-CM

## 2020-09-18 DIAGNOSIS — Z13.0 SCREENING FOR DEFICIENCY ANEMIA: ICD-10-CM

## 2020-09-18 DIAGNOSIS — Z12.11 COLON CANCER SCREENING: ICD-10-CM

## 2020-09-18 LAB
ANION GAP SERPL CALCULATED.3IONS-SCNC: 6 MMOL/L (ref 3–14)
BASOPHILS # BLD AUTO: 0.1 10E9/L (ref 0–0.2)
BASOPHILS NFR BLD AUTO: 0.8 %
BUN SERPL-MCNC: 18 MG/DL (ref 7–30)
CALCIUM SERPL-MCNC: 9 MG/DL (ref 8.5–10.1)
CHLORIDE SERPL-SCNC: 102 MMOL/L (ref 94–109)
CHOLEST SERPL-MCNC: 224 MG/DL
CO2 SERPL-SCNC: 31 MMOL/L (ref 20–32)
CREAT SERPL-MCNC: 0.82 MG/DL (ref 0.52–1.04)
CREAT UR-MCNC: 193 MG/DL
DIFFERENTIAL METHOD BLD: NORMAL
EOSINOPHIL # BLD AUTO: 0.7 10E9/L (ref 0–0.7)
EOSINOPHIL NFR BLD AUTO: 11.4 %
ERYTHROCYTE [DISTWIDTH] IN BLOOD BY AUTOMATED COUNT: 11.7 % (ref 10–15)
GFR SERPL CREATININE-BSD FRML MDRD: 84 ML/MIN/{1.73_M2}
GLUCOSE SERPL-MCNC: 92 MG/DL (ref 70–99)
HCT VFR BLD AUTO: 42.1 % (ref 35–47)
HDLC SERPL-MCNC: 56 MG/DL
HGB BLD-MCNC: 14.8 G/DL (ref 11.7–15.7)
IMM GRANULOCYTES # BLD: 0 10E9/L (ref 0–0.4)
IMM GRANULOCYTES NFR BLD: 0.5 %
LDLC SERPL CALC-MCNC: 141 MG/DL
LYMPHOCYTES # BLD AUTO: 2.1 10E9/L (ref 0.8–5.3)
LYMPHOCYTES NFR BLD AUTO: 32.7 %
MCH RBC QN AUTO: 31.4 PG (ref 26.5–33)
MCHC RBC AUTO-ENTMCNC: 35.2 G/DL (ref 31.5–36.5)
MCV RBC AUTO: 89 FL (ref 78–100)
MICROALBUMIN UR-MCNC: 20 MG/L
MICROALBUMIN/CREAT UR: 10.41 MG/G CR (ref 0–25)
MONOCYTES # BLD AUTO: 0.5 10E9/L (ref 0–1.3)
MONOCYTES NFR BLD AUTO: 7.3 %
NEUTROPHILS # BLD AUTO: 3.1 10E9/L (ref 1.6–8.3)
NEUTROPHILS NFR BLD AUTO: 47.3 %
NONHDLC SERPL-MCNC: 168 MG/DL
PLATELET # BLD AUTO: 278 10E9/L (ref 150–450)
POTASSIUM SERPL-SCNC: 3.8 MMOL/L (ref 3.4–5.3)
RBC # BLD AUTO: 4.71 10E12/L (ref 3.8–5.2)
SODIUM SERPL-SCNC: 139 MMOL/L (ref 133–144)
TRIGL SERPL-MCNC: 134 MG/DL
WBC # BLD AUTO: 6.4 10E9/L (ref 4–11)

## 2020-09-18 PROCEDURE — 99396 PREV VISIT EST AGE 40-64: CPT | Mod: 25 | Performed by: FAMILY MEDICINE

## 2020-09-18 PROCEDURE — 85025 COMPLETE CBC W/AUTO DIFF WBC: CPT | Performed by: FAMILY MEDICINE

## 2020-09-18 PROCEDURE — 36415 COLL VENOUS BLD VENIPUNCTURE: CPT | Performed by: FAMILY MEDICINE

## 2020-09-18 PROCEDURE — 80061 LIPID PANEL: CPT | Performed by: FAMILY MEDICINE

## 2020-09-18 PROCEDURE — 82043 UR ALBUMIN QUANTITATIVE: CPT | Performed by: FAMILY MEDICINE

## 2020-09-18 PROCEDURE — 87624 HPV HI-RISK TYP POOLED RSLT: CPT | Performed by: FAMILY MEDICINE

## 2020-09-18 PROCEDURE — 99213 OFFICE O/P EST LOW 20 MIN: CPT | Mod: 25 | Performed by: FAMILY MEDICINE

## 2020-09-18 PROCEDURE — 80048 BASIC METABOLIC PNL TOTAL CA: CPT | Performed by: FAMILY MEDICINE

## 2020-09-18 PROCEDURE — G0145 SCR C/V CYTO,THINLAYER,RESCR: HCPCS | Performed by: FAMILY MEDICINE

## 2020-09-18 PROCEDURE — 90471 IMMUNIZATION ADMIN: CPT | Performed by: FAMILY MEDICINE

## 2020-09-18 PROCEDURE — 90715 TDAP VACCINE 7 YRS/> IM: CPT | Performed by: FAMILY MEDICINE

## 2020-09-18 RX ORDER — ALBUTEROL SULFATE 90 UG/1
2 AEROSOL, METERED RESPIRATORY (INHALATION) EVERY 4 HOURS PRN
Qty: 8.5 G | Refills: 3 | Status: SHIPPED | OUTPATIENT
Start: 2020-09-18 | End: 2021-12-14

## 2020-09-18 RX ORDER — LISINOPRIL/HYDROCHLOROTHIAZIDE 10-12.5 MG
1 TABLET ORAL DAILY
Qty: 90 TABLET | Refills: 3 | Status: SHIPPED | OUTPATIENT
Start: 2020-09-18 | End: 2021-10-20

## 2020-09-18 RX ORDER — TEMAZEPAM 15 MG/1
15-30 CAPSULE ORAL
Qty: 45 CAPSULE | Refills: 0 | Status: SHIPPED | OUTPATIENT
Start: 2020-09-18 | End: 2021-04-07

## 2020-09-18 ASSESSMENT — MIFFLIN-ST. JEOR: SCORE: 1261.81

## 2020-09-18 ASSESSMENT — PAIN SCALES - GENERAL: PAINLEVEL: NO PAIN (0)

## 2020-09-18 NOTE — PATIENT INSTRUCTIONS
Get the TDAP today  Schedule for colonoscopy  Schedule for skin lesion removal - 40 minutes      Preventive Health Recommendations  Female Ages 50 - 64    Yearly exam: See your health care provider every year in order to  o Review health changes.   o Discuss preventive care.    o Review your medicines if your doctor has prescribed any.      Get a Pap test every three years (unless you have an abnormal result and your provider advises testing more often).    If you get Pap tests with HPV test, you only need to test every 5 years, unless you have an abnormal result.     You do not need a Pap test if your uterus was removed (hysterectomy) and you have not had cancer.    You should be tested each year for STDs (sexually transmitted diseases) if you're at risk.     Have a mammogram every 1 to 2 years.    Have a colonoscopy at age 50, or have a yearly FIT test (stool test). These exams screen for colon cancer.      Have a cholesterol test every 5 years, or more often if advised.    Have a diabetes test (fasting glucose) every three years. If you are at risk for diabetes, you should have this test more often.     If you are at risk for osteoporosis (brittle bone disease), think about having a bone density scan (DEXA).    Shots: Get a flu shot each year. Get a tetanus shot every 10 years.    Nutrition:     Eat at least 5 servings of fruits and vegetables each day.    Eat whole-grain bread, whole-wheat pasta and brown rice instead of white grains and rice.    Get adequate Calcium and Vitamin D.     Lifestyle    Exercise at least 150 minutes a week (30 minutes a day, 5 days a week). This will help you control your weight and prevent disease.    Limit alcohol to one drink per day.    No smoking.     Wear sunscreen to prevent skin cancer.     See your dentist every six months for an exam and cleaning.    See your eye doctor every 1 to 2 years.

## 2020-09-18 NOTE — PROGRESS NOTES
SUBJECTIVE:   CC: Renea Trotter is an 50 year old woman who presents for preventive health visit.       Patient has been advised of split billing requirements and indicates understanding: Yes  Healthy Habits:    Do you get at least three servings of calcium containing foods daily (dairy, green leafy vegetables, etc.)? yes    Amount of exercise or daily activities, outside of work: random    Problems taking medications regularly No    Medication side effects: No    Have you had an eye exam in the past two years? yes    Do you see a dentist twice per year? yes    Do you have sleep apnea, excessive snoring or daytime drowsiness?no        Today's PHQ-2 Score:   PHQ-2 ( 1999 [employer]) 5/28/2020 1/14/2020   Q1: Little interest or pleasure in doing things 0 0   Q2: Feeling down, depressed or hopeless 0 0   PHQ-2 Score 0 0       Abuse: Current or Past(Physical, Sexual or Emotional)- No  Do you feel safe in your environment? Yes        Social History     Tobacco Use     Smoking status: Never Smoker     Smokeless tobacco: Never Used   Substance Use Topics     Alcohol use: Yes     Alcohol/week: 1.0 - 2.0 standard drinks     Types: 1 - 2 Standard drinks or equivalent per week     Comment: RARELY     If you drink alcohol do you typically have >3 drinks per day or >7 drinks per week? No                     Reviewed orders with patient.  Reviewed health maintenance and updated orders accordingly - Yes  Lab work is in process  Labs reviewed in EPIC  BP Readings from Last 3 Encounters:   09/18/20 122/84   05/28/20 (!) 120/90   02/12/20 116/78    Wt Readings from Last 3 Encounters:   09/18/20 67.3 kg (148 lb 4.8 oz)   05/28/20 67.2 kg (148 lb 1.6 oz)   02/12/20 66 kg (145 lb 9.6 oz)                  Patient Active Problem List   Diagnosis     Relapsing remitting multiple sclerosis (H)     CARDIOVASCULAR SCREENING; LDL GOAL LESS THAN 160     BMI 26.0-26.9,adult     Urinary urgency     Headache     Low back pain without sciatica,  unspecified back pain laterality     Sleep disorder     Plantar warts     Essential hypertension with goal blood pressure less than 140/90     Hemorrhoids, unspecified hemorrhoid type     Seborrheic keratosis     Mild intermittent asthma     Past Surgical History:   Procedure Laterality Date     HC TOOTH EXTRACTION W/FORCEP         Social History     Tobacco Use     Smoking status: Never Smoker     Smokeless tobacco: Never Used   Substance Use Topics     Alcohol use: Yes     Alcohol/week: 1.0 - 2.0 standard drinks     Types: 1 - 2 Standard drinks or equivalent per week     Comment: RARELY     Family History   Problem Relation Age of Onset     Hypertension Mother      Lipids Mother      Eye Disorder Father      Cerebrovascular Disease Maternal Grandmother      Cerebrovascular Disease Maternal Grandfather      Prostate Cancer Maternal Grandfather      Blood Disease Paternal Grandmother      Alzheimer Disease Paternal Grandfather      Multiple Sclerosis Cousin      Pulmonary Embolism Daughter          Current Outpatient Medications   Medication Sig Dispense Refill     albuterol (PROAIR HFA/PROVENTIL HFA/VENTOLIN HFA) 108 (90 Base) MCG/ACT inhaler Inhale 2 puffs into the lungs every 4 hours as needed for shortness of breath / dyspnea or wheezing 8.5 g 3     Cholecalciferol (VITAMIN D3 PO) Take 5,000 Units by mouth daily       Glatiramer Acetate 40 MG/ML SOSY Inject 40 mg Subcutaneous three times a week 45 Syringe 3     ketoconazole (NIZORAL) 2 % external cream Apply thin layer to the right third toe and surrounding area. 30 g 11     lisinopril-hydrochlorothiazide (ZESTORETIC) 10-12.5 MG tablet Take 1 tablet by mouth daily 90 tablet 3     MULTIPLE VITAMIN PO Take 1 tablet by mouth daily.       temazepam (RESTORIL) 15 MG capsule Take 1-2 capsules (15-30 mg) by mouth nightly as needed for sleep 45 capsule 0     No Known Allergies  Recent Labs   Lab Test 09/18/20  0723 06/01/19  0803 03/15/18  1041 10/13/17  0727  10/12/16  0732   *  --   --  113* 95   HDL 56  --   --  52 44*   TRIG 134  --   --  169* 95   ALT  --  22 17 63* 20   CR 0.82 0.85 0.72 0.87 0.98   GFRESTIMATED 84 81 86 69 61   GFRESTBLACK >90 >90 >90 84 74   POTASSIUM 3.8 4.0 3.8 3.2* 3.5   TSH  --  1.63  --  2.19 1.38        Mammogram Screening: Patient under age 50, mutual decision reflected in health maintenance.      Pertinent mammograms are reviewed under the imaging tab.  History of abnormal Pap smear: NO - age 30-65 PAP every 5 years with negative HPV co-testing recommended  PAP / HPV Latest Ref Rng & Units 10/13/2017 8/6/2014   PAP - NIL NIL   HPV 16 DNA NEG:Negative Negative -   HPV 18 DNA NEG:Negative Negative -   OTHER HR HPV NEG:Negative Negative -     Reviewed and updated as needed this visit by clinical staff  Allergies  Meds         Reviewed and updated as needed this visit by Provider          Past Medical History:   Diagnosis Date     HTN (hypertension)      Mild intermittent asthma      MS (multiple sclerosis) (H)       Past Surgical History:   Procedure Laterality Date     HC TOOTH EXTRACTION W/FORCEP       OB History   No obstetric history on file.       ROS:  CONSTITUTIONAL: NEGATIVE for fever, chills, change in weight  INTEGUMENTARY/SKIN: Skin lesion on the back of the right thigh  EYES: NEGATIVE for vision changes or irritation  ENT: NEGATIVE for ear, mouth and throat problems  RESP: NEGATIVE for significant cough or SOB  BREAST: NEGATIVE for masses, tenderness or discharge  CV: NEGATIVE for chest pain, palpitations or peripheral edema  CV: Hx HTN  GI: NEGATIVE for nausea, abdominal pain, heartburn, or change in bowel habits  : NEGATIVE for unusual urinary or vaginal symptoms. Periods are regular.  MUSCULOSKELETAL: NEGATIVE for significant arthralgias or myalgia  NEURO: NEGATIVE for weakness, dizziness or paresthesias  NEURO: History of multiple sclerosis  ENDOCRINE: NEGATIVE for temperature intolerance, skin/hair  "changes  HEME/ALLERGY/IMMUNE: NEGATIVE for bleeding problems  PSYCHIATRIC: NEGATIVE for changes in mood or affect    OBJECTIVE:   /84 (BP Location: Right arm, Patient Position: Sitting, Cuff Size: Adult Regular)   Pulse 77   Temp 98.3  F (36.8  C) (Temporal)   Ht 1.6 m (5' 3\")   Wt 67.3 kg (148 lb 4.8 oz)   LMP 08/27/2020 (Exact Date)   SpO2 95%   BMI 26.27 kg/m    EXAM:  GENERAL: healthy, alert and no distress  EYES: Eyes grossly normal to inspection, PERRL and conjunctivae and sclerae normal  HENT: ear canals and TM's normal, nose and mouth without ulcers or lesions  NECK: no adenopathy, no asymmetry, masses, or scars and thyroid normal to palpation  RESP: lungs clear to auscultation - no rales, rhonchi or wheezes  BREAST: normal without masses, tenderness or nipple discharge and no palpable axillary masses or adenopathy  CV: regular rate and rhythm, normal S1 S2, no S3 or S4, no murmur, click or rub, no peripheral edema and peripheral pulses strong  ABDOMEN: soft, nontender, no hepatosplenomegaly, no masses and bowel sounds normal   (female): normal female external genitalia, normal urethral meatus, vaginal mucosa pink, moist, well rugated, and normal cervix/adnexa/uterus without masses or discharge  MS: no gross musculoskeletal defects noted, no edema  SKIN: 2 mm, skin colored, smooth raised skin lesion on the back of the right lower thigh  NEURO: Normal strength and tone, mentation intact and speech normal  PSYCH: mentation appears normal, affect normal/bright    Diagnostic Test Results:  Labs reviewed in Epic  Results for orders placed or performed in visit on 09/18/20 (from the past 24 hour(s))   CBC with platelets differential   Result Value Ref Range    WBC 6.4 4.0 - 11.0 10e9/L    RBC Count 4.71 3.8 - 5.2 10e12/L    Hemoglobin 14.8 11.7 - 15.7 g/dL    Hematocrit 42.1 35.0 - 47.0 %    MCV 89 78 - 100 fl    MCH 31.4 26.5 - 33.0 pg    MCHC 35.2 31.5 - 36.5 g/dL    RDW 11.7 10.0 - 15.0 %    " Platelet Count 278 150 - 450 10e9/L    Diff Method Automated Method     % Neutrophils 47.3 %    % Lymphocytes 32.7 %    % Monocytes 7.3 %    % Eosinophils 11.4 %    % Basophils 0.8 %    % Immature Granulocytes 0.5 %    Absolute Neutrophil 3.1 1.6 - 8.3 10e9/L    Absolute Lymphocytes 2.1 0.8 - 5.3 10e9/L    Absolute Monocytes 0.5 0.0 - 1.3 10e9/L    Absolute Eosinophils 0.7 0.0 - 0.7 10e9/L    Absolute Basophils 0.1 0.0 - 0.2 10e9/L    Abs Immature Granulocytes 0.0 0 - 0.4 10e9/L   **Basic metabolic panel FUTURE anytime   Result Value Ref Range    Sodium 139 133 - 144 mmol/L    Potassium 3.8 3.4 - 5.3 mmol/L    Chloride 102 94 - 109 mmol/L    Carbon Dioxide 31 20 - 32 mmol/L    Anion Gap 6 3 - 14 mmol/L    Glucose 92 70 - 99 mg/dL    Urea Nitrogen 18 7 - 30 mg/dL    Creatinine 0.82 0.52 - 1.04 mg/dL    GFR Estimate 84 >60 mL/min/[1.73_m2]    GFR Estimate If Black >90 >60 mL/min/[1.73_m2]    Calcium 9.0 8.5 - 10.1 mg/dL   Lipid panel reflex to direct LDL Fasting   Result Value Ref Range    Cholesterol 224 (H) <200 mg/dL    Triglycerides 134 <150 mg/dL    HDL Cholesterol 56 >49 mg/dL    LDL Cholesterol Calculated 141 (H) <100 mg/dL    Non HDL Cholesterol 168 (H) <130 mg/dL       ASSESSMENT/PLAN:   1. Routine general medical examination at a health care facility  Discussed on regular exercises, daily calcium intake, healthy eating, self breast exams monthly and routine dental checks    - GASTROENTEROLOGY ADULT REF PROCEDURE ONLY; Future  - TDAP, IM (10 - 64 YRS) - Adacel  - Pap imaged thin layer screen with HPV - recommended age 30 - 65 years (select HPV order below)  - HPV High Risk Types DNA Cervical    2. Essential hypertension with goal blood pressure less than 140/90  BP Readings from Last 6 Encounters:   09/18/20 122/84   05/28/20 (!) 120/90   02/12/20 116/78   11/11/19 121/78   06/01/19 124/84   05/04/19 (!) 134/95     Blood pressure is at goal, continue with current medications, low-salt diet, regular exercises,  recheck in 1 year or sooner if needed  - lisinopril-hydrochlorothiazide (ZESTORETIC) 10-12.5 MG tablet; Take 1 tablet by mouth daily  Dispense: 90 tablet; Refill: 3    3. Mild intermittent asthma without complication  Stable, continue with albuterol inhaler as needed  - albuterol (PROAIR HFA/PROVENTIL HFA/VENTOLIN HFA) 108 (90 Base) MCG/ACT inhaler; Inhale 2 puffs into the lungs every 4 hours as needed for shortness of breath / dyspnea or wheezing  Dispense: 8.5 g; Refill: 3    4. Sleep disorder  Refills done per patient's request  - temazepam (RESTORIL) 15 MG capsule; Take 1-2 capsules (15-30 mg) by mouth nightly as needed for sleep  Dispense: 45 capsule; Refill: 0    5. Colon cancer screening    - GASTROENTEROLOGY ADULT REF PROCEDURE ONLY; Future    6. Need for Tdap vaccination    - TDAP, IM (10 - 64 YRS) - Adacel    7. Cervical cancer screening    - Pap imaged thin layer screen with HPV - recommended age 30 - 65 years (select HPV order below)  - HPV High Risk Types DNA Cervical    8. Skin lesion  Per patient's request, she will be scheduled for skin lesion removal since patient noted every time when she gets on the leg, she had the skin lesion bitten by the small fishes which causes bleeding    9. CARDIOVASCULAR SCREENING; LDL GOAL LESS THAN 160  LDL Cholesterol Calculated   Date Value Ref Range Status   09/18/2020 141 (H) <100 mg/dL Final     Comment:     Above desirable:  100-129 mg/dl  Borderline High:  130-159 mg/dL  High:             160-189 mg/dL  Very high:       >189 mg/dl       The 10-year ASCVD risk score (Roly MONTERO Jr., et al., 2013) is: 1.7%    Values used to calculate the score:      Age: 50 years      Sex: Female      Is Non- : No      Diabetic: No      Tobacco smoker: No      Systolic Blood Pressure: 122 mmHg      Is BP treated: Yes      HDL Cholesterol: 56 mg/dL      Total Cholesterol: 224 mg/dL      10. Relapsing remitting multiple sclerosis (H)  On Copaxone, patient sees  " in neurology      Patient has been advised of split billing requirements and indicates understanding: Not applicable  COUNSELING:   Reviewed preventive health counseling, as reflected in patient instructions  Special attention given to:        Regular exercise       Healthy diet/nutrition       Vision screening       Immunizations    Vaccinated for: TDAP and Declined: Influenza due to Other -patient had it done last week at work               Contraception       Family planning       Folic Acid Counseling       Osteoporosis Prevention/Bone Health       Colon cancer screening       HIV screeninx in teen years, 1x in adult years, and at intervals if high risk       (Lily)menopause management       The 10-year ASCVD risk score (Roly MONTERO Jr., et al., 2013) is: 1.7%    Values used to calculate the score:      Age: 50 years      Sex: Female      Is Non- : No      Diabetic: No      Tobacco smoker: No      Systolic Blood Pressure: 122 mmHg      Is BP treated: Yes      HDL Cholesterol: 56 mg/dL      Total Cholesterol: 224 mg/dL    Estimated body mass index is 26.27 kg/m  as calculated from the following:    Height as of this encounter: 1.6 m (5' 3\").    Weight as of this encounter: 67.3 kg (148 lb 4.8 oz).    Weight management plan: Discussed healthy diet and exercise guidelines    She reports that she has never smoked. She has never used smokeless tobacco.      Counseling Resources:  ATP IV Guidelines  Pooled Cohorts Equation Calculator  Breast Cancer Risk Calculator  BRCA-Related Cancer Risk Assessment: FHS-7 Tool  FRAX Risk Assessment  ICSI Preventive Guidelines  Dietary Guidelines for Americans,   USDA's MyPlate  ASA Prophylaxis  Lung CA Screening    Cara Clements MD  [facility]  "

## 2020-09-18 NOTE — RESULT ENCOUNTER NOTE
Basil Meier,  Your lab test showed normal results for kidney functions, fasting blood sugar with no concern for diabetes, normal electrolytes, hemoglobin and blood counts.  These are good and reassuring  Your fasting cholesterol is moderately elevated, worse than 2 years ago.  Please improve your efforts on healthy eating and regular exercises, avoid diet rich in saturated fats, fast or fried foods.  We will recheck your cholesterol at your next visit for physical.  Let me know if you have any questions. Take care.  Cara Clements MD

## 2020-09-18 NOTE — LETTER
October 5, 2020       Renea Trotter  [address] [address] [facility] [address]        Dear MsSolange,    We are happy to inform you that your recent Pap smear and Human Papillomavirus (HPV) test results are normal and negative.    It is recommended that you have your next Pap smear and Human Papillomavirus (HPV) test in 3 years. You will also need to return to the clinic every year for an annual wellness visit.    If you have additional questions regarding this result, please contact our office and we will be happy to assist you.      Sincerely,    Your [facility] Care Team   Regarding: Left foot gout flare up, would like medication sent in   ----- Message from Manuel Musa sent at 7/14/2019  8:53 AM CDT -----  Patient Name: Sugey Reynoso  Specialist or PCP:Tutu Plummer  Pregnant (If Yes, how long?):N/A  Symptoms:Left foot gout flare up, would like medication sent in   Call Back #:[phone]  Is the patient’s permanent residence located in [place], [place], or a [facility]? Yes [facility] [address]  Call Center Account #:[phone]

## 2020-09-22 LAB
COPATH REPORT: NORMAL
PAP: NORMAL

## 2020-09-24 DIAGNOSIS — I10 ESSENTIAL HYPERTENSION WITH GOAL BLOOD PRESSURE LESS THAN 140/90: Primary | ICD-10-CM

## 2020-09-24 LAB
FINAL DIAGNOSIS: NORMAL
HPV HR 12 DNA CVX QL NAA+PROBE: NEGATIVE
HPV16 DNA SPEC QL NAA+PROBE: NEGATIVE
HPV18 DNA SPEC QL NAA+PROBE: NEGATIVE
SPECIMEN DESCRIPTION: NORMAL
SPECIMEN SOURCE CVX/VAG CYTO: NORMAL

## 2020-09-25 RX ORDER — LISINOPRIL 10 MG/1
10 TABLET ORAL DAILY
Qty: 90 TABLET | Refills: 3 | Status: SHIPPED | OUTPATIENT
Start: 2020-09-25 | End: 2021-12-14

## 2020-09-25 RX ORDER — HYDROCHLOROTHIAZIDE 12.5 MG/1
12.5 TABLET ORAL DAILY
Qty: 90 TABLET | Refills: 3 | Status: SHIPPED | OUTPATIENT
Start: 2020-09-25 | End: 2021-12-14

## 2020-09-25 NOTE — TELEPHONE ENCOUNTER
"Requested : separate prescriptions  1. lisinopril (ZESTRIL) 10 MG tablet     2. hydrochlorothiazide (HYDRODIURIL) 12.5 MG tablet        lisinopril-hydrochlorothiazide (ZESTORETIC) 10-12.5 MG tablet   Last Written Prescription Date:  9/18/20  Last Fill Quantity: 90,   # refills: 3  Last Office Visit : 9/18/20  Future Office visit:  None    Routing refill request to provider for review/approval because: lisinopril-hydrochlorothiazide (ZESTORETIC) 10-12.5 MG tablet is backordered.  Fax received from [facility] that reads, \"Please send for both Lisinopril and hydrochlorothiazide separately, combo is backordered\". Rx's pended as requested.       "

## 2020-11-02 ENCOUNTER — E-VISIT (OUTPATIENT)
Dept: PEDIATRICS | Facility: CLINIC | Age: 50
End: 2020-11-02
Payer: COMMERCIAL

## 2020-11-02 DIAGNOSIS — B96.89 BV (BACTERIAL VAGINOSIS): Primary | ICD-10-CM

## 2020-11-02 DIAGNOSIS — N76.0 BV (BACTERIAL VAGINOSIS): Primary | ICD-10-CM

## 2020-11-02 PROCEDURE — 99421 OL DIG E/M SVC 5-10 MIN: CPT | Performed by: FAMILY MEDICINE

## 2020-11-02 RX ORDER — METRONIDAZOLE 500 MG/1
500 TABLET ORAL 2 TIMES DAILY
Qty: 14 TABLET | Refills: 0 | Status: SHIPPED | OUTPATIENT
Start: 2020-11-02 | End: 2020-11-09

## 2020-11-02 NOTE — PATIENT INSTRUCTIONS
Thank you for choosing us for your care. I have placed an order for a prescription so that you can start treatment. View your full visit summary for details by clicking on the link below. Your pharmacist will able to address any questions you may have about the medication.     If you re not feeling better within 2-3 days, please schedule an appointment.  You can schedule an appointment right here [place], or call [phone]  If the visit is for the same symptoms as your e-visit, we ll refund the cost of your e-visit if seen within seven days.

## 2020-12-12 DIAGNOSIS — G35 MULTIPLE SCLEROSIS (H): ICD-10-CM

## 2020-12-14 RX ORDER — GLATIRAMER 40 MG/ML
40 INJECTION, SOLUTION SUBCUTANEOUS
Qty: 36 SYRINGE | Refills: 3 | Status: SHIPPED | OUTPATIENT
Start: 2020-12-14 | End: 2021-03-12

## 2020-12-14 NOTE — TELEPHONE ENCOUNTER
The pt is scheduled to see Dr Herrera on 12/21/20. Last appt was on 5/4/20. Refilled per RN refill protocol.  Damari Rowe, RNCC  Neurology

## 2021-01-12 ENCOUNTER — TELEPHONE (OUTPATIENT)
Dept: NEUROLOGY | Facility: CLINIC | Age: 51
End: 2021-01-12

## 2021-01-18 ENCOUNTER — OFFICE VISIT (OUTPATIENT)
Dept: NEUROLOGY | Facility: CLINIC | Age: 51
End: 2021-01-18
Payer: COMMERCIAL

## 2021-01-18 ENCOUNTER — MYC MEDICAL ADVICE (OUTPATIENT)
Dept: NEUROLOGY | Facility: CLINIC | Age: 51
End: 2021-01-18

## 2021-01-18 VITALS
HEART RATE: 78 BPM | HEIGHT: 63 IN | DIASTOLIC BLOOD PRESSURE: 78 MMHG | SYSTOLIC BLOOD PRESSURE: 114 MMHG | BODY MASS INDEX: 26.13 KG/M2 | WEIGHT: 147.5 LBS | OXYGEN SATURATION: 100 %

## 2021-01-18 DIAGNOSIS — G35 MS (MULTIPLE SCLEROSIS) (H): Primary | ICD-10-CM

## 2021-01-18 DIAGNOSIS — G25.81 RESTLESS LEGS SYNDROME: ICD-10-CM

## 2021-01-18 LAB — FERRITIN SERPL-MCNC: 183 NG/ML (ref 8–252)

## 2021-01-18 PROCEDURE — 36415 COLL VENOUS BLD VENIPUNCTURE: CPT | Performed by: PSYCHIATRY & NEUROLOGY

## 2021-01-18 PROCEDURE — 99214 OFFICE O/P EST MOD 30 MIN: CPT | Performed by: PSYCHIATRY & NEUROLOGY

## 2021-01-18 PROCEDURE — 82306 VITAMIN D 25 HYDROXY: CPT | Performed by: PSYCHIATRY & NEUROLOGY

## 2021-01-18 PROCEDURE — 82728 ASSAY OF FERRITIN: CPT | Performed by: PSYCHIATRY & NEUROLOGY

## 2021-01-18 ASSESSMENT — MIFFLIN-ST. JEOR: SCORE: 1258.19

## 2021-01-18 ASSESSMENT — PAIN SCALES - GENERAL: PAINLEVEL: NO PAIN (0)

## 2021-01-18 NOTE — NURSING NOTE
"Renea Trotter's goals for this visit include: return  She requests these members of her care team be copied on today's visit information:     PCP: Cara Clements    Referring Provider:  David Herrera MD  [id number] [address]    /78   Pulse 78   Ht 1.6 m (5' 3\")   Wt 66.9 kg (147 lb 8 oz)   SpO2 100%   BMI 26.13 kg/m      Do you need any medication refills at today's visit? y  "

## 2021-01-18 NOTE — PATIENT INSTRUCTIONS
1. Blood tests today    2. Return to clinic in one year with MRI scans of the brain and cervical spine prior to vist

## 2021-01-18 NOTE — LETTER
"1/18/2021      RE: Renea Trotter  [address] [address] [address]     Referral source: Established patient     Chief complaint: Multiple sclerosis     History of the Present Illness: Ms. Renea Trotter is a 50-year-old right-handed woman who is evaluated in the Multiple Sclerosis Clinic today for a scheduled follow-up visit.      This is a patient whom I evaluated on one previous occasion via telemedicine visit in May of last year after she transferred care from my former colleague, Dr. Regan Mcgregor.  At that time, she related a history of symptoms of demyelinating disease dating back to 2003 when she developed some numbness of the right hand and difficulty with the right leg that  was noticeable with walking.  She subsequently did well until 2011, at which time she had recurrent numbness in the right hand.  MRI imaging at that time was suggestive of demyelinating disease of the type seen in multiple sclerosis and a CSF examination was positive for oligoclonal bands.  She was subsequently placed on disease-modifying therapy with glatiramer acetate and MRI imaging was stable from 2475-2393.      Today, the patient denies any new episodic changes in vision, balance, strength or sensation suggestive of new relapse of multiple sclerosis since she was last seen.  As she reported in May, she feels like her right side is getting gradually weaker over time.  She does not think that there are any specific activities that she cannot do now that she would have been able to do a year or 2 ago, but does relate for example, that she is having more difficulty opening jars with the right hand over time.      Otherwise, symptomatically she continues to note \"irritating\" restless legs sensation intermittently at night.  Temazepam does help to relieve this and facilitate sleep, although she does not take this every day.      She also relates that about 6 months ago, she developed pain radiating down the right arm from the " shoulder to the elbow.  This did not seem to involve the neck and did not radiate below the elbow.  I told her that this particular symptom is unlikely to relate to new active inflammation in the central nervous system, but could potentially relate to some increased tone in the right arm, and advised that she try stretching any muscles that are tight or tending to spasm.          PHYSICAL EXAMINATION:   VITAL SIGNS:  Blood pressure 114/78; pulse 78; oxygen saturation 100%; weight 66.9 kg; height 1.6 meters.   NEUROLOGIC:    CRANIAL NERVES: Visual fields are full to confrontation.  Extraocular movements are intact with no internuclear ophthalmoplegia.  Facial strength is normal.  Palate elevation and tongue protrusion are normal.   POWER:  Examination of strength is pertinent for mild right-sided weakness in an upper motor neuron pattern, with trace weakness of the right wrist extensors, 4+ strength in the right finger extensors and first dorsal interosseous and 4/5 strength in the right anterior tibialis. Strength is otherwise normal in proximal and distal muscles in the upper and lower limbs.  REFLEXES:  Reflexes are roughly symmetric and within normal limits in the upper and lower limbs.   MOTOR/CEREBELLAR:  Finger-to-nose testing is normal bilaterally.  Rapid alternating movements are mildly slowed in the right hand and fingers.  There is no pronator drift in the arms.   GAIT:  The patient is able to ambulate independently on a flat, level surface without gross loss of postural stability.  She can walk on toes. Heel walking is impaired on the right, grading -1. Tandem gait is mildly to moderately impaired for age.      Assessment/plan:    1.  Multiple sclerosis  The patient is clinically stable with no evidence of active inflammatory demyelination on current disease-modifying therapy with glatiramer acetate, which she will continue.       I am going to check a vitamin D level today and I will advise her on  supplementation of vitamin D as needed to maintain her level within the goal range of 60-80 mcg per liter.      I am going to see her back in 1 year with MRI scans of the brain and cervical spine to be performed prior to that appointment to monitor the radiologic stability of her condition.     2.  Restless legs symptom  I will check a ferritin level today and advise her if ferritin level is below 75, in which case iron supplementation would be recommended to try to treat restless sensation in the lower limbs.      I discussed the possibility of using gabapentin as an agent for restless legs symptoms, but at present she would prefer not to add any additional medications, which is fine.  We can readdress this at any time, as desired.     I spent 35 minutes in patient care activities related to this encounter today, including time spent in reviewing the chart, obtaining history and examination from the patient, counseling, and documentation in the electronic medical record.     David Herrera MD   of Neurology  [facility]    Cc:  Cara Clements MD (PCP)  Patient

## 2021-01-18 NOTE — Clinical Note
"    1/18/2021         RE: Renea Trotter  [address] [facility] [address]        Dear Colleague,    Thank you for referring your patient, Renea Trotter, to the [facility] [place]. Please see a copy of my visit note below.    Date of service: January 18, 2021     Referral source: Established patient     Chief complaint: Multiple sclerosis     History of the Present Illness: Ms. Renea Trotter is a 50-year-old right-handed woman who is evaluated in the Multiple Sclerosis Clinic today for a scheduled follow-up visit.      This is a patient whom I evaluated on one previous occasion via telemedicine visit in May of last year after she transferred care from my former colleague, Dr. Regan Mcgregor.  At that time, she related a history of symptoms of demyelinating disease dating back to 2003 when she developed some numbness of the right hand and difficulty with the right leg that  was noticeable with walking.  She subsequently did well until 2011, at which time she had recurrent numbness in the right hand.  MRI imaging at that time was suggestive of demyelinating disease of the type seen in multiple sclerosis and a CSF examination was positive for oligoclonal bands.  She was subsequently placed on disease-modifying therapy with glatiramer acetate and MRI imaging was stable from 3486-5562.      Today, the patient denies any new episodic changes in vision, balance, strength or sensation suggestive of new relapse of multiple sclerosis since she was last seen.  As she reported in May, she feels like her right side is getting gradually weaker over time.  She does not think that there are any specific activities that she cannot do now that she would have been able to do a year or 2 ago, but does relate for example, that she is having more difficulty opening jars with the right hand over time.      Otherwise, symptomatically she continues to note \"irritating\" restless legs sensation " intermittently at night.  Temazepam does help to relieve this and facilitate sleep, although she does not take this every day.      She also relates that about 6 months ago, she developed pain radiating down the right arm from the shoulder to the elbow.  This did not seem to involve the neck and did not radiate below the elbow.  I told her that this particular symptom is unlikely to relate to new active inflammation in the central nervous system, but could potentially relate to some increased tone in the right arm, and advised that she try stretching any muscles that are tight or tending to spasm.      PHYSICAL EXAMINATION:   VITAL SIGNS:  Blood pressure 114/78; pulse 78; oxygen saturation 100%; weight 66.9 kg; height 1.6 meters.   NEUROLOGIC:    CRANIAL NERVES: Visual fields are full to confrontation.  Extraocular movements are intact with no internuclear ophthalmoplegia.  Facial strength is normal.  Palate elevation and tongue protrusion are normal.   POWER:  Examination of strength is pertinent for mild right-sided weakness in an upper motor neuron pattern, with trace weakness of the right wrist extensors, 4+ strength in the right finger extensors and first dorsal interosseous and 4/5 strength in the right anterior tibialis. Strength is otherwise normal in proximal and distal muscles in the upper and lower limbs.  REFLEXES:  Reflexes are roughly symmetric and within normal limits in the upper and lower limbs.   MOTOR/CEREBELLAR:  Finger-to-nose testing is normal bilaterally.  Rapid alternating movements are mildly slowed in the right hand and fingers.  There is no pronator drift in the arms.   GAIT:  The patient is able to ambulate independently on a flat, level surface without gross loss of postural stability.  She can walk on toes. Heel walking is impaired on the right, grading -1. Tandem gait is mildly to moderately impaired for age.      Assessment/plan:    1.  Multiple sclerosis  The patient is clinically  stable with no evidence of active inflammatory demyelination on current disease-modifying therapy with glatiramer acetate, which she will continue.       I am going to check a vitamin D level today and I will advise her on supplementation of vitamin D as needed to maintain her level within the goal range of 60-80 mcg per liter.      I am going to see her back in 1 year with MRI scans of the brain and cervical spine to be performed prior to that appointment to monitor the radiologic stability of her condition.     2.  Restless legs symptom  I will check a ferritin level today and advise her if ferritin level is below 75, in which case iron supplementation would be recommended to try to treat restless sensation in the lower limbs.      I discussed the possibility of using gabapentin as an agent for restless legs symptoms, but at present she would prefer not to add any additional medications, which is fine.  We can readdress this at any time, as desired.     I spent 35 minutes in patient care activities related to this encounter today, including time spent in reviewing the chart, obtaining history and examination from the patient, counseling, and documentation in the electronic medical record.        DAVID HERRERA MD             D: 2021   T: 2021   MT: LIZ      Name:     REINIER LÓPEZ   MRN:      [id number]        Account:      [id number]   :      1970           Visit Date:   2021      Document: [id number]          Again, thank you for allowing me to participate in the care of your patient.        Sincerely,        David Herrera MD

## 2021-01-19 LAB — DEPRECATED CALCIDIOL+CALCIFEROL SERPL-MC: 91 UG/L (ref 20–75)

## 2021-01-19 NOTE — PROGRESS NOTES
"Date of service: January 18, 2021     Referral source: Established patient     Chief complaint: Multiple sclerosis     History of the Present Illness: Ms. Renea Trotter is a 50-year-old right-handed woman who is evaluated in the Multiple Sclerosis Clinic today for a scheduled follow-up visit.      This is a patient whom I evaluated on one previous occasion via telemedicine visit in May of last year after she transferred care from my former colleague, Dr. Regan Mcgregor.  At that time, she related a history of symptoms of demyelinating disease dating back to 2003 when she developed some numbness of the right hand and difficulty with the right leg that  was noticeable with walking.  She subsequently did well until 2011, at which time she had recurrent numbness in the right hand.  MRI imaging at that time was suggestive of demyelinating disease of the type seen in multiple sclerosis and a CSF examination was positive for oligoclonal bands.  She was subsequently placed on disease-modifying therapy with glatiramer acetate and MRI imaging was stable from 5125-8465.      Today, the patient denies any new episodic changes in vision, balance, strength or sensation suggestive of new relapse of multiple sclerosis since she was last seen.  As she reported in May, she feels like her right side is getting gradually weaker over time.  She does not think that there are any specific activities that she cannot do now that she would have been able to do a year or 2 ago, but does relate for example, that she is having more difficulty opening jars with the right hand over time.      Otherwise, symptomatically she continues to note \"irritating\" restless legs sensation intermittently at night.  Temazepam does help to relieve this and facilitate sleep, although she does not take this every day.      She also relates that about 6 months ago, she developed pain radiating down the right arm from the shoulder to the elbow.  This did not seem to " involve the neck and did not radiate below the elbow.  I told her that this particular symptom is unlikely to relate to new active inflammation in the central nervous system, but could potentially relate to some increased tone in the right arm, and advised that she try stretching any muscles that are tight or tending to spasm.      PHYSICAL EXAMINATION:   VITAL SIGNS:  Blood pressure 114/78; pulse 78; oxygen saturation 100%; weight 66.9 kg; height 1.6 meters.   NEUROLOGIC:    CRANIAL NERVES: Visual fields are full to confrontation.  Extraocular movements are intact with no internuclear ophthalmoplegia.  Facial strength is normal.  Palate elevation and tongue protrusion are normal.   POWER:  Examination of strength is pertinent for mild right-sided weakness in an upper motor neuron pattern, with trace weakness of the right wrist extensors, 4+ strength in the right finger extensors and first dorsal interosseous and 4/5 strength in the right anterior tibialis. Strength is otherwise normal in proximal and distal muscles in the upper and lower limbs.  REFLEXES:  Reflexes are roughly symmetric and within normal limits in the upper and lower limbs.   MOTOR/CEREBELLAR:  Finger-to-nose testing is normal bilaterally.  Rapid alternating movements are mildly slowed in the right hand and fingers.  There is no pronator drift in the arms.   GAIT:  The patient is able to ambulate independently on a flat, level surface without gross loss of postural stability.  She can walk on toes. Heel walking is impaired on the right, grading -1. Tandem gait is mildly to moderately impaired for age.      Assessment/plan:    1.  Multiple sclerosis  The patient is clinically stable with no evidence of active inflammatory demyelination on current disease-modifying therapy with glatiramer acetate, which she will continue.       I am going to check a vitamin D level today and I will advise her on supplementation of vitamin D as needed to maintain her  level within the goal range of 60-80 mcg per liter.      I am going to see her back in 1 year with MRI scans of the brain and cervical spine to be performed prior to that appointment to monitor the radiologic stability of her condition.     2.  Restless legs symptom  I will check a ferritin level today and advise her if ferritin level is below 75, in which case iron supplementation would be recommended to try to treat restless sensation in the lower limbs.      I discussed the possibility of using gabapentin as an agent for restless legs symptoms, but at present she would prefer not to add any additional medications, which is fine.  We can readdress this at any time, as desired.     I spent 35 minutes in patient care activities related to this encounter today, including time spent in reviewing the chart, obtaining history and examination from the patient, counseling, and documentation in the electronic medical record.        BERNA HENSLEY MD             D: 2021   T: 2021   MT: LIZ      Name:     REINIER LÓPEZ   MRN:      [id number]        Account:      [id number]   :      1970           Visit Date:   2021      Document: [id number]

## 2021-02-19 ENCOUNTER — TELEPHONE (OUTPATIENT)
Dept: NEUROLOGY | Facility: CLINIC | Age: 51
End: 2021-02-19

## 2021-02-19 NOTE — TELEPHONE ENCOUNTER
[facility] Call Center    Phone Message    May a detailed message be left on voicemail: yes     Reason for Call: Other: pt calling and trying to get a prior auth for a medication, please advise with pt     Action Taken: Message routed to:  Adult Clinics: Neurology p [id number]    Travel Screening: Not Applicable

## 2021-02-22 NOTE — TELEPHONE ENCOUNTER
PA Initiation    Medication: Glatiramer acetate 40MG/ML syringes (PENDING)  Insurance Company: [employer] - Phone [phone] Fax [phone]  Pharmacy Filling the Rx: [facility], [address]  Filling Pharmacy Phone:    Filling Pharmacy Fax:    Start Date: 2/22/2021

## 2021-02-22 NOTE — TELEPHONE ENCOUNTER
The pt was informed on her VM that the PA was approved. She was encouraged to call with further questions or concerns.  Damari Rowe, RNCC  Neurology

## 2021-02-22 NOTE — TELEPHONE ENCOUNTER
Prior Authorization Approval    Authorization Effective Date: 1/23/2021  Authorization Expiration Date: 2/22/2022  Medication: Glatiramer acetate 40MG/ML syringes (APPROVED)  Approved Dose/Quantity: 12/28 days  Reference #:     Insurance Company: [employer] - Phone [phone] Fax [phone]  Expected CoPay:       CoPay Card Available: Yes    Foundation Assistance Needed: [employer]  Which Pharmacy is filling the prescription (Not needed for infusion/clinic administered): [facility] [place] - [address] [place] [address]  Pharmacy Notified: Yes  Patient Notified: Yes

## 2021-03-01 NOTE — TELEPHONE ENCOUNTER
PA Initiation    Medication: Glatopa 40MG/ML syringes (PENDING)  Insurance Company: [employer] - Phone [phone] Fax [phone]  Pharmacy Filling the Rx: [address]  Filling Pharmacy Phone:    Filling Pharmacy Fax:    Start Date: 3/1/2021    Per patient she has been on Glatopa for quite some time and tolerating well. According to charts she has been on Glatiramer. Resubmitted urgent PA to [place] for Glatopa 40MG/ML.    Thank you,    Mary Jane Bourgeois [facility]  Specialty Pharmacy Clinic [facility] [address] [place] [address]  Ph: [phone] Fax: [phone]  [email or web address]

## 2021-03-02 NOTE — TELEPHONE ENCOUNTER
PRIOR AUTHORIZATION DENIED    Medication: Glatopa 40MG/ML syringes (DENIED)    Denial Date: 3/1/2021    Denial Rational: insurance preferred products are Glatiramer and Dimethyl Fumarate    Appeal Information:

## 2021-03-02 NOTE — TELEPHONE ENCOUNTER
Dr Herrera,  Would you object to her using the alternative medications suggested below? The Glatopa is not covered. She is open to trying Glatiramer Acetate but at this point is out of medication.   Please advise on next steps. The pharmacy is attached to this encounter.     Damari Rowe, RNCC  Neurology

## 2021-03-12 ENCOUNTER — TELEPHONE (OUTPATIENT)
Dept: NEUROLOGY | Facility: CLINIC | Age: 51
End: 2021-03-12

## 2021-03-12 NOTE — TELEPHONE ENCOUNTER
Health Call Center    Phone Message    May a detailed message be left on voicemail: no     Reason for Call: Medication Question or concern regarding medication   Prescription Clarification  Name of Medication: glatiramer acetate 40 MG/ML injection  Prescribing Provider: Dr. Herrera   Pharmacy: [address]   What on the order needs clarification? Darlene calling to request a call back to discuss this medication. She stated that Renea has copay assistance for the Glatopa 40 mg/ml injection and is wondering if she would be able to switch to this medication. Please reference invoice number [id number].    Action Taken: Message routed to:  [facility]  [place]    Travel Screening: Not Applicable

## 2021-03-12 NOTE — TELEPHONE ENCOUNTER
RN spoke to [facility] and they shipped out the Glatiramer Acetate today.    Damari Rowe, RNCC  Neurology

## 2021-03-15 NOTE — TELEPHONE ENCOUNTER
Prior Authorization Approval    Authorization Effective Date: 2/10/2021  Authorization Expiration Date: 3/12/2022  Medication: Glatiramer acetate 40MG/ML syringes (APPROVED)  Approved Dose/Quantity: 12ML  Reference #:     Insurance Company: [employer] - Phone [phone] Fax [phone]  Expected CoPay:       CoPay Card Available: Yes    Foundation Assistance Needed: [employer]  Which Pharmacy is filling the prescription (Not needed for infusion/clinic administered): [facility] [place] - [address] [place] [address]  Pharmacy Notified: Yes  Patient Notified: Yes

## 2021-06-02 ENCOUNTER — TELEPHONE (OUTPATIENT)
Dept: NEUROLOGY | Facility: CLINIC | Age: 51
End: 2021-06-02

## 2021-06-02 DIAGNOSIS — G35 MULTIPLE SCLEROSIS (H): ICD-10-CM

## 2021-06-02 RX ORDER — GLATIRAMER 40 MG/ML
40 INJECTION, SOLUTION SUBCUTANEOUS
Qty: 12 ML | Refills: 11 | Status: CANCELLED | OUTPATIENT
Start: 2021-06-02

## 2021-06-02 NOTE — TELEPHONE ENCOUNTER
Health Call Center    Phone Message    May a detailed message be left on voicemail: yes     Reason for Call: Medication Question or concern regarding medication   Prescription Clarification  Name of Medication: glatiramer acetate 40 MG/ML injection  Prescribing Provider: Dr. Herrera   Pharmacy: [address]   What on the order needs clarification? Pt is requesting brand of Glatopa for this medication. Please contact pharmacy to advise if this is approved.    Miroslava from pharmacy states that there is some urgency to this as Pt has refused Rx as written.    Action Taken: Message routed to:  [facility] ([facility] Neurology    Travel Screening: Not Applicable

## 2021-06-03 NOTE — TELEPHONE ENCOUNTER
RN called the pt again to discuss her medication request but there was no answer. A message was left on her VM requesting a call back.  Damari Rowe RN   Neurology Care Coordinator

## 2021-06-03 NOTE — TELEPHONE ENCOUNTER
RN called [facility] to stop the shipment on the Glatiramer Acetate and called the pt to discuss her request. There was no answer so a message was left on her VM requesting a call back.  Damari Rowe RN   Neurology Care Coordinator

## 2021-06-04 NOTE — TELEPHONE ENCOUNTER
The pt returned my call to report that she DOES want the Glatiramer Acetate and has spoken with [facility]. They should be shipping to her home soon.  She was encouraged to call with further questions.   Damari Rowe RN   Neurology Care Coordinator

## 2021-09-05 ENCOUNTER — HEALTH MAINTENANCE LETTER (OUTPATIENT)
Age: 51
End: 2021-09-05

## 2021-10-18 ENCOUNTER — ANCILLARY PROCEDURE (OUTPATIENT)
Dept: MAMMOGRAPHY | Facility: CLINIC | Age: 51
End: 2021-10-18
Attending: FAMILY MEDICINE
Payer: COMMERCIAL

## 2021-10-18 DIAGNOSIS — Z12.31 VISIT FOR SCREENING MAMMOGRAM: ICD-10-CM

## 2021-10-18 PROCEDURE — 77063 BREAST TOMOSYNTHESIS BI: CPT | Mod: GC | Performed by: RADIOLOGY

## 2021-10-18 PROCEDURE — 77067 SCR MAMMO BI INCL CAD: CPT | Mod: GC | Performed by: RADIOLOGY

## 2021-10-24 ENCOUNTER — E-VISIT (OUTPATIENT)
Dept: FAMILY MEDICINE | Facility: CLINIC | Age: 51
End: 2021-10-24
Payer: COMMERCIAL

## 2021-10-24 DIAGNOSIS — N76.0 BACTERIAL VAGINOSIS: ICD-10-CM

## 2021-10-24 DIAGNOSIS — B96.89 BV (BACTERIAL VAGINOSIS): Primary | ICD-10-CM

## 2021-10-24 DIAGNOSIS — B96.89 BACTERIAL VAGINOSIS: ICD-10-CM

## 2021-10-24 DIAGNOSIS — N76.0 BV (BACTERIAL VAGINOSIS): Primary | ICD-10-CM

## 2021-10-24 PROCEDURE — 99421 OL DIG E/M SVC 5-10 MIN: CPT | Performed by: FAMILY MEDICINE

## 2021-10-25 RX ORDER — METRONIDAZOLE 7.5 MG/G
1 GEL VAGINAL AT BEDTIME
Qty: 70 G | Refills: 0 | Status: SHIPPED | OUTPATIENT
Start: 2021-10-25 | End: 2021-10-30

## 2021-10-31 ENCOUNTER — HEALTH MAINTENANCE LETTER (OUTPATIENT)
Age: 51
End: 2021-10-31

## 2021-12-12 NOTE — PROGRESS NOTES
SUBJECTIVE:   CC: Renea Trotter is an 51 year old woman who presents for preventive health visit.     Menopause/hypoactive sexual desire-patient is in menopause since August 2020  Has noticed a significant drop in sex drive and inability to achieve orgasm since menopause  Denies vaginal dryness, concerns for anxiety, depression  Chronic insomnia-patient has history of chronic insomnia  Denies snoring, sleep apnea  Has been taking temazepam as needed  Past medical history significant for multiple sclerosis, hypertension    Patient has been advised of split billing requirements and indicates understanding: Yes  Healthy Habits:     Getting at least 3 servings of Calcium per day:  Yes    Bi-annual eye exam:  Yes    Dental care twice a year:  Yes    Sleep apnea or symptoms of sleep apnea:  None    Diet:  Regular (no restrictions)    Frequency of exercise:  2-3 days/week    Duration of exercise:  15-30 minutes    Taking medications regularly:  No    Barriers to taking medications:  Not applicable    Medication side effects:  None    PHQ-2 Total Score: 0    Additional concerns today:  Yes          Hyperlipidemia Follow-Up      Are you regularly taking any medication or supplement to lower your cholesterol?   No    Are you having muscle aches or other side effects that you think could be caused by your cholesterol lowering medication?  N/A    Hypertension Follow-up      Do you check your blood pressure regularly outside of the clinic? Yes     Are you following a low salt diet? Yes         Are your blood pressures ever more than 140 on the top number (systolic) OR more   than 90 on the bottom number (diastolic), for example 140/90? No      Today's PHQ-2 Score:   PHQ-2 ( 1999 [employer]) 12/14/2021   Q1: Little interest or pleasure in doing things 0   Q2: Feeling down, depressed or hopeless 0   PHQ-2 Score 0   PHQ-2 Total Score (12-17 Years)- Positive if 3 or more points; Administer PHQ-A if positive -   Q1: Little  interest or pleasure in doing things Not at all   Q2: Feeling down, depressed or hopeless Not at all   PHQ-2 Score 0       Abuse: Current or Past (Physical, Sexual or Emotional) - No  Do you feel safe in your environment? Yes    Have you ever done Advance Care Planning? (For example, a Health Directive, POLST, or a discussion with a medical provider or your loved ones about your wishes): No, advance care planning information given to patient to review.  Patient declined advance care planning discussion at this time.    Social History     Tobacco Use     Smoking status: Never Smoker     Smokeless tobacco: Never Used   Substance Use Topics     Alcohol use: Yes     Alcohol/week: 1.0 - 2.0 standard drink     Comment: RARELY     If you drink alcohol do you typically have >3 drinks per day or >7 drinks per week? No    Alcohol Use 12/14/2021   Prescreen: >3 drinks/day or >7 drinks/week? No   Prescreen: >3 drinks/day or >7 drinks/week? -   No flowsheet data found.    Reviewed orders with patient.  Reviewed health maintenance and updated orders accordingly - Yes  Lab work is in process  Labs reviewed in EPIC  BP Readings from Last 3 Encounters:   12/14/21 121/85   01/18/21 114/78   09/18/20 122/84    Wt Readings from Last 3 Encounters:   12/14/21 67.1 kg (148 lb)   01/18/21 66.9 kg (147 lb 8 oz)   09/18/20 67.3 kg (148 lb 4.8 oz)                  Patient Active Problem List   Diagnosis     Relapsing remitting multiple sclerosis (H)     CARDIOVASCULAR SCREENING; LDL GOAL LESS THAN 160     BMI 26.0-26.9,adult     Urinary urgency     Headache     Low back pain without sciatica, unspecified back pain laterality     Sleep disorder     Plantar warts     Essential hypertension with goal blood pressure less than 140/90     Hemorrhoids, unspecified hemorrhoid type     Seborrheic keratosis     Mild intermittent asthma     Hypoactive sexual desire     Menopause     Tinea pedis of right foot     Vitamin D deficiency     Past Surgical  History:   Procedure Laterality Date     HC TOOTH EXTRACTION W/FORCEP         Social History     Tobacco Use     Smoking status: Never Smoker     Smokeless tobacco: Never Used   Substance Use Topics     Alcohol use: Yes     Alcohol/week: 1.0 - 2.0 standard drink     Comment: RARELY     Family History   Problem Relation Age of Onset     Hypertension Mother      Lipids Mother      Hyperlipidemia Mother      Eye Disorder Father      Hyperlipidemia Father      Coronary Artery Disease Sister      Hyperlipidemia Sister      Cerebrovascular Disease Maternal Grandmother      Cerebrovascular Disease Maternal Grandfather      Prostate Cancer Maternal Grandfather      Blood Disease Paternal Grandmother      Alzheimer Disease Paternal Grandfather      Pulmonary Embolism Daughter      Multiple Sclerosis Cousin          Current Outpatient Medications   Medication Sig Dispense Refill     albuterol (PROAIR HFA/PROVENTIL HFA/VENTOLIN HFA) 108 (90 Base) MCG/ACT inhaler Inhale 2 puffs into the lungs every 4 hours as needed for shortness of breath / dyspnea or wheezing Profile Rx 8.5 g 3     Cholecalciferol (VITAMIN D3 PO) Take 5,000 Units by mouth daily       glatiramer acetate 40 MG/ML injection Inject 40 mg Subcutaneous three times a week 12 mL 11     ketoconazole (NIZORAL) 2 % external cream Apply thin layer to the right third toe and surrounding area. 30 g 11     lisinopril-hydrochlorothiazide (ZESTORETIC) 10-12.5 MG tablet Take 1 tablet by mouth daily 90 tablet 3     MULTIPLE VITAMIN PO Take 1 tablet by mouth daily.       pravastatin (PRAVACHOL) 20 MG tablet Take 1 tablet (20 mg) by mouth every evening 90 tablet 3     temazepam (RESTORIL) 15 MG capsule TAKE 1 TO 2 CAPSULES BY MOUTH NIGHTLY AS NEEDED FOR SLEEP 15 capsule 0     No Known Allergies  Recent Labs   Lab Test 12/14/21  1229 09/18/20  0723 06/01/19  0803 03/15/18  1041 10/13/17  0727   * 141*  --   --  113*   HDL 60 56  --   --  52   TRIG 127 134  --   --  169*    ALT 26  --  22 17 63*   CR 0.94 0.82 0.85 0.72 0.87   GFRESTIMATED 70 84 81 86 69   GFRESTBLACK  --  >90 >90 >90 84   POTASSIUM 3.9 3.8 4.0 3.8 3.2*   TSH  --   --  1.63  --  2.19        Breast Cancer Screening:  Any new diagnosis of family breast, ovarian, or bowel cancer? No    FHS-7:   Breast CA Risk Assessment (FHS-7) 10/18/2021   Did any of your first-degree relatives have breast or ovarian cancer? No   Did any of your relatives have bilateral breast cancer? No   Did any man in your family have breast cancer? No   Did any woman in your family have breast and ovarian cancer? No   Did any woman in your family have breast cancer before age 50 y? No   Do you have 2 or more relatives with breast and/or ovarian cancer? No   Do you have 2 or more relatives with breast and/or bowel cancer? No     click delete button to remove this line now  Mammogram Screening: Recommended annual mammography  Pertinent mammograms are reviewed under the imaging tab.    History of abnormal Pap smear: NO - age 30-65 PAP every 5 years with negative HPV co-testing recommended  PAP / HPV Latest Ref Rng & Units 9/18/2020 10/13/2017 8/6/2014   PAP (Historical) - NIL NIL NIL   HPV16 NEG:Negative Negative Negative -   HPV18 NEG:Negative Negative Negative -   HRHPV NEG:Negative Negative Negative -     Reviewed and updated as needed this visit by clinical staff  Tobacco  Allergies  Meds             Reviewed and updated as needed this visit by Provider                 Past Medical History:   Diagnosis Date     HTN (hypertension)      Mild intermittent asthma      MS (multiple sclerosis) (H)       Past Surgical History:   Procedure Laterality Date     HC TOOTH EXTRACTION W/FORCEP       OB History   No obstetric history on file.       Review of Systems  CONSTITUTIONAL: NEGATIVE for fever, chills, change in weight  INTEGUMENTARY/SKIN: Has history of tinea pedis, requesting prescription refill that was originally prescribed from dermatology  EYES:  "NEGATIVE for vision changes or irritation  ENT: NEGATIVE for ear, mouth and throat problems  RESP: NEGATIVE for significant cough or SOB  BREAST: NEGATIVE for masses, tenderness or discharge  CV: NEGATIVE for chest pain, palpitations or peripheral edema  CV: Hx HTN  GI: NEGATIVE for nausea, abdominal pain, heartburn, or change in bowel habits  : NEGATIVE for unusual urinary or vaginal symptoms. No vaginal bleeding.   menopausal female: as above  MUSCULOSKELETAL: NEGATIVE for significant arthralgias or myalgia  NEURO: NEGATIVE for weakness, dizziness or paresthesias  NEURO: History of multiple sclerosis  ENDOCRINE: NEGATIVE for temperature intolerance, skin/hair changes  HEME/ALLERGY/IMMUNE: NEGATIVE for bleeding problems  PSYCHIATRIC: NEGATIVE for changes in mood or affect      OBJECTIVE:   /85 (BP Location: Right arm, Patient Position: Sitting, Cuff Size: Adult Regular)   Pulse 71   Temp 98.3  F (36.8  C) (Oral)   Resp 16   Ht 1.585 m (5' 2.4\")   Wt 67.1 kg (148 lb)   LMP 08/01/2020   SpO2 100%   Breastfeeding No   BMI 26.72 kg/m    Physical Exam  GENERAL APPEARANCE: healthy, alert and no distress  EYES: Eyes grossly normal to inspection, PERRL and conjunctivae and sclerae normal  HENT: ear canals and TM's normal, nose and mouth without ulcers or lesions, oropharynx clear and oral mucous membranes moist  NECK: no adenopathy, no asymmetry, masses, or scars and thyroid normal to palpation  RESP: lungs clear to auscultation - no rales, rhonchi or wheezes  BREAST: normal without masses, tenderness or nipple discharge and no palpable axillary masses or adenopathy  CV: regular rate and rhythm, normal S1 S2, no S3 or S4, no murmur, click or rub, no peripheral edema and peripheral pulses strong  ABDOMEN: soft, nontender, no hepatosplenomegaly, no masses and bowel sounds normal  MS: no musculoskeletal defects are noted and gait is age appropriate without ataxia  SKIN: no suspicious lesions or " rashes  NEURO: Normal strength and tone, sensory exam grossly normal, mentation intact and speech normal  PSYCH: mentation appears normal and affect normal/bright    Diagnostic Test Results:  Labs reviewed in Epic    ASSESSMENT/PLAN:   (Z00.00) Routine general medical examination at a health care facility  (primary encounter diagnosis)  Comment:   Plan: Discussed on regular exercises, daily calcium intake, healthy eating, self breast exams monthly and routine dental checks      (I10) Essential hypertension with goal blood pressure less than 140/90  Comment:   Plan: Albumin Random Urine Quantitative with Creat         Ratio, lisinopril-hydrochlorothiazide         (ZESTORETIC) 10-12.5 MG tablet, Comprehensive         metabolic panel (BMP + Alb, Alk Phos, ALT, AST,        Total. Bili, TP), CBC with platelets          BP Readings from Last 6 Encounters:   12/14/21 121/85   01/18/21 114/78   09/18/20 122/84   05/28/20 (!) 120/90   02/12/20 116/78   11/11/19 121/78     Blood pressure is at goal, continue current medications, low-salt, regular exercises, recheck in 1 year or sooner if needed  Will f/u on results and call with recommendations.      (G47.9) Sleep disorder  Comment:   Plan: SLEEP EVALUATION & MANAGEMENT REFERRAL - ADULT         -, TSH with free T4 reflex        Due to chronicity of symptoms recommended to have sleep physician consult to evaluate this further  Continue with sleep hygiene    (Z78.0) Menopause  Comment:   Plan: Ob/Gyn Referral        Recommended to consult gynecology to discuss about medication management of her current symptoms including hypoactive sex desire    (F52.0) Hypoactive sexual desire  Comment:   Plan: Ob/Gyn Referral, TSH with free T4 reflex        as above      (E78.5) Hyperlipidemia LDL goal <130  Comment:   Plan: pravastatin (PRAVACHOL) 20 MG tablet, Lipid         panel reflex to direct LDL Fasting, **ALT         FUTURE 2mo          LDL Cholesterol Calculated   Date Value Ref  Range Status   12/14/2021 141 (H) <=100 mg/dL Final   09/18/2020 141 (H) <100 mg/dL Final     Comment:     Above desirable:  100-129 mg/dl  Borderline High:  130-159 mg/dL  High:             160-189 mg/dL  Very high:       >189 mg/dl       LDL is not at goal  Recommended to start on pravastatin 20 mg daily  Continue with heart healthy diet, regular exercises, recheck lipids in 3 months  Dosing and potential medication side effects discussed.  Patient verbalised understanding and is agreeable to the plan.        (B35.3) Tinea pedis of right foot  Comment:   Plan: ketoconazole (NIZORAL) 2 % external cream        Refills given on Nizoral cream, recommended to keep the feet clean and dry    (J45.20) Mild intermittent asthma without complication  Comment:   Plan: albuterol (PROAIR HFA/PROVENTIL HFA/VENTOLIN         HFA) 108 (90 Base) MCG/ACT inhaler, Asthma         Action Plan (AAP)        Stable, continues albuterol inhaler as needed, patient has not used any rescue inhalers in the past year    (Z11.59) Need for hepatitis C screening test  Comment:   Plan: Hepatitis C Screen Reflex to HCV RNA Quant and         Genotype            (Z13.220) Screening for hyperlipidemia  Comment:   Plan: Lipid panel reflex to direct LDL Fasting            (Z23) Need for COVID-19 vaccine  Comment:   Plan: COVID-19,PF,PFIZER (12+ Yrs PURPLE LABEL)            (Z12.4) Cervical cancer screening  Comment:   Plan: Patient is not due for Pap this year    (Z12.31) Encounter for screening mammogram for malignant neoplasm of breast  Comment:   Plan: Reviewed normal mammogram from October 2021    (Z12.11) Colon cancer screening  Comment:   Plan: Adult Gastro Ref - Procedure Only            (G35) Relapsing remitting multiple sclerosis (H)  Comment:   Plan: SLEEP EVALUATION & MANAGEMENT REFERRAL - ADULT         -            (E55.9) Vitamin D deficiency  Comment:   Plan: Vitamin D Deficiency        Currently taking 5000 units of vitamin D  "daily    (Z13.1) Screening for diabetes mellitus (DM)  Comment:   Plan: Comprehensive metabolic panel (BMP + Alb, Alk         Phos, ALT, AST, Total. Bili, TP)            (Z13.0) Screening for deficiency anemia  Comment:   Plan: CBC with platelets            (Z13.29) Screening for thyroid disorder  Comment:   Plan: TSH with free T4 reflex              Patient has been advised of split billing requirements and indicates understanding: Yes  COUNSELING:  Reviewed preventive health counseling, as reflected in patient instructions  Special attention given to:        Regular exercise       Healthy diet/nutrition       Vision screening       Immunizations    Vaccinated for: Covid booster           Osteoporosis prevention/bone health       Colon cancer screening       Consider Hep C screening for all patients one time for ages 18-79 years       (Lily)menopause management       The 10-year ASCVD risk score (Roly MONTERO Jr., et al., 2013) is: 1.7%    Values used to calculate the score:      Age: 51 years      Sex: Female      Is Non- : No      Diabetic: No      Tobacco smoker: No      Systolic Blood Pressure: 121 mmHg      Is BP treated: Yes      HDL Cholesterol: 60 mg/dL      Total Cholesterol: 226 mg/dL    Estimated body mass index is 26.72 kg/m  as calculated from the following:    Height as of this encounter: 1.585 m (5' 2.4\").    Weight as of this encounter: 67.1 kg (148 lb).    Weight management plan: Discussed healthy diet and exercise guidelines    She reports that she has never smoked. She has never used smokeless tobacco.      Counseling Resources:  ATP IV Guidelines  Pooled Cohorts Equation Calculator  Breast Cancer Risk Calculator  BRCA-Related Cancer Risk Assessment: FHS-7 Tool  FRAX Risk Assessment  ICSI Preventive Guidelines  Dietary Guidelines for Americans, 2010  USDA's MyPlate  ASA Prophylaxis  Lung CA Screening    Cara Clements MD  [facility] " documentation done in part with Dragon Voice recognition Software. Although reviewed after completion, some word and grammatical error may remain.

## 2021-12-12 NOTE — PATIENT INSTRUCTIONS
Call  to schedule for sleep consult    Preventive Health Recommendations  Female Ages 50 - 64    Yearly exam: See your health care provider every year in order to  o Review health changes.   o Discuss preventive care.    o Review your medicines if your doctor has prescribed any.      Get a Pap test every three years (unless you have an abnormal result and your provider advises testing more often).    If you get Pap tests with HPV test, you only need to test every 5 years, unless you have an abnormal result.     You do not need a Pap test if your uterus was removed (hysterectomy) and you have not had cancer.    You should be tested each year for STDs (sexually transmitted diseases) if you're at risk.     Have a mammogram every 1 to 2 years.    Have a colonoscopy at age 50, or have a yearly FIT test (stool test). These exams screen for colon cancer.      Have a cholesterol test every 5 years, or more often if advised.    Have a diabetes test (fasting glucose) every three years. If you are at risk for diabetes, you should have this test more often.     If you are at risk for osteoporosis (brittle bone disease), think about having a bone density scan (DEXA).    Shots: Get a flu shot each year. Get a tetanus shot every 10 years.    Nutrition:     Eat at least 5 servings of fruits and vegetables each day.    Eat whole-grain bread, whole-wheat pasta and brown rice instead of white grains and rice.    Get adequate Calcium and Vitamin D.     Lifestyle    Exercise at least 150 minutes a week (30 minutes a day, 5 days a week). This will help you control your weight and prevent disease.    Limit alcohol to one drink per day.    No smoking.     Wear sunscreen to prevent skin cancer.     See your dentist every six months for an exam and cleaning.    See your eye doctor every 1 to 2 years.

## 2021-12-14 ENCOUNTER — OFFICE VISIT (OUTPATIENT)
Dept: FAMILY MEDICINE | Facility: CLINIC | Age: 51
End: 2021-12-14
Payer: COMMERCIAL

## 2021-12-14 VITALS
OXYGEN SATURATION: 100 % | RESPIRATION RATE: 16 BRPM | SYSTOLIC BLOOD PRESSURE: 121 MMHG | TEMPERATURE: 98.3 F | BODY MASS INDEX: 27.23 KG/M2 | HEIGHT: 62 IN | WEIGHT: 148 LBS | DIASTOLIC BLOOD PRESSURE: 85 MMHG | HEART RATE: 71 BPM

## 2021-12-14 DIAGNOSIS — Z13.0 SCREENING FOR DEFICIENCY ANEMIA: ICD-10-CM

## 2021-12-14 DIAGNOSIS — Z78.0 MENOPAUSE: ICD-10-CM

## 2021-12-14 DIAGNOSIS — E55.9 VITAMIN D DEFICIENCY: ICD-10-CM

## 2021-12-14 DIAGNOSIS — Z12.4 CERVICAL CANCER SCREENING: ICD-10-CM

## 2021-12-14 DIAGNOSIS — Z13.1 SCREENING FOR DIABETES MELLITUS (DM): ICD-10-CM

## 2021-12-14 DIAGNOSIS — I10 ESSENTIAL HYPERTENSION WITH GOAL BLOOD PRESSURE LESS THAN 140/90: ICD-10-CM

## 2021-12-14 DIAGNOSIS — Z12.11 COLON CANCER SCREENING: ICD-10-CM

## 2021-12-14 DIAGNOSIS — Z12.31 ENCOUNTER FOR SCREENING MAMMOGRAM FOR MALIGNANT NEOPLASM OF BREAST: ICD-10-CM

## 2021-12-14 DIAGNOSIS — E78.5 HYPERLIPIDEMIA LDL GOAL <130: ICD-10-CM

## 2021-12-14 DIAGNOSIS — Z00.00 ROUTINE GENERAL MEDICAL EXAMINATION AT A HEALTH CARE FACILITY: Primary | ICD-10-CM

## 2021-12-14 DIAGNOSIS — Z13.220 SCREENING FOR HYPERLIPIDEMIA: ICD-10-CM

## 2021-12-14 DIAGNOSIS — G47.9 SLEEP DISORDER: ICD-10-CM

## 2021-12-14 DIAGNOSIS — Z13.29 SCREENING FOR THYROID DISORDER: ICD-10-CM

## 2021-12-14 DIAGNOSIS — J45.20 MILD INTERMITTENT ASTHMA WITHOUT COMPLICATION: ICD-10-CM

## 2021-12-14 DIAGNOSIS — Z11.59 NEED FOR HEPATITIS C SCREENING TEST: ICD-10-CM

## 2021-12-14 DIAGNOSIS — F52.0 HYPOACTIVE SEXUAL DESIRE: ICD-10-CM

## 2021-12-14 DIAGNOSIS — G35 RELAPSING REMITTING MULTIPLE SCLEROSIS (H): ICD-10-CM

## 2021-12-14 DIAGNOSIS — Z23 NEED FOR COVID-19 VACCINE: ICD-10-CM

## 2021-12-14 DIAGNOSIS — B35.3 TINEA PEDIS OF RIGHT FOOT: ICD-10-CM

## 2021-12-14 LAB
ALBUMIN SERPL-MCNC: 4.3 G/DL (ref 3.4–5)
ALP SERPL-CCNC: 72 U/L (ref 40–150)
ALT SERPL W P-5'-P-CCNC: 26 U/L (ref 0–50)
ANION GAP SERPL CALCULATED.3IONS-SCNC: 2 MMOL/L (ref 3–14)
AST SERPL W P-5'-P-CCNC: 18 U/L (ref 0–45)
BILIRUB SERPL-MCNC: 0.5 MG/DL (ref 0.2–1.3)
BUN SERPL-MCNC: 22 MG/DL (ref 7–30)
CALCIUM SERPL-MCNC: 9.8 MG/DL (ref 8.5–10.1)
CHLORIDE BLD-SCNC: 103 MMOL/L (ref 94–109)
CHOLEST SERPL-MCNC: 226 MG/DL
CO2 SERPL-SCNC: 33 MMOL/L (ref 20–32)
CREAT SERPL-MCNC: 0.94 MG/DL (ref 0.52–1.04)
CREAT UR-MCNC: 119 MG/DL
ERYTHROCYTE [DISTWIDTH] IN BLOOD BY AUTOMATED COUNT: 11.9 % (ref 10–15)
FASTING STATUS PATIENT QL REPORTED: YES
GFR SERPL CREATININE-BSD FRML MDRD: 70 ML/MIN/1.73M2
GLUCOSE BLD-MCNC: 89 MG/DL (ref 70–99)
HCT VFR BLD AUTO: 42.1 % (ref 35–47)
HDLC SERPL-MCNC: 60 MG/DL
HGB BLD-MCNC: 14.2 G/DL (ref 11.7–15.7)
LDLC SERPL CALC-MCNC: 141 MG/DL
MCH RBC QN AUTO: 30.3 PG (ref 26.5–33)
MCHC RBC AUTO-ENTMCNC: 33.7 G/DL (ref 31.5–36.5)
MCV RBC AUTO: 90 FL (ref 78–100)
MICROALBUMIN UR-MCNC: 12 MG/L
MICROALBUMIN/CREAT UR: 10.08 MG/G CR (ref 0–25)
NONHDLC SERPL-MCNC: 166 MG/DL
PLATELET # BLD AUTO: 301 10E3/UL (ref 150–450)
POTASSIUM BLD-SCNC: 3.9 MMOL/L (ref 3.4–5.3)
PROT SERPL-MCNC: 8.2 G/DL (ref 6.8–8.8)
RBC # BLD AUTO: 4.69 10E6/UL (ref 3.8–5.2)
SODIUM SERPL-SCNC: 138 MMOL/L (ref 133–144)
TRIGL SERPL-MCNC: 127 MG/DL
WBC # BLD AUTO: 5.9 10E3/UL (ref 4–11)

## 2021-12-14 PROCEDURE — 82043 UR ALBUMIN QUANTITATIVE: CPT | Performed by: FAMILY MEDICINE

## 2021-12-14 PROCEDURE — 99396 PREV VISIT EST AGE 40-64: CPT | Performed by: FAMILY MEDICINE

## 2021-12-14 PROCEDURE — 82306 VITAMIN D 25 HYDROXY: CPT | Performed by: FAMILY MEDICINE

## 2021-12-14 PROCEDURE — 80061 LIPID PANEL: CPT | Performed by: FAMILY MEDICINE

## 2021-12-14 PROCEDURE — 85027 COMPLETE CBC AUTOMATED: CPT | Performed by: FAMILY MEDICINE

## 2021-12-14 PROCEDURE — 99214 OFFICE O/P EST MOD 30 MIN: CPT | Mod: 25 | Performed by: FAMILY MEDICINE

## 2021-12-14 PROCEDURE — 91300 COVID-19,PF,PFIZER (12+ YRS): CPT | Performed by: FAMILY MEDICINE

## 2021-12-14 PROCEDURE — 80053 COMPREHEN METABOLIC PANEL: CPT | Performed by: FAMILY MEDICINE

## 2021-12-14 PROCEDURE — 0004A COVID-19,PF,PFIZER (12+ YRS): CPT | Performed by: FAMILY MEDICINE

## 2021-12-14 PROCEDURE — 86803 HEPATITIS C AB TEST: CPT | Performed by: FAMILY MEDICINE

## 2021-12-14 PROCEDURE — 36415 COLL VENOUS BLD VENIPUNCTURE: CPT | Performed by: FAMILY MEDICINE

## 2021-12-14 RX ORDER — KETOCONAZOLE 20 MG/G
CREAM TOPICAL
Qty: 30 G | Refills: 11 | Status: SHIPPED | OUTPATIENT
Start: 2021-12-14 | End: 2023-01-17

## 2021-12-14 RX ORDER — LISINOPRIL/HYDROCHLOROTHIAZIDE 10-12.5 MG
1 TABLET ORAL DAILY
Qty: 90 TABLET | Refills: 3 | Status: SHIPPED | OUTPATIENT
Start: 2021-12-14 | End: 2023-01-17

## 2021-12-14 RX ORDER — ALBUTEROL SULFATE 90 UG/1
2 AEROSOL, METERED RESPIRATORY (INHALATION) EVERY 4 HOURS PRN
Qty: 8.5 G | Refills: 3 | Status: SHIPPED | OUTPATIENT
Start: 2021-12-14 | End: 2024-03-01

## 2021-12-14 ASSESSMENT — ENCOUNTER SYMPTOMS
NAUSEA: 0
HEADACHES: 0
PALPITATIONS: 0
SORE THROAT: 0
DIARRHEA: 0
JOINT SWELLING: 0
SHORTNESS OF BREATH: 0
ARTHRALGIAS: 0
CONSTIPATION: 0
DYSURIA: 0
FEVER: 0
PARESTHESIAS: 0
COUGH: 0
HEMATOCHEZIA: 0
EYE PAIN: 0
NERVOUS/ANXIOUS: 0
WEAKNESS: 0
CHILLS: 0
MYALGIAS: 0
HEMATURIA: 0
BREAST MASS: 0
DIZZINESS: 0
HEARTBURN: 0
ABDOMINAL PAIN: 0
FREQUENCY: 0

## 2021-12-14 ASSESSMENT — MIFFLIN-ST. JEOR: SCORE: 1245.95

## 2021-12-14 NOTE — LETTER
My Asthma Action Plan  Name: Renea Trotter   YOB: 1970  Date: 12/14/2021   My doctor: Cara Clements   My clinic: [facility]      My Control Medicine: None        Dose:   My Rescue Medicine: Albuterol        Dose: 2 puffs every 4-6 hours as needed   My Asthma Severity: Intermittent / Exercise Induced  Avoid your asthma triggers: upper respiratory infections        GREEN ZONE   Good Control    I feel good    No cough or wheeze    Can work, sleep and play without asthma symptoms       Take your asthma control medicine every day.     1. If exercise triggers your asthma, take your rescue medication    15 minutes before exercise or sports, and    During exercise if you have asthma symptoms  2. Spacer to use with inhaler: If you have a spacer, make sure to use it with your inhaler             YELLOW ZONE Getting Worse  I have ANY of these:    I do not feel good    Cough or wheeze    Chest feels tight    Wake up at night   1. Keep taking your Green Zone medications  2. Start taking your rescue medicine:    every 20 minutes for up to 1 hour. Then every 4 hours for 24-48 hours.  3. If you stay in the Yellow Zone for more than 12-24 hours, contact your doctor.  4. If you do not return to the Green Zone in 12-24 hours or you get worse, start taking your oral steroid medicine if prescribed by your provider.           RED ZONE Medical Alert - Get Help  I have ANY of these:    I feel awful    Medicine is not helping    Breathing getting harder    Trouble walking or talking    Nose opens wide to breathe       1. Take your rescue medicine NOW  2. If your provider has prescribed an oral steroid medicine, start taking it NOW  3. Call your doctor NOW  4. If you are still in the Red Zone after 20 minutes and you have not reached your doctor:    Take your rescue medicine again and    Call 911 or go to the emergency room right away    See your regular doctor within 2 weeks of an Emergency  Room or Urgent Care visit for follow-up treatment.        The above medication may be given at school or day care?: N/A (Adult Patient)  Child can carry and use inhaler(s) at school with approval of school nurse?: N/A (Adult Patient)    Electronically signed by: Cara Clements MD, December 14, 2021    Annual Reminders:  Meet with Asthma Educator,  Flu Shot in the Fall, consider Pneumonia Vaccination for patients with asthma (aged 19 and older).    Pharmacy:    [facility] [address] [place], [place] - [address] [address] [place] [address]  [employer]  [facility] D[facility] - [address]  [facility] [employer] [facility] - [address]  [id number] [place], [place] - [address] [facility] AT [address]                    Asthma Triggers  How To Control Things That Make Your Asthma Worse    Triggers are things that make your asthma worse.  Look at the list below to help you find your triggers and what you can do about them.  You can help prevent asthma flare-ups by staying away from your triggers.      Trigger                                                          What you can do   Cigarette Smoke  Tobacco smoke can make asthma worse. Do not allow smoking in your home, car or around you.  Be sure no one smokes at a child s day care or school.  If you smoke, ask your health care provider for ways to help you quit.  Ask family members to quit too.  Ask your health care provider for a referral to Quit Plan to help you quit smoking, or call 7-850-859-PLAN.     Colds, Flu, Bronchitis  These are common triggers of asthma. Wash your hands often.  Don t touch your eyes, nose or mouth.  Get a flu shot every year.     Dust Mites  These are tiny bugs that live in cloth or carpet. They are too small to see. Wash sheets and blankets in hot water every week.   Encase pillows and mattress in dust mite proof covers.  Avoid  having carpet if you can. If you have carpet, vacuum weekly.   Use a dust mask and HEPA vacuum.   Pollen and Outdoor Mold  Some people are allergic to trees, grass, or weed pollen, or molds. Try to keep your windows closed.  Limit time out doors when pollen count is high.   Ask you health care provider about taking medicine during allergy season.     Animal Dander  Some people are allergic to skin flakes, urine or saliva from pets with fur or feathers. Keep pets with fur or feathers out of your home.    If you can t keep the pet outdoors, then keep the pet out of your bedroom.  Keep the bedroom door closed.  Keep pets off cloth furniture and away from stuffed toys.     Mice, Rats, and Cockroaches  Some people are allergic to the waste from these pests.   Cover food and garbage.  Clean up spills and food crumbs.  Store grease in the refrigerator.   Keep food out of the bedroom.   Indoor Mold  This can be a trigger if your home has high moisture. Fix leaking faucets, pipes, or other sources of water.   Clean moldy surfaces.  Dehumidify basement if it is damp and smelly.   Smoke, Strong Odors, and Sprays  These can reduce air quality. Stay away from strong odors and sprays, such as perfume, powder, hair spray, paints, smoke incense, paint, cleaning products, candles and new carpet.   Exercise or Sports  Some people with asthma have this trigger. Be active!  Ask your doctor about taking medicine before sports or exercise to prevent symptoms.    Warm up for 5-10 minutes before and after sports or exercise.     Other Triggers of Asthma  Cold air:  Cover your nose and mouth with a scarf.  Sometimes laughing or crying can be a trigger.  Some medicines and food can trigger asthma.

## 2021-12-15 LAB
DEPRECATED CALCIDIOL+CALCIFEROL SERPL-MC: 98 UG/L (ref 20–75)
HCV AB SERPL QL IA: NONREACTIVE

## 2021-12-15 RX ORDER — PRAVASTATIN SODIUM 20 MG
20 TABLET ORAL EVERY EVENING
Qty: 90 TABLET | Refills: 3 | Status: SHIPPED | OUTPATIENT
Start: 2021-12-15 | End: 2022-12-29

## 2021-12-15 ASSESSMENT — ASTHMA QUESTIONNAIRES: ACT_TOTALSCORE: 25

## 2021-12-15 NOTE — RESULT ENCOUNTER NOTE
Basil Meier,  Your hepatitis C screening test is negative, this is reassuring  Your vitamin D levels are very high, please decrease the daily vitamin D from 5000 units down to 2000 units once a day.   Let me know if you have any questions. Take care.  Cara Clements MD

## 2021-12-15 NOTE — RESULT ENCOUNTER NOTE
Tsering Meier,  Your lab test showed normal urine with no protein leak, normal fasting blood sugar with no concern for diabetes, normal liver and kidney test and hemoglobin with no concern for anemia. These are reassuring  Your fasting cholesterol numbers continue to be elevated as it was a year ago  I have sent a prescription for pravastatin 20 mg to take once daily in the evening to control the lipid numbers  Please get a fasting labs for lipid recheck in 3 months.   Let me know if you have any questions. Take care.  Cara Clements MD

## 2021-12-21 DIAGNOSIS — G35 MULTIPLE SCLEROSIS (H): ICD-10-CM

## 2021-12-21 NOTE — TELEPHONE ENCOUNTER
Message sent to the pt letting her know that there should still be refills remaining through [facility]. She was also encouraged to call and set up her MRI and follow up appts.    Damari Rowe RN   Neurology Care Coordinator

## 2021-12-31 RX ORDER — GLATIRAMER 40 MG/ML
INJECTION, SOLUTION SUBCUTANEOUS
Qty: 12 ML | Refills: 12 | OUTPATIENT
Start: 2021-12-31

## 2022-01-10 ENCOUNTER — TELEPHONE (OUTPATIENT)
Dept: NEUROLOGY | Facility: CLINIC | Age: 52
End: 2022-01-10
Payer: COMMERCIAL

## 2022-01-10 NOTE — TELEPHONE ENCOUNTER
[facility] Call Center    Phone Message    May a detailed message be left on voicemail: yes     Reason for Call: Medication Refill Request    Has the patient contacted the pharmacy for the refill? Yes   Name of medication being requested:glatiramer acetate 40 MG/ML injectionProvider who prescribed the medication:   Pharmacy: [facility]  Date medication is needed: ASAP   Please cll when done. [phone]      Action Taken: Message routed to:  [facility]    Travel Screening: Not Applicable

## 2022-01-10 NOTE — TELEPHONE ENCOUNTER
The pt was notified via [employer].    Damari Rowe RN   Neurology Care Coordinator

## 2022-01-14 ENCOUNTER — OFFICE VISIT (OUTPATIENT)
Dept: OBGYN | Facility: CLINIC | Age: 52
End: 2022-01-14
Payer: COMMERCIAL

## 2022-01-14 VITALS
DIASTOLIC BLOOD PRESSURE: 86 MMHG | SYSTOLIC BLOOD PRESSURE: 123 MMHG | OXYGEN SATURATION: 100 % | WEIGHT: 151.6 LBS | BODY MASS INDEX: 27.37 KG/M2 | HEART RATE: 84 BPM

## 2022-01-14 DIAGNOSIS — Z78.0 MENOPAUSE: ICD-10-CM

## 2022-01-14 DIAGNOSIS — F52.0 HYPOACTIVE SEXUAL DESIRE: Primary | ICD-10-CM

## 2022-01-14 DIAGNOSIS — Z12.11 SCREEN FOR COLON CANCER: ICD-10-CM

## 2022-01-14 PROCEDURE — 99204 OFFICE O/P NEW MOD 45 MIN: CPT | Performed by: OBSTETRICS & GYNECOLOGY

## 2022-01-14 RX ORDER — MEDROXYPROGESTERONE ACETATE 5 MG
5 TABLET ORAL DAILY
Qty: 90 TABLET | Refills: 3 | Status: SHIPPED | OUTPATIENT
Start: 2022-01-14 | End: 2022-09-26

## 2022-01-14 RX ORDER — ESTERIFIED ESTROGEN AND METHYLTESTOSTERONE .625; 1.25 MG/1; MG/1
1 TABLET ORAL DAILY
Qty: 90 TABLET | Refills: 3 | Status: SHIPPED | OUTPATIENT
Start: 2022-01-14 | End: 2022-09-26

## 2022-01-14 NOTE — PROGRESS NOTES
This 50 y/o postmenopausal female, , LMP 2020, presents as a new patient to the [place] [facility] de[facility] c/o a lack of libido which is bothersome to her.  She states that she used to enjoy a healthy sex life so this is a change for her that she dislikes.  Her partner is about the same age but has not complained.  She also is experiencing hot flushes, night sweats, vaginal dryness, and irritability but the libido issue seems the most troublesome.  Her MA, labs, and pap smear are all updated but she has not scheduled a colonoscopy yet.  She is scheduled for a sleep study since she does not feel refreshed after sleeping and admits to snoring.  Her maternal aunt was diagnosed with breast cancer in her 60s but there are no other relatives with this.  The pt has MS.  /86 (BP Location: Right arm, Patient Position: Sitting, Cuff Size: Adult Regular)   Pulse 84   Wt 68.8 kg (151 lb 9.6 oz)   LMP 2020 (Exact Date)   SpO2 100%   BMI 27.37 kg/m    ROS:  10 systems were reviewed and the positives were listed under problems.  PE:  General- Healthy-appearing female in no acute distress  Eyes- No scleral injection or icterus  ENT- Mucus membranes moist  CV- No noticeable edema in extremities  Lymph- No noticeable cervical adenopathy  Respiratory- Non-labored breathing  Skin- No suspicious lesions or rashes visualized  Psych- Normal affect  Assessment - Decreased libido postmenopausally, HRT symptoms  Plan - We discussed treatment options and she would like to try HRT including testosterone.  She still has a uterus so understands that she will need to add progesterone in addition to the estrogen and testosterone to prevent the increased risk of uterine cancer.  She is to return for yearly mammograms, annual exams with a breast exam, and labwork as discussed.  She has not had a baseline colonoscopy to-date so this order was placed and the pt was reminded to schedule this.  The advantages, options, and risks of HRT  were reviewed at length and all her questions and concerns were addressed.  We also discussed the use of Replens for vaginal dryness issues as well as KY or Astraglide products for lubrication.  45 minutes were spent today in chart review, the patient visit, review of tests, and documentation in regards to the issues noted above.    Normal

## 2022-01-14 NOTE — PATIENT INSTRUCTIONS
If you have any questions regarding your visit, Please contact your care team.    [employer] [facility]: [phone]      [facility] CLINIC HOURS TELEPHONE NUMBER   Carol Payan DO.    LEXUS Terrazas -  Damaris -       Toshia RN  Janine, RN  Jeanne, RN     Monday, Wednesday, Thursday and Friday, [place]  8:30a.m-5:00 p.m   [facility]  [address] [place] [address]  [phone] ask for [facility]    Imaging [phone]       Urgent Care locations:    [facility] Saturday and Sunday   9 am - 5 pm    Monday-Friday   12 pm - 8 pm  Saturday and Sunday   9 am - 5 pm   [phone]     [facility] Labor and Delivery:  [phone]    If you need a medication refill, please contact your pharmacy. Please allow 3 business days for your refill to be completed.  As always, Thank you for trusting us with your healthcare needs!

## 2022-01-17 ENCOUNTER — TELEPHONE (OUTPATIENT)
Dept: NEUROLOGY | Facility: CLINIC | Age: 52
End: 2022-01-17

## 2022-01-17 NOTE — TELEPHONE ENCOUNTER
[facility] Call Center    Phone Message    May a detailed message be left on voicemail: yes     Reason for Call: Order - the MRIs ordered are expiring tomorrow, so needs to have the expiration date extended so patient can have MRIs done.     Please call patient when orders extended so she knows ready to schedule.         Action Taken: Other: [facility]    Travel Screening: Not Applicable

## 2022-01-24 ENCOUNTER — TELEPHONE (OUTPATIENT)
Dept: NEUROLOGY | Facility: CLINIC | Age: 52
End: 2022-01-24
Payer: COMMERCIAL

## 2022-01-24 NOTE — TELEPHONE ENCOUNTER
Prior Authorization Approval    Authorization Effective Date: 12/25/2021  Authorization Expiration Date: 1/24/2023  Medication: Galtiramer--APPROVED  Approved Dose/Quantity: 12/28  Reference #: KEY: [id number]   Insurance Company: [employer] - Phone [phone] Fax [phone]  Expected CoPay:       CoPay Card Available:      Foundation Assistance Needed:    Which Pharmacy is filling the prescription (Not needed for infusion/clinic administered):    Pharmacy Notified:    Patient Notified:

## 2022-01-24 NOTE — TELEPHONE ENCOUNTER
PA Initiation    Medication: Galtiramer--Initiated  Insurance Company: [employer] - Phone [phone] Fax [phone]  Pharmacy Filling the Rx:    Filling Pharmacy Phone:    Filling Pharmacy Fax:    Start Date: 1/24/2022    KEY: [id number]

## 2022-02-15 ENCOUNTER — ANCILLARY PROCEDURE (OUTPATIENT)
Dept: MRI IMAGING | Facility: CLINIC | Age: 52
End: 2022-02-15
Attending: PSYCHIATRY & NEUROLOGY
Payer: COMMERCIAL

## 2022-02-15 DIAGNOSIS — G35 MS (MULTIPLE SCLEROSIS) (H): ICD-10-CM

## 2022-02-15 PROCEDURE — 70553 MRI BRAIN STEM W/O & W/DYE: CPT | Performed by: RADIOLOGY

## 2022-02-15 PROCEDURE — 72156 MRI NECK SPINE W/O & W/DYE: CPT | Performed by: RADIOLOGY

## 2022-02-15 PROCEDURE — A9585 GADOBUTROL INJECTION: HCPCS | Mod: JW | Performed by: RADIOLOGY

## 2022-02-15 RX ORDER — GADOBUTROL 604.72 MG/ML
7.5 INJECTION INTRAVENOUS ONCE
Status: COMPLETED | OUTPATIENT
Start: 2022-02-15 | End: 2022-02-15

## 2022-02-15 RX ADMIN — GADOBUTROL 7 ML: 604.72 INJECTION INTRAVENOUS at 17:50

## 2022-02-21 ENCOUNTER — OFFICE VISIT (OUTPATIENT)
Dept: NEUROLOGY | Facility: CLINIC | Age: 52
End: 2022-02-21
Payer: COMMERCIAL

## 2022-02-21 VITALS
HEART RATE: 96 BPM | WEIGHT: 145 LBS | BODY MASS INDEX: 26.18 KG/M2 | DIASTOLIC BLOOD PRESSURE: 86 MMHG | SYSTOLIC BLOOD PRESSURE: 132 MMHG

## 2022-02-21 DIAGNOSIS — G35 MS (MULTIPLE SCLEROSIS) (H): Primary | ICD-10-CM

## 2022-02-21 DIAGNOSIS — G47.9 SLEEP DISTURBANCE: ICD-10-CM

## 2022-02-21 PROCEDURE — 99214 OFFICE O/P EST MOD 30 MIN: CPT | Performed by: PSYCHIATRY & NEUROLOGY

## 2022-02-21 ASSESSMENT — PAIN SCALES - GENERAL: PAINLEVEL: NO PAIN (0)

## 2022-02-21 NOTE — LETTER
"2/21/2022      RE: Renea Trotter  [address] [address] [place] [address]     Referral source: Established patient    Chief complaint: Multiple sclerosis    History of the Present Illness: Ms. Renea Trotter is a 51-year-old right-handed woman who returns to the [facility]clerosis [facility] today for a scheduled follow-up visit regarding her diagnosis of multiple sclerosis after earlier MRI scans of the brain and cervical spine.    The patient's history is as per my previous notes.  Initial onset of symptoms of demyelinating disease was in 2003, when she developed some numbness of the right hand and difficulty with the right leg that was noticeable with walking.  She subsequently did well until 2011, at which time she had recurrent numbness in the right hand.  MRI imaging at that time was suggestive of multiple sclerosis, and a CSF examination was also positive for oligoclonal bands.  She was placed on disease modifying therapy with glatiramer acetate about 10 years ago, and subsequent MRI imaging has been stable through 2019.    Today, the patient describes her overall status as \"pretty stable.\"  She denies any new episodic changes in vision, balance, strength or sensation suggestive of new relapse of multiple sclerosis since she was last seen.  She does, however, note slowly progressive right sided weakness over time, particularly in that she has less strength in her right hand.  She does continue to write with the right hand, but is switching to doing more things with the left, such as opening jars, for example.    The patient also relates that she has difficulty getting to sleep and is bothered by a restless type sensation in her legs.  I had checked her ferritin level last year, which was well within the normal range.    PHYSICAL EXAMINATION:    VITALS:  Blood pressure 132/86; pulse 86; weight 65.8 kg.  GENERAL:  Well-nourished woman who presents to the examination alone, awake and alert and in no acute " "distress.    NEUROLOGIC EXAMINATION:    Cranial nerves: Visual fields are full to confrontation. Extraocular movements are intact with no internuclear ophthalmoplegia.  Facial strength is normal.  Palate elevation and tongue protrusion are normal.    Power: There is weakness in an upper motor neuron pattern on the right with right finger extensors, first dorsal interosseous, hip flexors and anterior tibialis all grading about 4/5.  These muscles are normal on the left.    Reflexes:  Reflexes are somewhat increased in the right arm and leg as compared to the left.    Motor/cerebellar:  There is no appendicular ataxia on finger-to-nose testing.  Rapid alternating movements are within normal limits in the hands and fingers.  There is no pronator drift in the arms.   Gait: The patient is able to ambulate on a flat, level surface with no gross loss of postural stability.  She can walk on heels and toes. Tandem gait is mildly impaired for age.    Investigations: I reviewed images from MRI scans of the brain and cervical spine performed previously on 02/15/2022, and the following is my independent interpretation of those images.  There is no abnormal enhancement with gadolinium contrast throughout the brain and visualized cord parenchyma.  Multiple periventricular and juxtacortical foci of T2 hyperintensity in the white matter of the cerebral hemispheres bilaterally appear unchanged in comparison to previous MRI of 03/12/2019.  Likewise, several short segment foci of T2 hyperintensity in the cord parenchyma are also unchanged versus 03/12/2019.  No new lesions are seen.     Assessment/plan:    1.  Multiple sclerosis   The patient is clinically and radiologically stable on disease modifying therapy with glatiramer acetate, which she will continue.  I indicated to her that epidemiologic data suggests that as people with MS age, the active inflammatory component of the disease often \"burns out,\" particularly in people who are " past the age of 55.  I think we can hold off on additional MRI imaging for another 3-4 years.  If that imaging is stable at that time, a trial of discontinuation of disease-modifying therapy could be contemplated.  For now, I will plan on seeing her back in 1 year, or sooner if she has new symptoms that are of concern to her.    2.  Sleep disturbance  She does have a prescription for temazepam through her primary care clinic, which she uses sparingly as she is only provided with a small number of these tablets.  I discussed with her that she wanted to attempt an additional medication trial for restless leg symptoms, gabapentin would probably be the first line option.  She actually has an appointment with the Sleep Clinic tomorrow for further discussion of her sleep issues and, accordingly, we will hold off on any new prescription for now.    I spent a total of 36 minutes on patient care activities related to this encounter on the date of service, including time spent in reviewing the chart, performing independent review of neuroimaging, obtaining history and examination from and in counseling the patient, and in documentation in the electronic medical record.  I answered her questions.    David Herrera MD   of Neurology  [facility]    Cc:  Cara Clements MD (PCP)  Patient

## 2022-02-21 NOTE — PATIENT INSTRUCTIONS
1. Recommend that you take 5000 units of vitamin D twice weekly, or 2000 units 5 days/week to try to bring your level to the goal range of 60-80 mcg/L    2. Continue glatiramer acetate    3. Return to clinic in one year

## 2022-02-21 NOTE — Clinical Note
"    2/21/2022         RE: Renea Trotter  [address] [address]        Dear Colleague,    Thank you for referring your patient, Renea Trotter, to the [facility] [place]. Please see a copy of my visit note below.    Date of service: February 21, 2022    Referral source: Established patient    Chief complaint: Multiple sclerosis    History of the Present Illness: Ms. Renea Trotter is a 51-year-old right-handed woman who returns to the [facility]clerosis Clinic today for a scheduled follow-up visit regarding her diagnosis of multiple sclerosis after earlier MRI scans of the brain and cervical spine.    The patient's history is as per my previous notes.  Initial onset of symptoms of demyelinating disease was in 2003, when she developed some numbness of the right hand and difficulty with the right leg that was noticeable with walking.  She subsequently did well until 2011, at which time she had recurrent numbness in the right hand.  MRI imaging at that time was suggestive of multiple sclerosis, and a CSF examination was also positive for oligoclonal bands.  She was placed on disease modifying therapy with glatiramer acetate about 10 years ago, and subsequent MRI imaging has been stable through 2019.    Today, the patient describes her overall status as \"pretty stable.\"  She denies any new episodic changes in vision, balance, strength or sensation suggestive of new relapse of multiple sclerosis since she was last seen.  She does, however, note slowly progressive right sided weakness over time, particularly in that she has less strength in her right hand.  She does continue to write with the right hand, but is switching to doing more things with the left, such as opening jars, for example.    The patient also relates that she has difficulty getting to sleep and is bothered by a restless type sensation in her legs.  I had checked her ferritin level last year, which was " well within the normal range.    PHYSICAL EXAMINATION:    VITALS:  Blood pressure 132/86; pulse 86; weight 65.8 kg.  GENERAL:  Well-nourished woman who presents to the examination alone, awake and alert and in no acute distress.    NEUROLOGIC EXAMINATION:    Cranial nerves: Visual fields are full to confrontation. Extraocular movements are intact with no internuclear ophthalmoplegia.  Facial strength is normal.  Palate elevation and tongue protrusion are normal.    Power: There is weakness in an upper motor neuron pattern on the right with right finger extensors, first dorsal interosseous, hip flexors and anterior tibialis all grading about 4/5.  These muscles are normal on the left.    Reflexes:  Reflexes are somewhat increased in the right arm and leg as compared to the left.    Motor/cerebellar:  There is no appendicular ataxia on finger-to-nose testing.  Rapid alternating movements are within normal limits in the hands and fingers.  There is no pronator drift in the arms.   Gait: The patient is able to ambulate on a flat, level surface with no gross loss of postural stability.  She can walk on heels and toes. Tandem gait is mildly impaired for age.    Investigations: I reviewed images from MRI scans of the brain and cervical spine performed previously on 02/15/2022, and the following is my independent interpretation of those images.  There is no abnormal enhancement with gadolinium contrast throughout the brain and visualized cord parenchyma.  Multiple periventricular and juxtacortical foci of T2 hyperintensity in the white matter of the cerebral hemispheres bilaterally appear unchanged in comparison to previous MRI of 03/12/2019.  Likewise, several short segment foci of T2 hyperintensity in the cord parenchyma are also unchanged versus 03/12/2019.  No new lesions are seen.     Assessment/plan:    1.  Multiple sclerosis   The patient is clinically and radiologically stable on disease modifying therapy with  "glatiramer acetate, which she will continue.  I indicated to her that epidemiologic data suggests that as people with MS age, the active inflammatory component of the disease often \"burns out,\" particularly in people who are past the age of 55.  I think we can hold off on additional MRI imaging for another 3-4 years.  If that imaging is stable at that time, a trial of discontinuation of disease-modifying therapy could be contemplated.  For now, I will plan on seeing her back in 1 year, or sooner if she has new symptoms that are of concern to her.    2.  Sleep disturbance  She does have a prescription for temazepam through her primary care clinic, which she uses sparingly as she is only provided with a small number of these tablets.  I discussed with her that she wanted to attempt an additional medication trial for restless leg symptoms, gabapentin would probably be the first line option.  She actually has an appointment with the Sleep Clinic tomorrow for further discussion of her sleep issues and, accordingly, we will hold off on any new prescription for now.    I spent a total of 36 minutes on patient care activities related to this encounter on the date of service, including time spent in reviewing the chart, performing independent review of neuroimaging, obtaining history and examination from and in counseling the patient, and in documentation in the electronic medical record.  I answered her questions.    David Herrera MD        D: 2022   T: 2022   MT: [id number]    Name:     REINIER LÓPEZ  MRN:      [id number]        Account:    [id number]   :      1970           Visit Date: 2022     Document: [id number]        Again, thank you for allowing me to participate in the care of your patient.        Sincerely,        David Herrera MD  "

## 2022-02-21 NOTE — PROGRESS NOTES
"Date of service: February 21, 2022    Referral source: Established patient    Chief complaint: Multiple sclerosis    History of the Present Illness: Ms. Renea Trotter is a 51-year-old right-handed woman who returns to the [facility]clerosis Clinic today for a scheduled follow-up visit regarding her diagnosis of multiple sclerosis after earlier MRI scans of the brain and cervical spine.    The patient's history is as per my previous notes.  Initial onset of symptoms of demyelinating disease was in 2003, when she developed some numbness of the right hand and difficulty with the right leg that was noticeable with walking.  She subsequently did well until 2011, at which time she had recurrent numbness in the right hand.  MRI imaging at that time was suggestive of multiple sclerosis, and a CSF examination was also positive for oligoclonal bands.  She was placed on disease modifying therapy with glatiramer acetate about 10 years ago, and subsequent MRI imaging has been stable through 2019.    Today, the patient describes her overall status as \"pretty stable.\"  She denies any new episodic changes in vision, balance, strength or sensation suggestive of new relapse of multiple sclerosis since she was last seen.  She does, however, note slowly progressive right sided weakness over time, particularly in that she has less strength in her right hand.  She does continue to write with the right hand, but is switching to doing more things with the left, such as opening jars, for example.    The patient also relates that she has difficulty getting to sleep and is bothered by a restless type sensation in her legs.  I had checked her ferritin level last year, which was well within the normal range.    PHYSICAL EXAMINATION:    VITALS:  Blood pressure 132/86; pulse 86; weight 65.8 kg.  GENERAL:  Well-nourished woman who presents to the examination alone, awake and alert and in no acute distress.    NEUROLOGIC EXAMINATION:    Cranial " "nerves: Visual fields are full to confrontation. Extraocular movements are intact with no internuclear ophthalmoplegia.  Facial strength is normal.  Palate elevation and tongue protrusion are normal.    Power: There is weakness in an upper motor neuron pattern on the right with right finger extensors, first dorsal interosseous, hip flexors and anterior tibialis all grading about 4/5.  These muscles are normal on the left.    Reflexes:  Reflexes are somewhat increased in the right arm and leg as compared to the left.    Motor/cerebellar:  There is no appendicular ataxia on finger-to-nose testing.  Rapid alternating movements are within normal limits in the hands and fingers.  There is no pronator drift in the arms.   Gait: The patient is able to ambulate on a flat, level surface with no gross loss of postural stability.  She can walk on heels and toes. Tandem gait is mildly impaired for age.    Investigations: I reviewed images from MRI scans of the brain and cervical spine performed previously on 02/15/2022, and the following is my independent interpretation of those images.  There is no abnormal enhancement with gadolinium contrast throughout the brain and visualized cord parenchyma.  Multiple periventricular and juxtacortical foci of T2 hyperintensity in the white matter of the cerebral hemispheres bilaterally appear unchanged in comparison to previous MRI of 03/12/2019.  Likewise, several short segment foci of T2 hyperintensity in the cord parenchyma are also unchanged versus 03/12/2019.  No new lesions are seen.     Assessment/plan:    1.  Multiple sclerosis   The patient is clinically and radiologically stable on disease modifying therapy with glatiramer acetate, which she will continue.  I indicated to her that epidemiologic data suggests that as people with MS age, the active inflammatory component of the disease often \"burns out,\" particularly in people who are past the age of 55.  I think we can hold off on " additional MRI imaging for another 3-4 years.  If that imaging is stable at that time, a trial of discontinuation of disease-modifying therapy could be contemplated.  For now, I will plan on seeing her back in 1 year, or sooner if she has new symptoms that are of concern to her.    2.  Sleep disturbance  She does have a prescription for temazepam through her primary care clinic, which she uses sparingly as she is only provided with a small number of these tablets.  I discussed with her that she wanted to attempt an additional medication trial for restless leg symptoms, gabapentin would probably be the first line option.  She actually has an appointment with the Sleep Clinic tomorrow for further discussion of her sleep issues and, accordingly, we will hold off on any new prescription for now.    I spent a total of 36 minutes on patient care activities related to this encounter on the date of service, including time spent in reviewing the chart, performing independent review of neuroimaging, obtaining history and examination from and in counseling the patient, and in documentation in the electronic medical record.  I answered her questions.    David Herrera MD        D: 2022   T: 2022   MT: [id number]    Name:     REINIER LÓPEZ  MRN:      [id number]        Account:    [id number]   :      1970           Visit Date: 2022     Document: [id number]

## 2022-02-22 ENCOUNTER — VIRTUAL VISIT (OUTPATIENT)
Dept: SLEEP MEDICINE | Facility: CLINIC | Age: 52
End: 2022-02-22
Payer: COMMERCIAL

## 2022-02-22 VITALS — BODY MASS INDEX: 25.69 KG/M2 | WEIGHT: 145 LBS | HEIGHT: 63 IN

## 2022-02-22 DIAGNOSIS — G47.63 SLEEP RELATED BRUXISM: ICD-10-CM

## 2022-02-22 DIAGNOSIS — R06.83 SNORING: ICD-10-CM

## 2022-02-22 DIAGNOSIS — R53.82 CHRONIC FATIGUE: Primary | ICD-10-CM

## 2022-02-22 DIAGNOSIS — G47.00 PERSISTENT INSOMNIA: ICD-10-CM

## 2022-02-22 PROCEDURE — 99204 OFFICE O/P NEW MOD 45 MIN: CPT | Mod: 95 | Performed by: INTERNAL MEDICINE

## 2022-02-22 ASSESSMENT — SLEEP AND FATIGUE QUESTIONNAIRES
HOW LIKELY ARE YOU TO NOD OFF OR FALL ASLEEP WHILE SITTING AND READING: SLIGHT CHANCE OF DOZING
HOW LIKELY ARE YOU TO NOD OFF OR FALL ASLEEP WHILE LYING DOWN TO REST IN THE AFTERNOON WHEN CIRCUMSTANCES PERMIT: SLIGHT CHANCE OF DOZING
HOW LIKELY ARE YOU TO NOD OFF OR FALL ASLEEP WHILE SITTING INACTIVE IN A PUBLIC PLACE: WOULD NEVER DOZE
HOW LIKELY ARE YOU TO NOD OFF OR FALL ASLEEP WHILE SITTING AND TALKING TO SOMEONE: WOULD NEVER DOZE
HOW LIKELY ARE YOU TO NOD OFF OR FALL ASLEEP WHILE SITTING QUIETLY AFTER LUNCH WITHOUT ALCOHOL: WOULD NEVER DOZE
HOW LIKELY ARE YOU TO NOD OFF OR FALL ASLEEP WHEN YOU ARE A PASSENGER IN A CAR FOR AN HOUR WITHOUT A BREAK: WOULD NEVER DOZE
HOW LIKELY ARE YOU TO NOD OFF OR FALL ASLEEP WHILE WATCHING TV: MODERATE CHANCE OF DOZING
HOW LIKELY ARE YOU TO NOD OFF OR FALL ASLEEP IN A CAR, WHILE STOPPED FOR A FEW MINUTES IN TRAFFIC: WOULD NEVER DOZE

## 2022-02-22 NOTE — PROGRESS NOTES
Renea is a 51 year old who is being evaluated via a billable video visit.      How would you like to obtain your AVS? MyChart  If the video visit is dropped, the invitation should be resent by: Send to e-mail at: [email or web address]  Will anyone else be joining your video visit? No      Video Start Time: 11:23 AM  Video-Visit Details    Type of service:  Video Visit    Video End Time:11:43 AM    Originating Location (pt. Location): Home    Distant Location (provider location):  [facility]     Platform used for Video Visit: [employer]     Additional 15 minutes on the date of service was spent performing the following:    -Preparing to see the patient  -Obtaining and/or reviewing separately obtained history   -Ordering medications, tests, or procedures   -Documenting clinical information in the electronic or other health record     Thank you for the opportunity to participate in the care of  Renea Trotter.    Assessment and Plan:    In summary Renea Trotter is a 51 year old year old female here for sleep disturbance.  1. Chronic fatigue/Snoring/Sleep related bruxism   Renea Trotter has high risk for obstructive sleep apnea based on the history of chronic fatigue, snoring and a crowded airway. I educated the patient on the underlying pathophysiology of obstructive sleep apnea. We reviewed the risks associated with sleep apnea, including increased cardiovascular risk and overall death. We talked about treatments briefly. I recommend getting a Home sleep study. The patient should return to the clinic to discuss results and treatment option in a patient-centered approach.  2. Persistent Insomnia  I recommend that we proceed with a sleep psychology consultation to initiate CBT-I but she declined.    Lab reviewed: Discussed with patient.    History of present illness:    She is a 51 year old female who comes to the [facility] clinic with a chief complaint of chronic fatigue that has  been occurring on and off for approximately 10 years.  The patient also states that she has difficulty staying asleep for approximately 10 years.  She has been prescribed temazepam by her primary care provider to help her through this.  This is her first time being evaluated by sleep medicine.  While the patient denies any episodes of witnessed apnea she has been told that she does have snoring during sleep.  She also clenches her teeth during sleep as well.  Complicating matters further is the fact that patient is being treated for multiple sclerosis and has possible restless leg syndrome     Ideal Sleep-Wake Cycle(devoid of societal pressure):    Patient would try to initiate sleep at around 10 PM with a sleep latency of less than 10 minutes. The patient would have 1 awakenings. Final wake up time is around 7 AM.    Total score - [place]: 4 (2/22/2022 11:16 AM)    Patient told to return in one week after the sleep study is interpreted.    Patient Active Problem List   Diagnosis     Relapsing remitting multiple sclerosis (H)     CARDIOVASCULAR SCREENING; LDL GOAL LESS THAN 160     BMI 26.0-26.9,adult     Urinary urgency     Headache     Low back pain without sciatica, unspecified back pain laterality     Sleep disorder     Plantar warts     Essential hypertension with goal blood pressure less than 140/90     Hemorrhoids, unspecified hemorrhoid type     Seborrheic keratosis     Mild intermittent asthma     Hypoactive sexual desire     Menopause     Tinea pedis of right foot     Vitamin D deficiency     Past Medical History:   Diagnosis Date     HTN (hypertension)      Mild intermittent asthma      MS (multiple sclerosis) (H)      Past Surgical History:   Procedure Laterality Date     HC TOOTH EXTRACTION W/FORCEP       Current Outpatient Medications   Medication Sig Dispense Refill     albuterol (PROAIR HFA/PROVENTIL HFA/VENTOLIN HFA) 108 (90 Base) MCG/ACT inhaler Inhale 2 puffs into the lungs every 4 hours as needed  for shortness of breath / dyspnea or wheezing Profile Rx 8.5 g 3     Cholecalciferol (VITAMIN D3 PO) Take 5,000 Units by mouth daily       estrogens-methylTESTOSTERone (ESTRATEST HS) 0.625-1.25 MG per tablet Take 1 tablet by mouth daily 90 tablet 3     glatiramer acetate 40 MG/ML injection Inject 40 mg Subcutaneous three times a week 12 mL 11     ketoconazole (NIZORAL) 2 % external cream Apply thin layer to the right third toe and surrounding area. 30 g 11     lisinopril-hydrochlorothiazide (ZESTORETIC) 10-12.5 MG tablet Take 1 tablet by mouth daily 90 tablet 3     medroxyPROGESTERone (PROVERA) 5 MG tablet Take 1 tablet (5 mg) by mouth daily 90 tablet 3     MULTIPLE VITAMIN PO Take 1 tablet by mouth daily.       pravastatin (PRAVACHOL) 20 MG tablet Take 1 tablet (20 mg) by mouth every evening 90 tablet 3     temazepam (RESTORIL) 15 MG capsule TAKE 1 TO 2 CAPSULES BY MOUTH NIGHTLY AS NEEDED FOR SLEEP 15 capsule 0     Patient has no known allergies.  Social History     Socioeconomic History     Marital status:      Spouse name: Not on file     Number of children: Not on file     Years of education: Not on file     Highest education level: Not on file   Occupational History     Not on file   Tobacco Use     Smoking status: Never Smoker     Smokeless tobacco: Never Used   Substance and Sexual Activity     Alcohol use: Yes     Alcohol/week: 1.0 - 2.0 standard drink     Comment: RARELY     Drug use: No     Sexual activity: Yes     Partners: Male     Birth control/protection: None   Other Topics Concern     Parent/sibling w/ CABG, MI or angioplasty before 65F 55M? Yes     Comment: 47 sister   Social History Narrative     Not on file     Social Determinants of Health     Financial Resource Strain: Not on file   Food Insecurity: Not on file   Transportation Needs: Not on file   Physical Activity: Not on file   Stress: Not on file   Social Connections: Not on file   Intimate Partner Violence: Not on file   Housing  Stability: Not on file     Family History   Problem Relation Age of Onset     Hypertension Mother      Lipids Mother      Hyperlipidemia Mother      Eye Disorder Father      Hyperlipidemia Father      Coronary Artery Disease Sister      Hyperlipidemia Sister      Myocardial Infarction Sister      Cerebrovascular Disease Maternal Grandmother      Cerebrovascular Disease Maternal Grandfather      Prostate Cancer Maternal Grandfather      Blood Disease Paternal Grandmother      Alzheimer Disease Paternal Grandfather      Pulmonary Embolism Daughter      Multiple Sclerosis Cousin         Physical Exam:  GEN: NAD,   Head: Normocephalic.  EYES: EOMI  ENT: Oropharynx is clear, Lowe class 4+ airway.   Psych: normal mood, normal affect  Snore test:( +)     Labs/Studies:     No results found for: PH, PHARTERIAL, PO2, [id number], SAT, PCO2, HCO3, BASEEXCESS, JACKY, BEB  Lab Results   Component Value Date    TSH 1.63 06/01/2019    TSH 2.19 10/13/2017     Lab Results   Component Value Date    GLC 89 12/14/2021    GLC 92 09/18/2020     Lab Results   Component Value Date    HGB 14.2 12/14/2021    HGB 14.8 09/18/2020     Lab Results   Component Value Date    BUN 22 12/14/2021    BUN 18 09/18/2020    CR 0.94 12/14/2021    CR 0.82 09/18/2020     Lab Results   Component Value Date    AST 18 12/14/2021    AST 18 06/01/2019    ALT 26 12/14/2021    ALT 22 06/01/2019    ALKPHOS 72 12/14/2021    ALKPHOS 61 06/01/2019    BILITOTAL 0.5 12/14/2021    BILITOTAL 0.5 06/01/2019     No results found for: UAMP, UBARB, BENZODIAZEUR, UCANN, UCOC, OPIT, UPCP    Recent Labs   Lab Test 12/14/21  1229 09/18/20  0723    139   POTASSIUM 3.9 3.8   CHLORIDE 103 102   CO2 33* 31   ANIONGAP 2* 6   GLC 89 92   BUN 22 18   CR 0.94 0.82   MANUEL 9.8 9.0       Ferritin   Date Value Ref Range Status   01/18/2021 183 8 - 252 ng/mL Final       Javon Barger DO  Board Certified in Internal Medicine and Sleep Medicine    (Note created with Dragon voice  recognition and unintended spelling errors and word substitutions may occur)

## 2022-02-22 NOTE — PATIENT INSTRUCTIONS
Patient education: What is a sleep study?     What is a sleep study? -- A sleep study is a test that measures how well you sleep and checks for sleep problems. For some sleep studies, you stay overnight in a sleep lab at a hospital or sleep center.     What happens during a sleep study? -- Before you go to sleep, a technician attaches small, sticky patches called  electrodes  to your head, chest, and legs. He or she will also place a small tube beneath your nose and might wrap 1 or 2 belts around your chest.   Each of these items has wires that connect to monitors. The monitors record your movement, brain activity, breathing, and other body functions while you sleep.  If you have a history of trouble falling asleep, your doctor might prescribe a medicine to help you fall asleep in the lab. If you have never taken the medicine before, your doctor might ask you take it on a night before your sleep study to see how it affects you.   Why might my doctor order a sleep study? -- Your doctor will order a sleep study if he or she thinks you have sleep apnea or a different condition that makes you:   ?Have sudden jerking leg movements while you sleep, called  periodic limb movements.    ?Feel very sleepy during the day and fall asleep all of a sudden, called  narcolepsy.    ?Have trouble falling asleep or staying asleep over a long period of time, called  chronic insomnia.    ?Do odd things while you sleep, such as walking.  How should I prepare for a sleep study? -- On the day of your sleep study, you should:   ?Avoid alcohol   ?Avoid drinking coffee, tea, sodas, and other drinks that have caffeine in the afternoon and evening   ?Take all of your regular medicines     The cost of care estimate line is [phone]. They are able to give the patient an estimate of the charges and also an estimate of their insurance coverage/patient responsibility.   After your sleep study is performed, please call us at [phone] or  [phone]  to schedule for a follow up to review the results of the sleep study.    Please bring one tab of low dose melatonin 3 mg or less to the night of the study.    Melatonin intake is completely voluntary.    You may take own melatonin after arrival to sleep center. Do not drive or operate machinery after intake of melatonin.

## 2022-02-23 NOTE — NURSING NOTE
Patient was called to schedule sleep study. Sleep study and return visit for results has been scheduled. Sleep study information packet/letter send via [employer].         Angy Hodges [facility]

## 2022-06-01 ENCOUNTER — TELEPHONE (OUTPATIENT)
Dept: GASTROENTEROLOGY | Facility: CLINIC | Age: 52
End: 2022-06-01
Payer: COMMERCIAL

## 2022-06-01 NOTE — TELEPHONE ENCOUNTER
Screening Questions  BlueKIND OF PREP RedLOCATION [review exclusion criteria] GreenSEDATION TYPE  1. Have you had a positive covid test in the last 90 days? N     2. Do you have a legal guardian or medical Power of ?  Are you able to give consent for your medical care?Y (Sedation review/consideration needed)    3. Are you active on mychart? Y    4. What insurance is in the chart? MEDICA     3.   Ordering/Referring Provider: [facility]    4. BMI 25.7 [BMI OVER 40-EXTENDED PREP]  If greater than 40 review exclusion criteria [PAC APPT IF @ [facility]]        5.  Respiratory Screening :  [If yes to any of the following HOSPITAL setting only]     Do you use daily home oxygen? N    Do you have mod to severe Obstructive Sleep Apnea? N  [OKAY @ [facility] SH PH RI]   Do you have Pulmonary Hypertension? N     Do you have UNCONTROLLED asthma? N        6.   Have you had a heart or lung transplant? N      7.   Are you currently on dialysis? N [ If yes, G-PREP & HOSPITAL setting only]     8.   Do you have chronic kidney disease? N [ If yes, G-PREP ]    9.   Have you had a stroke or Transient ischemic attack (TIA - aka  mini stroke ) within 6 months?  N (If yes, please review exclusion criteria)    10.   In the past 6 months, have you had any heart related issues including cardiomyopathy or heart attack? N           If yes, did it require cardiac stenting or other implantable device?       11.   Do you have any implantable devices in your body (pacemaker, defib, LVAD)? N (If yes, please review exclusion criteria)    12.   Do you take nitroglycerin? N           If yes, how often?   (if yes, HOSPITAL setting ONLY)    13.   Are you currently taking any blood thinners? N           [IF YES, INFORM PATIENT TO FOLLOW UP W/ ORDERING PROVIDER FOR BRIDGING INSTRUCTIONS]     14.   Do you have a diagnosis of diabetes? N   [ If yes, G-PREP ]    15.   [FEMALES] Are you currently pregnant?     If yes, how many weeks?     16.   Are you  taking any prescription pain medications on a routine schedule?  N  [ If yes, EXTENDED PREP.] [If yes, MAC]    17.   Do you have any chemical dependencies such as alcohol, street drugs, or methadone?  N [If yes, MAC]    18.   Do you have any history of post-traumatic stress syndrome, severe anxiety or history of psychosis?  N  [If yes, MAC]    19.   Do you transfer independently?  Y    20.  On a regular basis do you go 3-5 days between bowel movements? N   [ If yes, EXTENDED PREP.]    21.   Preferred LOCAL Pharmacy for Pre Prescription      [facility] [address] [place], [place] - [address] [address] [place] [address]  [facility] - [address]  [facility] - [address]  [id number] - [place], [place] - [address] [facility] AT [address]      Scheduling Details      Caller : Renea Trotter    (Please ask for phone number if not scheduled by patient)    Type of Procedure Scheduled: COLON  Which Colonoscopy Prep was Sent?: MPREP  STEPHANIE CF PATIENTS & GROEN'S PATIENTS NEEDS EXTENDED PREP  Surgeon: HEIDI  Date of Procedure: 8/5/22  Location:       Sedation Type: CS  Conscious Sedation- Needs  for 6 hours after the procedure  MAC/General-Needs  for 24 hours after procedure    Pre-op Required at [facility] and OR for MAC sedation: N  (advise patient they will need a pre-op prior to procedure -)      Informed patient they will need an adult  Y  Cannot take any type of public or medical transportation alone    Pre-Procedure Covid test to be completed at [facility] or Externally: 8/2 @    Confirmed Nurse will call to complete assessment Y    Additional comments:

## 2022-08-01 ENCOUNTER — TELEPHONE (OUTPATIENT)
Dept: GASTROENTEROLOGY | Facility: CLINIC | Age: 52
End: 2022-08-01

## 2022-08-01 NOTE — TELEPHONE ENCOUNTER
[facility] Call Center    Reason for Call: Other: Pt has prep question in re unavailability of magnesium citrate & her colonoscopy on 8/5     Action Taken: Patient transferred to: [facility] nurse line    Travel Screening: Not Applicable

## 2022-08-05 ENCOUNTER — HOSPITAL ENCOUNTER (OUTPATIENT)
Facility: AMBULATORY SURGERY CENTER | Age: 52
Discharge: HOME | End: 2022-08-05
Attending: SURGERY | Admitting: SURGERY
Payer: COMMERCIAL

## 2022-08-05 VITALS
HEART RATE: 67 BPM | RESPIRATION RATE: 16 BRPM | SYSTOLIC BLOOD PRESSURE: 126 MMHG | OXYGEN SATURATION: 98 % | DIASTOLIC BLOOD PRESSURE: 86 MMHG | TEMPERATURE: 98.7 F

## 2022-08-05 LAB — COLONOSCOPY: NORMAL

## 2022-08-05 PROCEDURE — G8918 PT W/O PREOP ORDER IV AB PRO: HCPCS

## 2022-08-05 PROCEDURE — G8907 PT DOC NO EVENTS ON DISCHARG: HCPCS

## 2022-08-05 PROCEDURE — 45378 DIAGNOSTIC COLONOSCOPY: CPT

## 2022-08-05 PROCEDURE — G0121 COLON CA SCRN NOT HI RSK IND: HCPCS | Performed by: SURGERY

## 2022-08-05 RX ORDER — PROCHLORPERAZINE MALEATE 10 MG
10 TABLET ORAL EVERY 6 HOURS PRN
Status: DISCONTINUED | OUTPATIENT
Start: 2022-08-05 | End: 2022-08-06 | Stop reason: HOSPADM

## 2022-08-05 RX ORDER — NALOXONE HYDROCHLORIDE 0.4 MG/ML
0.2 INJECTION, SOLUTION INTRAMUSCULAR; INTRAVENOUS; SUBCUTANEOUS
Status: DISCONTINUED | OUTPATIENT
Start: 2022-08-05 | End: 2022-08-06 | Stop reason: HOSPADM

## 2022-08-05 RX ORDER — ONDANSETRON 2 MG/ML
4 INJECTION INTRAMUSCULAR; INTRAVENOUS EVERY 6 HOURS PRN
Status: DISCONTINUED | OUTPATIENT
Start: 2022-08-05 | End: 2022-08-06 | Stop reason: HOSPADM

## 2022-08-05 RX ORDER — LIDOCAINE 40 MG/G
CREAM TOPICAL
Status: DISCONTINUED | OUTPATIENT
Start: 2022-08-05 | End: 2022-08-06 | Stop reason: HOSPADM

## 2022-08-05 RX ORDER — NALOXONE HYDROCHLORIDE 0.4 MG/ML
0.4 INJECTION, SOLUTION INTRAMUSCULAR; INTRAVENOUS; SUBCUTANEOUS
Status: DISCONTINUED | OUTPATIENT
Start: 2022-08-05 | End: 2022-08-06 | Stop reason: HOSPADM

## 2022-08-05 RX ORDER — FLUMAZENIL 0.1 MG/ML
0.2 INJECTION, SOLUTION INTRAVENOUS
Status: ACTIVE | OUTPATIENT
Start: 2022-08-05 | End: 2022-08-05

## 2022-08-05 RX ORDER — ONDANSETRON 2 MG/ML
4 INJECTION INTRAMUSCULAR; INTRAVENOUS
Status: DISCONTINUED | OUTPATIENT
Start: 2022-08-05 | End: 2022-08-06 | Stop reason: HOSPADM

## 2022-08-05 RX ORDER — ONDANSETRON 4 MG/1
4 TABLET, ORALLY DISINTEGRATING ORAL EVERY 6 HOURS PRN
Status: DISCONTINUED | OUTPATIENT
Start: 2022-08-05 | End: 2022-08-06 | Stop reason: HOSPADM

## 2022-08-05 RX ORDER — FENTANYL CITRATE 50 UG/ML
INJECTION, SOLUTION INTRAMUSCULAR; INTRAVENOUS PRN
Status: DISCONTINUED | OUTPATIENT
Start: 2022-08-05 | End: 2022-08-05 | Stop reason: HOSPADM

## 2022-08-05 NOTE — H&P
Pre-Endoscopy History and Physical     Renea Trotter MRN# [id number]   YOB: 1970 Age: 52 year old     Date of Procedure: 8/5/2022  Primary care provider: Cara Clements  Type of Endoscopy: colonoscopy  Reason for Procedure: screening  Type of Anesthesia Anticipated: Moderate Sedation    HPI:    Renea is a 52 year old female who will be undergoing the above procedure.      A history and physical has been performed. The patient's medications and allergies have been reviewed. The risks and benefits of the procedure and the sedation options and risks were discussed with the patient.  All questions were answered and informed consent was obtained.      She denies a personal or family history of anesthesia complications or bleeding disorders.     No Known Allergies     Cannot display prior to admission medications because the patient has not been admitted in this contact.     Current Outpatient Medications   Medication     albuterol (PROAIR HFA/PROVENTIL HFA/VENTOLIN HFA) 108 (90 Base) MCG/ACT inhaler     Cholecalciferol (VITAMIN D3 PO)     glatiramer acetate 40 MG/ML injection     lisinopril-hydrochlorothiazide (ZESTORETIC) 10-12.5 MG tablet     medroxyPROGESTERone (PROVERA) 5 MG tablet     MULTIPLE VITAMIN PO     pravastatin (PRAVACHOL) 20 MG tablet     temazepam (RESTORIL) 15 MG capsule     estrogens-methylTESTOSTERone (ESTRATEST HS) 0.625-1.25 MG per tablet     ketoconazole (NIZORAL) 2 % external cream     No current facility-administered medications for this encounter.         Patient Active Problem List   Diagnosis     Relapsing remitting multiple sclerosis (H)     CARDIOVASCULAR SCREENING; LDL GOAL LESS THAN 160     BMI 26.0-26.9,adult     Urinary urgency     Headache     Low back pain without sciatica, unspecified back pain laterality     Sleep disorder     Plantar warts     Essential hypertension with goal blood pressure less than 140/90     Hemorrhoids, unspecified hemorrhoid type      "Seborrheic keratosis     Mild intermittent asthma     Hypoactive sexual desire     Menopause     Tinea pedis of right foot     Vitamin D deficiency        Past Medical History:   Diagnosis Date     HTN (hypertension)      Mild intermittent asthma      MS (multiple sclerosis) (H)         Past Surgical History:   Procedure Laterality Date     HC TOOTH EXTRACTION W/FORCEP         Social History     Tobacco Use     Smoking status: Never Smoker     Smokeless tobacco: Never Used   Substance Use Topics     Alcohol use: Yes     Alcohol/week: 1.0 - 2.0 standard drink     Comment: RARELY       Family History   Problem Relation Age of Onset     Hypertension Mother      Lipids Mother      Hyperlipidemia Mother      Eye Disorder Father      Hyperlipidemia Father      Sleep Apnea Father      Coronary Artery Disease Sister      Hyperlipidemia Sister      Myocardial Infarction Sister      Cerebrovascular Disease Maternal Grandmother      Cerebrovascular Disease Maternal Grandfather      Prostate Cancer Maternal Grandfather      Blood Disease Paternal Grandmother      Alzheimer Disease Paternal Grandfather      Pulmonary Embolism Daughter      Multiple Sclerosis Cousin        REVIEW OF SYSTEMS:     5 point ROS negative except as noted above in HPI, including Gen., Resp., CV, GI &  system review.      PHYSICAL EXAM:   [facility] 08/27/2020 (Exact Date)  Estimated body mass index is 25.69 kg/m  as calculated from the following:    Height as of 2/22/22: 1.6 m (5' 3\").    Weight as of 2/22/22: 65.8 kg (145 lb).   GENERAL APPEARANCE: healthy, alert and no distress  MENTAL STATUS: alert  AIRWAY EXAM: Mask on  RESP: lungs clear to auscultation - no rales, rhonchi or wheezes  CV: regular rates and rhythm      DIAGNOSTICS:    Not indicated      IMPRESSION   ASA Class 2 - Mild systemic disease        PLAN:       Plan for colonoscopy. We discussed the risks, benefits and alternatives and the patient wished to proceed.    The above has been " forwarded to the consulting provider.      Signed Electronically by: Hernan Vasquez MD  August 5, 2022

## 2022-08-14 ENCOUNTER — E-VISIT (OUTPATIENT)
Dept: URGENT CARE | Facility: CLINIC | Age: 52
End: 2022-08-14
Payer: COMMERCIAL

## 2022-08-14 DIAGNOSIS — N76.0 BACTERIAL VAGINOSIS: Primary | ICD-10-CM

## 2022-08-14 DIAGNOSIS — B96.89 BACTERIAL VAGINOSIS: Primary | ICD-10-CM

## 2022-08-14 PROCEDURE — 99421 OL DIG E/M SVC 5-10 MIN: CPT | Performed by: NURSE PRACTITIONER

## 2022-08-14 RX ORDER — METRONIDAZOLE 7.5 MG/G
1 GEL VAGINAL AT BEDTIME
Qty: 70 G | Refills: 0 | Status: SHIPPED | OUTPATIENT
Start: 2022-08-14 | End: 2022-08-19

## 2022-08-15 NOTE — PATIENT INSTRUCTIONS
Thank you for choosing us for your care. I have placed an order for a prescription so that you can start treatment. View your full visit summary for details by clicking on the link below. Your pharmacist will able to address any questions you may have about the medication.     If you re not feeling better within 2-3 days, please schedule an appointment.  You can schedule an appointment right here in [facility], or call [phone]  If the visit is for the same symptoms as your eVisit, we ll refund the cost of your eVisit if seen within seven days.      Bacterial Vaginosis    You have a vaginal infection called bacterial vaginosis (BV). Both good and bad bacteria are present in a healthy vagina. BV occurs when these bacteria get out of balance. The number of bad bacteria increase. And the number of good bacteria decrease. BV is linked with sexual activity, but it's not a sexually transmitted infection (STI).   BV may or may not cause symptoms. If symptoms do occur, they can include:     Thin, gray, milky-white, or sometimes green discharge    Unpleasant odor or  fishy  smell    Itching, burning, or pain in or around the vagina  It is not known what causes BV, but certain factors can make the problem more likely. These can include:     Douching    Spermicides    Use of antibiotics    Change in hormone levels with pregnancy, breastfeeding, or menopause    Having sex with a new partner    Having sex with more than one partner  BV will sometimes go away on its own. But treatment is often advised. This is because untreated BV can raise the risk of more serious health problems such as:     Pelvic inflammatory disease (PID)     delivery (giving birth to a baby early if you re pregnant)    HIV and some other sexually transmitted infections (STIs)    Infection after surgery on the reproductive organs  Home care  General care    BV is most often treated with medicines called antibiotics. These may be given as pills or  as a vaginal cream. If antibiotics are prescribed, be sure to use them exactly as directed. And complete all of the medicine, even if your symptoms go away.    Don't douche or having sex during treatment.    If you have sex with a female partner, ask your healthcare provider if she should also be treated.  Prevention    Don't douche.    Don't have sex. If you do have sex, then take steps to lower your risk:  ? Use condoms when having sex.  ? Limit the number of sex partners you have.    Follow-up care  Follow up with your healthcare provider, or as advised.   When to get medical advice  Call your healthcare provider right away if:     You have a fever of 100.4 F (38 C) or higher, or as directed by your provider.    Your symptoms get worse, or they don t go away within a few days of starting treatment.    You have new pain in the lower belly or pelvic region.    You have side effects that bother you or a reaction to the pills or cream you re prescribed.    You or any of your sex partners have new symptoms, such as a rash, joint pain, or sores.  [facility] last reviewed this educational content on 6/1/2020 2000-2021 [employer]. All rights reserved. This information is not intended as a substitute for professional medical care. Always follow your healthcare professional's instructions.

## 2022-09-14 ENCOUNTER — TRANSFERRED RECORDS (OUTPATIENT)
Dept: HEALTH INFORMATION MANAGEMENT | Facility: CLINIC | Age: 52
End: 2022-09-14

## 2022-09-26 ENCOUNTER — NURSE TRIAGE (OUTPATIENT)
Dept: NURSING | Facility: CLINIC | Age: 52
End: 2022-09-26

## 2022-09-26 ENCOUNTER — OFFICE VISIT (OUTPATIENT)
Dept: OBGYN | Facility: CLINIC | Age: 52
End: 2022-09-26
Payer: COMMERCIAL

## 2022-09-26 VITALS
OXYGEN SATURATION: 95 % | BODY MASS INDEX: 26.25 KG/M2 | HEART RATE: 95 BPM | WEIGHT: 148.2 LBS | DIASTOLIC BLOOD PRESSURE: 84 MMHG | SYSTOLIC BLOOD PRESSURE: 124 MMHG

## 2022-09-26 DIAGNOSIS — N95.0 POSTMENOPAUSAL BLEEDING: Primary | ICD-10-CM

## 2022-09-26 PROCEDURE — 99214 OFFICE O/P EST MOD 30 MIN: CPT | Performed by: OBSTETRICS & GYNECOLOGY

## 2022-09-26 NOTE — PROGRESS NOTES
This 53 y/o female, , s/p menopause in 2020, presents c/o one episode of vaginal spotting last evening at around 8 pm.  She denies any previous vaginal bleeding hx and did not note any pelvic cramping or passage of clots.  She states that she never started taking the Estratest and Provera which was prescribed last 2022 for treatment of her decreased libido complaint.  Today, she has only noted scant brownish vaginal discharge.  She was just treated for a UTI last Monday and feels that her symptoms have all resolved so declines a recheck.  She received a flu vaccine at work on 2022 so does not need this today.  /84 (BP Location: Left arm, Patient Position: Sitting, Cuff Size: Adult Regular)   Pulse 95   Wt 67.2 kg (148 lb 3.2 oz)   LMP 2020 (Exact Date)   SpO2 95%   BMI 26.25 kg/m    ROS:  10 systems were reviewed and the positives were listed under problems.  PE:  General- Healthy-appearing female in no acute distress  Eyes- No scleral injection or icterus  ENT- Mucus membranes moist  CV- No noticeable edema in extremities  Lymph- No noticeable cervical adenopathy  Respiratory- Non-labored breathing  Skin- No suspicious lesions or rashes visualized  Psych- Normal affect  Assessment - Postmenopausal spotting episode  Plan - Schedule a pelvic US to determine the endometrial stripe thickness and need for tissue sampling - office EMB versus operative hysteroscopy in [facility].  All her questions and concerns were addressed.  30 minutes were spent today in chart review, the patient visit, review of tests, and documentation in regard to the issues noted above.

## 2022-09-26 NOTE — PATIENT INSTRUCTIONS
If you have any questions regarding your visit, Please contact your care team.    [employer] [facility]: [phone]    To Schedule an Appointment 24/7  Call: [phone]   Carol Payan DO.    LEXUS Terrazas -Surgery Scheduler  Damaris - Surgery Scheduler    Toshia, ALBERT Peng, RN  Jeanne, ALBERT   [place]  Wednesday and Friday  8:30 a.m-5:00 p.m    [place]-Temporary  Monday 8:30 a.m-5:00 p.m  Typical Surgery day:  Tuesday [facility]  [address] [place] [address] [phone] Phone  [phone] Fax    Imaging Scheduling-All Locations [phone]    [facility]  [address] [place] [address]     Urgent Care locations:  [facility] Monday-Friday   10 am - 8 pm  Saturday and Sunday   9 am - 5 pm [phone]   **Surgeries** Our Surgery Schedulers will contact you to schedule. If you do not receive a call within 3 business days, please call [phone].    [facility] Labor and Delivery:  [phone]    If you need a medication refill, please contact your pharmacy. Please allow 3 business days for your refill to be completed.  As always, Thank you for trusting us with your healthcare needs!  see additional instructions from your care team below

## 2022-09-26 NOTE — TELEPHONE ENCOUNTER
Nurse Triage SBAR    Is this a 2nd Level Triage? YES, LICENSED PRACTITIONER REVIEW IS REQUIRED    Situation:   vaginal bleeding,mild,started last night    Background:    menopausal.    Assessment:   no pain,no fever. Wants to know cause     Protocol Recommended Disposition:   See in Office Today    Recommendation:   call pt     Routed to provider    Does the patient meet one of the following criteria for ADS visit consideration? No      Reason for Disposition    Patient wants to be seen    Additional Information    Negative: SEVERE vaginal bleeding (e.g., continuous red blood from vagina, or large blood clots) and very weak (can't stand)    Negative: Passed out (i.e., fainted, collapsed and was not responding)    Negative: Difficult to awaken or acting confused (e.g., disoriented, slurred speech)    Negative: Shock suspected (e.g., cold/pale/clammy skin, too weak to stand, low BP, rapid pulse)    Negative: Sounds like a life-threatening emergency to the triager    Negative: Pregnant 20 or more weeks (5 months or more)    Negative: Pregnant < 20 weeks (less than 5 months)    Negative: Postpartum (from 0 to 6 weeks after delivery)    Negative: Vaginal discharge is main symptom and bleeding is slight    Negative: SEVERE abdominal pain (e.g., excruciating)    Negative: SEVERE dizziness (e.g., unable to stand, requires support to walk, feels like passing out now)    Negative: SEVERE vaginal bleeding (e.g., soaking 2 pads or tampons per hour and present 2 or more hours; 1 menstrual cup every 2 hours)    Negative: MODERATE vaginal bleeding (i.e., soaking pad or tampon per hour and present > 6 hours; 1 menstrual cup every 6 hours)    Negative: Pale skin (pallor) of new-onset or worsening    Negative: Constant abdominal pain lasting > 2 hours    Negative: Patient sounds very sick or weak to the triager    Negative: Taking Coumadin (warfarin) or other strong blood thinner, or known bleeding disorder (e.g.,  "thrombocytopenia)    Negative: Skin bruises or nosebleed and not caused by an injury    Negative: Bleeding/spotting after procedure (e.g., biopsy) or pelvic examination (e.g., pap smear) that persists > 3 days    Answer Assessment - Initial Assessment Questions  1. AMOUNT: \"Describe the bleeding that you are having.\"       - SPOTTING: spotting, or pinkish / brownish mucous discharge; does not fill panty liner or pad     - MILD:  less than 1 pad / hour; less than patient's usual menstrual bleeding    - MODERATE: 1-2 pads / hour; 1 menstrual cup every 6 hours; small-medium blood clots (e.g., pea, grape, small coin)    - SEVERE: soaking 2 or more pads/hour for 2 or more hours; 1 menstrual cup every 2 hours; bleeding not contained by pads or continuous red blood from vagina; large blood clots (e.g., golf ball, large coin)     mild  2. ONSET: \"When did the bleeding begin?\" \"Is it continuing now?\"    Last night  3. MENSTRUAL PERIOD: \"When was the last normal menstrual period?\" \"How is this different than your period?\"     LMP 8/20  4. REGULARITY: \"How regular are your periods?\"      done  5. ABDOMINAL PAIN: \"Do you have any pain?\" \"How bad is the pain?\"  (e.g., Scale 1-10; mild, moderate, or severe)    - MILD (1-3): doesn't interfere with normal activities, abdomen soft and not tender to touch     - MODERATE (4-7): interferes with normal activities or awakens from sleep, abdomen tender to touch     - SEVERE (8-10): excruciating pain, doubled over, unable to do any normal activities       none  6. PREGNANCY: \"Could you be pregnant?\" \"Are you sexually active?\" \"Did you recently give birth?\"    not  7. BREASTFEEDING: \"Are you breastfeeding?\"     no  8. HORMONES: \"Are you taking any hormone medications, prescription or OTC?\" (e.g., birth control pills, estrogen)    no  9. BLOOD THINNERS: \"Do you take any blood thinners?\" (e.g., Coumadin/warfarin, Pradaxa/dabigatran, aspirin)      no  10. CAUSE: \"What do you think is causing " "the bleeding?\" (e.g., recent gyn surgery, recent gyn procedure; known bleeding disorder, cervical cancer, polycystic ovarian disease, fibroids)          unknown  11. HEMODYNAMIC STATUS: \"Are you weak or feeling lightheaded?\" If Yes, ask: \"Can you stand and walk normally?\"       Not weak or lightheaded, can stand and walk normal  12. OTHER SYMPTOMS: \"What other symptoms are you having with the bleeding?\" (e.g., passed tissue, vaginal discharge, fever, menstrual-type cramps)      no    Protocols used: VAGINAL BLEEDING - [id number]      "

## 2022-09-28 ENCOUNTER — ANCILLARY PROCEDURE (OUTPATIENT)
Dept: ULTRASOUND IMAGING | Facility: CLINIC | Age: 52
End: 2022-09-28
Attending: OBSTETRICS & GYNECOLOGY
Payer: COMMERCIAL

## 2022-09-28 DIAGNOSIS — N95.0 POSTMENOPAUSAL BLEEDING: ICD-10-CM

## 2022-09-28 PROCEDURE — 76830 TRANSVAGINAL US NON-OB: CPT | Performed by: RADIOLOGY

## 2022-09-28 PROCEDURE — 76856 US EXAM PELVIC COMPLETE: CPT | Performed by: RADIOLOGY

## 2022-10-02 ENCOUNTER — MYC MEDICAL ADVICE (OUTPATIENT)
Dept: OBGYN | Facility: CLINIC | Age: 52
End: 2022-10-02

## 2022-10-04 DIAGNOSIS — N83.201 RIGHT OVARIAN CYST: Primary | ICD-10-CM

## 2022-10-12 ENCOUNTER — TELEPHONE (OUTPATIENT)
Dept: OBGYN | Facility: CLINIC | Age: 52
End: 2022-10-12

## 2022-10-12 ENCOUNTER — LAB (OUTPATIENT)
Dept: LAB | Facility: CLINIC | Age: 52
End: 2022-10-12
Payer: COMMERCIAL

## 2022-10-12 DIAGNOSIS — N83.201 RIGHT OVARIAN CYST: ICD-10-CM

## 2022-10-12 LAB — CANCER AG125 SERPL-ACNC: 23 U/ML

## 2022-10-12 PROCEDURE — 86304 IMMUNOASSAY TUMOR CA 125: CPT

## 2022-10-12 PROCEDURE — 36415 COLL VENOUS BLD VENIPUNCTURE: CPT

## 2022-10-12 NOTE — TELEPHONE ENCOUNTER
[facility] Call Center    Phone Message    May a detailed message be left on voicemail: yes     Reason for Call: Other:     Pt is requesting a call back to discuss the results of their recent ultrasound.    Action Taken: Message routed to:  [facility] p [phone]    Travel Screening: Not Applicable

## 2022-10-12 NOTE — TELEPHONE ENCOUNTER
I returned her call and explained the need for the pelvic MRI and CA-125 marker test for follow up of her right ovarian cyst.  Will then plan to check an EMB in the office due to her one episode of postmenopausal spotting.  Unfortunately, the phonecall dropped while I was in mid sentence and when I tried to call her back, I was sent to her voice mail.  I left a message on that but she is to call back with any further questions or concerns.

## 2022-10-12 NOTE — TELEPHONE ENCOUNTER
"Per 9/28 US result note:  \"Your recent ultrasound showed a large right-sided cyst measuring 9.8 cm so a MRI has been advised for follow up.  I would also advise a CA-125 marker test which is drawn in lab for ovarian cancer screening.  After these results are back, will then decide on the need for an endometrial biopsy to check the uterine lining.\"    Pt just read the above message in her mychart and is very nervous that she has cancer that she is needing to do all of this extra testing.  She wants to know how concerning her US was and how quickly this further evalutation needs to be done.  She isn't scheduled for a pelvic MRI until 11/9. She is scheduled for the CA-125 this afternoon.    I assured the pt that I would send a message to Dr. Payan to elaborate on what the MRI is checking for and is there a concern for cancer and is it okay to wait until 11/9 for an MRI.  Pt mentioned several times that she is very concerned and is not going to sleep at night now.    Toshia Lynn RN      "

## 2022-10-23 ENCOUNTER — HEALTH MAINTENANCE LETTER (OUTPATIENT)
Age: 52
End: 2022-10-23

## 2022-10-30 ENCOUNTER — ANCILLARY PROCEDURE (OUTPATIENT)
Dept: MRI IMAGING | Facility: CLINIC | Age: 52
End: 2022-10-30
Attending: OBSTETRICS & GYNECOLOGY
Payer: COMMERCIAL

## 2022-10-30 DIAGNOSIS — N83.201 RIGHT OVARIAN CYST: ICD-10-CM

## 2022-10-30 PROCEDURE — A9585 GADOBUTROL INJECTION: HCPCS | Performed by: RADIOLOGY

## 2022-10-30 PROCEDURE — 72197 MRI PELVIS W/O & W/DYE: CPT | Performed by: RADIOLOGY

## 2022-10-30 RX ORDER — GADOBUTROL 604.72 MG/ML
7.5 INJECTION INTRAVENOUS ONCE
Status: COMPLETED | OUTPATIENT
Start: 2022-10-30 | End: 2022-10-30

## 2022-10-30 RX ADMIN — GADOBUTROL 7.5 ML: 604.72 INJECTION INTRAVENOUS at 10:55

## 2022-10-30 NOTE — DISCHARGE INSTRUCTIONS
MRI Contrast Discharge Instructions    The IV contrast you received today will pass out of your body in your  urine. This will happen in the next 24 hours. You will not feel this process.  Your urine will not change color.    Drink at least 4 extra glasses of water or juice today (unless your doctor  has restricted your fluids). This reduces the stress on your kidneys.  You may take your regular medicines.    If you are on dialysis: It is best to have dialysis today.    If you have a reaction: Most reactions happen right away. If you have  any new symptoms after leaving the hospital (such as hives or swelling),  call your hospital at the correct number below. Or call your family doctor.  If you have breathing distress or wheezing, call 911.    Special instructions: ***    I have read and understand the above information.    Signature:______________________________________ Date:___________    Staff:__________________________________________ Date:___________     Time:__________    [place] [facility] Departments:    ___[place]: [phone]  ___[facility]: [phone]  ___[place]: [phone] ___[facility]: [phone]  ___[facility]: [phone]  ___[facility]: [phone]  ___[phone]  ___[facility]: [phone]  ___[phone]

## 2022-10-31 ENCOUNTER — TELEPHONE (OUTPATIENT)
Dept: OBGYN | Facility: CLINIC | Age: 52
End: 2022-10-31

## 2022-10-31 DIAGNOSIS — N83.201 RIGHT OVARIAN CYST: Primary | ICD-10-CM

## 2022-10-31 NOTE — TELEPHONE ENCOUNTER
I called this patient and explained today's MRI results and concerns.  Due to the complex nature and size of this right ovarian cyst, I advised a referral to gyn oncology and she is comfortable with this plan.  All her questions and concerns were addressed.

## 2022-11-01 ENCOUNTER — PATIENT OUTREACH (OUTPATIENT)
Dept: ONCOLOGY | Facility: CLINIC | Age: 52
End: 2022-11-01

## 2022-11-01 NOTE — PROGRESS NOTES
New Patient Hematology / Oncology Nurse Navigator Note     Referral Date: 10/31/22    Referring provider:   Carol Payan,    [address]   [facility] [address]   Phone: [phone]   Fax: [phone]       Referring Clinic/Organization: [facility]     Referred to: GynOnc    Requested provider (if applicable): First available - did not specify     Evaluation for : Large complex right ovarian cyst in postmenopausal female per US and MRI     Clinical History (per Nurse review of records provided):        (normal at 23)-- BOOKMARKED    9/28 US showing:  IMPRESSION:  1. 9.8 cm right adnexal cystic mass with thin septations.  Consider  contrast enhanced MRI or short interval follow-up US in 3 months for  further assessment.  2. The visualized endometrium appears within normal limits. The  endometrium in the lower uterine segment is incompletely visualized. -- BOOKMARKED    10/30 MRI showing:  IMPRESSION:  1. 6.6 x 8.3 x 8.1 cm thin septated midline large cyst anterior to the  uterus probably arising from right ovary with focal mural cystic  nodule/septation. Given complicated appearance, large size and  postmenopausal status, lesion remains suspicious for cystic neoplasm.  2. Small intramural uterine fibroids.  3. Junctional zone appears diffusely thickened measuring up to 11 mm  containing 3 mm cyst in the corpus which raises suspicion for  adenomyosis.    4. Cyst near the left lesser trochanter of femur may represent  bursitis. -- BOOKMARKED    Clinical Assessment / Barriers to Care (Per Nurse):  Pt lives in [place], [place]     Records Location: [facility]     Records Needed:   N/A    Additional testing needed prior to consult:   N/A    Referral updates and Plan:   OUTGOING CALL to pt:  Introduced my role as nurse navigator with [facility] Hematology/Oncology dept and that we have recd the referral for dx of right ovarian cyst from Dr Payan  Pt confirms they are aware of the referral and  ready to schedule  Provided contact information if future questions arise.     -Transferred pt to NPS line [phone] to schedule appt per scheduling instructions.  -Explained to pt that they will receive a call from our scheduling intake team and provided our call-back number below if needed.    Dennise Woodard, BSN, RN, PHN, OCN  Hematology/Oncology Nurse Navigator  [facility]  [phone]

## 2022-11-02 NOTE — TELEPHONE ENCOUNTER
RECORDS STATUS - ALL OTHER DIAGNOSIS      RECORDS RECEIVED FROM: [facility]   DATE RECEIVED: 11/02/22   NOTES STATUS DETAILS   OFFICE NOTE from referring provider Epic 09/26/22: Dr. Carol Payan   MEDICATION LIST [facility]    LABS     ANYTHING RELATED TO DIAGNOSIS Epic Most recent 10/12/22   IMAGING (NEED IMAGES & REPORT)     MRI PACS 10/30/22: MR Pelvis   ULTRASOUND PACS 09/28/22: US Pelvic

## 2022-11-03 ENCOUNTER — ONCOLOGY VISIT (OUTPATIENT)
Dept: ONCOLOGY | Facility: CLINIC | Age: 52
End: 2022-11-03
Attending: OBSTETRICS & GYNECOLOGY
Payer: COMMERCIAL

## 2022-11-03 ENCOUNTER — PRE VISIT (OUTPATIENT)
Dept: ONCOLOGY | Facility: CLINIC | Age: 52
End: 2022-11-03

## 2022-11-03 VITALS
DIASTOLIC BLOOD PRESSURE: 101 MMHG | TEMPERATURE: 98.3 F | WEIGHT: 149 LBS | SYSTOLIC BLOOD PRESSURE: 153 MMHG | BODY MASS INDEX: 26.4 KG/M2 | HEIGHT: 63 IN | RESPIRATION RATE: 16 BRPM | OXYGEN SATURATION: 98 % | HEART RATE: 115 BPM

## 2022-11-03 DIAGNOSIS — N95.0 PMB (POSTMENOPAUSAL BLEEDING): ICD-10-CM

## 2022-11-03 DIAGNOSIS — N83.201 RIGHT OVARIAN CYST: ICD-10-CM

## 2022-11-03 DIAGNOSIS — R10.2 PELVIC PAIN IN FEMALE: Primary | ICD-10-CM

## 2022-11-03 LAB
ERYTHROCYTE [DISTWIDTH] IN BLOOD BY AUTOMATED COUNT: 11.7 % (ref 10–15)
HCT VFR BLD AUTO: 42.5 % (ref 35–47)
HGB BLD-MCNC: 14.9 G/DL (ref 11.7–15.7)
MCH RBC QN AUTO: 30.7 PG (ref 26.5–33)
MCHC RBC AUTO-ENTMCNC: 35.1 G/DL (ref 31.5–36.5)
MCV RBC AUTO: 88 FL (ref 78–100)
PLATELET # BLD AUTO: 305 10E3/UL (ref 150–450)
RBC # BLD AUTO: 4.85 10E6/UL (ref 3.8–5.2)
WBC # BLD AUTO: 7.8 10E3/UL (ref 4–11)

## 2022-11-03 PROCEDURE — G0463 HOSPITAL OUTPT CLINIC VISIT: HCPCS | Mod: 25

## 2022-11-03 PROCEDURE — 85027 COMPLETE CBC AUTOMATED: CPT | Performed by: OBSTETRICS & GYNECOLOGY

## 2022-11-03 PROCEDURE — 58100 BIOPSY OF UTERUS LINING: CPT | Performed by: OBSTETRICS & GYNECOLOGY

## 2022-11-03 PROCEDURE — 58100 BIOPSY OF UTERUS LINING: CPT

## 2022-11-03 PROCEDURE — 93005 ELECTROCARDIOGRAM TRACING: CPT | Mod: 59

## 2022-11-03 PROCEDURE — 88305 TISSUE EXAM BY PATHOLOGIST: CPT | Mod: 26 | Performed by: PATHOLOGY

## 2022-11-03 PROCEDURE — 99204 OFFICE O/P NEW MOD 45 MIN: CPT | Mod: 25 | Performed by: OBSTETRICS & GYNECOLOGY

## 2022-11-03 PROCEDURE — 88305 TISSUE EXAM BY PATHOLOGIST: CPT | Mod: TC | Performed by: OBSTETRICS & GYNECOLOGY

## 2022-11-03 PROCEDURE — G0463 HOSPITAL OUTPT CLINIC VISIT: HCPCS

## 2022-11-03 PROCEDURE — 93010 ELECTROCARDIOGRAM REPORT: CPT | Performed by: INTERNAL MEDICINE

## 2022-11-03 PROCEDURE — 36415 COLL VENOUS BLD VENIPUNCTURE: CPT | Performed by: OBSTETRICS & GYNECOLOGY

## 2022-11-03 RX ORDER — CEFAZOLIN SODIUM 2 G/50ML
2 SOLUTION INTRAVENOUS SEE ADMIN INSTRUCTIONS
Status: CANCELLED | OUTPATIENT
Start: 2022-11-03

## 2022-11-03 RX ORDER — CEFAZOLIN SODIUM 2 G/50ML
2 SOLUTION INTRAVENOUS
Status: CANCELLED | OUTPATIENT
Start: 2022-11-03

## 2022-11-03 RX ORDER — ACETAMINOPHEN 325 MG/1
975 TABLET ORAL ONCE
Status: CANCELLED | OUTPATIENT
Start: 2022-11-03 | End: 2022-11-03

## 2022-11-03 RX ORDER — PHENAZOPYRIDINE HYDROCHLORIDE 200 MG/1
200 TABLET, FILM COATED ORAL ONCE
Status: CANCELLED | OUTPATIENT
Start: 2022-11-03 | End: 2022-11-03

## 2022-11-03 ASSESSMENT — PAIN SCALES - GENERAL: PAINLEVEL: MILD PAIN (2)

## 2022-11-03 NOTE — LETTER
11/3/2022         RE: Renea Trotter  [address] [facility] [place] [address]        Dear Colleague,    Thank you for referring your patient, Renea Trotter, to the [facility] [facility]. Please see a copy of my visit note below.    GYNECOLOGIC  ONCOLOGY CONSULT    [place]  [address] [place] [address]   RE: Renea Trotter  : 1970  NIALL: 11/3/2022    CC: right ovarian mass    HPI: Ms Renea Trotter is a 52 year old post-menopausal female w/ PMHx of HTN, HLD, and multiple sclerosis who presents for consultation regarding right ovarian mass found on US 22. She is here today with her friend.    She notes abdominal pain, nausea, bloating, increased urgency, and headaches for the past week.     Postmenopausal since 2020. She presented with one episode of vaginal bleeding to her OB/GYN clinic on 2022.    22 Pelvic US  IMPRESSION:  1. 9.8 cm right adnexal cystic mass with thin septations.  Consider  contrast enhanced MRI or short interval follow-up US in 3 months for  further assessment.  2. The visualized endometrium appears within normal limits. Endometrial stripe is 4 mm. The  endometrium in the lower uterine segment is incompletely visualized.      10/13/22   23    10/30/22 MRI of Pelvis  IMPRESSION:  1. 6.6 x 8.3 x 8.1 cm thin septated midline large cyst anterior to the  uterus probably arising from right ovary with focal mural cystic  nodule/septation. Given complicated appearance, large size and  postmenopausal status, lesion remains suspicious for cystic neoplasm.  2. Small intramural uterine fibroids.  3. Junctional zone appears diffusely thickened measuring up to 11 mm  containing 3 mm cyst in the corpus which raises suspicion for  adenomyosis.      OBGYN history and Health Maintenance:    Last Pap Smear:  2020 Negative/ HPV negative  Last Mammogram: 10/2021 Negative  Last Colonoscopy:   8/2022 Negative    Review of Systems:  Systemic:No weight changes.    Skin : No skin changes or new lesions.   Eye : No changes in vision.   Pulmonary: No cough or SOB.   Cardiovascular: No CP or palpitations  Gastrointestinal :  +Abdominal pain, nausea, no diarrhea, constipation. Bowel habits normal.   Genitourinary: + urgency, no dysuria or bleeding  Psychiatric: No depression or anxiety  Hematologic : No palpable lymph nodes.   Endocrine : No hot flashes. No heat/cold intolerance.      Neurological: + headaches, + numbness/tiingling    Past Medical History:   Diagnosis Date     HTN (hypertension)      Hypercholesterolemia      Mild intermittent asthma      MS (multiple sclerosis) (H)        Past Surgical History:   Procedure Laterality Date     APPENDECTOMY       COLONOSCOPY WITH CO2 INSUFFLATION N/A 08/05/2022    Procedure: COLONOSCOPY, WITH CO2 INSUFFLATION;  Surgeon: Hernan Vasquez MD;  Location:  OR      TOOTH EXTRACTION W/FORCEP            Current Outpatient Medications   Medication Sig Dispense Refill     Cholecalciferol (VITAMIN D3 PO) Take 5,000 Units by mouth daily       glatiramer acetate 40 MG/ML injection Inject 40 mg Subcutaneous three times a week 12 mL 11     ketoconazole (NIZORAL) 2 % external cream Apply thin layer to the right third toe and surrounding area. 30 g 11     lisinopril-hydrochlorothiazide (ZESTORETIC) 10-12.5 MG tablet Take 1 tablet by mouth daily 90 tablet 3     MULTIPLE VITAMIN PO Take 1 tablet by mouth daily.       pravastatin (PRAVACHOL) 20 MG tablet Take 1 tablet (20 mg) by mouth every evening 90 tablet 3     temazepam (RESTORIL) 15 MG capsule TAKE 1 TO 2 CAPSULES BY MOUTH NIGHTLY AS NEEDED FOR SLEEP 15 capsule 0     albuterol (PROAIR HFA/PROVENTIL HFA/VENTOLIN HFA) 108 (90 Base) MCG/ACT inhaler Inhale 2 puffs into the lungs every 4 hours as needed for shortness of breath / dyspnea or wheezing Profile Rx (Patient not taking: Reported on 11/3/2022) 8.5 g 3         No  "Known Allergies    Social History:  Social History     Tobacco Use     Smoking status: Never     Smokeless tobacco: Never   Substance Use Topics     Alcohol use: Yes     Alcohol/week: 1.0 - 2.0 standard drink     Comment: RARELY         Family History:   The patient's family history is notable for   Family History   Problem Relation Age of Onset     Hypertension Mother      Lipids Mother      Hyperlipidemia Mother      Eye Disorder Father      Hyperlipidemia Father      Sleep Apnea Father      Coronary Artery Disease Sister      Hyperlipidemia Sister      Myocardial Infarction Sister      Cerebrovascular Disease Maternal Grandmother      Cerebrovascular Disease Maternal Grandfather      Prostate Cancer Maternal Grandfather      Blood Disease Paternal Grandmother      Alzheimer Disease Paternal Grandfather      Pulmonary Embolism Daughter      Multiple Sclerosis Cousin          Physical Exam:     BP (!) 153/101   Pulse 115   Temp 98.3  F (36.8  C) (Oral)   Resp 16   Ht 1.6 m (5' 2.99\")   Wt 67.6 kg (149 lb)   LMP 08/27/2020 (Exact Date)   SpO2 98%   BMI 26.40 kg/m    Body mass index is 26.4 kg/m .    General: Alert and oriented, appears anxious  Psych: Mood stable  CV: mildly tachycardic, regular rhythm, no murmurs, rubs, or gallops  Pulm: CTA bilaterally, no increased work of breathing  GI: Soft. No distention. No hernia.   Lymph: No enlarge lymph nodes in neck or groin  : Normal external genitalia. Multiparous cervix. Uterus mobile. Right adnexal mass on palpation. Left adnexa unremarkable.    Endometrial Biopsy was performed as follows: A speculum placed in   vagina with good visualization of cervix; cervix swabbed x3 with   Betadine solution. Anterior lip of cervix grasped with single   toothed tenaculum; endometrial sampling achieved with Pipelle   sampling unit. Tissue collected, labelled and sent to Pathology.   Instruments removed, hemostasis achieved with ease. The patient   tolerated the procedure " well.    Assessment: Renea Trotter is a 52 year old woman with a right ovarian mass and post-menopausal bleeding. Pelvic pain and urinary pressure and urgency.    Post menopausal bleeding, endometrial biopsy obtained. Ultrasound with normal endometrial thickness.      Plan:     1.)   Discussed surgical management, including removal of right ovary and both fallopian tubes. Patient would like to have surgery as soon as possible due to pain and bloating. Reviewed risk of malignancy is low and possible need for total hysterectomy and staging pending frozen section pathology.   Surgical consent obtained today    2.) Endometrial biopsy performed today without complications, small amount of tissue. Patient counseled on possible need for hysterectomy depending on pathology results.    3.) Pre-operative exam and labs completed today       Debbie Sepulveda, MS4  Gynecologic Oncology Sub-Internship Medical Student    Savana Valle M.D., MPH,  F.A.C.O.G.  Professor  Department of Ob/Gyn and Women's Health  Division of Gynecologic Oncology  [facility]  [phone]      I saw and evaluated the patient with the medical student who acted as scribe. I reviewed and edited the note.      Time: total time spent today, 11/3/22, including preparation, review of outside records, face to face counseling, and documentation was 60 minutes.

## 2022-11-03 NOTE — NURSING NOTE
"Oncology Rooming Note    November 3, 2022 12:53 PM   Renea Trotter is a 52 year old female who presents for:    Chief Complaint   Patient presents with     Oncology Clinic Visit     Ovarian cyst, right     Initial Vitals: BP (!) 153/101   Pulse 115   Temp 98.3  F (36.8  C) (Oral)   Resp 16   Ht 1.6 m (5' 2.99\")   Wt 67.6 kg (149 lb)   LMP 08/27/2020 (Exact Date)   SpO2 98%   BMI 26.40 kg/m   Estimated body mass index is 26.4 kg/m  as calculated from the following:    Height as of this encounter: 1.6 m (5' 2.99\").    Weight as of this encounter: 67.6 kg (149 lb). Body surface area is 1.73 meters squared.  Mild Pain (2) Comment: Data Unavailable   Patient's last menstrual period was 08/27/2020 (exact date).  Allergies reviewed: Yes  Medications reviewed: Yes    Medications: Medication refills not needed today.  Pharmacy name entered into [employer] Data:    [facility] - [place], [place] - [address] [address] [place] [address] [facility] D[facility] - [address]  [facility] - [address]  [id number] [place] [place] [facility] [address] [facility] AT [address]    Clinical concerns: none       Jessica Gomez CMA              "

## 2022-11-03 NOTE — PATIENT INSTRUCTIONS
It was a pleasure seeing you in clinic today-please reach out if there are any further questions that arise following today's visit.  During business hours, you may reach me at [phone].  For urgent/emergent questions after business hours, you may reach the on-call Gynecologic Oncology Resident through the [facility]  at [phone].    All normal test results are usually communicated via letter or SumU[employer]art message.  Abnormal results (those that require a change in the previously discussed plan of care) are usually communicated via a phone call, MyChart or letter     I recommend signing up for SumU[employer]art access if you have not already done so.  This allows you online access to your lab results and also helps you communicate efficiently with your clinic should any questions arise in your care.     Preparation for Surgery:  Covid test to be done 2-3 days before surgery, you will be scheduled for this test/you can do this at home and bring a picture of the test result with you to                    presurgery          NO bowel prep needed     DAY OF SURGERY:  Diet:  NO food or Milk products 8 hours prior to surgery. You can have WATER up until 3 hours prior to surgery    You will be scheduled for surgery. My  will reach out at her earliest convenience     The surgical procedure is: LAPAROSCOPIC RIGHT SALPINGO-OOPHORECTOMY, LEFT SALPINGECTOMY, POSSIBLE BILATEARL SALPINGO-OOPHORECTOMY, POSSIBLE TOTAL HYSTERECTOMY, POSSIBLE CANCER STAGING      Anticipated length of surgery: .  You will be in the recovery room for approximately 2-4hrs after surgery.        At discharge you will need a someone to drive you home.    Postoperative Restrictions:    No heavy lifting >15 lbs or strenuous activity for six weeks  Nothing in the vagina (no tampons, no intercourse, no douching) for eight weeks.     Postoperative plan  Decreased appetite is normal, plan to eat 6-8 small meal day, drink at least 6-8  glasses of water per day, there are no dietary restrictions. However, it is recommended that you keep your diet light, simple and small. NOTHING: greasy, spicy, things you know give you gas and carbonation until you are passing gas regularly.     Take stool softener daily as directed  for at least 1-2 weeks unless you develop diarrhea.    Activity as tolerated, reminder to balance rest with activity as you will tire easily while recovering. Using stairs is ok. Walk as much as tolerated, increasing your activity every day.     You may shower 48 hours after surgery, your incision site can get wet/soapy, pat dry.      You will be given ibuprofen and tylenol, take on schedule every 6 hours.  Do not set an alarm to wake yourself up to take the pain medications.  Take consistently for a week  then you can decrease/stop as tolerated.  You will also be given a small dose of narcotics, you may take this in addition to the tylenol/ibuprofen as needed if the pain is not manageable.  do not take this on a schedule.      Ok to resume driving after surgery after you STOP using narcotics AND FEEL SAFE to handle a vehicle     Wound care:  No need to keep your incision covered. However, you may cover your incision sites if there is drainage or clothing is causing irritation otherwise leave open to the air.  No need to put an creams/ointments on incision site.  Wound will be mildly pink and might have a firm ridge underneath this is normal and will resolve in 4-6 weeks    OTHER: Patients are often constipated after general anesthesia and surgery. The patient should continue to take stool softeners (for example, Senokot-S) for the next six weeks and consume adequate amounts of water. If the patient remains constipated or unable to pass stool, please try one or all of the following measures:    1. Milk of Magnesia 30cc twice a day as needed by mouth  2. Metamucil 2 tablespoons in 12 ounces of fluid  3. Dulcolax oral or  suppositories  4. Prunes or prune juice  5. Miralax daily    Call clinic if you have fever greater than 100.5, heavy vaginal bleeding, persistent nausea/vomiting, increasing redness, pain, swelling at incision site, drainage with bad odor or other concerns.      You should be feeling better every day.     PLEASE BE AWARE THAT SOME OF THESE INSTRUCTIONS MAY CHANGE DEPENDING ON THE OUTCOME OF YOUR SURGERY    Krissy

## 2022-11-03 NOTE — NURSING NOTE
EKG was performed today per order written by Leonard Sawant.  Name and  verified with patient. Patient tolerated well without incident. File transmitted to chart.    Bobo Hinojosa on 11/3/2022 at 2:31 PM

## 2022-11-03 NOTE — NURSING NOTE
Labs completed via venipuncture, patient discharged.    See flow sheets.    Rasheeda Ratliff CMA ([facility])

## 2022-11-03 NOTE — PROGRESS NOTES
GYNECOLOGIC  ONCOLOGY CONSULT    [place]  [id number] [place] [address]   RE: Renea Trotter  : 1970  NIALL: 11/3/2022    CC: right ovarian mass    HPI: Ms Renea Trotter is a 52 year old post-menopausal female w/ PMHx of HTN, HLD, and multiple sclerosis who presents for consultation regarding right ovarian mass found on US 22. She is here today with her friend.    She notes abdominal pain, nausea, bloating, increased urgency, and headaches for the past week.     Postmenopausal since 2020. She presented with one episode of vaginal bleeding to her OB/GYN clinic on 2022.    22 Pelvic US  IMPRESSION:  1. 9.8 cm right adnexal cystic mass with thin septations.  Consider  contrast enhanced MRI or short interval follow-up US in 3 months for  further assessment.  2. The visualized endometrium appears within normal limits. Endometrial stripe is 4 mm. The  endometrium in the lower uterine segment is incompletely visualized.      10/13/22   23    10/30/22 MRI of Pelvis  IMPRESSION:  1. 6.6 x 8.3 x 8.1 cm thin septated midline large cyst anterior to the  uterus probably arising from right ovary with focal mural cystic  nodule/septation. Given complicated appearance, large size and  postmenopausal status, lesion remains suspicious for cystic neoplasm.  2. Small intramural uterine fibroids.  3. Junctional zone appears diffusely thickened measuring up to 11 mm  containing 3 mm cyst in the corpus which raises suspicion for  adenomyosis.      OBGYN history and Health Maintenance:    Last Pap Smear:  2020 Negative/ HPV negative  Last Mammogram: 10/2021 Negative  Last Colonoscopy:  2022 Negative    Review of Systems:  Systemic:No weight changes.    Skin : No skin changes or new lesions.   Eye : No changes in vision.   Pulmonary: No cough or SOB.   Cardiovascular: No CP or palpitations  Gastrointestinal :  +Abdominal pain, nausea, no  diarrhea, constipation. Bowel habits normal.   Genitourinary: + urgency, no dysuria or bleeding  Psychiatric: No depression or anxiety  Hematologic : No palpable lymph nodes.   Endocrine : No hot flashes. No heat/cold intolerance.      Neurological: + headaches, + numbness/tiingling    Past Medical History:   Diagnosis Date     HTN (hypertension)      Hypercholesterolemia      Mild intermittent asthma      MS (multiple sclerosis) (H)        Past Surgical History:   Procedure Laterality Date     APPENDECTOMY       COLONOSCOPY WITH CO2 INSUFFLATION N/A 08/05/2022    Procedure: COLONOSCOPY, WITH CO2 INSUFFLATION;  Surgeon: Hernan Vasquez MD;  Location: [facility] OR      TOOTH EXTRACTION W/FORCEP            Current Outpatient Medications   Medication Sig Dispense Refill     Cholecalciferol (VITAMIN D3 PO) Take 5,000 Units by mouth daily       glatiramer acetate 40 MG/ML injection Inject 40 mg Subcutaneous three times a week 12 mL 11     ketoconazole (NIZORAL) 2 % external cream Apply thin layer to the right third toe and surrounding area. 30 g 11     lisinopril-hydrochlorothiazide (ZESTORETIC) 10-12.5 MG tablet Take 1 tablet by mouth daily 90 tablet 3     MULTIPLE VITAMIN PO Take 1 tablet by mouth daily.       pravastatin (PRAVACHOL) 20 MG tablet Take 1 tablet (20 mg) by mouth every evening 90 tablet 3     temazepam (RESTORIL) 15 MG capsule TAKE 1 TO 2 CAPSULES BY MOUTH NIGHTLY AS NEEDED FOR SLEEP 15 capsule 0     albuterol (PROAIR HFA/PROVENTIL HFA/VENTOLIN HFA) 108 (90 Base) MCG/ACT inhaler Inhale 2 puffs into the lungs every 4 hours as needed for shortness of breath / dyspnea or wheezing Profile Rx (Patient not taking: Reported on 11/3/2022) 8.5 g 3         No Known Allergies    Social History:  Social History     Tobacco Use     Smoking status: Never     Smokeless tobacco: Never   Substance Use Topics     Alcohol use: Yes     Alcohol/week: 1.0 - 2.0 standard drink     Comment: RARELY         Family History:   The  "patient's family history is notable for   Family History   Problem Relation Age of Onset     Hypertension Mother      Lipids Mother      Hyperlipidemia Mother      Eye Disorder Father      Hyperlipidemia Father      Sleep Apnea Father      Coronary Artery Disease Sister      Hyperlipidemia Sister      Myocardial Infarction Sister      Cerebrovascular Disease Maternal Grandmother      Cerebrovascular Disease Maternal Grandfather      Prostate Cancer Maternal Grandfather      Blood Disease Paternal Grandmother      Alzheimer Disease Paternal Grandfather      Pulmonary Embolism Daughter      Multiple Sclerosis Cousin          Physical Exam:     BP (!) 153/101   Pulse 115   Temp 98.3  F (36.8  C) (Oral)   Resp 16   Ht 1.6 m (5' 2.99\")   Wt 67.6 kg (149 lb)   LMP 08/27/2020 (Exact Date)   SpO2 98%   BMI 26.40 kg/m    Body mass index is 26.4 kg/m .    General: Alert and oriented, appears anxious  Psych: Mood stable  CV: mildly tachycardic, regular rhythm, no murmurs, rubs, or gallops  Pulm: CTA bilaterally, no increased work of breathing  GI: Soft. No distention. No hernia.   Lymph: No enlarge lymph nodes in neck or groin  : Normal external genitalia. Multiparous cervix. Uterus mobile. Right adnexal mass on palpation. Left adnexa unremarkable.    Endometrial Biopsy was performed as follows: A speculum placed in   vagina with good visualization of cervix; cervix swabbed x3 with   Betadine solution. Anterior lip of cervix grasped with single   toothed tenaculum; endometrial sampling achieved with Pipelle   sampling unit. Tissue collected, labelled and sent to Pathology.   Instruments removed, hemostasis achieved with ease. The patient   tolerated the procedure well.    Assessment: Renea Trotter is a 52 year old woman with a right ovarian mass and post-menopausal bleeding. Pelvic pain and urinary pressure and urgency.    Post menopausal bleeding, endometrial biopsy obtained. Ultrasound with normal endometrial " thickness.      Plan:     1.)   Discussed surgical management, including removal of right ovary and both fallopian tubes. Patient would like to have surgery as soon as possible due to pain and bloating. Reviewed risk of malignancy is low and possible need for total hysterectomy and staging pending frozen section pathology.   Surgical consent obtained today    2.) Endometrial biopsy performed today without complications, small amount of tissue. Patient counseled on possible need for hysterectomy depending on pathology results.    3.) Pre-operative exam and labs completed today       Debbie Sepulveda MS4  Gynecologic Oncology Sub-Internship Medical Student    Savana Valle M.D., MPH,  F.A.C.O.G.  Professor  Department of Ob/Gyn and Women's Health  Division of Gynecologic Oncology  [facility]/[employer] [place]  [phone]      I saw and evaluated the patient with the medical student who acted as scribe. I reviewed and edited the note.        Time: total time spent today, 11/3/22, including preparation, review of outside records, face to face counseling, and documentation was 60 minutes.

## 2022-11-03 NOTE — NURSING NOTE
RN completed pre operative teaching  Reviewed:  What to expect with surgery  Incison care  Restrictions  Diet after  Symptoms of concern  Driving  Bowel care   Showering after  Pain management and expectations     RN answered all the patients questions to the best of her ability    LAB and EKG to be completed prior to patient leaving clinic     Nida Roman RN

## 2022-11-04 ENCOUNTER — TELEPHONE (OUTPATIENT)
Dept: ONCOLOGY | Facility: CLINIC | Age: 52
End: 2022-11-04

## 2022-11-04 LAB
ATRIAL RATE - MUSE: 97 BPM
DIASTOLIC BLOOD PRESSURE - MUSE: NORMAL MMHG
INTERPRETATION ECG - MUSE: NORMAL
P AXIS - MUSE: 53 DEGREES
PATH REPORT.COMMENTS IMP SPEC: NORMAL
PATH REPORT.COMMENTS IMP SPEC: NORMAL
PATH REPORT.FINAL DX SPEC: NORMAL
PATH REPORT.GROSS SPEC: NORMAL
PATH REPORT.MICROSCOPIC SPEC OTHER STN: NORMAL
PATH REPORT.RELEVANT HX SPEC: NORMAL
PHOTO IMAGE: NORMAL
PR INTERVAL - MUSE: 138 MS
QRS DURATION - MUSE: 76 MS
QT - MUSE: 348 MS
QTC - MUSE: 441 MS
R AXIS - MUSE: 60 DEGREES
SYSTOLIC BLOOD PRESSURE - MUSE: NORMAL MMHG
T AXIS - MUSE: 57 DEGREES
VENTRICULAR RATE- MUSE: 97 BPM

## 2022-11-04 NOTE — TELEPHONE ENCOUNTER
RN Care Coordinator: Stephanie Roman; [phone]    Surgery is scheduled with Dr. Valle   Date: 11/11/2022   Location: [facility]  Scheduled per patient request      H&P already completed by Surgeon     COVID-19 test: patient to complete an at-home test 1-2 days prior to scheduled date and bring a picture of negative results or call [phone] option # 7 to schedule a PCR test within 4 days of the scheduled date     Post-op: 12/15/22, in person visit    Patient will receive a phone call from pre-admission nurses 1-2 days prior to surgery with arrival and start time.      Spoke with the patient and was able to confirm all scheduled information.       Patient questions/concerns: N/A       Surgery packet was provided by the RNCC during appointment    __    Damari Siu, Perioperative Coordinator, on 11/4/2022  P: [phone]

## 2022-11-04 NOTE — TELEPHONE ENCOUNTER
----- Message from Yumi Cardenas sent at 11/4/2022  9:02 AM CDT -----  Regarding: FW: Next week possible??    ----- Message -----  From: Nida oRman, RN  Sent: 11/3/2022   2:28 PM CDT  To: Yumi Cardenas  Subject: RE: Next week possible??                         She said yes    Krissy  ----- Message -----  From: Yumi Cardenas  Sent: 11/3/2022   2:06 PM CDT  To: Nida Roman RN  Subject: RE: Next week possible??                         12 PM 11/11?  ----- Message -----  From: Nida Roman, ALBERT  Sent: 11/3/2022   1:30 PM CDT  To: Yumi Cardenas  Subject: Next week possible??                             Next week NOT Monday?     Orders in     Krissy

## 2022-11-10 ENCOUNTER — ANESTHESIA EVENT (OUTPATIENT)
Dept: SURGERY | Facility: CLINIC | Age: 52
End: 2022-11-10
Payer: COMMERCIAL

## 2022-11-10 NOTE — ANESTHESIA PREPROCEDURE EVALUATION
Anesthesia Pre-Procedure Evaluation    Patient: Renea Trotter   MRN: [id number] : 1970        Procedure : Procedure(s):  LAPAROSCOPIC RIGHT SALPINGO-OOPHORECTOMY, LEFT SALPINGECTOMY, POSSIBLE BILATERAL SALPINGO-OOPHORECTOMY  POSSIBLE TOTAL HYSTERECTOMY, POSSIBLE CANCER STAGING          Past Medical History:   Diagnosis Date     HTN (hypertension)      Hypercholesterolemia      Mild intermittent asthma      MS (multiple sclerosis) (H)       Past Surgical History:   Procedure Laterality Date     APPENDECTOMY       COLONOSCOPY WITH CO2 INSUFFLATION N/A 2022    Procedure: COLONOSCOPY, WITH CO2 INSUFFLATION;  Surgeon: Hernan Vasquez MD;  Location: [facility] OR     HC TOOTH EXTRACTION W/FORCEP        No Known Allergies   Social History     Tobacco Use     Smoking status: Never     Smokeless tobacco: Never   Substance Use Topics     Alcohol use: Yes     Alcohol/week: 1.0 - 2.0 standard drink     Comment: RARELY      Wt Readings from Last 1 Encounters:   22 67.6 kg (149 lb)        Anesthesia Evaluation   Pt has had prior anesthetic. Type: General.        ROS/MED HX  ENT/Pulmonary:     (+) Intermittent, asthma     Neurologic: Comment: Headaches    (+) Multiple Sclerosis,     Cardiovascular:     (+) Dyslipidemia hypertension-----    METS/Exercise Tolerance:     Hematologic:  - neg hematologic  ROS     Musculoskeletal: Comment: Chronic LBP      GI/Hepatic:  - neg GI/hepatic ROS     Renal/Genitourinary:  - neg Renal ROS     Endo:  - neg endo ROS     Psychiatric/Substance Use:  - neg psychiatric ROS     Infectious Disease:  - neg infectious disease ROS     Malignancy:  - neg malignancy ROS     Other:            Physical Exam    Airway        Mallampati: III    Neck ROM: full     Respiratory Devices and Support         Dental  no notable dental history         Cardiovascular   cardiovascular exam normal          Pulmonary   pulmonary exam normal                OUTSIDE LABS:  CBC:   Lab Results    Component Value Date    WBC 7.8 11/03/2022    WBC 5.9 12/14/2021    HGB 14.9 11/03/2022    HGB 14.2 12/14/2021    HCT 42.5 11/03/2022    HCT 42.1 12/14/2021     11/03/2022     12/14/2021     BMP:   Lab Results   Component Value Date     12/14/2021     09/18/2020    POTASSIUM 3.9 12/14/2021    POTASSIUM 3.8 09/18/2020    CHLORIDE 103 12/14/2021    CHLORIDE 102 09/18/2020    CO2 33 (H) 12/14/2021    CO2 31 09/18/2020    BUN 22 12/14/2021    BUN 18 09/18/2020    CR 0.94 12/14/2021    CR 0.82 09/18/2020    GLC 89 12/14/2021    GLC 92 09/18/2020     COAGS:   Lab Results   Component Value Date    PTT 28 05/08/2012    INR 0.98 05/08/2012     POC: No results found for: BGM, HCG, HCGS  HEPATIC:   Lab Results   Component Value Date    ALBUMIN 4.3 12/14/2021    PROTTOTAL 8.2 12/14/2021    ALT 26 12/14/2021    AST 18 12/14/2021    ALKPHOS 72 12/14/2021    BILITOTAL 0.5 12/14/2021     OTHER:   Lab Results   Component Value Date    MANUEL 9.8 12/14/2021    TSH 1.63 06/01/2019    SED 12 04/12/2012       Anesthesia Plan    ASA Status:  2   NPO Status:  NPO Appropriate    Anesthesia Type: General.     - Airway: ETT   Induction: Intravenous.   Maintenance: Inhalation.   Techniques and Equipment:     - Lines/Monitors: BIS     Consents    Anesthesia Plan(s) and associated risks, benefits, and realistic alternatives discussed. Questions answered and patient/representative(s) expressed understanding.     - Discussed: Risks, Benefits and Alternatives for BOTH SEDATION and the PROCEDURE were discussed     - Discussed with:  Patient    Use of blood products discussed: Yes.     - Discussed with: Patient.     Postoperative Care    Pain management: IV analgesics, Oral pain medications, Multi-modal analgesia.   PONV prophylaxis: Dexamethasone or Solumedrol, Ondansetron (or other 5HT-3), Background Propofol Infusion     Comments:                Soo Altamirano MD

## 2022-11-11 ENCOUNTER — ANESTHESIA (OUTPATIENT)
Dept: SURGERY | Facility: CLINIC | Age: 52
End: 2022-11-11
Payer: COMMERCIAL

## 2022-11-11 ENCOUNTER — HOSPITAL ENCOUNTER (OUTPATIENT)
Facility: CLINIC | Age: 52
Discharge: HOME | End: 2022-11-11
Attending: OBSTETRICS & GYNECOLOGY | Admitting: OBSTETRICS & GYNECOLOGY
Payer: COMMERCIAL

## 2022-11-11 VITALS
OXYGEN SATURATION: 95 % | WEIGHT: 148.59 LBS | HEIGHT: 63 IN | BODY MASS INDEX: 26.33 KG/M2 | SYSTOLIC BLOOD PRESSURE: 140 MMHG | DIASTOLIC BLOOD PRESSURE: 85 MMHG | HEART RATE: 80 BPM | TEMPERATURE: 98.4 F | RESPIRATION RATE: 16 BRPM

## 2022-11-11 DIAGNOSIS — Z90.721 S/P RIGHT OOPHORECTOMY: Primary | ICD-10-CM

## 2022-11-11 LAB
ABO/RH(D): NORMAL
ANTIBODY SCREEN: NEGATIVE
GLUCOSE BLDC GLUCOMTR-MCNC: 94 MG/DL (ref 70–99)
HCG INTACT+B SERPL-ACNC: 3 MIU/ML
HGB BLD-MCNC: 15 G/DL (ref 11.7–15.7)
SPECIMEN EXPIRATION DATE: NORMAL

## 2022-11-11 PROCEDURE — 36415 COLL VENOUS BLD VENIPUNCTURE: CPT | Performed by: OBSTETRICS & GYNECOLOGY

## 2022-11-11 PROCEDURE — 250N000011 HC RX IP 250 OP 636: Performed by: STUDENT IN AN ORGANIZED HEALTH CARE EDUCATION/TRAINING PROGRAM

## 2022-11-11 PROCEDURE — 250N000009 HC RX 250: Performed by: STUDENT IN AN ORGANIZED HEALTH CARE EDUCATION/TRAINING PROGRAM

## 2022-11-11 PROCEDURE — 250N000011 HC RX IP 250 OP 636: Performed by: OBSTETRICS & GYNECOLOGY

## 2022-11-11 PROCEDURE — 86850 RBC ANTIBODY SCREEN: CPT | Performed by: OBSTETRICS & GYNECOLOGY

## 2022-11-11 PROCEDURE — 250N000011 HC RX IP 250 OP 636: Performed by: ANESTHESIOLOGY

## 2022-11-11 PROCEDURE — 250N000009 HC RX 250: Performed by: OBSTETRICS & GYNECOLOGY

## 2022-11-11 PROCEDURE — 85018 HEMOGLOBIN: CPT | Performed by: OBSTETRICS & GYNECOLOGY

## 2022-11-11 PROCEDURE — 84702 CHORIONIC GONADOTROPIN TEST: CPT | Performed by: OBSTETRICS & GYNECOLOGY

## 2022-11-11 PROCEDURE — 88302 TISSUE EXAM BY PATHOLOGIST: CPT | Mod: 26 | Performed by: PATHOLOGY

## 2022-11-11 PROCEDURE — 250N000013 HC RX MED GY IP 250 OP 250 PS 637: Performed by: STUDENT IN AN ORGANIZED HEALTH CARE EDUCATION/TRAINING PROGRAM

## 2022-11-11 PROCEDURE — 360N000076 HC SURGERY LEVEL 3, PER MIN: Performed by: OBSTETRICS & GYNECOLOGY

## 2022-11-11 PROCEDURE — 88331 PATH CONSLTJ SURG 1 BLK 1SPC: CPT | Mod: 26 | Performed by: PATHOLOGY

## 2022-11-11 PROCEDURE — 258N000003 HC RX IP 258 OP 636: Performed by: STUDENT IN AN ORGANIZED HEALTH CARE EDUCATION/TRAINING PROGRAM

## 2022-11-11 PROCEDURE — 88112 CYTOPATH CELL ENHANCE TECH: CPT | Mod: TC | Performed by: OBSTETRICS & GYNECOLOGY

## 2022-11-11 PROCEDURE — 710N000012 HC RECOVERY PHASE 2, PER MINUTE: Performed by: OBSTETRICS & GYNECOLOGY

## 2022-11-11 PROCEDURE — 370N000017 HC ANESTHESIA TECHNICAL FEE, PER MIN: Performed by: OBSTETRICS & GYNECOLOGY

## 2022-11-11 PROCEDURE — 250N000025 HC SEVOFLURANE, PER MIN: Performed by: OBSTETRICS & GYNECOLOGY

## 2022-11-11 PROCEDURE — 88307 TISSUE EXAM BY PATHOLOGIST: CPT | Mod: 26 | Performed by: PATHOLOGY

## 2022-11-11 PROCEDURE — 999N000141 HC STATISTIC PRE-PROCEDURE NURSING ASSESSMENT: Performed by: OBSTETRICS & GYNECOLOGY

## 2022-11-11 PROCEDURE — 88112 CYTOPATH CELL ENHANCE TECH: CPT | Mod: 26 | Performed by: PATHOLOGY

## 2022-11-11 PROCEDURE — 250N000013 HC RX MED GY IP 250 OP 250 PS 637: Performed by: ANESTHESIOLOGY

## 2022-11-11 PROCEDURE — 710N000010 HC RECOVERY PHASE 1, LEVEL 2, PER MIN: Performed by: OBSTETRICS & GYNECOLOGY

## 2022-11-11 PROCEDURE — 88331 PATH CONSLTJ SURG 1 BLK 1SPC: CPT | Mod: TC | Performed by: OBSTETRICS & GYNECOLOGY

## 2022-11-11 PROCEDURE — 272N000001 HC OR GENERAL SUPPLY STERILE: Performed by: OBSTETRICS & GYNECOLOGY

## 2022-11-11 PROCEDURE — 86901 BLOOD TYPING SEROLOGIC RH(D): CPT | Performed by: OBSTETRICS & GYNECOLOGY

## 2022-11-11 PROCEDURE — 250N000013 HC RX MED GY IP 250 OP 250 PS 637: Performed by: OBSTETRICS & GYNECOLOGY

## 2022-11-11 RX ORDER — ONDANSETRON 2 MG/ML
INJECTION INTRAMUSCULAR; INTRAVENOUS PRN
Status: DISCONTINUED | OUTPATIENT
Start: 2022-11-11 | End: 2022-11-11

## 2022-11-11 RX ORDER — FENTANYL CITRATE 50 UG/ML
25 INJECTION, SOLUTION INTRAMUSCULAR; INTRAVENOUS
Status: DISCONTINUED | OUTPATIENT
Start: 2022-11-11 | End: 2022-11-11 | Stop reason: HOSPADM

## 2022-11-11 RX ORDER — ACETAMINOPHEN 325 MG/1
975 TABLET ORAL ONCE
Status: COMPLETED | OUTPATIENT
Start: 2022-11-11 | End: 2022-11-11

## 2022-11-11 RX ORDER — DEXAMETHASONE SODIUM PHOSPHATE 4 MG/ML
INJECTION, SOLUTION INTRA-ARTICULAR; INTRALESIONAL; INTRAMUSCULAR; INTRAVENOUS; SOFT TISSUE PRN
Status: DISCONTINUED | OUTPATIENT
Start: 2022-11-11 | End: 2022-11-11

## 2022-11-11 RX ORDER — HYDROMORPHONE HCL IN WATER/PF 6 MG/30 ML
0.2 PATIENT CONTROLLED ANALGESIA SYRINGE INTRAVENOUS EVERY 5 MIN PRN
Status: DISCONTINUED | OUTPATIENT
Start: 2022-11-11 | End: 2022-11-11 | Stop reason: HOSPADM

## 2022-11-11 RX ORDER — SODIUM CHLORIDE, SODIUM LACTATE, POTASSIUM CHLORIDE, CALCIUM CHLORIDE 600; 310; 30; 20 MG/100ML; MG/100ML; MG/100ML; MG/100ML
INJECTION, SOLUTION INTRAVENOUS CONTINUOUS
Status: DISCONTINUED | OUTPATIENT
Start: 2022-11-11 | End: 2022-11-11 | Stop reason: HOSPADM

## 2022-11-11 RX ORDER — BUPIVACAINE HYDROCHLORIDE 5 MG/ML
INJECTION, SOLUTION EPIDURAL; INTRACAUDAL PRN
Status: DISCONTINUED | OUTPATIENT
Start: 2022-11-11 | End: 2022-11-11 | Stop reason: HOSPADM

## 2022-11-11 RX ORDER — HYDRALAZINE HYDROCHLORIDE 20 MG/ML
2.5-5 INJECTION INTRAMUSCULAR; INTRAVENOUS EVERY 10 MIN PRN
Status: DISCONTINUED | OUTPATIENT
Start: 2022-11-11 | End: 2022-11-11 | Stop reason: HOSPADM

## 2022-11-11 RX ORDER — METOPROLOL TARTRATE 1 MG/ML
1-2 INJECTION, SOLUTION INTRAVENOUS EVERY 5 MIN PRN
Status: DISCONTINUED | OUTPATIENT
Start: 2022-11-11 | End: 2022-11-11 | Stop reason: HOSPADM

## 2022-11-11 RX ORDER — PROPOFOL 10 MG/ML
INJECTION, EMULSION INTRAVENOUS PRN
Status: DISCONTINUED | OUTPATIENT
Start: 2022-11-11 | End: 2022-11-11

## 2022-11-11 RX ORDER — PROPOFOL 10 MG/ML
INJECTION, EMULSION INTRAVENOUS CONTINUOUS PRN
Status: DISCONTINUED | OUTPATIENT
Start: 2022-11-11 | End: 2022-11-11

## 2022-11-11 RX ORDER — AMOXICILLIN 250 MG
1-2 CAPSULE ORAL 2 TIMES DAILY
Qty: 30 TABLET | Refills: 0 | Status: SHIPPED | OUTPATIENT
Start: 2022-11-11

## 2022-11-11 RX ORDER — CEFAZOLIN SODIUM/WATER 2 G/20 ML
2 SYRINGE (ML) INTRAVENOUS
Status: COMPLETED | OUTPATIENT
Start: 2022-11-11 | End: 2022-11-11

## 2022-11-11 RX ORDER — FENTANYL CITRATE 50 UG/ML
INJECTION, SOLUTION INTRAMUSCULAR; INTRAVENOUS PRN
Status: DISCONTINUED | OUTPATIENT
Start: 2022-11-11 | End: 2022-11-11

## 2022-11-11 RX ORDER — ACETAMINOPHEN 325 MG/1
975 TABLET ORAL ONCE
Status: DISCONTINUED | OUTPATIENT
Start: 2022-11-11 | End: 2022-11-11 | Stop reason: HOSPADM

## 2022-11-11 RX ORDER — ACETAMINOPHEN 325 MG/1
650 TABLET ORAL EVERY 6 HOURS PRN
Qty: 24 TABLET | Refills: 0 | Status: SHIPPED | OUTPATIENT
Start: 2022-11-11

## 2022-11-11 RX ORDER — LIDOCAINE 40 MG/G
CREAM TOPICAL
Status: DISCONTINUED | OUTPATIENT
Start: 2022-11-11 | End: 2022-11-11 | Stop reason: HOSPADM

## 2022-11-11 RX ORDER — CEFAZOLIN SODIUM/WATER 2 G/20 ML
2 SYRINGE (ML) INTRAVENOUS SEE ADMIN INSTRUCTIONS
Status: DISCONTINUED | OUTPATIENT
Start: 2022-11-11 | End: 2022-11-11 | Stop reason: HOSPADM

## 2022-11-11 RX ORDER — ONDANSETRON 2 MG/ML
4 INJECTION INTRAMUSCULAR; INTRAVENOUS EVERY 30 MIN PRN
Status: DISCONTINUED | OUTPATIENT
Start: 2022-11-11 | End: 2022-11-11 | Stop reason: HOSPADM

## 2022-11-11 RX ORDER — FENTANYL CITRATE 50 UG/ML
25 INJECTION, SOLUTION INTRAMUSCULAR; INTRAVENOUS EVERY 5 MIN PRN
Status: DISCONTINUED | OUTPATIENT
Start: 2022-11-11 | End: 2022-11-11 | Stop reason: HOSPADM

## 2022-11-11 RX ORDER — OXYCODONE HYDROCHLORIDE 5 MG/1
5 TABLET ORAL
Status: DISCONTINUED | OUTPATIENT
Start: 2022-11-11 | End: 2022-11-11 | Stop reason: HOSPADM

## 2022-11-11 RX ORDER — SODIUM CHLORIDE, SODIUM LACTATE, POTASSIUM CHLORIDE, CALCIUM CHLORIDE 600; 310; 30; 20 MG/100ML; MG/100ML; MG/100ML; MG/100ML
INJECTION, SOLUTION INTRAVENOUS CONTINUOUS PRN
Status: DISCONTINUED | OUTPATIENT
Start: 2022-11-11 | End: 2022-11-11

## 2022-11-11 RX ORDER — ONDANSETRON 4 MG/1
4 TABLET, ORALLY DISINTEGRATING ORAL EVERY 30 MIN PRN
Status: DISCONTINUED | OUTPATIENT
Start: 2022-11-11 | End: 2022-11-11 | Stop reason: HOSPADM

## 2022-11-11 RX ORDER — IBUPROFEN 600 MG/1
600 TABLET, FILM COATED ORAL EVERY 6 HOURS PRN
Qty: 12 TABLET | Refills: 0 | Status: SHIPPED | OUTPATIENT
Start: 2022-11-11

## 2022-11-11 RX ORDER — METRONIDAZOLE 500 MG/100ML
500 INJECTION, SOLUTION INTRAVENOUS ONCE
Status: COMPLETED | OUTPATIENT
Start: 2022-11-11 | End: 2022-11-11

## 2022-11-11 RX ORDER — OXYCODONE HYDROCHLORIDE 5 MG/1
5-10 TABLET ORAL EVERY 4 HOURS PRN
Qty: 6 TABLET | Refills: 0 | Status: SHIPPED | OUTPATIENT
Start: 2022-11-11 | End: 2023-01-17

## 2022-11-11 RX ORDER — MEPERIDINE HYDROCHLORIDE 25 MG/ML
12.5 INJECTION INTRAMUSCULAR; INTRAVENOUS; SUBCUTANEOUS
Status: DISCONTINUED | OUTPATIENT
Start: 2022-11-11 | End: 2022-11-11 | Stop reason: HOSPADM

## 2022-11-11 RX ORDER — SODIUM CHLORIDE, SODIUM LACTATE, POTASSIUM CHLORIDE, CALCIUM CHLORIDE 600; 310; 30; 20 MG/100ML; MG/100ML; MG/100ML; MG/100ML
INJECTION, SOLUTION INTRAVENOUS CONTINUOUS
Status: DISCONTINUED | OUTPATIENT
Start: 2022-11-11 | End: 2022-11-11

## 2022-11-11 RX ORDER — OXYCODONE HYDROCHLORIDE 5 MG/1
5 TABLET ORAL EVERY 4 HOURS PRN
Status: DISCONTINUED | OUTPATIENT
Start: 2022-11-11 | End: 2022-11-11 | Stop reason: HOSPADM

## 2022-11-11 RX ORDER — PHENAZOPYRIDINE HYDROCHLORIDE 200 MG/1
200 TABLET, FILM COATED ORAL ONCE
Status: COMPLETED | OUTPATIENT
Start: 2022-11-11 | End: 2022-11-11

## 2022-11-11 RX ORDER — IBUPROFEN 200 MG
800 TABLET ORAL ONCE
Status: DISCONTINUED | OUTPATIENT
Start: 2022-11-11 | End: 2022-11-11 | Stop reason: HOSPADM

## 2022-11-11 RX ORDER — LIDOCAINE HYDROCHLORIDE 20 MG/ML
INJECTION, SOLUTION INFILTRATION; PERINEURAL PRN
Status: DISCONTINUED | OUTPATIENT
Start: 2022-11-11 | End: 2022-11-11

## 2022-11-11 RX ADMIN — PROPOFOL 100 MG: 10 INJECTION, EMULSION INTRAVENOUS at 12:47

## 2022-11-11 RX ADMIN — ACETAMINOPHEN 975 MG: 325 TABLET, FILM COATED ORAL at 10:51

## 2022-11-11 RX ADMIN — FENTANYL CITRATE 25 MCG: 50 INJECTION INTRAMUSCULAR; INTRAVENOUS at 15:11

## 2022-11-11 RX ADMIN — FENTANYL CITRATE 25 MCG: 50 INJECTION INTRAMUSCULAR; INTRAVENOUS at 15:01

## 2022-11-11 RX ADMIN — FENTANYL CITRATE 25 MCG: 50 INJECTION INTRAMUSCULAR; INTRAVENOUS at 15:42

## 2022-11-11 RX ADMIN — OXYCODONE HYDROCHLORIDE 5 MG: 5 TABLET ORAL at 16:30

## 2022-11-11 RX ADMIN — HYDROMORPHONE HYDROCHLORIDE 0.25 MG: 1 INJECTION, SOLUTION INTRAMUSCULAR; INTRAVENOUS; SUBCUTANEOUS at 13:36

## 2022-11-11 RX ADMIN — HYDROMORPHONE HYDROCHLORIDE 0.25 MG: 1 INJECTION, SOLUTION INTRAMUSCULAR; INTRAVENOUS; SUBCUTANEOUS at 14:09

## 2022-11-11 RX ADMIN — SUGAMMADEX 200 MG: 100 INJECTION, SOLUTION INTRAVENOUS at 14:16

## 2022-11-11 RX ADMIN — HYDROMORPHONE HYDROCHLORIDE 0.2 MG: 0.2 INJECTION, SOLUTION INTRAMUSCULAR; INTRAVENOUS; SUBCUTANEOUS at 16:41

## 2022-11-11 RX ADMIN — Medication 2 G: at 12:51

## 2022-11-11 RX ADMIN — PHENYLEPHRINE HYDROCHLORIDE 100 MCG: 10 INJECTION INTRAVENOUS at 12:55

## 2022-11-11 RX ADMIN — METRONIDAZOLE 500 MG: 500 INJECTION, SOLUTION INTRAVENOUS at 12:26

## 2022-11-11 RX ADMIN — FENTANYL CITRATE 100 MCG: 50 INJECTION, SOLUTION INTRAMUSCULAR; INTRAVENOUS at 12:45

## 2022-11-11 RX ADMIN — PROPOFOL 75 MCG/KG/MIN: 10 INJECTION, EMULSION INTRAVENOUS at 13:19

## 2022-11-11 RX ADMIN — Medication 50 MG: at 12:47

## 2022-11-11 RX ADMIN — HYDROMORPHONE HYDROCHLORIDE 0.2 MG: 0.2 INJECTION, SOLUTION INTRAMUSCULAR; INTRAVENOUS; SUBCUTANEOUS at 16:27

## 2022-11-11 RX ADMIN — ONDANSETRON 4 MG: 2 INJECTION INTRAMUSCULAR; INTRAVENOUS at 14:13

## 2022-11-11 RX ADMIN — ACETAMINOPHEN 975 MG: 325 TABLET, FILM COATED ORAL at 16:56

## 2022-11-11 RX ADMIN — LIDOCAINE HYDROCHLORIDE 100 MG: 20 INJECTION, SOLUTION INFILTRATION; PERINEURAL at 12:47

## 2022-11-11 RX ADMIN — MIDAZOLAM 2 MG: 1 INJECTION INTRAMUSCULAR; INTRAVENOUS at 12:41

## 2022-11-11 RX ADMIN — Medication 10 MG: at 13:59

## 2022-11-11 RX ADMIN — SODIUM CHLORIDE, POTASSIUM CHLORIDE, SODIUM LACTATE AND CALCIUM CHLORIDE: 600; 310; 30; 20 INJECTION, SOLUTION INTRAVENOUS at 12:26

## 2022-11-11 RX ADMIN — PHENAZOPYRIDINE HYDROCHLORIDE 200 MG: 200 TABLET, FILM COATED ORAL at 10:51

## 2022-11-11 RX ADMIN — FENTANYL CITRATE 25 MCG: 50 INJECTION INTRAMUSCULAR; INTRAVENOUS at 15:36

## 2022-11-11 RX ADMIN — DEXAMETHASONE SODIUM PHOSPHATE 10 MG: 4 INJECTION, SOLUTION INTRA-ARTICULAR; INTRALESIONAL; INTRAMUSCULAR; INTRAVENOUS; SOFT TISSUE at 13:36

## 2022-11-11 ASSESSMENT — ACTIVITIES OF DAILY LIVING (ADL)
ADLS_ACUITY_SCORE: 20

## 2022-11-11 NOTE — OP NOTE
[facility]  Operative Note    Patient: Renea Trotter  MRN: [id number]  : 1970    Date of Service:  2022    Preoperative Diagnosis:  - Pelvic mass  - HTN  - HLD  - Asthma  - MS    Postoperative Diagnosis:  - Same as above    Procedure: Laparoscopic right salpingo-oophorectomy, left salpingectomy    Surgeon: Savana Valle MD    Assistants:  Denny Dawson MD - PGY3    Anesthesia: GETA    EBL: 10 mL    IVF: 1200 mL crystalloid    UOP: 170 mL clear urine at the end of the case    Drains:  None    Specimens:  ID Type Source Tests Collected by Time Destination   1 : pelvic washings Washings Pelvis NON-GYNECOLOGIC CYTOLOGY Savana Valle MD 2022  1:30 PM    2 : right ovary and fallopian tube  Tissue Ovary and Fallopian Tube, Right SURGICAL PATHOLOGY EXAM Savana Valle MD 2022  1:49 PM    3 : left fallopian tube Tissue Fallopian Tube, Left SURGICAL PATHOLOGY EXAM Savana Valle MD 2022  2:00 PM      Complications: None apparent    Indications:  Renea Trotter is a 52 year old with a 9.8cm complex pelvic mas who presented for laparoscopic oopherectomy, possible cancer surgery. The risks, benefits, and alternatives to the procedure were discussed and she desired to proceed. Written informed consent signed prior to procedure.     Findings: On bimanual exam, small, mobile size, anteverted uterus and approximately 10cm adnexal mass on the right. On laparoscopy, no evidence of injury on entry. Normal appearing liver, diaphragm, bowel, omentum. Normal appearing uterus, left ovary and bilateral tubes. Right ovary with large cystic structure, approximately 10cm. Surgical sites hemostatic at end of case.     Procedure Details:  The patient was brought to the operating room, where adequate general endotracheal anesthesia was administered. She was placed in the dorsal lithotomy position with feet in Rashel stirrups. Exam under anesthesia revealed the findings  noted above. She was prepped and draped in the usual sterile fashion. Surgical time out was performed. Wills catheter was placed.    The umbilicus was imbibed with lidocaine and a 5mm incision was made through the umbilicus with the scalpel. The Veress needle was placed, and intraperitoneal placement was suggested with the water drop test and an opening pressure of <5 mmHg. The Veress needle was removed. A 5 mm trocar was placed. Laparoscope was placed and revealed no trauma from entry. Survey of the abdomen revealed the findings noted above.     Attention was turned to the left lower quadrant, where the skin was infiltrated with 0.5% marcaine at a point 2 cm superomedial to the  ASIS. A 5 mm skin incision was made, and a 5 mm trocar was placed atraumatically under direct visualization. Attention was turned to the right lower quadrant, where the skin was likewise infiltrated with 0.5% marcaine at a point 2 cm superomedial to the  ASIS. A 12 mm incision was made, and a 12 mm trocar was placed atraumatically. Pelvic washings were obtained. The right ureter was visualized by opening the retroperitoneum parallel to the IP along the pelvic side wall. A window was made with cautery in the peritoneum. The infundibulopelvic ligament was cauterized and ligated with the LigaSure with care to avoid the ureter. The mesosalpinx was then serially cauterized and ligated with the LigaSure. The fallopian tube and uteroovarian ligament were then cauterized and transected with the LigaSure, amputating the  tube and ovary. The surgical site appeared hemostatic. The cyst was punctured and drained with laparoscopic needle. The right tube and ovary were removed with EndoCatch bag. The left ureter was visualized transperitoneally along the left pelvic side wall. The mesosalpinx was then serially cauterized and ligated with the LigaSure. The fallopian tube and was transected with the LigaSure, amputating the left tube.  The surgical site  appeared hemostatic. The fallopian tube was  removed with the EndoCatch bag. Surgical sites were reexamined and noted to be hemostatic. The 12 mm fascial incision was closed with a 0 Vicryl suture using the Wilmer-Ravi device. The abdomen was desufflated and trocars were removed. The skin incisions were closed with 4-0 Vicryl and covered with Dermabond. The villalobos catheter was removed.    All sponge, needle, and instrument counts were correct. The patient tolerated the procedure well and was transferred to recovery in stable condition. Dr. Valle was present and scrubbed for the entire procedure.    Denyn Dawson MD  Obstetrics, Gynecology, and Women's Health  PGY3  2:27 PM 11/11/2022     As the primary surgeon, I was scrubbed and present for the full course of the procedure.    Savana Valle M.D.

## 2022-11-11 NOTE — PROGRESS NOTES
Gynecologic Oncology   Postoperative Check  11/11/2022    S:   Patient reports she is doing fine postoperatively. Pain is controlled with oral medications. Ambulating without dizziness. Voiding spontaneously. Tolerating crackers and water without nausea or vomiting. Denies chest pain, shortness of breath, dizziness, or other concerns at this time.    O:  Vitals:    11/11/22 1630 11/11/22 1645 11/11/22 1700 11/11/22 1726   BP: 135/88 134/85 136/87 (!) 140/85   BP Location:       Pulse: 87 82 80    Resp: 14 16 16 16   Temp: 98.8  F (37.1  C)   98.4  F (36.9  C)   TempSrc: Oral      SpO2: 95% 95% 95% 95%   Weight:       Height:           Gen: NAD  Cardio: RR  Resp: Normal WOB  Abdomen: soft, appropriately tender, non distended, incisions x3 c/d/i  Extremities: Non-tender, no edema    A/P:   52 year old POD#0 s/p RSO, LS. Doing well postoperatively. Appropriate for discharge. Reviewed discharge instructions and precautions. She will follow-up post-op with Dr. Valle on 12/15.    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

## 2022-11-11 NOTE — ANESTHESIA POSTPROCEDURE EVALUATION
Patient: Renea Trotter    Procedure: Procedure(s):  LAPAROSCOPIC RIGHT SALPINGO-OOPHORECTOMY, LEFT SALPINGECTOMY       Anesthesia Type:  General    Note:  Disposition: Inpatient   Postop Pain Control: Uneventful            Sign Out: Well controlled pain   PONV: No   Neuro/Psych: Uneventful            Sign Out: Acceptable/Baseline neuro status   Airway/Respiratory: Uneventful            Sign Out: Acceptable/Baseline resp. status   CV/Hemodynamics: Uneventful            Sign Out: Acceptable CV status; No obvious hypovolemia; No obvious fluid overload   Other NRE: NONE   DID A NON-ROUTINE EVENT OCCUR? No           Last vitals:  Vitals Value Taken Time   /83 11/11/22 1445   Temp     Pulse 75 11/11/22 1450   Resp 2 11/11/22 1450   SpO2 100 % 11/11/22 1450   Vitals shown include unvalidated device data.    Electronically Signed By: Abisai Wren MD  November 11, 2022  2:51 PM

## 2022-11-11 NOTE — DISCHARGE INSTRUCTIONS
[facility]  Same-Day Surgery   Adult Discharge Orders & Instructions     For 24 hours after surgery    Get plenty of rest.  A responsible adult must stay with you for at least 24 hours after you leave the hospital.   Do not drive or use heavy equipment.  If you have weakness or tingling, don't drive or use heavy equipment until this feeling goes away.  Do not drink alcohol.  Avoid strenuous or risky activities.  Ask for help when climbing stairs.   You may feel lightheaded.  IF so, sit for a few minutes before standing.  Have someone help you get up.   If you have nausea (feel sick to your stomach): Drink only clear liquids such as apple juice, ginger ale, broth or 7-Up.  Rest may also help.  Be sure to drink enough fluids.  Move to a regular diet as you feel able.  You may have a slight fever. Call the doctor if your fever is over 100 F (37.7 C) (taken under the tongue) or lasts longer than 24 hours.  You may have a dry mouth, a sore throat, muscle aches or trouble sleeping.  These should go away after 24 hours.  Do not make important or legal decisions.   Call your doctor for any of the followin.  Signs of infection (fever, growing tenderness at the surgery site, a large amount of drainage or bleeding, severe pain, foul-smelling drainage, redness, swelling).    2. It has been over 8 to 10 hours since surgery and you are still not able to urinate (pass water).    3.  Headache for over 24 hours.      To contact a doctor, call  Dr. Valle OB/GYN DEPARTMENT  [phone]  or:    '   [phone] and ask for the resident on call for   gynecology (answered 24 hours a day)  '   Emergency Department:    [facility]: [phone]       (TTY for hearing impaired: [phone])

## 2022-11-12 ENCOUNTER — PATIENT OUTREACH (OUTPATIENT)
Dept: CARE COORDINATION | Facility: CLINIC | Age: 52
End: 2022-11-12

## 2022-11-12 NOTE — PROGRESS NOTES
"Clinic Care Coordination Contact  [facility]: Post-Discharge Note  SITUATION                                                      Admission:    Admission Date: 11/10/22   Reason for Admission: Pelvic mass  - HTN  - HLD  - Asthma  - MS  Discharge:   Discharge Date: 11/11/22  Discharge Diagnosis: Same as above    BACKGROUND                                                      Per hospital discharge summary and inpatient provider notes:  Renea Trotter is a 52 year old with a 9.8cm complex pelvic mas who presented for laparoscopic oopherectomy, possible cancer surgery.       ASSESSMENT           Discharge Assessment  How are you doing now that you are home?: \"I'm doing pretty good\"  How are your symptoms? (Red Flag symptoms escalate to triage hotline per guidelines): Improved  Do you feel your condition is stable enough to be safe at home until your provider visit?: Yes  Does the patient have their discharge instructions? : Yes  Does the patient have questions regarding their discharge instructions? : No  Were you started on any new medications or were there changes to any of your previous medications? : Yes  Does the patient have all of their medications?: Yes  Do you have questions regarding any of your medications? : No  Do you have all of your needed medical supplies or equipment (DME)?  (i.e. oxygen tank, CPAP, cane, etc.):  (NA)  Discharge follow-up appointment scheduled within 14 calendar days? :  (NA-has 5 week post op appointment scheduled as recommended 12-15)         Post-op (Clinicians Only)  Did the patient have surgery or a procedure: Yes (Laparoscopic right salpingo-oophorectomy, left salpingectomy)  Incision: closed  Fever: No  Chills: No  Redness: No  Warmth: No  Swelling: No  Incision site pain: No  Closure: dissolving  PO Intake: regular diet  Additional Symptoms: decreased appetite  Bowel Function: normal  Date of last BM: 11/11/22  Urinary Status: voiding without " complaint/concerns    Patient complaining of abdominal pressure causing pain. Taking ibuprofen and tylenol as prescribed. Took two oxycodone this morning with good relief. Advised to try ibp/tylenol scheduled but can use oxycodone as prescribed as needed    PLAN                                                      Outpatient Plan:  Follow up appointment  You have a postoperative visit scheduled with Dr. Valle on 12/15.    Future Appointments   Date Time Provider Department Center   12/15/2022  4:10 PM Savana Valle MD [facility]   2/20/2023 12:00 PM David Herrera MD [facility]         For any urgent concerns, please contact our 24 hour nurse triage line: [phone] ([phone])         Alondra Ford RN

## 2022-11-14 ENCOUNTER — PATIENT OUTREACH (OUTPATIENT)
Dept: ONCOLOGY | Facility: CLINIC | Age: 52
End: 2022-11-14

## 2022-11-14 LAB
PATH REPORT.COMMENTS IMP SPEC: NORMAL
PATH REPORT.FINAL DX SPEC: NORMAL
PATH REPORT.GROSS SPEC: NORMAL
PATH REPORT.MICROSCOPIC SPEC OTHER STN: NORMAL
PATH REPORT.RELEVANT HX SPEC: NORMAL

## 2022-11-14 NOTE — PROGRESS NOTES
PCP team completed post operative call.     RN will reach out later this week so not to duplicate calls     Nida Roman RN

## 2022-11-16 ENCOUNTER — MYC MEDICAL ADVICE (OUTPATIENT)
Dept: ONCOLOGY | Facility: CLINIC | Age: 52
End: 2022-11-16

## 2022-12-05 LAB
PATH REPORT.COMMENTS IMP SPEC: NORMAL
PATH REPORT.FINAL DX SPEC: NORMAL
PATH REPORT.GROSS SPEC: NORMAL
PATH REPORT.INTRAOP OBS SPEC DOC: NORMAL
PATH REPORT.MICROSCOPIC SPEC OTHER STN: NORMAL
PATH REPORT.RELEVANT HX SPEC: NORMAL
PHOTO IMAGE: NORMAL

## 2022-12-15 ENCOUNTER — VIRTUAL VISIT (OUTPATIENT)
Dept: ONCOLOGY | Facility: CLINIC | Age: 52
End: 2022-12-15
Attending: OBSTETRICS & GYNECOLOGY
Payer: COMMERCIAL

## 2022-12-15 DIAGNOSIS — C56.1: Primary | ICD-10-CM

## 2022-12-15 PROCEDURE — 99213 OFFICE O/P EST LOW 20 MIN: CPT | Performed by: OBSTETRICS & GYNECOLOGY

## 2022-12-15 NOTE — PROGRESS NOTES
Renea is a 52 year old who is being evaluated via a billable video visit.      How would you like to obtain your AVS? MyChart  If the video visit is dropped, the invitation should be resent by: Text to cell phone: [phone]  Will anyone else be joining your video visit? Teresa Talbotxiang Vazquez RENAN    Video-Visit Details    Type of service:  Video Visit    Originating Location (pt. Location): Home        Distant Location (provider location):  On-site    Platform used for Video Visit: [facility]      GYNECOLOGIC  ONCOLOGY CONSULT    [place]  [address] [place] [address]   RE: Renea Trotter  : 1970  NIALL: December 15, 2022      CC: Mucinous Cystadenoma of Right Ovary    HPI: Ms Renea Trotter is a 52 year old post-menopausal female w/ PMHx of HTN, HLD, and multiple sclerosis who presents post a recent surgery for ovarian mass.    Since the surgery on  she reports some skin tingling, no incisional or abdominal pain.    Course to date  Postmenopausal since 2020. She presented with one episode of vaginal bleeding to her OB/GYN clinic on 2022.    22 Pelvic US  IMPRESSION:  1. 9.8 cm right adnexal cystic mass with thin septations.  Consider  contrast enhanced MRI or short interval follow-up US in 3 months for  further assessment.  2. The visualized endometrium appears within normal limits. Endometrial stripe is 4 mm. The  endometrium in the lower uterine segment is incompletely visualized.    10/13/22   23    10/30/22 MRI of Pelvis  IMPRESSION:  1. 6.6 x 8.3 x 8.1 cm thin septated midline large cyst anterior to the  uterus probably arising from right ovary with focal mural cystic  nodule/septation. Given complicated appearance, large size and  postmenopausal status, lesion remains suspicious for cystic neoplasm.  2. Small intramural uterine fibroids.  3. Junctional zone appears diffusely thickened measuring up to 11  mm  containing 3 mm cyst in the corpus which raises suspicion for  adenomyosis.      11/3/22 Endometrial Biopsy: Atrophic Endometrium    2022 Laparoscopic right salpingo-oophorectomy, left salpingectomy  Final Diagnosis  A. 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 LEFT fallopian tube, laparoscopic salpingectomy:  -Fallopian tube with paratubal cyst  -Negative for atypia or malignancy  Cytology- negative    OBGYN history and Health Maintenance:    Last Pap Smear:  2020 Negative/ HPV negative  Last Mammogram: 10/2021 Negative  Last Colonoscopy:  2022 Negative    Review of Systems:  Systemic:No weight changes.    Skin : No skin changes or new lesions.   Eye : No changes in vision.   Pulmonary: No cough or SOB.   Cardiovascular: No CP or palpitations  Gastrointestinal :  No Abdominal pain, nausea, no diarrhea, constipation. Bowel habits normal.   Genitourinary: No urgency, no dysuria or bleeding  Psychiatric: No depression or anxiety  Hematologic : No palpable lymph nodes.   Endocrine : No hot flashes. No heat/cold intolerance.      Neurological: No headaches, No numbness/tingling    Past Medical History:   Diagnosis Date     HTN (hypertension)      Hypercholesterolemia      Mild intermittent asthma      MS (multiple sclerosis) (H)        Past Surgical History:   Procedure Laterality Date     APPENDECTOMY       COLONOSCOPY WITH CO2 INSUFFLATION N/A 2022    Procedure: COLONOSCOPY, WITH CO2 INSUFFLATION;  Surgeon: Hernan Vasquez MD;  Location: [facility] OR     HC TOOTH EXTRACTION W/FORCEP       LAPAROSCOPIC SALPINGO-OOPHORECTOMY Bilateral 2022    Procedure: LAPAROSCOPIC RIGHT SALPINGO-OOPHORECTOMY, LEFT SALPINGECTOMY;  Surgeon: Savana Valle MD;  Location: [facility] OR          Current Outpatient Medications   Medication Sig  Dispense Refill     acetaminophen (TYLENOL) 325 MG tablet Take 2 tablets (650 mg) by mouth every 6 hours as needed for mild pain 24 tablet 0     albuterol (PROAIR HFA/PROVENTIL HFA/VENTOLIN HFA) 108 (90 Base) MCG/ACT inhaler Inhale 2 puffs into the lungs every 4 hours as needed for shortness of breath / dyspnea or wheezing Profile Rx 8.5 g 3     Cholecalciferol (VITAMIN D3 PO) Take 5,000 Units by mouth daily       glatiramer acetate 40 MG/ML injection INJECT 40 MG UNDER THE SKIN THREE TIMES A WEEK 12 mL 11     ibuprofen (ADVIL/MOTRIN) 600 MG tablet Take 1 tablet (600 mg) by mouth every 6 hours as needed for other (mile and/or inflammatory pain.) 12 tablet 0     ketoconazole (NIZORAL) 2 % external cream Apply thin layer to the right third toe and surrounding area. 30 g 11     lisinopril-hydrochlorothiazide (ZESTORETIC) 10-12.5 MG tablet Take 1 tablet by mouth daily 90 tablet 3     MULTIPLE VITAMIN PO Take 1 tablet by mouth daily.       oxyCODONE (ROXICODONE) 5 MG tablet Take 1-2 tablets (5-10 mg) by mouth every 4 hours as needed for moderate to severe pain 6 tablet 0     pravastatin (PRAVACHOL) 20 MG tablet Take 1 tablet (20 mg) by mouth every evening 90 tablet 3     senna-docusate (SENOKOT-S/PERICOLACE) 8.6-50 MG tablet Take 1-2 tablets by mouth 2 times daily 30 tablet 0     temazepam (RESTORIL) 15 MG capsule TAKE 1 TO 2 CAPSULES BY MOUTH NIGHTLY AS NEEDED FOR SLEEP 15 capsule 0         No Known Allergies    Social History:  Social History     Tobacco Use     Smoking status: Never     Smokeless tobacco: Never   Substance Use Topics     Alcohol use: Yes     Alcohol/week: 1.0 - 2.0 standard drink     Comment: RARELY         Family History:   The patient's family history is notable for   Family History   Problem Relation Age of Onset     Hypertension Mother      Lipids Mother      Hyperlipidemia Mother      Eye Disorder Father      Hyperlipidemia Father      Sleep Apnea Father      Coronary Artery Disease Sister       Hyperlipidemia Sister      Myocardial Infarction Sister      Cerebrovascular Disease Maternal Grandmother      Cerebrovascular Disease Maternal Grandfather      Prostate Cancer Maternal Grandfather      Blood Disease Paternal Grandmother      Alzheimer Disease Paternal Grandfather      Pulmonary Embolism Daughter      Multiple Sclerosis Cousin          Physical Exam:     LMP 08/27/2020 (Exact Date)   There is no height or weight on file to calculate BMI.    General: Alert and oriented, appears anxious  Psych: Mood stable        Assessment/Plan: Renea Trotter is a 52 year old woman with a right ovarian mass s/p Laparoscopic right salpingo-ooporectomy and left salpingectomy with diagnosis of benign mucinous cystadenoma.    Pathology reviewed with benign findings. No further treatment recommended    Post surgical recovery without complications, follow up with primary clinic as needed.        Savana Valle M.D., MPH,  F.A.C.O.G.  Professor  Department of Ob/Gyn and Women's Health  Division of Gynecologic Oncology  [facility]  [phone]

## 2022-12-15 NOTE — LETTER
12/15/2022         RE: Renea Trotter  [address] [address] [address]        Dear Colleague,    Thank you for referring your patient, Renea Trotter, to the [facility]. Please see a copy of my visit note below.        GYNECOLOGIC  ONCOLOGY CONSULT    [place]  [address] [place] [address]     RE: Renea Trotter  : 1970  NIALL: December 15, 2022      CC: Mucinous Cystadenoma of Right Ovary    HPI: Ms Renea Trotter is a 52 year old post-menopausal female w/ PMHx of HTN, HLD, and multiple sclerosis who presents post a recent surgery for ovarian mass.    Since the surgery on  she reports some skin tingling, no incisional or abdominal pain.    Course to date  Postmenopausal since 2020. She presented with one episode of vaginal bleeding to her OB/GYN clinic on 2022.    22 Pelvic US  IMPRESSION:  1. 9.8 cm right adnexal cystic mass with thin septations.  Consider  contrast enhanced MRI or short interval follow-up US in 3 months for  further assessment.  2. The visualized endometrium appears within normal limits. Endometrial stripe is 4 mm. The  endometrium in the lower uterine segment is incompletely visualized.    10/13/22   23    10/30/22 MRI of Pelvis  IMPRESSION:  1. 6.6 x 8.3 x 8.1 cm thin septated midline large cyst anterior to the  uterus probably arising from right ovary with focal mural cystic  nodule/septation. Given complicated appearance, large size and  postmenopausal status, lesion remains suspicious for cystic neoplasm.  2. Small intramural uterine fibroids.  3. Junctional zone appears diffusely thickened measuring up to 11 mm  containing 3 mm cyst in the corpus which raises suspicion for  adenomyosis.      11/3/22 Endometrial Biopsy: Atrophic Endometrium    2022 Laparoscopic right salpingo-oophorectomy, left salpingectomy  Final Diagnosis  A. RIGHT ovary and  fallopian tube, laparoscopic salpingo-oophorectomy (including A1FS):  -Ovarian mucinous cystadenoma (size 8.1 cm, disrupted)  -Unremarkable fallopian tube  -Negative for atypia or malignancy     B. LEFT fallopian tube, laparoscopic salpingectomy:  -Fallopian tube with paratubal cyst  -Negative for atypia or malignancy  Cytology- negative    OBGYN history and Health Maintenance:    Last Pap Smear:  2020 Negative/ HPV negative  Last Mammogram: 10/2021 Negative  Last Colonoscopy:  2022 Negative    Review of Systems:  Systemic:No weight changes.    Skin : No skin changes or new lesions.   Eye : No changes in vision.   Pulmonary: No cough or SOB.   Cardiovascular: No CP or palpitations  Gastrointestinal :  No Abdominal pain, nausea, no diarrhea, constipation. Bowel habits normal.   Genitourinary: No urgency, no dysuria or bleeding  Psychiatric: No depression or anxiety  Hematologic : No palpable lymph nodes.   Endocrine : No hot flashes. No heat/cold intolerance.      Neurological: No headaches, No numbness/tingling    Past Medical History:   Diagnosis Date     HTN (hypertension)      Hypercholesterolemia      Mild intermittent asthma      MS (multiple sclerosis) (H)        Past Surgical History:   Procedure Laterality Date     APPENDECTOMY       COLONOSCOPY WITH CO2 INSUFFLATION N/A 2022    Procedure: COLONOSCOPY, WITH CO2 INSUFFLATION;  Surgeon: Hernan Vasquez MD;  Location:  OR      TOOTH EXTRACTION W/FORCEP       LAPAROSCOPIC SALPINGO-OOPHORECTOMY Bilateral 2022    Procedure: LAPAROSCOPIC RIGHT SALPINGO-OOPHORECTOMY, LEFT SALPINGECTOMY;  Surgeon: Savana Valle MD;  Location: [facility] OR          Current Outpatient Medications   Medication Sig Dispense Refill     acetaminophen (TYLENOL) 325 MG tablet Take 2 tablets (650 mg) by mouth every 6 hours as needed for mild pain 24 tablet 0     albuterol (PROAIR HFA/PROVENTIL HFA/VENTOLIN HFA) 108 (90 Base) MCG/ACT inhaler Inhale 2 puffs into  the lungs every 4 hours as needed for shortness of breath / dyspnea or wheezing Profile Rx 8.5 g 3     Cholecalciferol (VITAMIN D3 PO) Take 5,000 Units by mouth daily       glatiramer acetate 40 MG/ML injection INJECT 40 MG UNDER THE SKIN THREE TIMES A WEEK 12 mL 11     ibuprofen (ADVIL/MOTRIN) 600 MG tablet Take 1 tablet (600 mg) by mouth every 6 hours as needed for other (mile and/or inflammatory pain.) 12 tablet 0     ketoconazole (NIZORAL) 2 % external cream Apply thin layer to the right third toe and surrounding area. 30 g 11     lisinopril-hydrochlorothiazide (ZESTORETIC) 10-12.5 MG tablet Take 1 tablet by mouth daily 90 tablet 3     MULTIPLE VITAMIN PO Take 1 tablet by mouth daily.       oxyCODONE (ROXICODONE) 5 MG tablet Take 1-2 tablets (5-10 mg) by mouth every 4 hours as needed for moderate to severe pain 6 tablet 0     pravastatin (PRAVACHOL) 20 MG tablet Take 1 tablet (20 mg) by mouth every evening 90 tablet 3     senna-docusate (SENOKOT-S/PERICOLACE) 8.6-50 MG tablet Take 1-2 tablets by mouth 2 times daily 30 tablet 0     temazepam (RESTORIL) 15 MG capsule TAKE 1 TO 2 CAPSULES BY MOUTH NIGHTLY AS NEEDED FOR SLEEP 15 capsule 0         No Known Allergies    Social History:  Social History     Tobacco Use     Smoking status: Never     Smokeless tobacco: Never   Substance Use Topics     Alcohol use: Yes     Alcohol/week: 1.0 - 2.0 standard drink     Comment: RARELY         Family History:   The patient's family history is notable for   Family History   Problem Relation Age of Onset     Hypertension Mother      Lipids Mother      Hyperlipidemia Mother      Eye Disorder Father      Hyperlipidemia Father      Sleep Apnea Father      Coronary Artery Disease Sister      Hyperlipidemia Sister      Myocardial Infarction Sister      Cerebrovascular Disease Maternal Grandmother      Cerebrovascular Disease Maternal Grandfather      Prostate Cancer Maternal Grandfather      Blood Disease Paternal Grandmother       Alzheimer Disease Paternal Grandfather      Pulmonary Embolism Daughter      Multiple Sclerosis Cousin      Physical Exam:     LMP 08/27/2020 (Exact Date)   There is no height or weight on file to calculate BMI.    General: Alert and oriented, appears anxious  Psych: Mood stable      Assessment/Plan: Renea Trotter is a 52 year old woman with a right ovarian mass s/p Laparoscopic right salpingo-ooporectomy and left salpingectomy with diagnosis of benign mucinous cystadenoma.    Pathology reviewed with benign findings. No further treatment recommended    Post surgical recovery without complications, follow up with primary clinic as needed.      Savana Valle M.D., MPH,  F.A.C.O.G.  Professor  Department of Ob/Gyn and Women's Health  Division of Gynecologic Oncology  [facility]  [phone]

## 2022-12-20 ENCOUNTER — DOCUMENTATION ONLY (OUTPATIENT)
Dept: ONCOLOGY | Facility: CLINIC | Age: 52
End: 2022-12-20

## 2022-12-20 NOTE — PROGRESS NOTES
CLINICAL NUTRITION SERVICES     Reason for Contact: Questions from Oncology Distress Screening  1. How concerned are you about your ability to eat? :  0  2. How concerned are you about unintended weight loss or your current weight? : 8  3. Patient requested to speak with a Dietitian on the Oncology Distress Screening tool. Yes    Action: RD called patient, no answer at this time and no voice mail box was set up.      Follow up: PRN, provider can place nutrition referral as needed.     Leidy Mccabe RD, LD  [phone]

## 2022-12-29 ENCOUNTER — MYC MEDICAL ADVICE (OUTPATIENT)
Dept: FAMILY MEDICINE | Facility: CLINIC | Age: 52
End: 2022-12-29

## 2023-01-04 NOTE — TELEPHONE ENCOUNTER
Routing to provider to approve use of same day slot for annual exam.    Namrata Ortega RN    [facility]

## 2023-01-16 ENCOUNTER — TELEPHONE (OUTPATIENT)
Dept: NEUROLOGY | Facility: CLINIC | Age: 53
End: 2023-01-16

## 2023-01-16 ASSESSMENT — ENCOUNTER SYMPTOMS
WEAKNESS: 0
CHILLS: 0
HEARTBURN: 0
HEMATURIA: 0
DIZZINESS: 0
BREAST MASS: 0
MYALGIAS: 0
NAUSEA: 0
JOINT SWELLING: 0
FEVER: 0
SHORTNESS OF BREATH: 0
PALPITATIONS: 0
DIARRHEA: 0
HEADACHES: 0
SORE THROAT: 0
ARTHRALGIAS: 0
EYE PAIN: 0
CONSTIPATION: 0
DYSURIA: 0
HEMATOCHEZIA: 0
PARESTHESIAS: 0
NERVOUS/ANXIOUS: 0
COUGH: 0
ABDOMINAL PAIN: 0
FREQUENCY: 0

## 2023-01-16 ASSESSMENT — ASTHMA QUESTIONNAIRES
ACT_TOTALSCORE: 24
QUESTION_3 LAST FOUR WEEKS HOW OFTEN DID YOUR ASTHMA SYMPTOMS (WHEEZING, COUGHING, SHORTNESS OF BREATH, CHEST TIGHTNESS OR PAIN) WAKE YOU UP AT NIGHT OR EARLIER THAN USUAL IN THE MORNING: NOT AT ALL
QUESTION_5 LAST FOUR WEEKS HOW WOULD YOU RATE YOUR ASTHMA CONTROL: COMPLETELY CONTROLLED
QUESTION_4 LAST FOUR WEEKS HOW OFTEN HAVE YOU USED YOUR RESCUE INHALER OR NEBULIZER MEDICATION (SUCH AS ALBUTEROL): NOT AT ALL
QUESTION_1 LAST FOUR WEEKS HOW MUCH OF THE TIME DID YOUR ASTHMA KEEP YOU FROM GETTING AS MUCH DONE AT WORK, SCHOOL OR AT HOME: NONE OF THE TIME
ACT_TOTALSCORE: 24
QUESTION_2 LAST FOUR WEEKS HOW OFTEN HAVE YOU HAD SHORTNESS OF BREATH: ONCE OR TWICE A WEEK

## 2023-01-16 NOTE — TELEPHONE ENCOUNTER
Prior Authorization Approval    Authorization Effective Date: 12/17/2022  Authorization Expiration Date: 1/16/2023  Medication: Glatiramer acetate 40MG/ML syringes ([place] APPROVED)  Approved Dose/Quantity: 28 days  Reference #:     Insurance Company: [employer] - Phone [phone] Fax [phone]  Expected CoPay:       CoPay Card Available: Yes    Foundation Assistance Needed:    Which Pharmacy is filling the prescription (Not needed for infusion/clinic administered): [address]  Pharmacy Notified:    Patient Notified:            Thank you,    Mary Jane Bourgeois [facility]  Specialty Pharmacy Clinic Liaison - CardiologyNeurologyMultiple Sclerosis  [facility] [address] [address]  Ph: [phone] Fax: [phone]  [email or web address]

## 2023-01-17 ENCOUNTER — OFFICE VISIT (OUTPATIENT)
Dept: FAMILY MEDICINE | Facility: CLINIC | Age: 53
End: 2023-01-17
Payer: COMMERCIAL

## 2023-01-17 VITALS
SYSTOLIC BLOOD PRESSURE: 120 MMHG | OXYGEN SATURATION: 97 % | TEMPERATURE: 97.9 F | BODY MASS INDEX: 26.56 KG/M2 | DIASTOLIC BLOOD PRESSURE: 86 MMHG | HEART RATE: 90 BPM | HEIGHT: 63 IN | WEIGHT: 149.9 LBS | RESPIRATION RATE: 16 BRPM

## 2023-01-17 DIAGNOSIS — E55.9 VITAMIN D DEFICIENCY: ICD-10-CM

## 2023-01-17 DIAGNOSIS — B35.3 TINEA PEDIS OF RIGHT FOOT: ICD-10-CM

## 2023-01-17 DIAGNOSIS — R06.83 SNORING: ICD-10-CM

## 2023-01-17 DIAGNOSIS — G47.9 SLEEP DISORDER: ICD-10-CM

## 2023-01-17 DIAGNOSIS — D27.0 MUCINOUS CYSTADENOMA OF OVARY, RIGHT: ICD-10-CM

## 2023-01-17 DIAGNOSIS — I10 ESSENTIAL HYPERTENSION WITH GOAL BLOOD PRESSURE LESS THAN 140/90: ICD-10-CM

## 2023-01-17 DIAGNOSIS — G35 MS (MULTIPLE SCLEROSIS) (H): ICD-10-CM

## 2023-01-17 DIAGNOSIS — L98.9 FACIAL SKIN LESION: ICD-10-CM

## 2023-01-17 DIAGNOSIS — Z00.00 ROUTINE GENERAL MEDICAL EXAMINATION AT A HEALTH CARE FACILITY: Primary | ICD-10-CM

## 2023-01-17 DIAGNOSIS — E78.5 HYPERLIPIDEMIA LDL GOAL <130: ICD-10-CM

## 2023-01-17 DIAGNOSIS — J45.20 MILD INTERMITTENT ASTHMA WITHOUT COMPLICATION: ICD-10-CM

## 2023-01-17 PROBLEM — C56.1: Status: ACTIVE | Noted: 2023-01-17

## 2023-01-17 PROBLEM — C56.1: Status: RESOLVED | Noted: 2023-01-17 | Resolved: 2023-01-17

## 2023-01-17 PROCEDURE — 99396 PREV VISIT EST AGE 40-64: CPT | Performed by: FAMILY MEDICINE

## 2023-01-17 PROCEDURE — 99214 OFFICE O/P EST MOD 30 MIN: CPT | Mod: 25 | Performed by: FAMILY MEDICINE

## 2023-01-17 RX ORDER — PRAVASTATIN SODIUM 20 MG
20 TABLET ORAL EVERY EVENING
Qty: 90 TABLET | Refills: 0 | Status: CANCELLED | OUTPATIENT
Start: 2023-01-17

## 2023-01-17 RX ORDER — KETOCONAZOLE 20 MG/G
CREAM TOPICAL
Qty: 30 G | Refills: 11 | Status: SHIPPED | OUTPATIENT
Start: 2023-01-17 | End: 2024-05-29

## 2023-01-17 RX ORDER — LISINOPRIL/HYDROCHLOROTHIAZIDE 10-12.5 MG
1 TABLET ORAL DAILY
Qty: 90 TABLET | Refills: 3 | Status: SHIPPED | OUTPATIENT
Start: 2023-01-17 | End: 2024-02-09

## 2023-01-17 ASSESSMENT — ENCOUNTER SYMPTOMS
SHORTNESS OF BREATH: 0
NAUSEA: 0
HEMATOCHEZIA: 0
ABDOMINAL PAIN: 0
HEADACHES: 0
JOINT SWELLING: 0
MYALGIAS: 0
ARTHRALGIAS: 0
DIARRHEA: 0
PARESTHESIAS: 0
FREQUENCY: 0
PALPITATIONS: 0
SORE THROAT: 0
EYE PAIN: 0
BREAST MASS: 0
HEMATURIA: 0
HEARTBURN: 0
DYSURIA: 0
WEAKNESS: 0
NERVOUS/ANXIOUS: 0
CONSTIPATION: 0
FEVER: 0
CHILLS: 0
COUGH: 0
DIZZINESS: 0

## 2023-01-17 ASSESSMENT — PAIN SCALES - GENERAL: PAINLEVEL: NO PAIN (0)

## 2023-01-17 NOTE — PATIENT INSTRUCTIONS
Start taking CALTRATE- D TWICE DAILY    Preventive Health Recommendations  Female Ages 50 - 64    Yearly exam: See your health care provider every year in order to  Review health changes.   Discuss preventive care.    Review your medicines if your doctor has prescribed any.    Get a Pap test every three years (unless you have an abnormal result and your provider advises testing more often).  If you get Pap tests with HPV test, you only need to test every 5 years, unless you have an abnormal result.   You do not need a Pap test if your uterus was removed (hysterectomy) and you have not had cancer.  You should be tested each year for STDs (sexually transmitted diseases) if you're at risk.   Have a mammogram every 1 to 2 years.  Have a colonoscopy at age 50, or have a yearly FIT test (stool test). These exams screen for colon cancer.    Have a cholesterol test every 5 years, or more often if advised.  Have a diabetes test (fasting glucose) every three years. If you are at risk for diabetes, you should have this test more often.   If you are at risk for osteoporosis (brittle bone disease), think about having a bone density scan (DEXA).    Shots: Get a flu shot each year. Get a tetanus shot every 10 years.    Nutrition:   Eat at least 5 servings of fruits and vegetables each day.  Eat whole-grain bread, whole-wheat pasta and brown rice instead of white grains and rice.  Get adequate Calcium and Vitamin D.     Lifestyle  Exercise at least 150 minutes a week (30 minutes a day, 5 days a week). This will help you control your weight and prevent disease.  Limit alcohol to one drink per day.  No smoking.   Wear sunscreen to prevent skin cancer.   See your dentist every six months for an exam and cleaning.  See your eye doctor every 1 to 2 years.

## 2023-01-17 NOTE — PROGRESS NOTES
Dermatol     SUBJECTIVE:   CC: Renea is an 52 year old who presents for preventive health visit.     Patient has been advised of split billing requirements and indicates understanding: Yes     Healthy Habits:     Getting at least 3 servings of Calcium per day:  Yes    Bi-annual eye exam:  Yes    Dental care twice a year:  Yes    Sleep apnea or symptoms of sleep apnea:  None    Diet:  Regular (no restrictions)    Frequency of exercise:  1 day/week    Duration of exercise:  Less than 15 minutes    Taking medications regularly:  Yes    Medication side effects:  Not applicable    PHQ-2 Total Score: 0    Additional concerns today:  Yes        Asthma Follow-Up    Was ACT completed today?    Yes    ACT Total Scores 1/16/2023   ACT TOTAL SCORE -   ASTHMA ER VISITS -   ASTHMA HOSPITALIZATIONS -   ACT TOTAL SCORE (Goal Greater than or Equal to 20) 24   In the past 12 months, how many times did you visit the emergency room for your asthma without being admitted to the hospital? 0   In the past 12 months, how many times were you hospitalized overnight because of your asthma? 0          How many days per week do you miss taking your asthma controller medication?  I do not have an asthma controller medication    Please describe any recent triggers for your asthma: upper respiratory infections    Have you had any Emergency Room Visits, Urgent Care Visits, or Hospital Admissions since your last office visit?  No            Hyperlipidemia Follow-Up      Are you regularly taking any medication or supplement to lower your cholesterol?   Yes- Pravastatin    Are you having muscle aches or other side effects that you think could be caused by your cholesterol lowering medication?  No    Hypertension Follow-up      Do you check your blood pressure regularly outside of the clinic?  At the office visits    Are you following a low salt diet? Yes    Are your blood pressures ever more than 140 on the top number (systolic) OR more   than 90 on  the bottom number (diastolic), for example 140/90? No      Today's PHQ-2 Score:   PHQ-2 ( 1999 [employer]) 1/16/2023   Q1: Little interest or pleasure in doing things 0   Q2: Feeling down, depressed or hopeless 0   PHQ-2 Score 0   PHQ-2 Total Score (12-17 Years)- Positive if 3 or more points; Administer PHQ-A if positive -   Q1: Little interest or pleasure in doing things Not at all   Q2: Feeling down, depressed or hopeless Not at all   PHQ-2 Score 0           Social History     Tobacco Use     Smoking status: Never     Smokeless tobacco: Never   Substance Use Topics     Alcohol use: Yes     Alcohol/week: 1.0 - 2.0 standard drink     Comment: RARELY     If you drink alcohol do you typically have >3 drinks per day or >7 drinks per week? No    Alcohol Use 1/16/2023   Prescreen: >3 drinks/day or >7 drinks/week? No   Prescreen: >3 drinks/day or >7 drinks/week? -   No flowsheet data found.    Reviewed orders with patient.  Reviewed health maintenance and updated orders accordingly - Yes  Lab work is in process  Labs reviewed in EPIC  BP Readings from Last 3 Encounters:   01/17/23 120/86   11/11/22 (!) 140/85   11/03/22 (!) 153/101    Wt Readings from Last 3 Encounters:   01/17/23 68 kg (149 lb 14.4 oz)   11/11/22 67.4 kg (148 lb 9.4 oz)   11/03/22 67.6 kg (149 lb)                  Patient Active Problem List   Diagnosis     MS (multiple sclerosis) (H)     CARDIOVASCULAR SCREENING; LDL GOAL LESS THAN 160     BMI 26.0-26.9,adult     Urinary urgency     Headache     Low back pain without sciatica, unspecified back pain laterality     Sleep disorder     Plantar warts     Essential hypertension with goal blood pressure less than 140/90     Hemorrhoids, unspecified hemorrhoid type     Seborrheic keratosis     Mild intermittent asthma     Hypoactive sexual desire     Menopause     Tinea pedis of right foot     Vitamin D deficiency     Past Surgical History:   Procedure Laterality Date     APPENDECTOMY       COLONOSCOPY WITH CO2  INSUFFLATION N/A 08/05/2022    Procedure: COLONOSCOPY, WITH CO2 INSUFFLATION;  Surgeon: Hernan Vasquez MD;  Location: [facility] OR     HC TOOTH EXTRACTION W/FORCEP       LAPAROSCOPIC SALPINGO-OOPHORECTOMY Bilateral 11/11/2022    Procedure: LAPAROSCOPIC RIGHT SALPINGO-OOPHORECTOMY, LEFT SALPINGECTOMY;  Surgeon: Savana Valle MD;  Location: [facility] OR       Social History     Tobacco Use     Smoking status: Never     Smokeless tobacco: Never   Substance Use Topics     Alcohol use: Yes     Alcohol/week: 1.0 - 2.0 standard drink     Comment: RARELY     Family History   Problem Relation Age of Onset     Hypertension Mother      Lipids Mother      Hyperlipidemia Mother      Eye Disorder Father      Hyperlipidemia Father      Sleep Apnea Father      Coronary Artery Disease Sister      Hyperlipidemia Sister      Myocardial Infarction Sister      Cerebrovascular Disease Maternal Grandmother      Cerebrovascular Disease Maternal Grandfather      Prostate Cancer Maternal Grandfather      Blood Disease Paternal Grandmother      Alzheimer Disease Paternal Grandfather      Pulmonary Embolism Daughter      Multiple Sclerosis Cousin          Current Outpatient Medications   Medication Sig Dispense Refill     acetaminophen (TYLENOL) 325 MG tablet Take 2 tablets (650 mg) by mouth every 6 hours as needed for mild pain 24 tablet 0     albuterol (PROAIR HFA/PROVENTIL HFA/VENTOLIN HFA) 108 (90 Base) MCG/ACT inhaler Inhale 2 puffs into the lungs every 4 hours as needed for shortness of breath / dyspnea or wheezing Profile Rx 8.5 g 3     Cholecalciferol (VITAMIN D3 PO) Take 5,000 Units by mouth daily       glatiramer acetate 40 MG/ML injection INJECT 40 MG UNDER THE SKIN THREE TIMES A WEEK 12 mL 11     ibuprofen (ADVIL/MOTRIN) 600 MG tablet Take 1 tablet (600 mg) by mouth every 6 hours as needed for other (mile and/or inflammatory pain.) 12 tablet 0     ketoconazole (NIZORAL) 2 % external cream Apply thin layer to the right third toe and  surrounding area. 30 g 11     lisinopril-hydrochlorothiazide (ZESTORETIC) 10-12.5 MG tablet Take 1 tablet by mouth daily 90 tablet 3     MULTIPLE VITAMIN PO Take 1 tablet by mouth daily.       pravastatin (PRAVACHOL) 20 MG tablet Take 1 tablet by mouth in the evening 90 tablet 0     senna-docusate (SENOKOT-S/PERICOLACE) 8.6-50 MG tablet Take 1-2 tablets by mouth 2 times daily 30 tablet 0     temazepam (RESTORIL) 15 MG capsule TAKE 1 TO 2 CAPSULES BY MOUTH NIGHTLY AS NEEDED FOR SLEEP 15 capsule 0     No Known Allergies  Recent Labs   Lab Test 12/14/21  1229 09/18/20  0723 06/01/19  0803 03/15/18  1041 10/13/17  0727   * 141*  --   --  113*   HDL 60 56  --   --  52   TRIG 127 134  --   --  169*   ALT 26  --  22 17 63*   CR 0.94 0.82 0.85 0.72 0.87   GFRESTIMATED 70 84 81 86 69   GFRESTBLACK  --  >90 >90 >90 84   POTASSIUM 3.9 3.8 4.0 3.8 3.2*   TSH  --   --  1.63  --  2.19        Breast Cancer Screening:    Breast CA Risk Assessment (FHS-7) 12/14/2021   Do you have a family history of breast, colon, or ovarian cancer? No / Unknown       click delete button to remove this line now  Mammogram Screening: Recommended annual mammography  Pertinent mammograms are reviewed under the imaging tab.    History of abnormal Pap smear: NO - age 30-65 PAP every 5 years with negative HPV co-testing recommended  PAP / HPV Latest Ref Rng & Units 9/18/2020 10/13/2017 8/6/2014   PAP (Historical) - NIL NIL NIL   HPV16 NEG:Negative Negative Negative -   HPV18 NEG:Negative Negative Negative -   HRHPV NEG:Negative Negative Negative -     Reviewed and updated as needed this visit by clinical staff   Tobacco  Allergies  Meds              Reviewed and updated as needed this visit by Provider     Meds               Past Medical History:   Diagnosis Date     HTN (hypertension)      Hypercholesterolemia      Mild intermittent asthma      MS (multiple sclerosis) (H)       Past Surgical History:   Procedure Laterality Date      APPENDECTOMY       COLONOSCOPY WITH CO2 INSUFFLATION N/A 2022    Procedure: COLONOSCOPY, WITH CO2 INSUFFLATION;  Surgeon: Hernan Vasquez MD;  Location: [facility]     HC TOOTH EXTRACTION W/FORCEP       LAPAROSCOPIC SALPINGO-OOPHORECTOMY Bilateral 2022    Procedure: LAPAROSCOPIC RIGHT SALPINGO-OOPHORECTOMY, LEFT SALPINGECTOMY;  Surgeon: Savana Valle MD;  Location: [facility] OR     OB History    Para Term  AB Living   3 3 3 0 0 3   SAB IAB Ectopic Multiple Live Births   0 0 0 0 0      # Outcome Date GA Lbr [place]/2nd Weight Sex Delivery Anes PTL Lv   3 Term     M       2 Term     F       1 Term     F           Review of Systems   Constitutional: Negative for chills and fever.   HENT: Negative for congestion, ear pain, hearing loss and sore throat.    Eyes: Negative for pain and visual disturbance.   Respiratory: Negative for cough and shortness of breath.    Cardiovascular: Negative for chest pain, palpitations and peripheral edema.   Gastrointestinal: Negative for abdominal pain, constipation, diarrhea, heartburn, hematochezia and nausea.   Breasts:  Negative for tenderness, breast mass and discharge.   Genitourinary: Negative for dysuria, frequency, genital sores, hematuria, pelvic pain, urgency, vaginal bleeding and vaginal discharge.   Musculoskeletal: Negative for arthralgias, joint swelling and myalgias.   Skin: Negative for rash.   Neurological: Negative for dizziness, weakness, headaches and paresthesias.   Psychiatric/Behavioral: Negative for mood changes. The patient is not nervous/anxious.      CONSTITUTIONAL: NEGATIVE for fever, chills, change in weight  INTEGUMENTARY/SKIN: Slightly raised, pigmented skin lesion on the upper right cheek  EYES: NEGATIVE for vision changes or irritation  ENT: NEGATIVE for ear, mouth and throat problems  RESP: NEGATIVE for significant cough or SOB  BREAST: NEGATIVE for masses, tenderness or discharge  CV: NEGATIVE for chest pain,  "palpitations or peripheral edema  CV: Hx HTN  GI: NEGATIVE for nausea, abdominal pain, heartburn, or change in bowel habits  : NEGATIVE for unusual urinary or vaginal symptoms. No vaginal bleeding.  MUSCULOSKELETAL: NEGATIVE for significant arthralgias or myalgia  NEURO: NEGATIVE for weakness, dizziness or paresthesias  NEURO: History of multiple sclerosis  ENDOCRINE: NEGATIVE for temperature intolerance, skin/hair changes  HEME/ALLERGY/IMMUNE: NEGATIVE for bleeding problems  PSYCHIATRIC: NEGATIVE for changes in mood or affect      OBJECTIVE:   /86 (BP Location: Right arm, Patient Position: Sitting, Cuff Size: Adult Regular)   Pulse 90   Temp 97.9  F (36.6  C) (Temporal)   Resp 16   Ht 1.6 m (5' 3\")   Wt 68 kg (149 lb 14.4 oz)   LMP 08/27/2020 (Exact Date)   SpO2 97%   BMI 26.55 kg/m    Physical Exam  GENERAL APPEARANCE: healthy, alert and no distress  EYES: Eyes grossly normal to inspection, PERRL and conjunctivae and sclerae normal  HENT: ear canals and TM's normal, nose and mouth without ulcers or lesions, oropharynx clear and oral mucous membranes moist  NECK: no adenopathy, no asymmetry, masses, or scars and thyroid normal to palpation  RESP: lungs clear to auscultation - no rales, rhonchi or wheezes  BREAST: normal without masses, tenderness or nipple discharge and no palpable axillary masses or adenopathy  CV: regular rate and rhythm, normal S1 S2, no S3 or S4, no murmur, click or rub, no peripheral edema and peripheral pulses strong  ABDOMEN: soft, nontender, no hepatosplenomegaly, no masses and bowel sounds normal  MS: no musculoskeletal defects are noted and gait is age appropriate without ataxia  SKIN: About 3 to 4 mm, faint brown, slightly keratotic pigmented macular spot on the right upper cheek   NEURO: Normal strength and tone, sensory exam grossly normal, mentation intact and speech normal  PSYCH: mentation appears normal and affect normal/bright    Diagnostic Test Results:  Labs " reviewed in Epic    ASSESSMENT/PLAN:   (Z00.00) Routine general medical examination at a health care facility  (primary encounter diagnosis)  Comment:   Plan: Discussed on regular exercises, daily calcium intake, healthy eating, self breast exams monthly and routine dental checks    (I10) Essential hypertension with goal blood pressure less than 140/90  Comment:   Plan: lisinopril-hydrochlorothiazide (ZESTORETIC)         10-12.5 MG tablet, Comprehensive metabolic         panel (BMP + Alb, Alk Phos, ALT, AST, Total.         Bili, TP), Albumin Random Urine Quantitative         with Creat Ratio          BP Readings from Last 6 Encounters:   01/17/23 120/86   11/11/22 (!) 140/85   11/03/22 (!) 153/101   09/26/22 124/84   08/05/22 126/86   02/21/22 132/86     Blood pressure is at goal, continue with current medications, low-salt diet, regular exercises, will get the fasting labs done this week    (E78.5) Hyperlipidemia LDL goal <130  Comment:   Plan: Comprehensive metabolic panel (BMP + Alb, Alk         Phos, ALT, AST, Total. Bili, TP), Lipid panel         reflex to direct LDL Fasting, Lipoprotein (a)        Will f/u on results and call with recommendations.  Per patient's request, lipoprotein a was added  Patient reported that her sister who had a recent history of ischemic heart disease had significant elevated LP(a) she wants to have the test done for herself too    (B35.3) Tinea pedis of right foot  Comment:   ACT Total Scores 10/13/2017 12/14/2021 1/16/2023   ACT TOTAL SCORE - - -   ASTHMA ER VISITS - - -   ASTHMA HOSPITALIZATIONS - - -   ACT TOTAL SCORE (Goal Greater than or Equal to 20) 23 25 24   In the past 12 months, how many times did you visit the emergency room for your asthma without being admitted to the hospital? 0 0 0   In the past 12 months, how many times were you hospitalized overnight because of your asthma? 0 0 0       Plan: ketoconazole (NIZORAL) 2 % external cream        Refills given per  patient's request    (J45.20) Mild intermittent asthma without complication  Comment:   Plan: Asthma Action Plan (AAP)        Stable, continue with albuterol inhaler as needed    (G47.9) Sleep disorder  Comment:   Plan: Adult Sleep Eval & Management          Referral        Patient uses temazepam as needed for intermittent episodes of chronic insomnia has also concerns for snoring, recommended to get a sleep study to evaluate for sleep apnea  This was ordered before, patient was not able to complete it last year      (R06.83) Snoring  Comment:   Plan: Adult Sleep Eval & Management          Referral        as above      (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, right  Comment:   Plan: Reviewed recent new diagnosis of mucinous cystadenoma, s/p  laparoscopic bilateral salpingo-oophorectomy in 11/2022      (G35) MS (multiple sclerosis) (H)  Comment:   Plan: Adult Sleep Eval & Management          Referral        Continue to follow-up with neurology      (L98.9) Facial skin lesion  Comment:   Plan: Adult Dermatology Referral        Continue with sunscreen use, recommended to consult dermatology for evaluation    (E55.9) Vitamin D deficiency  Comment:   Plan: Vitamin D Deficiency        Patient is currently taking 2000 IU of vitamin D daily            COUNSELING:  Reviewed preventive health counseling, as reflected in patient instructions  Special attention given to:        Regular exercise       Healthy diet/nutrition       Vision screening       Immunizations    Declined: Covid-19 and Hepatitis B due to Concerns about side effects/safety               Osteoporosis prevention/bone health       Colorectal Cancer Screening       (Lily)menopause management       The 10-year ASCVD risk score (Dean PORTER, et al., 2019) is: 1.9%    Values used to calculate the score:      Age: 52 years      Sex: Female      Is Non- : No      Diabetic: No      Tobacco smoker: No      Systolic Blood  "Pressure: 120 mmHg      Is BP treated: Yes      HDL Cholesterol: 60 mg/dL      Total Cholesterol: 226 mg/dL      BMI:   Estimated body mass index is 26.55 kg/m  as calculated from the following:    Height as of this encounter: 1.6 m (5' 3\").    Weight as of this encounter: 68 kg (149 lb 14.4 oz).   Weight management plan: Discussed healthy diet and exercise guidelines      She reports that she has never smoked. She has never used smokeless tobacco.      Chart documentation done in part with Dragon Voice recognition Software. Although reviewed after completion, some word and grammatical error may remain.  Cara Clements MD  [facility]  "

## 2023-01-17 NOTE — LETTER
My Asthma Action Plan  Name: Renea Trotter   YOB: 1970  Date: 1/17/2023   My doctor: aCra Clements   My clinic: [facility]      My Control Medicine: None        Dose:   My Rescue Medicine: Albuterol        Dose: 2 puffs every 4-6 hours as needed   My Asthma Severity: Intermittent / Exercise Induced  Avoid your asthma triggers: upper respiratory infections        GREEN ZONE   Good Control    I feel good    No cough or wheeze    Can work, sleep and play without asthma symptoms       Take your asthma control medicine every day.     1. If exercise triggers your asthma, take your rescue medication    15 minutes before exercise or sports, and    During exercise if you have asthma symptoms  2. Spacer to use with inhaler: If you have a spacer, make sure to use it with your inhaler             YELLOW ZONE Getting Worse  I have ANY of these:    I do not feel good    Cough or wheeze    Chest feels tight    Wake up at night   1. Keep taking your Green Zone medications  2. Start taking your rescue medicine:    every 20 minutes for up to 1 hour. Then every 4 hours for 24-48 hours.  3. If you stay in the Yellow Zone for more than 12-24 hours, contact your doctor.  4. If you do not return to the Green Zone in 12-24 hours or you get worse, start taking your oral steroid medicine if prescribed by your provider.           RED ZONE Medical Alert - Get Help  I have ANY of these:    I feel awful    Medicine is not helping    Breathing getting harder    Trouble walking or talking    Nose opens wide to breathe       1. Take your rescue medicine NOW  2. If your provider has prescribed an oral steroid medicine, start taking it NOW  3. Call your doctor NOW  4. If you are still in the Red Zone after 20 minutes and you have not reached your doctor:    Take your rescue medicine again and    Call 911 or go to the emergency room right away    See your regular doctor within 2 weeks of an Emergency  Room or Urgent Care visit for follow-up treatment.        The above medication may be given at school or day care?: N/A (Adult Patient)  Child can carry and use inhaler(s) at school with approval of school nurse?: N/A (Adult Patient)    Electronically signed by: Cara Clements MD, January 17, 2023    Annual Reminders:  Meet with Asthma Educator,  Flu Shot in the Fall, consider Pneumonia Vaccination for patients with asthma (aged 19 and older).    Pharmacy:    [facility] [address] [place], [place] - [address] [address] [place] [address]  [facility] D[facility] - [address]  [employer] - [address]  [id number] [place], [place] - [address] [facility] [address]                    Asthma Triggers  How To Control Things That Make Your Asthma Worse    Triggers are things that make your asthma worse.  Look at the list below to help you find your triggers and what you can do about them.  You can help prevent asthma flare-ups by staying away from your triggers.      Trigger                                                          What you can do   Cigarette Smoke  Tobacco smoke can make asthma worse. Do not allow smoking in your home, car or around you.  Be sure no one smokes at a child s day care or school.  If you smoke, ask your health care provider for ways to help you quit.  Ask family members to quit too.  Ask your health care provider for a referral to Quit Plan to help you quit smoking, or call 5-242-777-PLAN.     Colds, Flu, Bronchitis  These are common triggers of asthma. Wash your hands often.  Don t touch your eyes, nose or mouth.  Get a flu shot every year.     Dust Mites  These are tiny bugs that live in cloth or carpet. They are too small to see. Wash sheets and blankets in hot water every week.   Encase pillows and mattress in dust mite proof covers.  Avoid  having carpet if you can. If you have carpet, vacuum weekly.   Use a dust mask and HEPA vacuum.   Pollen and Outdoor Mold  Some people are allergic to trees, grass, or weed pollen, or molds. Try to keep your windows closed.  Limit time out doors when pollen count is high.   Ask you health care provider about taking medicine during allergy season.     Animal Dander  Some people are allergic to skin flakes, urine or saliva from pets with fur or feathers. Keep pets with fur or feathers out of your home.    If you can t keep the pet outdoors, then keep the pet out of your bedroom.  Keep the bedroom door closed.  Keep pets off cloth furniture and away from stuffed toys.     Mice, Rats, and Cockroaches  Some people are allergic to the waste from these pests.   Cover food and garbage.  Clean up spills and food crumbs.  Store grease in the refrigerator.   Keep food out of the bedroom.   Indoor Mold  This can be a trigger if your home has high moisture. Fix leaking faucets, pipes, or other sources of water.   Clean moldy surfaces.  Dehumidify basement if it is damp and smelly.   Smoke, Strong Odors, and Sprays  These can reduce air quality. Stay away from strong odors and sprays, such as perfume, powder, hair spray, paints, smoke incense, paint, cleaning products, candles and new carpet.   Exercise or Sports  Some people with asthma have this trigger. Be active!  Ask your doctor about taking medicine before sports or exercise to prevent symptoms.    Warm up for 5-10 minutes before and after sports or exercise.     Other Triggers of Asthma  Cold air:  Cover your nose and mouth with a scarf.  Sometimes laughing or crying can be a trigger.  Some medicines and food can trigger asthma.

## 2023-01-20 ENCOUNTER — ANCILLARY PROCEDURE (OUTPATIENT)
Dept: MAMMOGRAPHY | Facility: CLINIC | Age: 53
End: 2023-01-20
Attending: FAMILY MEDICINE
Payer: COMMERCIAL

## 2023-01-20 ENCOUNTER — LAB (OUTPATIENT)
Dept: LAB | Facility: CLINIC | Age: 53
End: 2023-01-20
Payer: COMMERCIAL

## 2023-01-20 DIAGNOSIS — Z12.31 VISIT FOR SCREENING MAMMOGRAM: ICD-10-CM

## 2023-01-20 DIAGNOSIS — E78.5 HYPERLIPIDEMIA LDL GOAL <130: ICD-10-CM

## 2023-01-20 DIAGNOSIS — E55.9 VITAMIN D DEFICIENCY: ICD-10-CM

## 2023-01-20 DIAGNOSIS — I10 ESSENTIAL HYPERTENSION WITH GOAL BLOOD PRESSURE LESS THAN 140/90: ICD-10-CM

## 2023-01-20 LAB
ALBUMIN SERPL-MCNC: 4.3 G/DL (ref 3.4–5)
ALP SERPL-CCNC: 63 U/L (ref 40–150)
ALT SERPL W P-5'-P-CCNC: 30 U/L (ref 0–50)
ANION GAP SERPL CALCULATED.3IONS-SCNC: 3 MMOL/L (ref 3–14)
APO A-I SERPL-MCNC: 6 MG/DL
AST SERPL W P-5'-P-CCNC: 18 U/L (ref 0–45)
BILIRUB SERPL-MCNC: 0.6 MG/DL (ref 0.2–1.3)
BUN SERPL-MCNC: 24 MG/DL (ref 7–30)
CALCIUM SERPL-MCNC: 9.8 MG/DL (ref 8.5–10.1)
CHLORIDE BLD-SCNC: 107 MMOL/L (ref 94–109)
CHOLEST SERPL-MCNC: 151 MG/DL
CO2 SERPL-SCNC: 32 MMOL/L (ref 20–32)
CREAT SERPL-MCNC: 0.88 MG/DL (ref 0.52–1.04)
DEPRECATED CALCIDIOL+CALCIFEROL SERPL-MC: 75 UG/L (ref 20–75)
FASTING STATUS PATIENT QL REPORTED: YES
GFR SERPL CREATININE-BSD FRML MDRD: 79 ML/MIN/1.73M2
GLUCOSE BLD-MCNC: 100 MG/DL (ref 70–99)
HDLC SERPL-MCNC: 59 MG/DL
LDLC SERPL CALC-MCNC: 70 MG/DL
NONHDLC SERPL-MCNC: 92 MG/DL
POTASSIUM BLD-SCNC: 3.9 MMOL/L (ref 3.4–5.3)
PROT SERPL-MCNC: 7.8 G/DL (ref 6.8–8.8)
SODIUM SERPL-SCNC: 142 MMOL/L (ref 133–144)
TRIGL SERPL-MCNC: 111 MG/DL

## 2023-01-20 PROCEDURE — 80053 COMPREHEN METABOLIC PANEL: CPT

## 2023-01-20 PROCEDURE — 77063 BREAST TOMOSYNTHESIS BI: CPT | Mod: GC | Performed by: RADIOLOGY

## 2023-01-20 PROCEDURE — 77067 SCR MAMMO BI INCL CAD: CPT | Mod: GC | Performed by: RADIOLOGY

## 2023-01-20 PROCEDURE — 82306 VITAMIN D 25 HYDROXY: CPT

## 2023-01-20 PROCEDURE — 80061 LIPID PANEL: CPT

## 2023-01-20 PROCEDURE — 36415 COLL VENOUS BLD VENIPUNCTURE: CPT

## 2023-01-20 PROCEDURE — 83695 ASSAY OF LIPOPROTEIN(A): CPT

## 2023-01-23 DIAGNOSIS — E78.5 HYPERLIPIDEMIA LDL GOAL <130: ICD-10-CM

## 2023-01-23 RX ORDER — PRAVASTATIN SODIUM 20 MG
20 TABLET ORAL EVERY EVENING
Qty: 90 TABLET | Refills: 3 | Status: SHIPPED | OUTPATIENT
Start: 2023-01-23 | End: 2023-02-02

## 2023-01-23 NOTE — RESULT ENCOUNTER NOTE
Basil Meier   Your recent labs showed Normal vitamin D and lipoprotein a..  Your liver and kidney functions including potassium are all in normal range.  Fasting blood sugar is slightly elevated but not concerning for diabetes..  Your fasting cholesterol numbers are significantly improved and within normal range, this is excellent and reassuring.  Please continue with current dose of lovastatin, refills were sent today.   Let me know if you have any questions. Take care.  Cara Clements MD

## 2023-02-01 ENCOUNTER — TELEPHONE (OUTPATIENT)
Dept: FAMILY MEDICINE | Facility: CLINIC | Age: 53
End: 2023-02-01

## 2023-02-01 ENCOUNTER — LAB (OUTPATIENT)
Dept: LAB | Facility: CLINIC | Age: 53
End: 2023-02-01
Attending: FAMILY MEDICINE
Payer: COMMERCIAL

## 2023-02-01 ENCOUNTER — VIRTUAL VISIT (OUTPATIENT)
Dept: FAMILY MEDICINE | Facility: CLINIC | Age: 53
End: 2023-02-01
Payer: COMMERCIAL

## 2023-02-01 DIAGNOSIS — J45.20 MILD INTERMITTENT ASTHMA WITHOUT COMPLICATION: ICD-10-CM

## 2023-02-01 DIAGNOSIS — U07.1 INFECTION DUE TO 2019 NOVEL CORONAVIRUS: ICD-10-CM

## 2023-02-01 DIAGNOSIS — R68.89 FLU-LIKE SYMPTOMS: ICD-10-CM

## 2023-02-01 DIAGNOSIS — G35 MS (MULTIPLE SCLEROSIS) (H): ICD-10-CM

## 2023-02-01 DIAGNOSIS — R68.89 FLU-LIKE SYMPTOMS: Primary | ICD-10-CM

## 2023-02-01 LAB
FLUAV AG SPEC QL IA: NEGATIVE
FLUBV AG SPEC QL IA: NEGATIVE

## 2023-02-01 PROCEDURE — U0005 INFEC AGEN DETEC AMPLI PROBE: HCPCS

## 2023-02-01 PROCEDURE — 99214 OFFICE O/P EST MOD 30 MIN: CPT | Mod: CS | Performed by: FAMILY MEDICINE

## 2023-02-01 PROCEDURE — U0003 INFECTIOUS AGENT DETECTION BY NUCLEIC ACID (DNA OR RNA); SEVERE ACUTE RESPIRATORY SYNDROME CORONAVIRUS 2 (SARS-COV-2) (CORONAVIRUS DISEASE [COVID-19]), AMPLIFIED PROBE TECHNIQUE, MAKING USE OF HIGH THROUGHPUT TECHNOLOGIES AS DESCRIBED BY CMS-2020-01-R: HCPCS

## 2023-02-01 PROCEDURE — 87804 INFLUENZA ASSAY W/OPTIC: CPT

## 2023-02-01 NOTE — TELEPHONE ENCOUNTER
Patient calling to report she developed a sore throat on Monday 1/30/23 that has since developed into a head ache, chest pressure, some nausea/vomiting and temperature of 100.9 taken axillary. Patient states she tested at home for covid yesterday and it was negative. She states he just returned back from a trip so she is unsure if she was exposed to influenza/covid.     RN advised on virtual appointment to address symptoms and to determine if additional testing is needed. Patient verbalized understanding and agreed to plan. Appointment scheduled for 02/01/23.     Namrata Ortega RN    [facility]

## 2023-02-01 NOTE — PROGRESS NOTES
Renea is a 52 year old who is being evaluated via a billable video visit.      How would you like to obtain your AVS? MyChart  If the video visit is dropped, the invitation should be resent by: Text to cell phone: [phone]  Will anyone else be joining your video visit? No        Assessment & Plan     Flu-like symptoms  Recommended to proceed with screening for both COVID and influenza today  Recommended rest, push fluids  Will f/u on results and call with recommendations.    - Symptomatic COVID-19 Virus (Coronavirus) by PCR Nose; Future  - Influenza A/B antigen; Future    Mild intermittent asthma without complication  Continue with albuterol inhaler as needed    MS (multiple sclerosis) (H)  On glatiramer acetate per neurology     Infection due to 2019 novel coronavirus  Comment:   Plan: nirmatrelvir and ritonavir (PAXLOVID) therapy         pack        Reviewed positive COVID-19 PCR test results, called patient and discussed about positive results and further treatment  Prescription sent for Paxlovid to take twice daily for 5 days  Recommended to hold pravastatin for 10 days from today  Continue with self quarantine for 5 days, wear mask for additional 5 days after the treatment  Continue with supportive care including pushing fluids, diet as tolerated, rest  Dosing and potential medication side effects discussed.  RTC in 1week if no better by then or sooner prn.    Patient verbalised understanding and is agreeable to the plan.          Review of the result(s) of each unique test - Positive COVID-19 and negative influenza screening test results  [id number]}     Chart documentation done in part with Dragon Voice recognition Software. Although reviewed after completion, some word and grammatical error may remain.    See Patient Instructions    Return in about 1 week (around 2/8/2023), or if symptoms worsen or fail to improve.    Cara Clements MD  [facility]   Renea is a 52  year old, presenting for the following health issues:  Flu Symptoms  Patient is here for a video visit instead of in person visit due to the current COVID-19 pandemic.  Patient with past medical history significant for multiple sclerosis, hyperlipidemia, mild intermittent asthma, hypertension is here with concerns of having sore throat that started 3 days ago followed by chills, body aches, nasal congestion and cough for the last 2 days along with a low-grade fever.  Patient denies shortness of breath, dyspnea on exertion, wheezing, chest pain, nausea, vomiting, abdominal pain, diarrhea, abnormal skin rashes  Patient reported resolving sore throat for the last 2 days  She had a phone COVID test that was negative  Patient is vaccinated against influenza, vaccinated and boosted once against COVID  History of Present Illness       Reason for visit:  FLU SYMPTOMS  Symptom onset:  1-3 days ago  Symptoms include:  Sore throat, headache, neck pain, feverish, coughing, congestion, chest pain  Symptom intensity:  Moderate  Symptom progression:  Improving  Had these symptoms before:  Yes  Has tried/received treatment for these symptoms:  Yes  Previous treatment was successful:  Yes  What makes it worse:  None  What makes it better:  Resting, fluids    She eats 2-3 servings of fruits and vegetables daily.She consumes 0 sweetened beverage(s) daily.She exercises with enough effort to increase her heart rate 9 or less minutes per day.  She exercises with enough effort to increase her heart rate 3 or less days per week.   She is taking medications regularly.             Review of Systems   CONSTITUTIONAL:as above  INTEGUMENTARY/SKIN: NEGATIVE for worrisome rashes, moles or lesions  EYES: NEGATIVE for vision changes or irritation  ENT/MOUTH: as above  RESP: Cough, history of asthma  CV: NEGATIVE for chest pain, palpitations or peripheral edema  CV: History of hypertension  GI: NEGATIVE for nausea, abdominal pain, heartburn, or  change in bowel habits  MUSCULOSKELETAL: NEGATIVE for significant arthralgias or myalgia  NEURO: History of multiple sclerosis  PSYCHIATRIC: NEGATIVE for changes in mood or affect      Objective           Vitals:  No vitals were obtained today due to virtual visit.    Physical Exam   GENERAL: Healthy, alert and no distress  EYES: Eyes grossly normal to inspection  RESP: No audible wheeze, , or visible cyanosis.  No visible retractions or increased work of breathing.    RESP: Coughing during conversation  SKIN: Visible skin clear. No significant rash, abnormal pigmentation or lesions.  NEURO: Cranial nerves grossly intact.  Mentation and speech appropriate for age.  PSYCH: Mentation appears normal, affect normal/bright, judgement and insight intact, normal speech and appearance well-groomed.    Results for orders placed or performed in visit on 02/01/23   Symptomatic COVID-19 Virus (Coronavirus) by PCR Nose     Status: Abnormal    Specimen: Nose; Swab   Result Value Ref Range    SARS CoV2 PCR Positive (A) Negative    Narrative    Testing was performed using the kerri SARS-CoV-2 assay on the kerri  WePow0 System. This test should be ordered for the detection of  SARS-CoV-2 in individuals who meet SARS-CoV-2 clinical and/or  epidemiological criteria. Test performance is unknown in asymptomatic  patients. This test is for in vitro diagnostic use under the FDA EUA  for laboratories certified under CLIA to perform high and/or moderate  complexity testing. This test has not been FDA cleared or approved. A  negative result does not rule out the presence of PCR inhibitors in  the specimen or target RNA in concentration below the limit of  detection for the assay. The possibility of a false negative should  be considered if the patient's recent exposure or clinical  presentation suggests COVID-19. This test was validated by the [facility]. This  laboratory is certified under the  Clinical Laboratory Improvement  Amendments of 1988 (CLIA-88) as qualified to perform high and/or  moderate complexity laboratory testing.   Influenza A/B antigen     Status: Normal    Specimen: Nose; Swab   Result Value Ref Range    Influenza A antigen Negative Negative    Influenza B antigen Negative Negative    Narrative    Test results must be correlated with clinical data. If necessary, results should be confirmed by a molecular assay or viral culture.                 Video-Visit Details    Type of service:  Video Visit   10:52am- 11:01am  Originating Location (pt. Location): Home  Distant Location (provider location):  On-site  Platform used for Video Visit: Antonella

## 2023-02-02 ENCOUNTER — TELEPHONE (OUTPATIENT)
Dept: NURSING | Facility: CLINIC | Age: 53
End: 2023-02-02
Payer: COMMERCIAL

## 2023-02-02 LAB — SARS-COV-2 RNA RESP QL NAA+PROBE: POSITIVE

## 2023-02-02 NOTE — TELEPHONE ENCOUNTER
Coronavirus (COVID-19) Notification    PT-REINIER    Reason for call  Notify of Positive Coronavirus (COVID-19) lab results, assess symptoms,  review [facility] recommendations    Lab Result    Lab test:  2019-nCoV rRt-PCR or SARS-CoV-2 PCR    Oropharyngeal AND/OR nasopharyngeal swabs is POSITIVE for 2019-nCoV RNA/SARS-COV-2 PCR (COVID-19 virus)      Gather patient reported symptoms   Assessment   Current Symptoms at time of phone call, reported by patient: (if no symptoms, document: No symptoms] COVID   Date of symptom(s) onset (if applicable) 2/1     If at time of call, Patients symptoms have worsened, the Patient should contact 911 or have someone drive them to Emergency Dept promptly:      If Patient calling 911, inform 911 personal that you have tested positive for the Coronavirus (COVID-19).  Place mask on and await 911 to arrive.    If Emergency Dept, If possible, please have another adult drive you to the Emergency Dept but you need to wear mask when in contact with other people.      Treatment Options:   Patient classified as COVID treatment eligible by Epic high risk algorithm: No  You may be eligible to receive a new treatment with a monoclonal antibody for preventing hospitalization in patients at high risk for complications from COVID-19.  This medication is still experimental and available on a limited basis; it is given through an IV and must be given at an infusion center.  Please note that not all people who are eligible will receive the medication since it is in limited supply.   Is the patient symptomatic and onset of symptoms within the last 7 days? No. Patient does not qualify.    Review information with Patient    Your result was positive. This means you have COVID-19 (coronavirus).    How can I protect others?    These guidelines are for isolating before returning to work, school or .    If you DO have symptoms    Stay home and away from others     For at least 5 days after your  symptoms started, AND    You are fever free for 24 hours (with no medicine that reduces fever), AND    Your other symptoms are better    Wear a mask for 10 full days anytime you are around others    If you DON'T have symptoms    Stay home and away from others for at least 5 days after your positive test    Wear a mask for 10 full days anytime you are around others    There may be different guidelines for healthcare facilities.  Please check with the specific sites before arriving.    If you have been told by a doctor that you were severely ill with COVID-19 or are immunocompromised, you should isolate for at least 10 days.    You should not go back to work until you meet the guidelines above for ending your home isolation. You don't need to be retested for COVID-19 before going back to work--studies show that you won't spread the virus if it's been at least 10 days since your symptoms started (or 20 days, if you have a weak immune system).    Employers, schools, and daycares: This is an official notice for this person's medical guidelines for returning in-person.  They must meet the above guidelines before going back to work, school or  in person.    You will receive a positive COVID-19 letter via [employer] or the mail soon with additional self-care information.    Would you like me to review some of that information with you now?  No    If you were tested for an upcoming procedure, please contact your provider for next steps.    Thelma

## 2023-02-20 ENCOUNTER — OFFICE VISIT (OUTPATIENT)
Dept: NEUROLOGY | Facility: CLINIC | Age: 53
End: 2023-02-20
Payer: COMMERCIAL

## 2023-02-20 VITALS
HEART RATE: 86 BPM | SYSTOLIC BLOOD PRESSURE: 125 MMHG | WEIGHT: 142 LBS | BODY MASS INDEX: 25.15 KG/M2 | DIASTOLIC BLOOD PRESSURE: 83 MMHG

## 2023-02-20 DIAGNOSIS — G35 MS (MULTIPLE SCLEROSIS) (H): Primary | ICD-10-CM

## 2023-02-20 DIAGNOSIS — G47.00 INSOMNIA, UNSPECIFIED TYPE: ICD-10-CM

## 2023-02-20 PROCEDURE — 99214 OFFICE O/P EST MOD 30 MIN: CPT | Performed by: PSYCHIATRY & NEUROLOGY

## 2023-02-20 NOTE — LETTER
2/20/2023      RE: Renea Trotter  [address] [address] [facility] [place] [address]     Referral source: Established patient    Chief complaint: Multiple sclerosis    History of the Present Illness: Ms. Renea Trotter is a 52-year-old right-handed woman who returns to the Multiple Sclerosis Clinic today for a scheduled follow-up visit regarding her diagnosis of multiple sclerosis.    The patient's history is as per my previous notes.  She initially developed symptoms of demyelinating disease in 2003 when she developed some numbness of the right hand and difficulty with the right leg that was noticeable with walking.  Subsequently, she did well until 2011 when she had recurrent numbness in the right hand.  MRI and CSF studies at that time were suggestive of demyelinating disease of the type seen in multiple sclerosis.  She has been on disease modifying therapy with glatiramer acetate for more than 10 years and MRI imaging has remained stable, most recently in early 2022.    Today, the patient continues to deny any new episodic changes in vision, balance, strength or sensation suggestive of new relapse of multiple sclerosis since she was last seen in the clinic.    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    Symptomatically, she denies any new episodic changes in vision, balance, strength or sensation suggestive of new relapse of multiple sclerosis since she was last seen.  She is noting slowly progressive right-sided weakness over time, particularly in that her right hand is slowly becoming less functional.    She also continues to be bothered by sleep disruption, principally difficulty with staying asleep rather than falling asleep.  It was recommended that she undergo a sleep study when she was seen in sleep medicine last year, but she did not end up completing that.  Today, she denies any obvious reason why  she is waking up at night and does not think that this is due to pain or nocturia, for example.    PHYSICAL EXAMINATION:    VITALS:  Blood pressure 125/83; pulse 86; weight 64.4 kg.  GENERAL:  Well-nourished woman who presents to the examination alone, awake and alert and in no acute distress.    NEUROLOGIC EXAMINATION:   CRANIAL NERVES:  Visual fields are full to confrontation.  Extraocular movements are intact with no internuclear ophthalmoplegia.  Facial strength is normal.  Palate elevation and tongue protrusion are normal.  POWER:  There is mild weakness on the right side of the body in an upper motor neuron pattern, including finger extensors grading 4, finger interosseous 4-, hip flexors 4+, and anterior tibialis 4, with all of these muscles being normal on the left side.  REFLEXES:  Reflexes are roughly symmetric and within normal limits throughout.  MOTOR/CEREBELLAR:  There is no appendicular ataxia out of proportion to weakness.  Rapid alternating movements are mildly to moderately slowed in the right hand and fingers.  There is no definite pronator drift in the arms.  GAIT:  The patient is able to ambulate on a flat, level surface with no gross loss of postural stability.  She can walk on toes.  Heel walking is mildly to moderately impaired on the right.  Tandem gait is also moderately impaired for age.    Assessment/plan:     1.  Multiple sclerosis  The patient remains clinically stable as regards any evidence of active inflammatory demyelination on current disease modifying therapy with glatiramer acetate, which she will continue.    We discussed her long-term prognosis.  I discussed with her that her current symptoms are suggestive of a secondary progressive component of her presentation.  This may eventually plateau with time, but unfortunately she may continue to experience slowly progressive worsening on the right side of the body in the future.  That she has essentially normal strength on the left  side of the body is definitely a positive prognostic factor regarding long-term ambulatory capacity.    I discussed with her that the etiology of the progressive aspect of multiple sclerosis is injury to the underlying axonal nerve fibers in areas of previous demyelination.  This behaves more like a degenerative than an inflammatory process. In the absence of any evidence of active inflammatory demyelination, there is no good evidence that switching to a different immune therapy would reduce the likelihood of further progression in this regard.    I would like to see her back for a review in 1 year, or sooner if she has any new symptoms that are of concern to her.    2.  Insomnia  I advised her that I would proceed with a sleep study as previously recommended by our colleagues in sleep medicine, and she can reach out to their clinic to arrange this.    I spent a total of 32 minutes on patient care activities related to this encounter on the date of service, including time spent in reviewing the chart, obtaining history and examination from and in counseling the patient, and in documentation in the electronic medical record.    David Herrera MD   of Neurology  [facility]        Cc:  Cara Clements MD (PCP)  Javon Barger DO (Sleep Medicine)  Patient

## 2023-02-20 NOTE — Clinical Note
2/20/2023         RE: Renea Trotter  [address] [address] [facility] [address]        Dear Colleague,    Thank you for referring your patient, Renea rTotter, to the [facility] [place]. Please see a copy of my visit note below.    Date of service: February 20, 2023    Referral source: Established patient    Chief complaint: Multiple sclerosis    History of the Present Illness: Ms. Renea Trotter is a 52-year-old right-handed woman who returns to the Multiple Sclerosis Clinic today for a scheduled follow-up visit regarding her diagnosis of multiple sclerosis.    The patient's history is as per my previous notes.  She initially developed symptoms of demyelinating disease in 2003 when she developed some numbness of the right hand and difficulty with the right leg that was noticeable with walking.  Subsequently, she did well until 2011 when she had recurrent numbness in the right hand.  MRI and CSF studies at that time were suggestive of demyelinating disease of the type seen in multiple sclerosis.  She has been on disease modifying therapy with glatiramer acetate for more than 10 years and MRI imaging has remained stable, most recently in early 2022.    Today, the patient continues to deny any new episodic changes in vision, balance, strength or sensation suggestive of new relapse of multiple sclerosis since she was last seen in the clinic.    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    Symptomatically, she denies any new episodic changes in vision, balance, strength or sensation suggestive of new relapse of multiple sclerosis since she was last seen.  She is noting slowly progressive right-sided weakness over time, particularly in that her right hand is slowly becoming less functional.    She also continues to be bothered by sleep disruption, principally difficulty with  staying asleep rather than falling asleep.  It was recommended that she undergo a sleep study when she was seen in sleep medicine last year, but she did not end up completing that.  Today, she denies any obvious reason why she is waking up at night and does not think that this is due to pain or nocturia, for example.    PHYSICAL EXAMINATION:    VITALS:  Blood pressure 125/83; pulse 86; weight 64.4 kg.  GENERAL:  Well-nourished woman who presents to the examination alone, awake and alert and in no acute distress.    NEUROLOGIC EXAMINATION:   CRANIAL NERVES:  Visual fields are full to confrontation.  Extraocular movements are intact with no internuclear ophthalmoplegia.  Facial strength is normal.  Palate elevation and tongue protrusion are normal.  POWER:  There is mild weakness on the right side of the body in an upper motor neuron pattern, including finger extensors grading 4, finger interosseous 4-, hip flexors 4+, and anterior tibialis 4, with all of these muscles being normal on the left side.  REFLEXES:  Reflexes are roughly symmetric and within normal limits throughout.  MOTOR/CEREBELLAR:  There is no appendicular ataxia out of proportion to weakness.  Rapid alternating movements are mildly to moderately slowed in the right hand and fingers.  There is no definite pronator drift in the arms.  GAIT:  The patient is able to ambulate on a flat, level surface with no gross loss of postural stability.  She can walk on toes.  Heel walking is mildly to moderately impaired on the right.  Tandem gait is also moderately impaired for age.    Assessment/plan:     1.  Multiple sclerosis  The patient remains clinically stable as regards any evidence of active inflammatory demyelination on current disease modifying therapy with glatiramer acetate, which she will continue.    We discussed her long-term prognosis.  I discussed with her that her current symptoms are suggestive of a secondary progressive component of her  presentation.  This may eventually plateau with time, but unfortunately she may continue to experience slowly progressive worsening on the right side of the body in the future.  That she has essentially normal strength on the left side of the body is definitely a positive prognostic factor regarding long-term ambulatory capacity.    I discussed with her that the etiology of the progressive aspect of multiple sclerosis is injury to the underlying axonal nerve fibers in areas of previous demyelination.  This behaves more like a degenerative than an inflammatory process. In the absence of any evidence of active inflammatory demyelination, there is no good evidence that switching to a different immune therapy would reduce the likelihood of further progression in this regard.    I would like to see her back for a review in 1 year, or sooner if she has any new symptoms that are of concern to her.    2.  Insomnia  I advised her that I would proceed with a sleep study as previously recommended by our colleagues in sleep medicine, and she can reach out to their clinic to arrange this.    I spent a total of 32 minutes on patient care activities related to this encounter on the date of service, including time spent in reviewing the chart, obtaining history and examination from and in counseling the patient, and in documentation in the electronic medical record.      David Herrera MD        D: 2023   T: 2023   MT: [id number]    Name:     REINIER LÓPEZ  MRN:      [id number]        Account:    [id number]   :      1970           Visit Date: 2023     Document: [id number]      Again, thank you for allowing me to participate in the care of your patient.        Sincerely,        David Herrera MD

## 2023-02-21 NOTE — PROGRESS NOTES
Date of service: February 20, 2023    Referral source: Established patient    Chief complaint: Multiple sclerosis    History of the Present Illness: Ms. Renea Trotter is a 52-year-old right-handed woman who returns to the [facility] today for a scheduled follow-up visit regarding her diagnosis of multiple sclerosis.    The patient's history is as per my previous notes.  She initially developed symptoms of demyelinating disease in 2003 when she developed some numbness of the right hand and difficulty with the right leg that was noticeable with walking.  Subsequently, she did well until 2011 when she had recurrent numbness in the right hand.  MRI and CSF studies at that time were suggestive of demyelinating disease of the type seen in multiple sclerosis.  She has been on disease modifying therapy with glatiramer acetate for more than 10 years and MRI imaging has remained stable, most recently in early 2022.    Today, the patient continues to deny any new episodic changes in vision, balance, strength or sensation suggestive of new relapse of multiple sclerosis since she was last seen in the clinic.    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    Symptomatically, she denies any new episodic changes in vision, balance, strength or sensation suggestive of new relapse of multiple sclerosis since she was last seen.  She is noting slowly progressive right-sided weakness over time, particularly in that her right hand is slowly becoming less functional.    She also continues to be bothered by sleep disruption, principally difficulty with staying asleep rather than falling asleep.  It was recommended that she undergo a sleep study when she was seen in sleep medicine last year, but she did not end up completing that.  Today, she denies any obvious reason why she is waking up at night and does not think  that this is due to pain or nocturia, for example.    PHYSICAL EXAMINATION:    VITALS:  Blood pressure 125/83; pulse 86; weight 64.4 kg.  GENERAL:  Well-nourished woman who presents to the examination alone, awake and alert and in no acute distress.    NEUROLOGIC EXAMINATION:   CRANIAL NERVES:  Visual fields are full to confrontation.  Extraocular movements are intact with no internuclear ophthalmoplegia.  Facial strength is normal.  Palate elevation and tongue protrusion are normal.  POWER:  There is mild weakness on the right side of the body in an upper motor neuron pattern, including finger extensors grading 4, finger interosseous 4-, hip flexors 4+, and anterior tibialis 4, with all of these muscles being normal on the left side.  REFLEXES:  Reflexes are roughly symmetric and within normal limits throughout.  MOTOR/CEREBELLAR:  There is no appendicular ataxia out of proportion to weakness.  Rapid alternating movements are mildly to moderately slowed in the right hand and fingers.  There is no definite pronator drift in the arms.  GAIT:  The patient is able to ambulate on a flat, level surface with no gross loss of postural stability.  She can walk on toes.  Heel walking is mildly to moderately impaired on the right.  Tandem gait is also moderately impaired for age.    Assessment/plan:     1.  Multiple sclerosis  The patient remains clinically stable as regards any evidence of active inflammatory demyelination on current disease modifying therapy with glatiramer acetate, which she will continue.    We discussed her long-term prognosis.  I discussed with her that her current symptoms are suggestive of a secondary progressive component of her presentation.  This may eventually plateau with time, but unfortunately she may continue to experience slowly progressive worsening on the right side of the body in the future.  That she has essentially normal strength on the left side of the body is definitely a positive  prognostic factor regarding long-term ambulatory capacity.    I discussed with her that the etiology of the progressive aspect of multiple sclerosis is injury to the underlying axonal nerve fibers in areas of previous demyelination.  This behaves more like a degenerative than an inflammatory process. In the absence of any evidence of active inflammatory demyelination, there is no good evidence that switching to a different immune therapy would reduce the likelihood of further progression in this regard.    I would like to see her back for a review in 1 year, or sooner if she has any new symptoms that are of concern to her.    2.  Insomnia  I advised her that I would proceed with a sleep study as previously recommended by our colleagues in sleep medicine, and she can reach out to their clinic to arrange this.    I spent a total of 32 minutes on patient care activities related to this encounter on the date of service, including time spent in reviewing the chart, obtaining history and examination from and in counseling the patient, and in documentation in the electronic medical record.      David Herrera MD        D: 2023   T: 2023   MT: [id number]    Name:     REINIER LÓPEZVidya  MRN:      [id number]        Account:    [id number]   :      1970           Visit Date: 2023     Document: [id number]

## 2023-04-04 DIAGNOSIS — E78.5 HYPERLIPIDEMIA LDL GOAL <130: ICD-10-CM

## 2023-04-04 RX ORDER — PRAVASTATIN SODIUM 20 MG
TABLET ORAL
Qty: 90 TABLET | Refills: 3 | Status: SHIPPED | OUTPATIENT
Start: 2023-04-04 | End: 2024-03-01

## 2023-05-16 PROBLEM — K35.80 ACUTE APPENDICITIS: Status: ACTIVE | Noted: 2020-03-25

## 2023-05-17 ENCOUNTER — OFFICE VISIT (OUTPATIENT)
Dept: PODIATRY | Facility: CLINIC | Age: 53
End: 2023-05-17
Payer: COMMERCIAL

## 2023-05-17 VITALS
HEART RATE: 78 BPM | BODY MASS INDEX: 25.15 KG/M2 | SYSTOLIC BLOOD PRESSURE: 132 MMHG | DIASTOLIC BLOOD PRESSURE: 80 MMHG | WEIGHT: 142 LBS

## 2023-05-17 DIAGNOSIS — L60.0 INGROWING NAIL: ICD-10-CM

## 2023-05-17 DIAGNOSIS — G35 MS (MULTIPLE SCLEROSIS) (H): Primary | ICD-10-CM

## 2023-05-17 DIAGNOSIS — B35.1 ONYCHOMYCOSIS: ICD-10-CM

## 2023-05-17 DIAGNOSIS — B35.3 TINEA PEDIS OF RIGHT FOOT: ICD-10-CM

## 2023-05-17 PROCEDURE — 99204 OFFICE O/P NEW MOD 45 MIN: CPT | Mod: 25 | Performed by: PODIATRIST

## 2023-05-17 PROCEDURE — 11730 AVULSION NAIL PLATE SIMPLE 1: CPT | Mod: T5 | Performed by: PODIATRIST

## 2023-05-17 NOTE — LETTER
5/17/2023         RE: Renea Trotter  [id number] [facility] [id number]        Dear Colleague,    Thank you for referring your patient, Renea Trotter, to the [facility]. Please see a copy of my visit note below.    Subjective:    Pt is seen today as a new pt referral w/ the c/c of a painful ingrown right great nail medial border.  This has been problematic for 9 month(s).  positivehistory of drainage from the site. This is slowly getting worse.  Aggravated by activity and relieved by rest.  Has tried soaking which has not helped.   denies history of trauma to the area.  Denies fever and chill, denies numbness and tingling, denies erythema on dorsum of foot.  Was at urgent care and they performed a procedure which really did not help.  Patient has MS and she states her right side is weak.  She has a rash on her right foot.  She was diagnosed by dermatology in the past with ketoconazole and she has been using this.  Has also noticed lesser nails becoming thicker recently.    ROS:  see above    Past Medical History:   Diagnosis Date     HTN (hypertension)      Hypercholesterolemia      Mild intermittent asthma      MS (multiple sclerosis) (H)        Past Surgical History:   Procedure Laterality Date     APPENDECTOMY       COLONOSCOPY WITH CO2 INSUFFLATION N/A 08/05/2022    Procedure: COLONOSCOPY, WITH CO2 INSUFFLATION;  Surgeon: Hernan Vasquez MD;  Location:  OR      TOOTH EXTRACTION W/FORCEP       LAPAROSCOPIC SALPINGO-OOPHORECTOMY Bilateral 11/11/2022    Procedure: LAPAROSCOPIC RIGHT SALPINGO-OOPHORECTOMY, LEFT SALPINGECTOMY;  Surgeon: Savana Valle MD;  Location:  OR       Family History   Problem Relation Age of Onset     Hypertension Mother      Lipids Mother      Hyperlipidemia Mother      Eye Disorder Father      Hyperlipidemia Father      Sleep Apnea Father      Coronary Artery Disease Sister      Hyperlipidemia Sister      Myocardial Infarction  Sister      Cerebrovascular Disease Maternal Grandmother      Cerebrovascular Disease Maternal Grandfather      Prostate Cancer Maternal Grandfather      Blood Disease Paternal Grandmother      Alzheimer Disease Paternal Grandfather      Pulmonary Embolism Daughter      Multiple Sclerosis Cousin        Social History     Tobacco Use     Smoking status: Never     Smokeless tobacco: Never   Vaping Use     Vaping status: Never Used   Substance Use Topics     Alcohol use: Yes     Alcohol/week: 1.0 - 2.0 standard drink of alcohol     Comment: RARELY         Current Outpatient Medications:      acetaminophen (TYLENOL) 325 MG tablet, Take 2 tablets (650 mg) by mouth every 6 hours as needed for mild pain, Disp: 24 tablet, Rfl: 0     albuterol (PROAIR HFA/PROVENTIL HFA/VENTOLIN HFA) 108 (90 Base) MCG/ACT inhaler, Inhale 2 puffs into the lungs every 4 hours as needed for shortness of breath / dyspnea or wheezing Profile Rx, Disp: 8.5 g, Rfl: 3     Cholecalciferol (VITAMIN D3 PO), Take 5,000 Units by mouth daily, Disp: , Rfl:      glatiramer acetate 40 MG/ML injection, INJECT 40 MG UNDER THE SKIN THREE TIMES A WEEK, Disp: 12 mL, Rfl: 11     ibuprofen (ADVIL/MOTRIN) 600 MG tablet, Take 1 tablet (600 mg) by mouth every 6 hours as needed for other (mile and/or inflammatory pain.), Disp: 12 tablet, Rfl: 0     ketoconazole (NIZORAL) 2 % external cream, Apply thin layer to the right third toe and surrounding area., Disp: 30 g, Rfl: 11     lisinopril-hydrochlorothiazide (ZESTORETIC) 10-12.5 MG tablet, Take 1 tablet by mouth daily, Disp: 90 tablet, Rfl: 3     MULTIPLE VITAMIN PO, Take 1 tablet by mouth daily., Disp: , Rfl:      pravastatin (PRAVACHOL) 20 MG tablet, Take 1 tablet by mouth in the evening, Disp: 90 tablet, Rfl: 3     senna-docusate (SENOKOT-S/PERICOLACE) 8.6-50 MG tablet, Take 1-2 tablets by mouth 2 times daily, Disp: 30 tablet, Rfl: 0     temazepam (RESTORIL) 15 MG capsule, TAKE 1 TO 2 CAPSULES BY MOUTH NIGHTLY AS  NEEDED FOR SLEEP, Disp: 15 capsule, Rfl: 0     No Known Allergies    /80   Pulse 78   Wt 64.4 kg (142 lb)   LMP 08/01/2020   BMI 25.15 kg/m  .      Constitutional/ general:  Pt is in no apparent distress, appears well-nourished.  Cooperative with history and physical exam.     Psych:  The patient answered questions appropriately.  Normal affect.  Seems to have reasonable expectations, in terms of treatment.     Lungs:  Non labored breathing, non labored speech. No cough.  No audible wheezing. Even, quiet breathing.       Vascular:  Pedal pulses are palpable bilaterally for both the DP and PT arteries.  CFT < 3 sec.  No ankle varicosities or edema.  Pedal hair growth noted.     Neuro:  Alert and oriented x 3.   Light touch sensation is intact     Derm: Normal texture and turgor.  No ecchymosis, or cyanosis.  Hair growth noted.  Right foot skin dry moccasin distribution.  X3 right lesser nails mycotic.       Musculoskeletal:     Patient is ambulatory without an assistive device or brace.   right great toe nail medial border shows soft tissue impingement with localized erythema.   negative active drainage/purulence at this time.  No sinus tracts.  No nailbed masses or exostosis.  No pain with range of motion of IPJ or MTPJ.  No ascending cellulitis.    ASSESSMENT:    MS  Onychocryptosis with paronychia right toe.  Onychomycosis  Chronic tinea right foot    Discussed cause of fungal infection on right nails.  Discussed various treatment options risk complications and efficacy.  She would rather just watch this for now.  She will keep nails trimmed short.  She will continue to use ketoconazole on tinea right foot.    Discussed etiology and treatment options in detail w/ the pt.  The potential causes and nature of an ingrown toenail were discussed with the patient.  We reviewed the natural history/prognosis of the condition and potential risks if no treatment is provided.      After thorough discussion and  answering all questions, the patient elected to have nail avulsion.  Obtained consent, used 3cc of 1% lidocaine plain to block right first toe.  Sterile prep, then avulsed the affected border(s).  No evidence of deep abscess noted.  Pt tolerated procedure well.  Sterile bandage placed, gave wound care instruction.  Instructed patient on trimming nails correctly.  They will avoid tight shoes.  Avoid pedicures.  Discussed permanent removal of border if chronic problem.  Return to clinic prn.    Marko Valle DPM, FACFAS        Again, thank you for allowing me to participate in the care of your patient.        Sincerely,        Marko Valle DPM

## 2023-05-17 NOTE — PATIENT INSTRUCTIONS
We wish you continued good healing. If you have any questions or concerns, please do not hesitate to contact us at  [phone]    [facility] (secure e-mail communication and access to your chart) to send a message or to make an appointment.    Please remember to call and schedule a follow up appointment if one was recommended at your earliest convenience.     PODIATRY CLINIC HOURS  TELEPHONE NUMBER    Dr. Marko VANGPMADHAVI FACFAS        Clinics:  [place]  [facility], [place], [place]  Tuesday 1PM-6PM  [facility]  Wednesday 745AM-330PM  [place]  Thursday/Friday 745AM-230PM  [place]   1st and 3rd Mondays  845-430 PM   [place] APPOINTMENTS  [phone]    [place] APPOINTMENTS  [phone]    [place] APPOINTMENTS  [phone]        If you need a medication refill, please contact us you may need lab work and/or a follow up visit prior to your refill (i.e. Antifungal medications).  If MRI needed please call Imaging at [phone]   HOW DO I GET MY KNEE SCOOTER? Knee scooters can be picked up at ANY Medical Supply stores with your knee scooter Prescription.  OR  Bring your signed prescription to an [facility] Medical Equipment showroom.  Call or bring in your Orthotics order to any Orthotics locations. Or call [phone]         [facility] TOENAIL REMOVAL AFTERCARE     Go directly home and elevate the affected foot on one or two pillows for the remainder of the day/evening if possible. Your toe may stay numb anywhere from 2-8 hours.   Take Tylenol, ibuprofen or another anti-inflammatory as needed for pain.   That evening of the procedure,  you may remove the bandage.(you may soak it in warm soapy water ) After soaks, pat the area dry and then allow to airdry for a few minutes. Apply antibiotic ointment to the area and cover with  band-aid.  The following day. Find a shoe that is comfortable and minimizes the amount of rubbing  on your toe, as this increases pain.  Dress with band-aids until no longer draining, typically 3 days.  Watch for any signs and symptoms of infection such as: redness, red streaks going up the foot/leg, swelling, pus or foul odor. Fevers > 101   Please call with questions.    Dr. Marko Valle D.P.M FAC FAS

## 2023-05-17 NOTE — PROGRESS NOTES
Subjective:    Pt is seen today as a new pt referral w/ the c/c of a painful ingrown right great nail medial border.  This has been problematic for 9 month(s).  positivehistory of drainage from the site. This is slowly getting worse.  Aggravated by activity and relieved by rest.  Has tried soaking which has not helped.   denies history of trauma to the area.  Denies fever and chill, denies numbness and tingling, denies erythema on dorsum of foot.  Was at urgent care and they performed a procedure which really did not help.  Patient has MS and she states her right side is weak.  She has a rash on her right foot.  She was diagnosed by dermatology in the past with ketoconazole and she has been using this.  Has also noticed lesser nails becoming thicker recently.    ROS:  see above    Past Medical History:   Diagnosis Date     HTN (hypertension)      Hypercholesterolemia      Mild intermittent asthma      MS (multiple sclerosis) (H)        Past Surgical History:   Procedure Laterality Date     APPENDECTOMY       COLONOSCOPY WITH CO2 INSUFFLATION N/A 08/05/2022    Procedure: COLONOSCOPY, WITH CO2 INSUFFLATION;  Surgeon: Hernan Vasquez MD;  Location:  OR      TOOTH EXTRACTION W/FORCEP       LAPAROSCOPIC SALPINGO-OOPHORECTOMY Bilateral 11/11/2022    Procedure: LAPAROSCOPIC RIGHT SALPINGO-OOPHORECTOMY, LEFT SALPINGECTOMY;  Surgeon: Savana Valle MD;  Location: [facility] OR       Family History   Problem Relation Age of Onset     Hypertension Mother      Lipids Mother      Hyperlipidemia Mother      Eye Disorder Father      Hyperlipidemia Father      Sleep Apnea Father      Coronary Artery Disease Sister      Hyperlipidemia Sister      Myocardial Infarction Sister      Cerebrovascular Disease Maternal Grandmother      Cerebrovascular Disease Maternal Grandfather      Prostate Cancer Maternal Grandfather      Blood Disease Paternal Grandmother      Alzheimer Disease Paternal Grandfather      Pulmonary Embolism  Daughter      Multiple Sclerosis Cousin        Social History     Tobacco Use     Smoking status: Never     Smokeless tobacco: Never   Vaping Use     Vaping status: Never Used   Substance Use Topics     Alcohol use: Yes     Alcohol/week: 1.0 - 2.0 standard drink of alcohol     Comment: RARELY         Current Outpatient Medications:      acetaminophen (TYLENOL) 325 MG tablet, Take 2 tablets (650 mg) by mouth every 6 hours as needed for mild pain, Disp: 24 tablet, Rfl: 0     albuterol (PROAIR HFA/PROVENTIL HFA/VENTOLIN HFA) 108 (90 Base) MCG/ACT inhaler, Inhale 2 puffs into the lungs every 4 hours as needed for shortness of breath / dyspnea or wheezing Profile Rx, Disp: 8.5 g, Rfl: 3     Cholecalciferol (VITAMIN D3 PO), Take 5,000 Units by mouth daily, Disp: , Rfl:      glatiramer acetate 40 MG/ML injection, INJECT 40 MG UNDER THE SKIN THREE TIMES A WEEK, Disp: 12 mL, Rfl: 11     ibuprofen (ADVIL/MOTRIN) 600 MG tablet, Take 1 tablet (600 mg) by mouth every 6 hours as needed for other (mile and/or inflammatory pain.), Disp: 12 tablet, Rfl: 0     ketoconazole (NIZORAL) 2 % external cream, Apply thin layer to the right third toe and surrounding area., Disp: 30 g, Rfl: 11     lisinopril-hydrochlorothiazide (ZESTORETIC) 10-12.5 MG tablet, Take 1 tablet by mouth daily, Disp: 90 tablet, Rfl: 3     MULTIPLE VITAMIN PO, Take 1 tablet by mouth daily., Disp: , Rfl:      pravastatin (PRAVACHOL) 20 MG tablet, Take 1 tablet by mouth in the evening, Disp: 90 tablet, Rfl: 3     senna-docusate (SENOKOT-S/PERICOLACE) 8.6-50 MG tablet, Take 1-2 tablets by mouth 2 times daily, Disp: 30 tablet, Rfl: 0     temazepam (RESTORIL) 15 MG capsule, TAKE 1 TO 2 CAPSULES BY MOUTH NIGHTLY AS NEEDED FOR SLEEP, Disp: 15 capsule, Rfl: 0     No Known Allergies    /80   Pulse 78   Wt 64.4 kg (142 lb)   LMP 08/01/2020   BMI 25.15 kg/m  .      Constitutional/ general:  Pt is in no apparent distress, appears well-nourished.  Cooperative with  history and physical exam.     Psych:  The patient answered questions appropriately.  Normal affect.  Seems to have reasonable expectations, in terms of treatment.     Lungs:  Non labored breathing, non labored speech. No cough.  No audible wheezing. Even, quiet breathing.       Vascular:  Pedal pulses are palpable bilaterally for both the DP and PT arteries.  CFT < 3 sec.  No ankle varicosities or edema.  Pedal hair growth noted.     Neuro:  Alert and oriented x 3.   Light touch sensation is intact     Derm: Normal texture and turgor.  No ecchymosis, or cyanosis.  Hair growth noted.  Right foot skin dry moccasin distribution.  X3 right lesser nails mycotic.       Musculoskeletal:     Patient is ambulatory without an assistive device or brace.   right great toe nail medial border shows soft tissue impingement with localized erythema.   negative active drainage/purulence at this time.  No sinus tracts.  No nailbed masses or exostosis.  No pain with range of motion of IPJ or MTPJ.  No ascending cellulitis.    ASSESSMENT:    MS  Onychocryptosis with paronychia right toe.  Onychomycosis  Chronic tinea right foot    Discussed cause of fungal infection on right nails.  Discussed various treatment options risk complications and efficacy.  She would rather just watch this for now.  She will keep nails trimmed short.  She will continue to use ketoconazole on tinea right foot.    Discussed etiology and treatment options in detail w/ the pt.  The potential causes and nature of an ingrown toenail were discussed with the patient.  We reviewed the natural history/prognosis of the condition and potential risks if no treatment is provided.      After thorough discussion and answering all questions, the patient elected to have nail avulsion.  Obtained consent, used 3cc of 1% lidocaine plain to block right first toe.  Sterile prep, then avulsed the affected border(s).  No evidence of deep abscess noted.  Pt tolerated procedure well.   Sterile bandage placed, gave wound care instruction.  Instructed patient on trimming nails correctly.  They will avoid tight shoes.  Avoid pedicures.  Discussed permanent removal of border if chronic problem.  Return to clinic prn.    Marko Valle DPM, FACFAS

## 2023-06-07 ENCOUNTER — OFFICE VISIT (OUTPATIENT)
Dept: DERMATOLOGY | Facility: CLINIC | Age: 53
End: 2023-06-07
Attending: FAMILY MEDICINE
Payer: COMMERCIAL

## 2023-06-07 DIAGNOSIS — B35.3 TINEA PEDIS, UNSPECIFIED LATERALITY: Primary | ICD-10-CM

## 2023-06-07 DIAGNOSIS — L81.6 POIKILODERMA: ICD-10-CM

## 2023-06-07 DIAGNOSIS — L57.0 ACTINIC KERATOSIS: ICD-10-CM

## 2023-06-07 DIAGNOSIS — D48.5 NEOPLASM OF UNCERTAIN BEHAVIOR OF SKIN: ICD-10-CM

## 2023-06-07 DIAGNOSIS — D18.01 CHERRY ANGIOMA: ICD-10-CM

## 2023-06-07 DIAGNOSIS — L82.1 SEBORRHEIC KERATOSIS: ICD-10-CM

## 2023-06-07 DIAGNOSIS — D22.9 MULTIPLE BENIGN NEVI: ICD-10-CM

## 2023-06-07 PROCEDURE — 17000 DESTRUCT PREMALG LESION: CPT | Mod: 59 | Performed by: PHYSICIAN ASSISTANT

## 2023-06-07 PROCEDURE — 99203 OFFICE O/P NEW LOW 30 MIN: CPT | Mod: 25 | Performed by: PHYSICIAN ASSISTANT

## 2023-06-07 PROCEDURE — 88305 TISSUE EXAM BY PATHOLOGIST: CPT | Performed by: DERMATOLOGY

## 2023-06-07 PROCEDURE — 11102 TANGNTL BX SKIN SINGLE LES: CPT | Performed by: PHYSICIAN ASSISTANT

## 2023-06-07 RX ORDER — FLUCONAZOLE 100 MG/1
100 TABLET ORAL DAILY
Qty: 21 TABLET | Refills: 0 | Status: SHIPPED | OUTPATIENT
Start: 2023-06-07 | End: 2024-03-01

## 2023-06-07 NOTE — PROGRESS NOTES
Renea Trotter is an extremely pleasant 52 year old year old female patient here today for spot on right cheek. She notes present for a few month, comes and goes. She notes it will occasionally get sensitive and scaly. She also notes rash on right present intermittent for years, has tried ketoconazole cream which has helped but continue to return. She notes that it will get itchy. She also notes mole on back of thigh that will get irritated with shaving.  Patient has no other skin complaints today.  Remainder of the HPI, Meds, PMH, Allergies, FH, and SH was reviewed in chart.    Pertinent Hx:  No personal history of skin cancer   Past Medical History:   Diagnosis Date     HTN (hypertension)      Hypercholesterolemia      Mild intermittent asthma      MS (multiple sclerosis) (H)        Past Surgical History:   Procedure Laterality Date     APPENDECTOMY       COLONOSCOPY WITH CO2 INSUFFLATION N/A 08/05/2022    Procedure: COLONOSCOPY, WITH CO2 INSUFFLATION;  Surgeon: Hernan Vasquez MD;  Location:  OR      TOOTH EXTRACTION W/FORCEP       LAPAROSCOPIC SALPINGO-OOPHORECTOMY Bilateral 11/11/2022    Procedure: LAPAROSCOPIC RIGHT SALPINGO-OOPHORECTOMY, LEFT SALPINGECTOMY;  Surgeon: Savana Valle MD;  Location: [facility]        Family History   Problem Relation Age of Onset     Hypertension Mother      Lipids Mother      Hyperlipidemia Mother      Eye Disorder Father      Hyperlipidemia Father      Sleep Apnea Father      Skin Cancer Father      Coronary Artery Disease Sister      Hyperlipidemia Sister      Myocardial Infarction Sister      Cerebrovascular Disease Maternal Grandmother      Cerebrovascular Disease Maternal Grandfather      Prostate Cancer Maternal Grandfather      Blood Disease Paternal Grandmother      Alzheimer Disease Paternal Grandfather      Pulmonary Embolism Daughter      Multiple Sclerosis Cousin        Social History     Socioeconomic History     Marital status:      Spouse  name: Not on file     Number of children: Not on file     Years of education: Not on file     Highest education level: Not on file   Occupational History     Not on file   Tobacco Use     Smoking status: Never     Smokeless tobacco: Never   Vaping Use     Vaping status: Never Used   Substance and Sexual Activity     Alcohol use: Yes     Alcohol/week: 1.0 - 2.0 standard drink of alcohol     Comment: RARELY     Drug use: No     Sexual activity: Yes     Partners: Male     Birth control/protection: Condom   Other Topics Concern     Parent/sibling w/ CABG, MI or angioplasty before 65F 55M? Yes     Comment: 47 sister   Social History Narrative     Not on file     Social Determinants of Health     Financial Resource Strain: Not on file   Food Insecurity: Not on file   Transportation Needs: Not on file   Physical Activity: Not on file   Stress: Not on file   Social Connections: Not on file   Intimate Partner Violence: Not on file   Housing Stability: Not on file       Outpatient Encounter Medications as of 6/7/2023   Medication Sig Dispense Refill     fluconazole (DIFLUCAN) 100 MG tablet Take 1 tablet (100 mg) by mouth daily 21 tablet 0     glatiramer acetate 40 MG/ML injection INJECT 40 MG UNDER THE SKIN THREE TIMES A WEEK 12 mL 11     ketoconazole (NIZORAL) 2 % external cream Apply thin layer to the right third toe and surrounding area. 30 g 11     lisinopril-hydrochlorothiazide (ZESTORETIC) 10-12.5 MG tablet Take 1 tablet by mouth daily 90 tablet 3     pravastatin (PRAVACHOL) 20 MG tablet Take 1 tablet by mouth in the evening 90 tablet 3     temazepam (RESTORIL) 15 MG capsule TAKE 1 TO 2 CAPSULES BY MOUTH NIGHTLY AS NEEDED FOR SLEEP 15 capsule 0     acetaminophen (TYLENOL) 325 MG tablet Take 2 tablets (650 mg) by mouth every 6 hours as needed for mild pain 24 tablet 0     albuterol (PROAIR HFA/PROVENTIL HFA/VENTOLIN HFA) 108 (90 Base) MCG/ACT inhaler Inhale 2 puffs into the lungs every 4 hours as needed for shortness of  breath / dyspnea or wheezing Profile Rx 8.5 g 3     Cholecalciferol (VITAMIN D3 PO) Take 5,000 Units by mouth daily       ibuprofen (ADVIL/MOTRIN) 600 MG tablet Take 1 tablet (600 mg) by mouth every 6 hours as needed for other (mile and/or inflammatory pain.) 12 tablet 0     MULTIPLE VITAMIN PO Take 1 tablet by mouth daily.       senna-docusate (SENOKOT-S/PERICOLACE) 8.6-50 MG tablet Take 1-2 tablets by mouth 2 times daily 30 tablet 0     No facility-administered encounter medications on file as of 6/7/2023.             O:   NAD, WDWN, Alert & Oriented, Mood & Affect wnl, Vitals stable   Here today alone   LMP 08/01/2020    General appearance normal   Vitals stable   Alert, oriented and in no acute distress     0.3 cm brown papule on right posterior thigh   Stuck on papules and brown macules on trunk and ext   Red papules on trunk  Brown papules and macules with regular pigment network and borders on torso and extremities   Vesicular eruption in moccasin distrubution on right foot    Reddish brown patches on lateral sides of neck   The remainder of skin exam is normal.      Eyes: Conjunctivae/lids:Normal     ENT: Lipsnormal    MSK:Normal    Cardiovascular: peripheral edema none    Pulm: Breathing Normal    Neuro/Psych: Orientation:Alert and Orientedx3 ; Mood/Affect:normal   A/P:  1. R/O melanocytic nevus on right posterior thigh  TANGENTIAL BIOPSY SENT OUT:  After consent, anesthesia with LEC and prep, tangential excision performed and specimen sent out for permanent section histology.  No complications and routine wound care. Patient told to call our office in 1-2 weeks for result.      2. Tinea pedis   Take fluconazole 100 mg daily x 2-3 weeks.   Continue ketoconazole 3-4 time weekly.   3. Actinic keratosis on right cheek x   LN2:  Treated with LN2 for 5s for 1-2 cycles. Warned risks of blistering, pain, pigment change, scarring, and incomplete resolution.  Advised patient to return if lesions do not completely  resolve.  Wound care sheet given.  4.  Benign nevi, seborrheic keratosis, cherry angioma, poikiloderma    It was a pleasure speaking to Renea Trotter today.  BENIGN LESIONS DISCUSSED WITH PATIENT:  I discussed the specifics of tumor, prognosis, and genetics of benign lesions.  I explained that treatment of these lesions would be purely cosmetic and not medically neccessary.  I discussed with patient different removal options including excision, cautery and /or laser.      Nature and genetics of benign skin lesions dicussed with patient.  Signs and Symptoms of skin cancer discussed with patient.  ABCDEs of melanoma reviewed with patient.  Patient encouraged to perform monthly skin exams.  UV precautions reviewed with patient.  Risks of non-melanoma skin cancer discussed with patient   Return to clinic in one year or sooner if needed.

## 2023-06-07 NOTE — PATIENT INSTRUCTIONS
Wound Care Instructions     FOR SUPERFICIAL WOUNDS     [facility], [facility] or    [facility] [phone]          AFTER 24 HOURS YOU SHOULD REMOVE THE BANDAGE AND BEGIN DAILY DRESSING CHANGES AS FOLLOWS:     1) Remove Dressing.     2) Clean and dry the area with tap water using a Q-tip or sterile gauze pad.     3) Apply Vaseline, Aquaphor, Polysporin ointment or Bacitracin ointment over entire wound.  Do NOT use Neosporin ointment.     4) Cover the wound with a band-aid, or a sterile non-stick gauze pad and micropore paper tape      REPEAT THESE INSTRUCTIONS AT LEAST ONCE A DAY UNTIL THE WOUND HAS COMPLETELY HEALED.    It is an old wives tale that a wound heals better when it is exposed to air and allowed to dry out. The wound will heal faster with a better cosmetic result if it is kept moist with ointment and covered with a bandage.    **Do not let the wound dry out.**      Supplies Needed:      *Cotton tipped applicators (Q-tips)    *Polysporin Ointment or Bacitracin Ointment (NOT NEOSPORIN)    *Band-aids or non-stick gauze pads and micropore paper tape.      PATIENT INFORMATION:    During the healing process you will notice a number of changes. All wounds develop a small halo of redness surrounding the wound.  This means healing is occurring. Severe itching with extensive redness usually indicates sensitivity to the ointment or bandage tape used to dress the wound.  You should call our office if this develops.      Swelling  and/or discoloration around your surgical site is common, particularly when performed around the eye.    All wounds normally drain.  The larger the wound the more drainage there will be.  After 7-10 days, you will notice the wound beginning to shrink and new skin will begin to grow.  The wound is healed when you can see skin has formed over the entire area.  A healed wound has a healthy, shiny look to the surface and is red to dark pink in color to normalize.   Wounds may take approximately 4-6 weeks to heal.  Larger wounds may take 6-8 weeks.  After the wound is healed you may discontinue dressing changes.    You may experience a sensation of tightness as your wound heals. This is normal and will gradually subside.    Your healed wound may be sensitive to temperature changes. This sensitivity improves with time, but if you re having a lot of discomfort, try to avoid temperature extremes.    Patients frequently experience itching after their wound appears to have healed because of the continue healing under the skin.  Plain Vaseline will help relieve the itching.        POSSIBLE COMPLICATIONS    BLEEDING:    Leave the bandage in place.  Use tightly rolled up gauze or a cloth to apply direct pressure over the bandage for 30  minutes.  Reapply pressure for an additional 30 minutes if necessary  Use additional gauze and tape to maintain pressure once the bleeding has stopped.       Patient Education       Proper skin care from [facility]:    -Eliminate harsh soaps as they strip the natural oils from the skin, often resulting in dry itchy skin ( i.e. Dial, Zest, Lucia Spring)  -Use mild soaps such as Cetaphil or Dove Sensitive Skin in the shower. You do not need to use soap on arms, legs, and trunk every time you shower unless visibly soiled.   -Avoid hot or cold showers.  -After showering, lightly dry off and apply moisturizing within 2-3 minutes. This will help trap moisture in the skin.   -Aggressive use of a moisturizer at least 1-2 times a day to the entire body (including -Vanicream, Cetaphil, Aquaphor or Cerave) and moisturize hands after every washing.  -We recommend using moisturizers that come in a tub that needs to be scooped out, not a pump. This has more of an oil base. It will hold moisture in your skin much better than a water base moisturizer. The above recommended are non-pore clogging.      Wear a sunscreen with at least SPF 30 on your face, ears,  neck and V of the chest daily. Wear sunscreen on other areas of the body if those areas are exposed to the sun throughout the day. Sunscreens can contain physical and/or chemical blockers. Physical blockers are less likely to clog pores, these include zinc oxide and titanium dioxide. Reapply every two hour and after swimming.     Sunscreen examples: https://www.ewg.org/sunscreen/    UV radiation  UVA radiation remains constant throughout the day and throughout the year. It is a longer wavelength than UVB and therefore penetrates deeper into the skin leading to immediate and delayed tanning, photoaging, and skin cancer. 70-80% of UVA and UVB radiation occurs between the hours of 10am-2pm.  UVB radiation  UVB radiation causes the most harmful effects and is more significant during the summer months. However, snow and ice can reflect UVB radiation leading to skin damage during the winter months as well. UVB radiation is responsible for tanning, burning, inflammation, delayed erythema (pinkness), pigmentation (brown spots), and skin cancer.     I recommend self monthly full body exams and yearly full body exams with a dermatology provider. If you develop a new or changing lesion please follow up for examination. Most skin cancers are pink and scaly or pink and pearly. However, we do see blue/brown/black skin cancers.  Consider the ABCDEs of melanoma when giving yourself your monthly full body exam ( don't forget the groin, buttocks, feet, toes, etc). A-asymmetry, B-borders, C-color, D-diameter, E-elevation or evolving. If you see any of these changes please follow up in clinic. If you cannot see your back I recommend purchasing a hand held mirror to use with a larger wall mirror.       Checking for Skin Cancer  You can find cancer early by checking your skin each month. There are 3 kinds of skin cancer. They are melanoma, basal cell carcinoma, and squamous cell carcinoma. Doing monthly skin checks is the best way to  find new marks or skin changes. Follow the instructions below for checking your skin.   The ABCDEs of checking moles for melanoma   Check your moles or growths for signs of melanoma using ABCDE:   Asymmetry: the sides of the mole or growth don t match  Border: the edges are ragged, notched, or blurred  Color: the color within the mole or growth varies  Diameter: the mole or growth is larger than 6 mm (size of a pencil eraser)  Evolving: the size, shape, or color of the mole or growth is changing (evolving is not shown in the images below)    Checking for other types of skin cancer  Basal cell carcinoma or squamous cell carcinoma have symptoms such as:     A spot or mole that looks different from all other marks on your skin  Changes in how an area feels, such as itching, tenderness, or pain  Changes in the skin's surface, such as oozing, bleeding, or scaliness  A sore that does not heal  New swelling or redness beyond the border of a mole    Who s at risk?  Anyone can get skin cancer. But you are at greater risk if you have:   Fair skin, light-colored hair, or light-colored eyes  Many moles or abnormal moles on your skin  A history of sunburns from sunlight or tanning beds  A family history of skin cancer  A history of exposure to radiation or chemicals  A weakened immune system  If you have had skin cancer in the past, you are at risk for recurring skin cancer.   How to check your skin  Do your monthly skin checkups in front of a full-length mirror. Check all parts of your body, including your:   Head (ears, face, neck, and scalp)  Torso (front, back, and sides)  Arms (tops, undersides, upper, and lower armpits)  Hands (palms, backs, and fingers, including under the nails)  Buttocks and genitals  Legs (front, back, and sides)  Feet (tops, soles, toes, including under the nails, and between toes)  If you have a lot of moles, take digital photos of them each month. Make sure to take photos both up close and from a  distance. These can help you see if any moles change over time.   Most skin changes are not cancer. But if you see any changes in your skin, call your doctor right away. Only he or she can diagnose a problem. If you have skin cancer, seeing your doctor can be the first step toward getting the treatment that could save your life.   [employer] last reviewed this educational content on 4/1/2019 2000-2020 [employer] [address] [place] [address], [place] [address]. All rights reserved. This information is not intended as a substitute for professional medical care. Always follow your healthcare professional's instructions.       When should I call my doctor?  If you are worsening or not improving, please, contact us or seek urgent care as noted below.     Who should I call with questions (adults)?  [facility] (adult and pediatric): [phone]  [facility] (adult): [phone]  [facility] ([facility]) [phone]  For urgent needs outside of business hours call the [facility] at [phone] and ask for the dermatology resident on call to be paged  If this is a medical emergency and you are unable to reach an ER, Call [phone]      If you need a prescription refill, please contact your pharmacy. Refills are approved or denied by our Physicians during normal business hours, Monday through Fridays  Per office policy, refills will not be granted if you have not been seen within the past year (or sooner depending on your child's condition)

## 2023-06-07 NOTE — LETTER
6/7/2023         RE: Renea Trotter  [id number] [facility] [address]        Dear Colleague,    Thank you for referring your patient, Renea Trotter, to the [facility]. Please see a copy of my visit note below.    Renea Trotter is an extremely pleasant 52 year old year old female patient here today for spot on right cheek. She notes present for a few month, comes and goes. She notes it will occasionally get sensitive and scaly. She also notes rash on right present intermittent for years, has tried ketoconazole cream which has helped but continue to return. She notes that it will get itchy. She also notes mole on back of thigh that will get irritated with shaving.  Patient has no other skin complaints today.  Remainder of the HPI, Meds, PMH, Allergies, FH, and SH was reviewed in chart.    Pertinent Hx:  No personal history of skin cancer   Past Medical History:   Diagnosis Date     HTN (hypertension)      Hypercholesterolemia      Mild intermittent asthma      MS (multiple sclerosis) (H)        Past Surgical History:   Procedure Laterality Date     APPENDECTOMY       COLONOSCOPY WITH CO2 INSUFFLATION N/A 08/05/2022    Procedure: COLONOSCOPY, WITH CO2 INSUFFLATION;  Surgeon: Hernan Vasquez MD;  Location:  OR      TOOTH EXTRACTION W/FORCEP       LAPAROSCOPIC SALPINGO-OOPHORECTOMY Bilateral 11/11/2022    Procedure: LAPAROSCOPIC RIGHT SALPINGO-OOPHORECTOMY, LEFT SALPINGECTOMY;  Surgeon: Savana Valle MD;  [facility]        Family History   Problem Relation Age of Onset     Hypertension Mother      Lipids Mother      Hyperlipidemia Mother      Eye Disorder Father      Hyperlipidemia Father      Sleep Apnea Father      Skin Cancer Father      Coronary Artery Disease Sister      Hyperlipidemia Sister      Myocardial Infarction Sister      Cerebrovascular Disease Maternal Grandmother      Cerebrovascular Disease Maternal Grandfather      Prostate Cancer  Maternal Grandfather      Blood Disease Paternal Grandmother      Alzheimer Disease Paternal Grandfather      Pulmonary Embolism Daughter      Multiple Sclerosis Cousin        Social History     Socioeconomic History     Marital status:      Spouse name: Not on file     Number of children: Not on file     Years of education: Not on file     Highest education level: Not on file   Occupational History     Not on file   Tobacco Use     Smoking status: Never     Smokeless tobacco: Never   Vaping Use     Vaping status: Never Used   Substance and Sexual Activity     Alcohol use: Yes     Alcohol/week: 1.0 - 2.0 standard drink of alcohol     Comment: RARELY     Drug use: No     Sexual activity: Yes     Partners: Male     Birth control/protection: Condom   Other Topics Concern     Parent/sibling w/ CABG, MI or angioplasty before 65F 55M? Yes     Comment: 47 sister   Social History Narrative     Not on file     Social Determinants of Health     Financial Resource Strain: Not on file   Food Insecurity: Not on file   Transportation Needs: Not on file   Physical Activity: Not on file   Stress: Not on file   Social Connections: Not on file   Intimate Partner Violence: Not on file   Housing Stability: Not on file       Outpatient Encounter Medications as of 6/7/2023   Medication Sig Dispense Refill     fluconazole (DIFLUCAN) 100 MG tablet Take 1 tablet (100 mg) by mouth daily 21 tablet 0     glatiramer acetate 40 MG/ML injection INJECT 40 MG UNDER THE SKIN THREE TIMES A WEEK 12 mL 11     ketoconazole (NIZORAL) 2 % external cream Apply thin layer to the right third toe and surrounding area. 30 g 11     lisinopril-hydrochlorothiazide (ZESTORETIC) 10-12.5 MG tablet Take 1 tablet by mouth daily 90 tablet 3     pravastatin (PRAVACHOL) 20 MG tablet Take 1 tablet by mouth in the evening 90 tablet 3     temazepam (RESTORIL) 15 MG capsule TAKE 1 TO 2 CAPSULES BY MOUTH NIGHTLY AS NEEDED FOR SLEEP 15 capsule 0     acetaminophen  (TYLENOL) 325 MG tablet Take 2 tablets (650 mg) by mouth every 6 hours as needed for mild pain 24 tablet 0     albuterol (PROAIR HFA/PROVENTIL HFA/VENTOLIN HFA) 108 (90 Base) MCG/ACT inhaler Inhale 2 puffs into the lungs every 4 hours as needed for shortness of breath / dyspnea or wheezing Profile Rx 8.5 g 3     Cholecalciferol (VITAMIN D3 PO) Take 5,000 Units by mouth daily       ibuprofen (ADVIL/MOTRIN) 600 MG tablet Take 1 tablet (600 mg) by mouth every 6 hours as needed for other (mile and/or inflammatory pain.) 12 tablet 0     MULTIPLE VITAMIN PO Take 1 tablet by mouth daily.       senna-docusate (SENOKOT-S/PERICOLACE) 8.6-50 MG tablet Take 1-2 tablets by mouth 2 times daily 30 tablet 0     No facility-administered encounter medications on file as of 6/7/2023.             O:   NAD, WDWN, Alert & Oriented, Mood & Affect wnl, Vitals stable   Here today alone   LMP 08/01/2020    General appearance normal   Vitals stable   Alert, oriented and in no acute distress     0.3 cm brown papule on right posterior thigh   Stuck on papules and brown macules on trunk and ext   Red papules on trunk  Brown papules and macules with regular pigment network and borders on torso and extremities   Vesicular eruption in moccasin distrubution on right foot    Reddish brown patches on lateral sides of neck   The remainder of skin exam is normal.      Eyes: Conjunctivae/lids:Normal     ENT: Lipsnormal    MSK:Normal    Cardiovascular: peripheral edema none    Pulm: Breathing Normal    Neuro/Psych: Orientation:Alert and Orientedx3 ; Mood/Affect:normal   A/P:  1. R/O melanocytic nevus on right posterior thigh  TANGENTIAL BIOPSY SENT OUT:  After consent, anesthesia with LEC and prep, tangential excision performed and specimen sent out for permanent section histology.  No complications and routine wound care. Patient told to call our office in 1-2 weeks for result.      2. Tinea pedis   Take fluconazole 100 mg daily x 2-3 weeks.   Continue  ketoconazole 3-4 time weekly.   3. Actinic keratosis on right cheek x   LN2:  Treated with LN2 for 5s for 1-2 cycles. Warned risks of blistering, pain, pigment change, scarring, and incomplete resolution.  Advised patient to return if lesions do not completely resolve.  Wound care sheet given.  4.  Benign nevi, seborrheic keratosis, cherry angioma, poikiloderma    It was a pleasure speaking to Renea Trotter today.  BENIGN LESIONS DISCUSSED WITH PATIENT:  I discussed the specifics of tumor, prognosis, and genetics of benign lesions.  I explained that treatment of these lesions would be purely cosmetic and not medically neccessary.  I discussed with patient different removal options including excision, cautery and /or laser.      Nature and genetics of benign skin lesions dicussed with patient.  Signs and Symptoms of skin cancer discussed with patient.  ABCDEs of melanoma reviewed with patient.  Patient encouraged to perform monthly skin exams.  UV precautions reviewed with patient.  Risks of non-melanoma skin cancer discussed with patient   Return to clinic in one year or sooner if needed.         Again, thank you for allowing me to participate in the care of your patient.        Sincerely,        Judith Yoder PA-C

## 2023-06-09 LAB
PATH REPORT.COMMENTS IMP SPEC: NORMAL
PATH REPORT.COMMENTS IMP SPEC: NORMAL
PATH REPORT.FINAL DX SPEC: NORMAL
PATH REPORT.GROSS SPEC: NORMAL
PATH REPORT.MICROSCOPIC SPEC OTHER STN: NORMAL
PATH REPORT.RELEVANT HX SPEC: NORMAL

## 2023-12-20 ENCOUNTER — TELEPHONE (OUTPATIENT)
Dept: NEUROLOGY | Facility: CLINIC | Age: 53
End: 2023-12-20
Payer: COMMERCIAL

## 2023-12-20 NOTE — TELEPHONE ENCOUNTER
Prior Authorization Approval    Medication: GLATIRAMER ACETATE 40 MG/ML SC SOSY  Authorization Effective Date: 11/20/2023  Authorization Expiration Date: 12/19/2024  Approved Dose/Quantity: 28 days, 12mL  Reference #:     Insurance Company: [employer] - Phone [phone] Fax [phone]  Expected CoPay: $    CoPay Card Available:      Financial Assistance Needed: N/A  Which Pharmacy is filling the prescription: [place], [place] - [address] [place] [address]  Pharmacy Notified: Yes  Patient Notified: Yes        Thank you,    Mary Jane Bourgeois [facility]  Specialty Pharmacy Clinic Liaison - CardiologyNeurologyMultiple Sclerosis  [facility] [address]  [address] [place] [address]  Ph: [phone] Fax: [phone]  [email or web address]

## 2023-12-21 ENCOUNTER — PATIENT OUTREACH (OUTPATIENT)
Dept: CARE COORDINATION | Facility: CLINIC | Age: 53
End: 2023-12-21
Payer: COMMERCIAL

## 2024-01-04 ENCOUNTER — PATIENT OUTREACH (OUTPATIENT)
Dept: CARE COORDINATION | Facility: CLINIC | Age: 54
End: 2024-01-04
Payer: COMMERCIAL

## 2024-01-18 ENCOUNTER — PATIENT OUTREACH (OUTPATIENT)
Dept: CARE COORDINATION | Facility: CLINIC | Age: 54
End: 2024-01-18
Payer: COMMERCIAL

## 2024-02-08 DIAGNOSIS — Z13.1 SCREENING FOR DIABETES MELLITUS (DM): ICD-10-CM

## 2024-02-08 DIAGNOSIS — Z13.0 SCREENING FOR DEFICIENCY ANEMIA: Primary | ICD-10-CM

## 2024-02-08 DIAGNOSIS — I10 ESSENTIAL HYPERTENSION WITH GOAL BLOOD PRESSURE LESS THAN 140/90: ICD-10-CM

## 2024-02-08 DIAGNOSIS — Z13.220 SCREENING FOR HYPERLIPIDEMIA: ICD-10-CM

## 2024-02-09 ENCOUNTER — ANCILLARY PROCEDURE (OUTPATIENT)
Dept: MAMMOGRAPHY | Facility: CLINIC | Age: 54
End: 2024-02-09
Attending: FAMILY MEDICINE
Payer: COMMERCIAL

## 2024-02-09 DIAGNOSIS — Z12.31 VISIT FOR SCREENING MAMMOGRAM: ICD-10-CM

## 2024-02-09 PROCEDURE — 77063 BREAST TOMOSYNTHESIS BI: CPT | Mod: GC | Performed by: STUDENT IN AN ORGANIZED HEALTH CARE EDUCATION/TRAINING PROGRAM

## 2024-02-09 PROCEDURE — 77067 SCR MAMMO BI INCL CAD: CPT | Mod: GC | Performed by: STUDENT IN AN ORGANIZED HEALTH CARE EDUCATION/TRAINING PROGRAM

## 2024-02-09 RX ORDER — LISINOPRIL/HYDROCHLOROTHIAZIDE 10-12.5 MG
1 TABLET ORAL DAILY
Qty: 90 TABLET | Refills: 0 | Status: SHIPPED | OUTPATIENT
Start: 2024-02-09 | End: 2024-03-01

## 2024-02-09 NOTE — TELEPHONE ENCOUNTER
Spoke with pt that Rx was sent to pharmacy and scheduled a fasting labs 2 days prior to schedule appt.

## 2024-02-09 NOTE — TELEPHONE ENCOUNTER
Medication Question or Refill    Contacts         Type Contact Phone/Fax    02/08/2024 09:17 PM CST Interface (Incoming) [facility] [address] [place] [address] [phone]            What medication are you calling about (include dose and sig)?:   Zestoretic     Preferred Pharmacy:  [facility] [address] [place] [id number]  Phone: [phone] Fax: [phone]      Controlled Substance Agreement on file:   CSA -- Patient Level:    CSA: None found at the patient level.       Who prescribed the medication?: Cara Clements     Do you need a refill? Yes

## 2024-02-09 NOTE — TELEPHONE ENCOUNTER
Please call patient to get a lab appointment couple of days before the visit  Fasting lab orders placed

## 2024-02-28 ENCOUNTER — LAB (OUTPATIENT)
Dept: LAB | Facility: CLINIC | Age: 54
End: 2024-02-28
Payer: COMMERCIAL

## 2024-02-28 DIAGNOSIS — I10 ESSENTIAL HYPERTENSION WITH GOAL BLOOD PRESSURE LESS THAN 140/90: ICD-10-CM

## 2024-02-28 DIAGNOSIS — Z13.1 SCREENING FOR DIABETES MELLITUS (DM): ICD-10-CM

## 2024-02-28 DIAGNOSIS — Z13.0 SCREENING FOR DEFICIENCY ANEMIA: ICD-10-CM

## 2024-02-28 DIAGNOSIS — Z13.220 SCREENING FOR HYPERLIPIDEMIA: ICD-10-CM

## 2024-02-28 LAB
ERYTHROCYTE [DISTWIDTH] IN BLOOD BY AUTOMATED COUNT: 11.5 % (ref 10–15)
HCT VFR BLD AUTO: 40.1 % (ref 35–47)
HGB BLD-MCNC: 14 G/DL (ref 11.7–15.7)
MCH RBC QN AUTO: 29.9 PG (ref 26.5–33)
MCHC RBC AUTO-ENTMCNC: 34.9 G/DL (ref 31.5–36.5)
MCV RBC AUTO: 86 FL (ref 78–100)
PLATELET # BLD AUTO: 279 10E3/UL (ref 150–450)
RBC # BLD AUTO: 4.68 10E6/UL (ref 3.8–5.2)
WBC # BLD AUTO: 5.8 10E3/UL (ref 4–11)

## 2024-02-28 PROCEDURE — 82306 VITAMIN D 25 HYDROXY: CPT | Performed by: FAMILY MEDICINE

## 2024-02-28 PROCEDURE — 82043 UR ALBUMIN QUANTITATIVE: CPT

## 2024-02-28 PROCEDURE — 80053 COMPREHEN METABOLIC PANEL: CPT

## 2024-02-28 PROCEDURE — 84443 ASSAY THYROID STIM HORMONE: CPT | Performed by: FAMILY MEDICINE

## 2024-02-28 PROCEDURE — 82570 ASSAY OF URINE CREATININE: CPT

## 2024-02-28 PROCEDURE — 36415 COLL VENOUS BLD VENIPUNCTURE: CPT

## 2024-02-28 PROCEDURE — 85027 COMPLETE CBC AUTOMATED: CPT

## 2024-02-28 PROCEDURE — 80061 LIPID PANEL: CPT

## 2024-02-29 LAB
ALBUMIN SERPL BCG-MCNC: 4.8 G/DL (ref 3.5–5.2)
ALP SERPL-CCNC: 65 U/L (ref 40–150)
ALT SERPL W P-5'-P-CCNC: 17 U/L (ref 0–50)
ANION GAP SERPL CALCULATED.3IONS-SCNC: 12 MMOL/L (ref 7–15)
AST SERPL W P-5'-P-CCNC: 23 U/L (ref 0–45)
BILIRUB SERPL-MCNC: 0.5 MG/DL
BUN SERPL-MCNC: 23.8 MG/DL (ref 6–20)
CALCIUM SERPL-MCNC: 10 MG/DL (ref 8.6–10)
CHLORIDE SERPL-SCNC: 101 MMOL/L (ref 98–107)
CHOLEST SERPL-MCNC: 153 MG/DL
CREAT SERPL-MCNC: 0.9 MG/DL (ref 0.51–0.95)
CREAT UR-MCNC: 119 MG/DL
DEPRECATED HCO3 PLAS-SCNC: 27 MMOL/L (ref 22–29)
EGFRCR SERPLBLD CKD-EPI 2021: 76 ML/MIN/1.73M2
FASTING STATUS PATIENT QL REPORTED: YES
GLUCOSE SERPL-MCNC: 90 MG/DL (ref 70–99)
HDLC SERPL-MCNC: 43 MG/DL
LDLC SERPL CALC-MCNC: 95 MG/DL
MICROALBUMIN UR-MCNC: 13.8 MG/L
MICROALBUMIN/CREAT UR: 11.6 MG/G CR (ref 0–25)
NONHDLC SERPL-MCNC: 110 MG/DL
POTASSIUM SERPL-SCNC: 4.1 MMOL/L (ref 3.4–5.3)
PROT SERPL-MCNC: 7.6 G/DL (ref 6.4–8.3)
SODIUM SERPL-SCNC: 140 MMOL/L (ref 135–145)
TRIGL SERPL-MCNC: 75 MG/DL

## 2024-03-01 ENCOUNTER — OFFICE VISIT (OUTPATIENT)
Dept: FAMILY MEDICINE | Facility: CLINIC | Age: 54
End: 2024-03-01
Payer: COMMERCIAL

## 2024-03-01 VITALS
RESPIRATION RATE: 16 BRPM | OXYGEN SATURATION: 97 % | TEMPERATURE: 97.8 F | HEIGHT: 62 IN | DIASTOLIC BLOOD PRESSURE: 82 MMHG | HEART RATE: 82 BPM | WEIGHT: 145.31 LBS | BODY MASS INDEX: 26.74 KG/M2 | SYSTOLIC BLOOD PRESSURE: 116 MMHG

## 2024-03-01 DIAGNOSIS — K59.09 CHRONIC CONSTIPATION: ICD-10-CM

## 2024-03-01 DIAGNOSIS — I10 ESSENTIAL HYPERTENSION WITH GOAL BLOOD PRESSURE LESS THAN 140/90: ICD-10-CM

## 2024-03-01 DIAGNOSIS — Z12.4 CERVICAL CANCER SCREENING: ICD-10-CM

## 2024-03-01 DIAGNOSIS — E55.9 VITAMIN D DEFICIENCY: ICD-10-CM

## 2024-03-01 DIAGNOSIS — N95.1 MENOPAUSAL SYNDROME (HOT FLASHES): ICD-10-CM

## 2024-03-01 DIAGNOSIS — Z00.00 ROUTINE GENERAL MEDICAL EXAMINATION AT A HEALTH CARE FACILITY: Primary | ICD-10-CM

## 2024-03-01 DIAGNOSIS — R79.9 ELEVATED BUN: ICD-10-CM

## 2024-03-01 DIAGNOSIS — E78.5 HYPERLIPIDEMIA LDL GOAL <130: ICD-10-CM

## 2024-03-01 DIAGNOSIS — G35 MS (MULTIPLE SCLEROSIS) (H): ICD-10-CM

## 2024-03-01 DIAGNOSIS — G47.9 SLEEP DISORDER: ICD-10-CM

## 2024-03-01 DIAGNOSIS — Z13.29 SCREENING FOR THYROID DISORDER: ICD-10-CM

## 2024-03-01 DIAGNOSIS — J45.20 MILD INTERMITTENT ASTHMA WITHOUT COMPLICATION: ICD-10-CM

## 2024-03-01 DIAGNOSIS — R06.83 SNORING: ICD-10-CM

## 2024-03-01 DIAGNOSIS — Z87.42 HISTORY OF OVARIAN CYST: ICD-10-CM

## 2024-03-01 LAB
TSH SERPL DL<=0.005 MIU/L-ACNC: 1.48 UIU/ML (ref 0.3–4.2)
VIT D+METAB SERPL-MCNC: 67 NG/ML (ref 20–50)

## 2024-03-01 PROCEDURE — G0145 SCR C/V CYTO,THINLAYER,RESCR: HCPCS | Performed by: FAMILY MEDICINE

## 2024-03-01 PROCEDURE — 87624 HPV HI-RISK TYP POOLED RSLT: CPT | Performed by: FAMILY MEDICINE

## 2024-03-01 PROCEDURE — 99214 OFFICE O/P EST MOD 30 MIN: CPT | Mod: 25 | Performed by: FAMILY MEDICINE

## 2024-03-01 PROCEDURE — 90472 IMMUNIZATION ADMIN EACH ADD: CPT | Performed by: FAMILY MEDICINE

## 2024-03-01 PROCEDURE — 90746 HEPB VACCINE 3 DOSE ADULT IM: CPT | Performed by: FAMILY MEDICINE

## 2024-03-01 PROCEDURE — 99396 PREV VISIT EST AGE 40-64: CPT | Mod: 25 | Performed by: FAMILY MEDICINE

## 2024-03-01 PROCEDURE — 90471 IMMUNIZATION ADMIN: CPT | Performed by: FAMILY MEDICINE

## 2024-03-01 PROCEDURE — 90677 PCV20 VACCINE IM: CPT | Performed by: FAMILY MEDICINE

## 2024-03-01 RX ORDER — TEMAZEPAM 15 MG/1
CAPSULE ORAL
Qty: 15 CAPSULE | Refills: 0 | Status: SHIPPED | OUTPATIENT
Start: 2024-03-01

## 2024-03-01 RX ORDER — LISINOPRIL/HYDROCHLOROTHIAZIDE 10-12.5 MG
1 TABLET ORAL DAILY
Qty: 90 TABLET | Refills: 3 | Status: SHIPPED | OUTPATIENT
Start: 2024-03-01 | End: 2024-05-14 | Stop reason: SINTOL

## 2024-03-01 RX ORDER — POLYETHYLENE GLYCOL 3350 17 G/17G
1 POWDER, FOR SOLUTION ORAL DAILY
Qty: 510 G | Refills: 5 | Status: SHIPPED | OUTPATIENT
Start: 2024-03-01

## 2024-03-01 RX ORDER — PRAVASTATIN SODIUM 20 MG
20 TABLET ORAL EVERY EVENING
Qty: 90 TABLET | Refills: 3 | Status: SHIPPED | OUTPATIENT
Start: 2024-03-01

## 2024-03-01 RX ORDER — ALBUTEROL SULFATE 90 UG/1
2 AEROSOL, METERED RESPIRATORY (INHALATION) EVERY 4 HOURS PRN
Qty: 8.5 G | Refills: 3 | Status: SHIPPED | OUTPATIENT
Start: 2024-03-01

## 2024-03-01 SDOH — HEALTH STABILITY: PHYSICAL HEALTH: ON AVERAGE, HOW MANY DAYS PER WEEK DO YOU ENGAGE IN MODERATE TO STRENUOUS EXERCISE (LIKE A BRISK WALK)?: 1 DAY

## 2024-03-01 ASSESSMENT — SOCIAL DETERMINANTS OF HEALTH (SDOH): HOW OFTEN DO YOU GET TOGETHER WITH FRIENDS OR RELATIVES?: ONCE A WEEK

## 2024-03-01 ASSESSMENT — ASTHMA QUESTIONNAIRES
QUESTION_5 LAST FOUR WEEKS HOW WOULD YOU RATE YOUR ASTHMA CONTROL: COMPLETELY CONTROLLED
ACT_TOTALSCORE: 25
ACT_TOTALSCORE: 25
QUESTION_1 LAST FOUR WEEKS HOW MUCH OF THE TIME DID YOUR ASTHMA KEEP YOU FROM GETTING AS MUCH DONE AT WORK, SCHOOL OR AT HOME: NONE OF THE TIME
QUESTION_2 LAST FOUR WEEKS HOW OFTEN HAVE YOU HAD SHORTNESS OF BREATH: NOT AT ALL
QUESTION_3 LAST FOUR WEEKS HOW OFTEN DID YOUR ASTHMA SYMPTOMS (WHEEZING, COUGHING, SHORTNESS OF BREATH, CHEST TIGHTNESS OR PAIN) WAKE YOU UP AT NIGHT OR EARLIER THAN USUAL IN THE MORNING: NOT AT ALL
QUESTION_4 LAST FOUR WEEKS HOW OFTEN HAVE YOU USED YOUR RESCUE INHALER OR NEBULIZER MEDICATION (SUCH AS ALBUTEROL): NOT AT ALL

## 2024-03-01 ASSESSMENT — PAIN SCALES - GENERAL: PAINLEVEL: NO PAIN (0)

## 2024-03-01 NOTE — NURSING NOTE
Prior to immunization administration, verified patients identity using patient s name and date of birth. Please see Immunization Activity for additional information.     Screening Questionnaire for Adult Immunization    Are you sick today?   No   Do you have allergies to medications, food, a vaccine component or latex?   No   Have you ever had a serious reaction after receiving a vaccination?   No   Do you have a long-term health problem with heart, lung, kidney, or metabolic disease (e.g., diabetes), asthma, a blood disorder, no spleen, complement component deficiency, a cochlear implant, or a spinal fluid leak?  Are you on long-term aspirin therapy?   No   Do you have cancer, leukemia, HIV/AIDS, or any other immune system problem?   No   Do you have a parent, brother, or sister with an immune system problem?   No   In the past 3 months, have you taken medications that affect  your immune system, such as prednisone, other steroids, or anticancer drugs; drugs for the treatment of rheumatoid arthritis, Crohn s disease, or psoriasis; or have you had radiation treatments?   No   Have you had a seizure, or a brain or other nervous system problem?   No   During the past year, have you received a transfusion of blood or blood    products, or been given immune (gamma) globulin or antiviral drug?   No   For women: Are you pregnant or is there a chance you could become       pregnant during the next month?   No   Have you received any vaccinations in the past 4 weeks?   No     Immunization questionnaire answers were all negative.      Patient instructed to remain in clinic for 15 minutes afterwards, and to report any adverse reactions.     Screening performed by Gela Viera MA on 3/1/2024 at 8:02 AM.

## 2024-03-01 NOTE — RESULT ENCOUNTER NOTE
Basil Meier,  Your vitamin D is moderately elevated  Please decrease the vitamin D from 2000 units down to 1000 units once daily   Let me know if you have any questions. Take care.  Cara Clements MD

## 2024-03-01 NOTE — COMMUNITY RESOURCES LIST (ENGLISH)
03/01/2024   [facility] [employer]  N/A  For questions about this resource list or additional care needs, please contact your primary care clinic or care manager.  Phone: [phone]   Email: N/A   Address: [address] [place] [id number]   Hours: N/A        Financial Stability       Utility payment assistance  1  [facility] ([facility]) - Emergency Assistance - Utility payment assistance Distance: 8.42 miles      In-Person   [address] [address]  Language: English, Irish  Hours: Mon 10:00 AM - 1:30 PM Appt. Only, Wed 10:00 AM - 1:30 PM Appt. Only, Thu 4:30 PM - 6:30 PM Appt. Only, Fri 10:00 AM - 1:30 PM Appt. Only  Fees: Free   Phone: (396) 573-8817 Email: info@[employer] [facility] [place] Website: http://www.casnagajob.com.org     2  [facility] Distance: 10.78 miles      In-Person   [address] [place] [address]  Language: English, Irish  Hours: Mon - Fri 8:00 AM - 3:30 PM  Fees: Free, Self Pay   Phone: (309) 574-1951 Email: [email or web address] Website: [email or web address]          Important Numbers & Websites       Emergency Services   911  [facility] Services   311  Poison Control   (164) 946-7632  Suicide Prevention Lifeline   (884) 501-6879 (TALK)  Child Abuse Hotline   (319) 629-8905 (4-A-Child)  Sexual Assault Hotline   (319) 886-9576 (HOPE)  National Runaway Safeline   (309) 793-5400 (RUNAWAY)  All-Options Talkline   (720) 436-8461  Substance Abuse Referral   (292) 374-9430 (HELP)

## 2024-03-01 NOTE — PATIENT INSTRUCTIONS
Preventive Care Advice   This is general advice given by our system to help you stay healthy. However, your care team may have specific advice just for you. Please talk to your care team about your preventive care needs.  Nutrition  Eat 5 or more servings of fruits and vegetables each day.  Try wheat bread, brown rice and whole grain pasta (instead of white bread, rice, and pasta).  Get enough calcium and vitamin D. Check the label on foods and aim for 100% of the RDA (recommended daily allowance).  Lifestyle  Exercise at least 150 minutes each week   (30 minutes a day, 5 days a week).  Do muscle strengthening activities 2 days a week. These help control your weight and prevent disease.  No smoking.  Wear sunscreen to prevent skin cancer.  Have a dental exam and cleaning every 6 months.  Yearly exams  See your health care team every year to talk about:  Any changes in your health.  Any medicines your care team has prescribed.  Preventive care, family planning, and ways to prevent chronic diseases.  Shots (vaccines)   HPV shots (up to age 26), if you've never had them before.  Hepatitis B shots (up to age 59), if you've never had them before.  COVID-19 shot: Get this shot when it's due.  Flu shot: Get a flu shot every year.  Tetanus shot: Get a tetanus shot every 10 years.  Pneumococcal, hepatitis A, and RSV shots: Ask your care team if you need these based on your risk.  Shingles shot (for age 50 and up).  General health tests  Diabetes screening:  Starting at age 35, Get screened for diabetes at least every 3 years.  If you are younger than age 35, ask your care team if you should be screened for diabetes.  Cholesterol test: At age 39, start having a cholesterol test every 5 years, or more often if advised.  Bone density scan (DEXA): At age 50, ask your care team if you should have this scan for osteoporosis (brittle bones).  Hepatitis C: Get tested at least once in your life.  STIs (sexually transmitted  infections)  Before age 24: Ask your care team if you should be screened for STIs.  After age 24: Get screened for STIs if you're at risk. You are at risk for STIs (including HIV) if:  You are sexually active with more than one person.  You don't use condoms every time.  You or a partner was diagnosed with a sexually transmitted infection.  If you are at risk for HIV, ask about PrEP medicine to prevent HIV.  Get tested for HIV at least once in your life, whether you are at risk for HIV or not.  Cancer screening tests  Cervical cancer screening: If you have a cervix, begin getting regular cervical cancer screening tests at age 21. Most people who have regular screenings with normal results can stop after age 65. Talk about this with your provider.  Breast cancer scan (mammogram): If you've ever had breasts, begin having regular mammograms starting at age 40. This is a scan to check for breast cancer.  Colon cancer screening: It is important to start screening for colon cancer at age 45.  Have a colonoscopy test every 10 years (or more often if you're at risk) Or, ask your provider about stool tests like a FIT test every year or Cologuard test every 3 years.  To learn more about your testing options, visit: https://www.[employer]/300839.pdf.  For help making a decision, visit: https://bit.ly/dk13252.  Prostate cancer screening test: If you have a prostate and are age 55 to 69, ask your provider if you would benefit from a yearly prostate cancer screening test.  Lung cancer screening: If you are a current or former smoker age 50 to 80, ask your care team if ongoing lung cancer screenings are right for you.  For informational purposes only. Not to replace the advice of your health care provider. Copyright   2023 [employer]. All rights reserved. Clinically reviewed by the [facility] Transitions Program. Sn[employer]s 449145 - REV 01/24.    Preventing Falls: Care Instructions  Injuries and health  problems such as trouble walking or poor eyesight can increase your risk of falling. So can some medicines. But there are things you can do to help prevent falls. You can exercise to get stronger. You can also arrange your home to make it safer.    Talk to your doctor about the medicines you take. Ask if any of them increase the risk of falls and whether they can be changed or stopped.   Try to exercise regularly. It can help improve your strength and balance. This can help lower your risk of falling.     Practice fall safety and prevention.    Wear low-heeled shoes that fit well and give your feet good support. Talk to your doctor if you have foot problems that make this hard.  Carry a cellphone or wear a medical alert device that you can use to call for help.  Use stepladders instead of chairs to reach high objects. Don't climb if you're at risk for falls. Ask for help, if needed.  Wear the correct eyeglasses, if you need them.    Make your home safer.    Remove rugs, cords, clutter, and furniture from walkways.  Keep your house well lit. Use night-lights in hallways and bathrooms.  Install and use sturdy handrails on stairways.  Wear nonskid footwear, even inside. Don't walk barefoot or in socks without shoes.    Be safe outside.    Use handrails, curb cuts, and ramps whenever possible.  Keep your hands free by using a shoulder bag or backpack.  Try to walk in well-lit areas. Watch out for uneven ground, changes in pavement, and debris.  Be careful in the winter. Walk on the grass or gravel when sidewalks are slippery. Use de-icer on steps and walkways. Add non-slip devices to shoes.    Put grab bars and nonskid mats in your shower or tub and near the toilet. Try to use a shower chair or bath bench when bathing.   Get into a tub or shower by putting in your weaker leg first. Get out with your strong side first. Have a phone or medical alert device in the bathroom with you.   Where can you learn more?  Go to  "https://www.[employer].net/patiented  Enter G117 in the search box to learn more about \"Preventing Falls: Care Instructions.\"  Current as of: July 18, 2023               Content Version: 13.8    1900-3011 [employer].   Care instructions adapted under license by your healthcare professional. If you have questions about a medical condition or this instruction, always ask your healthcare professional. [employer] disclaims any warranty or liability for your use of this information.      Learning About Stress  What is stress?     Stress is your body's response to a hard situation. Your body can have a physical, emotional, or mental response. Stress is a fact of life for most people, and it affects everyone differently. What causes stress for you may not be stressful for someone else.  A lot of things can cause stress. You may feel stress when you go on a job interview, take a test, or run a race. This kind of short-term stress is normal and even useful. It can help you if you need to work hard or react quickly. For example, stress can help you finish an important job on time.  Long-term stress is caused by ongoing stressful situations or events. Examples of long-term stress include long-term health problems, ongoing problems at work, or conflicts in your family. Long-term stress can harm your health.  How does stress affect your health?  When you are stressed, your body responds as though you are in danger. It makes hormones that speed up your heart, make you breathe faster, and give you a burst of energy. This is called the fight-or-flight stress response. If the stress is over quickly, your body goes back to normal and no harm is done.  But if stress happens too often or lasts too long, it can have bad effects. Long-term stress can make you more likely to get sick, and it can make symptoms of some diseases worse. If you tense up when you are stressed, you may develop neck, shoulder, or low " back pain. Stress is linked to high blood pressure and heart disease.  Stress also harms your emotional health. It can make you victoria, tense, or depressed. Your relationships may suffer, and you may not do well at work or school.  What can you do to manage stress?  You can try these things to help manage stress:   Do something active. Exercise or activity can help reduce stress. Walking is a great way to get started. Even everyday activities such as housecleaning or yard work can help.  Try yoga or shakeel chi. These techniques combine exercise and meditation. You may need some training at first to learn them.  Do something you enjoy. For example, listen to music or go to a movie. Practice your hobby or do volunteer work.  Meditate. This can help you relax, because you are not worrying about what happened before or what may happen in the future.  Do guided imagery. Imagine yourself in any setting that helps you feel calm. You can use online videos, books, or a teacher to guide you.  Do breathing exercises. For example:  From a standing position, bend forward from the waist with your knees slightly bent. Let your arms dangle close to the floor.  Breathe in slowly and deeply as you return to a standing position. Roll up slowly and lift your head last.  Hold your breath for just a few seconds in the standing position.  Breathe out slowly and bend forward from the waist.  Let your feelings out. Talk, laugh, cry, and express anger when you need to. Talking with supportive friends or family, a counselor, or a carolyn leader about your feelings is a healthy way to relieve stress. Avoid discussing your feelings with people who make you feel worse.  Write. It may help to write about things that are bothering you. This helps you find out how much stress you feel and what is causing it. When you know this, you can find better ways to cope.  What can you do to prevent stress?  You might try some of these things to help prevent  "stress:  Manage your time. This helps you find time to do the things you want and need to do.  Get enough sleep. Your body recovers from the stresses of the day while you are sleeping.  Get support. Your family, friends, and community can make a difference in how you experience stress.  Limit your news feed. Avoid or limit time on social media or news that may make you feel stressed.  Do something active. Exercise or activity can help reduce stress. Walking is a great way to get started.  Where can you learn more?  Go to https://www.[employer].net/patiented  Enter N032 in the search box to learn more about \"Learning About Stress.\"  Current as of: February 26, 2023               Content Version: 13.8    6583-2706 [employer].   Care instructions adapted under license by your healthcare professional. If you have questions about a medical condition or this instruction, always ask your healthcare professional. [employer], [employer] disclaims any warranty or liability for your use of this information.      "

## 2024-03-01 NOTE — PROGRESS NOTES
Preventive Care Visit  [facility]  Cara Clements MD, Family Medicine  Mar 1, 2024    Assessment & Plan     Routine general medical examination at a health care facility  Discussed on regular exercises, daily calcium intake, healthy eating, and routine dental checks      Mild intermittent asthma without complication  Stable,, continue with albuterol inhaler as needed  Recheck in 1 year or sooner if needed  - albuterol (PROAIR HFA/PROVENTIL HFA/VENTOLIN HFA) 108 (90 Base) MCG/ACT inhaler; Inhale 2 puffs into the lungs every 4 hours as needed for shortness of breath or wheezing Profile Rx  - Asthma Action Plan (AAP)    Essential hypertension with goal blood pressure less than 140/90  BP Readings from Last 6 Encounters:   03/01/24 116/82   05/17/23 132/80   02/20/23 125/83   01/17/23 120/86   11/11/22 (!) 140/85   11/03/22 (!) 153/101     Blood pressure is at goal, continue with current dose of Zestoretic , Will follow low salt diet, weight loss and regular exercises.    - lisinopril-hydrochlorothiazide (ZESTORETIC) 10-12.5 MG tablet; Take 1 tablet by mouth daily    Hyperlipidemia LDL goal <130  LDL Cholesterol Calculated   Date Value Ref Range Status   02/28/2024 95 <=100 mg/dL Final   09/18/2020 141 (H) <100 mg/dL Final     Comment:     Above desirable:  100-129 mg/dl  Borderline High:  130-159 mg/dL  High:             160-189 mg/dL  Very high:       >189 mg/dl       LDL is at goal, continue current dose of pravastatin, recheck in 1 year or sooner if needed  - pravastatin (PRAVACHOL) 20 MG tablet; Take 1 tablet (20 mg) by mouth every evening    Sleep disorder  Patient has not had a sleep study done yet  She understands to get it done this year  Prescription for temazepam refill given to use sparingly  - Adult Sleep Eval & Management  Referral; Future  - temazepam (RESTORIL) 15 MG capsule; TAKE 1 TO 2 CAPSULES BY MOUTH NIGHTLY AS NEEDED FOR SLEEP    Snoring  as above  - Adult  "Sleep Eval & Management  Referral; Future    Chronic constipation  Recommended to start using MiraLAX daily or every other day  - polyethylene glycol (MIRALAX) 17 GM/Dose powder; Take 17 g (1 Capful) by mouth daily      History of ovarian cyst  Status post laparoscopic right salpingo-oophorectomy and left salpingectomy on 11/1/2022  Patient is worried about her ongoing abdominal bloating and recurrence of cyst  Will get a pelvic scan for further evaluation  - US Pelvic Complete with Transvaginal; Future    Cervical cancer screening    - Pap Screen with HPV - recommended age 30 - 65 years    MS (multiple sclerosis) (H)  Continue to follow-up with neurology, on glatiramer acetate injection  - Vitamin D Deficiency    Elevated BUN  Last Comprehensive Metabolic Panel:  Lab Results   Component Value Date     02/28/2024    POTASSIUM 4.1 02/28/2024    CHLORIDE 101 02/28/2024    CO2 27 02/28/2024    ANIONGAP 12 02/28/2024    GLC 90 02/28/2024    BUN 23.8 (H) 02/28/2024    CR 0.90 02/28/2024    GFRESTIMATED 76 02/28/2024    MANUEL 10.0 02/28/2024       Reviewed BMP showing normal creatinine, GFR but elevated BUN likely from dehydration  Patient is on Zestoretic emphasized on pushing fluids, avoid NSAID use, recheck labs in 2 to 3 months while nonfasting to see the trend    - Renal panel (Alb, BUN, Ca, Cl, CO2, Creat, Gluc, Phos, K, Na); Future    Vitamin D deficiency  On vitamin D 2000 units daily  - Vitamin D Deficiency    Screening for thyroid disorder  Patient complaining of hot flashes at night  Screen for thyroid disorder  - TSH with free T4 reflex    Menopausal syndrome (hot flashes)  Mild symptoms, continue to monitor      Review of the result(s) of each unique test - CBC, CMP, lipid panel, urine microalbumin colonoscopy, mammogram, pelvic scan        BMI  Estimated body mass index is 26.58 kg/m  as calculated from the following:    Height as of this encounter: 1.575 m (5' 2\").    Weight as of this " encounter: 65.9 kg (145 lb 5 oz).   Weight management plan: Discussed healthy diet and exercise guidelines    Counseling  Appropriate preventive services were discussed with this patient, including applicable screening as appropriate for fall prevention, nutrition, physical activity, Tobacco-use cessation, weight loss and cognition.  Checklist reviewing preventive services available has been given to the patient.  Reviewed patient's diet, addressing concerns and/or questions.   She is at risk for lack of exercise and has been provided with information to increase physical activity for the benefit of her well-being.       Work on weight loss  Regular exercise  Chart documentation done in part with Dragon Voice recognition Software. Although reviewed after completion, some word and grammatical error may remain.    See Patient Instructions    Natali Meier is a 53 year old, presenting for the following:  Physical        3/1/2024     7:16 AM   Additional Questions   Roomed by Liliane COCHRAN   Accompanied by self         3/1/2024     7:16 AM   Patient Reported Additional Medications   Patient reports taking the following new medications None       Health Care Directive  Patient does not have a Health Care Directive or Living Will: Discussed advance care planning with patient; however, patient declined at this time.    Healthy Habits:     Getting at least 3 servings of Calcium per day:  NO    Bi-annual eye exam:  Yes    Dental care twice a year:  Yes    Sleep apnea or symptoms of sleep apnea:  None    Diet:  Regular (no restrictions)    Frequency of exercise:  1 day/week    Duration of exercise:  15-30 minutes    Taking medications regularly:  Yes    Barriers to taking medications:  None    Medication side effects:  None    Additional concerns today:  Yes (Rash on the right side)        Hyperlipidemia Follow-Up    Are you regularly taking any medication or supplement to lower your cholesterol?   Yes- pravastatin  Are you  having muscle aches or other side effects that you think could be caused by your cholesterol lowering medication?  No    Hypertension Follow-up    Do you check your blood pressure regularly outside of the clinic? No   Are you following a low salt diet? Yes  Are your blood pressures ever more than 140 on the top number (systolic) OR more   than 90 on the bottom number (diastolic), for example 140/90?  N/A    Asthma Follow-Up    Was ACT completed today?  Yes        3/1/2024     7:19 AM   ACT Total Scores   ACT TOTAL SCORE (Goal Greater than or Equal to 20) 25   In the past 12 months, how many times did you visit the emergency room for your asthma without being admitted to the hospital? 0   In the past 12 months, how many times were you hospitalized overnight because of your asthma? 0        How many days per week do you miss taking your asthma controller medication?  I do not have an asthma controller medication  Please describe any recent triggers for your asthma: upper respiratory infections  Have you had any Emergency Room Visits, Urgent Care Visits, or Hospital Admissions since your last office visit?  No  Medication Followup of temazepam as needed for sleep  Taking Medication as prescribed: yes  Side Effects:  None  Medication Helping Symptoms:  yes        3/1/2024   General Health   How would you rate your overall physical health? Good   Feel stress (tense, anxious, or unable to sleep) To some extent   (!) STRESS CONCERN      3/1/2024   Nutrition   Three or more servings of calcium each day? Yes   Diet: Regular (no restrictions)   How many servings of fruit and vegetables per day? (!) 2-3   How many sweetened beverages each day? 0-1         3/1/2024   Exercise   Days per week of moderate/strenous exercise 1 day   (!) EXERCISE CONCERN      3/1/2024   Social Factors   Frequency of gathering with friends or relatives Once a week   Worry food won't last until get money to buy more No   Food not last or not have  enough money for food? No   Do you have housing?  Yes   Are you worried about losing your housing? No   Lack of transportation? No   Unable to get utilities (heat,electricity)? Yes   Want help with housing or utility concern? No   (!) FINANCIAL RESOURCE STRAIN CONCERN      3/1/2024   Fall Risk   Fallen 2 or more times in the past year? Yes   Trouble with walking or balance? Yes           3/1/2024   Dental   Dentist two times every year? Yes            Today's PHQ-2 Score:       3/1/2024     7:17 AM   PHQ-2 ( 1999 [employer])   Q1: Little interest or pleasure in doing things 0   Q2: Feeling down, depressed or hopeless 0   PHQ-2 Score 0   Q1: Little interest or pleasure in doing things Not at all   Q2: Feeling down, depressed or hopeless Not at all   PHQ-2 Score 0           3/1/2024   Substance Use   Alcohol more than 3/day or more than 7/wk No   Do you use any other substances recreationally? No     Social History     Tobacco Use    Smoking status: Never     Passive exposure: Never    Smokeless tobacco: Never   Vaping Use    Vaping Use: Never used   Substance Use Topics    Alcohol use: Yes     Alcohol/week: 1.0 - 2.0 standard drink of alcohol     Comment: RARELY    Drug use: No           2/9/2024   LAST FHS-7 RESULTS   1st degree relative breast or ovarian cancer No   Any relative bilateral breast cancer No   Any male have breast cancer No   Any ONE woman have BOTH breast AND ovarian cancer No   Any woman with breast cancer before 50yrs No   2 or more relatives with breast AND/OR ovarian cancer No   2 or more relatives with breast AND/OR bowel cancer No        Mammogram Screening - Mammogram every 1-2 years updated in Health Maintenance based on mutual decision making        3/1/2024   STI Screening   New sexual partner(s) since last STI/HIV test? No     History of abnormal Pap smear: NO - age 30-65 PAP every 5 years with negative HPV co-testing recommended        Latest Ref Rng & Units 9/18/2020     8:18 AM 9/18/2020      8:11 AM 10/13/2017     8:20 AM   PAP / HPV   PAP (Historical)   NIL     HPV 16 DNA NEG^Negative Negative   Negative    HPV 18 DNA NEG^Negative Negative   Negative    Other HR HPV NEG^Negative Negative   Negative      ASCVD Risk   The 10-year ASCVD risk score (Dean PORTER, et al., 2019) is: 1.7%    Values used to calculate the score:      Age: 53 years      Sex: Female      Is Non- : No      Diabetic: No      Tobacco smoker: No      Systolic Blood Pressure: 116 mmHg      Is BP treated: Yes      HDL Cholesterol: 43 mg/dL      Total Cholesterol: 153 mg/dL           Reviewed and updated as needed this visit by Provider                    Past Medical History:   Diagnosis Date    HTN (hypertension)     Hypercholesterolemia     Mild intermittent asthma     MS (multiple sclerosis) (H)      Past Surgical History:   Procedure Laterality Date    APPENDECTOMY      COLONOSCOPY WITH CO2 INSUFFLATION N/A 2022    Procedure: COLONOSCOPY, WITH CO2 INSUFFLATION;  Surgeon: Hernan Vasquez MD;  Location: [facility] OR    HC TOOTH EXTRACTION W/FORCEP      LAPAROSCOPIC SALPINGO-OOPHORECTOMY Bilateral 2022    Procedure: LAPAROSCOPIC RIGHT SALPINGO-OOPHORECTOMY, LEFT SALPINGECTOMY;  Surgeon: Savana Valle MD;  Location: [facility] OR     OB History    Para Term  AB Living   3 3 3 0 0 3   SAB IAB Ectopic Multiple Live Births   0 0 0 0 0      # Outcome Date GA Lbr Amador/2nd Weight Sex Delivery Anes PTL Lv   3 Term     M       2 Term     F       1 Term     F         Lab work is in process  Labs reviewed in EPIC  BP Readings from Last 3 Encounters:   24 116/82   23 132/80   23 125/83    Wt Readings from Last 3 Encounters:   24 65.9 kg (145 lb 5 oz)   23 64.4 kg (142 lb)   23 64.4 kg (142 lb)                  Patient Active Problem List   Diagnosis    MS (multiple sclerosis) (H)    CARDIOVASCULAR SCREENING; LDL GOAL LESS THAN 160    BMI  26.0-26.9,adult    Urinary urgency    Headache    Low back pain without sciatica, unspecified back pain laterality    Sleep disorder    Plantar warts    Essential hypertension with goal blood pressure less than 140/90    Hemorrhoids, unspecified hemorrhoid type    Seborrheic keratosis    Mild intermittent asthma    Hypoactive sexual desire    Menopause    Tinea pedis of right foot    Vitamin D deficiency    Acute appendicitis    Elevated BUN    History of ovarian cyst    Snoring    Hyperlipidemia LDL goal <130    Chronic constipation     Past Surgical History:   Procedure Laterality Date    APPENDECTOMY      COLONOSCOPY WITH CO2 INSUFFLATION N/A 08/05/2022    Procedure: COLONOSCOPY, WITH CO2 INSUFFLATION;  Surgeon: Hernan Vasquez MD;  Location:  OR     TOOTH EXTRACTION W/FORCEP      LAPAROSCOPIC SALPINGO-OOPHORECTOMY Bilateral 11/11/2022    Procedure: LAPAROSCOPIC RIGHT SALPINGO-OOPHORECTOMY, LEFT SALPINGECTOMY;  Surgeon: Savana Valle MD;  Location: [facility]       Social History     Tobacco Use    Smoking status: Never     Passive exposure: Never    Smokeless tobacco: Never   Substance Use Topics    Alcohol use: Yes     Alcohol/week: 1.0 - 2.0 standard drink of alcohol     Comment: RARELY     Family History   Problem Relation Age of Onset    Hypertension Mother     Lipids Mother     Hyperlipidemia Mother     Eye Disorder Father     Hyperlipidemia Father     Sleep Apnea Father     Skin Cancer Father     Coronary Artery Disease Sister     Hyperlipidemia Sister     Myocardial Infarction Sister     Cerebrovascular Disease Maternal Grandmother     Cerebrovascular Disease Maternal Grandfather     Prostate Cancer Maternal Grandfather     Blood Disease Paternal Grandmother     Alzheimer Disease Paternal Grandfather     Pulmonary Embolism Daughter     Multiple Sclerosis Cousin          Current Outpatient Medications   Medication Sig Dispense Refill    acetaminophen (TYLENOL) 325 MG tablet Take 2 tablets (650 mg)  by mouth every 6 hours as needed for mild pain 24 tablet 0    albuterol (PROAIR HFA/PROVENTIL HFA/VENTOLIN HFA) 108 (90 Base) MCG/ACT inhaler Inhale 2 puffs into the lungs every 4 hours as needed for shortness of breath or wheezing Profile Rx 8.5 g 3    Cholecalciferol (VITAMIN D3 PO) Take 5,000 Units by mouth daily      glatiramer acetate 40 MG/ML injection INJECT 40 MG UNDER THE SKIN THREE TIMES A WEEK 12 mL 11    ibuprofen (ADVIL/MOTRIN) 600 MG tablet Take 1 tablet (600 mg) by mouth every 6 hours as needed for other (mile and/or inflammatory pain.) 12 tablet 0    ketoconazole (NIZORAL) 2 % external cream Apply thin layer to the right third toe and surrounding area. 30 g 11    lisinopril-hydrochlorothiazide (ZESTORETIC) 10-12.5 MG tablet Take 1 tablet by mouth daily 90 tablet 3    MULTIPLE VITAMIN PO Take 1 tablet by mouth daily.      polyethylene glycol (MIRALAX) 17 GM/Dose powder Take 17 g (1 Capful) by mouth daily 510 g 5    pravastatin (PRAVACHOL) 20 MG tablet Take 1 tablet (20 mg) by mouth every evening 90 tablet 3    senna-docusate (SENOKOT-S/PERICOLACE) 8.6-50 MG tablet Take 1-2 tablets by mouth 2 times daily 30 tablet 0    temazepam (RESTORIL) 15 MG capsule TAKE 1 TO 2 CAPSULES BY MOUTH NIGHTLY AS NEEDED FOR SLEEP 15 capsule 0     No Known Allergies  Recent Labs   Lab Test 02/28/24  1155 01/20/23  0734 12/14/21  1229 12/14/21  1229 09/18/20  0723 06/01/19  0803 03/15/18  1041 10/13/17  0727   LDL 95 70  --  141* 141*  --   --  113*   HDL 43* 59  --  60 56  --   --  52   TRIG 75 111  --  127 134  --   --  169*   ALT 17 30  --  26  --  22   < > 63*   CR 0.90 0.88   < > 0.94 0.82 0.85   < > 0.87   GFRESTIMATED 76 79   < > 70 84 81   < > 69   GFRESTBLACK  --   --   --   --  >90 >90   < > 84   POTASSIUM 4.1 3.9   < > 3.9 3.8 4.0   < > 3.2*   TSH  --   --   --   --   --  1.63  --  2.19    < > = values in this interval not displayed.          Review of Systems  CONSTITUTIONAL: NEGATIVE for fever, chills, change  "in weight  INTEGUMENTARY/SKIN: NEGATIVE for worrisome rashes, moles or lesions  EYES: NEGATIVE for vision changes or irritation  ENT/MOUTH: NEGATIVE for ear, mouth and throat problems  RESP: History of asthma  BREAST: NEGATIVE for masses, tenderness or discharge  CV: History of hypertension  GI: Abdominal bloating, history of chronic constipation  : NEGATIVE for frequency, dysuria, or hematuria  History of ovarian cyst  MUSCULOSKELETAL: NEGATIVE for significant arthralgias or myalgia  NEURO: History of MS  ENDOCRINE: NEGATIVE for temperature intolerance, skin/hair changes  HEME: NEGATIVE for bleeding problems  PSYCHIATRIC: NEGATIVE for changes in mood or affect     Objective    Exam  /82 (BP Location: Right arm, Patient Position: Sitting, Cuff Size: Adult Large)   Pulse 82   Temp 97.8  F (36.6  C) (Oral)   Resp 16   Ht 1.575 m (5' 2\")   Wt 65.9 kg (145 lb 5 oz)   LMP 08/01/2020   SpO2 97%   BMI 26.58 kg/m     Estimated body mass index is 26.58 kg/m  as calculated from the following:    Height as of this encounter: 1.575 m (5' 2\").    Weight as of this encounter: 65.9 kg (145 lb 5 oz).    Physical Exam  GENERAL: alert and no distress  EYES: Eyes grossly normal to inspection, PERRL and conjunctivae and sclerae normal  HENT: ear canals and TM's normal, nose and mouth without ulcers or lesions  NECK: no adenopathy, no asymmetry, masses, or scars  RESP: lungs clear to auscultation - no rales, rhonchi or wheezes  CV: regular rate and rhythm, normal S1 S2, no S3 or S4, no murmur, click or rub, no peripheral edema  ABDOMEN: soft, nontender, no hepatosplenomegaly, no masses and bowel sounds normal  MS: no gross musculoskeletal defects noted, no edema  SKIN: no suspicious lesions or rashes  NEURO: Normal strength and tone, mentation intact and speech normal  PSYCH: mentation appears normal, affect normal/bright      Signed Electronically by: Cara Clements MD    "

## 2024-03-01 NOTE — LETTER
My Asthma Action Plan    Name: Renea Trotter   YOB: 1970  Date: 3/1/2024   My doctor: Cara Clements MD   My clinic: [facility]        My Rescue Medicine:   Albuterol inhaler (Proair/Ventolin/Proventil HFA)  2-4 puffs EVERY 4 HOURS as needed. Use a spacer if recommended by your provider.   My Asthma Severity:   Intermittent / Exercise Induced  Know your asthma triggers: upper respiratory infections  upper respiratory infections          GREEN ZONE   Good Control  I feel good  No cough or wheeze  Can work, sleep and play without asthma symptoms       Take your asthma control medicine every day.     If exercise triggers your asthma, take your rescue medication  15 minutes before exercise or sports, and  During exercise if you have asthma symptoms  Spacer to use with inhaler: If you have a spacer, make sure to use it with your inhaler             YELLOW ZONE Getting Worse  I have ANY of these:  I do not feel good  Cough or wheeze  Chest feels tight  Wake up at night   Keep taking your Green Zone medications  Start taking your rescue medicine:  every 20 minutes for up to 1 hour. Then every 4 hours for 24-48 hours.  If you stay in the Yellow Zone for more than 12-24 hours, contact your doctor.  If you do not return to the Green Zone in 12-24 hours or you get worse, start taking your oral steroid medicine if prescribed by your provider.           RED ZONE Medical Alert - Get Help  I have ANY of these:  I feel awful  Medicine is not helping  Breathing getting harder  Trouble walking or talking  Nose opens wide to breathe       Take your rescue medicine NOW  If your provider has prescribed an oral steroid medicine, start taking it NOW  Call your doctor NOW  If you are still in the Red Zone after 20 minutes and you have not reached your doctor:  Take your rescue medicine again and  Call 911 or go to the emergency room right away    See your regular doctor within 2 weeks of an  Emergency Room or Urgent Care visit for follow-up treatment.          Annual Reminders:  Meet with Asthma Educator,  Flu Shot in the Fall, consider Pneumonia Vaccination for patients with asthma (aged 19 and older).    Pharmacy:    [id number] [place] [place] [facility] [address] [address] [place] [address] [facility] D[facility] - [address]  [facility] - [address]  [id number] - [place], [place] - [address] [facility] AT [address]    Electronically signed by Cara Clements MD   Date: 03/01/24                    Asthma Triggers  How To Control Things That Make Your Asthma Worse    Triggers are things that make your asthma worse.  Look at the list below to help you find your triggers and   what you can do about them. You can help prevent asthma flare-ups by staying away from your triggers.      Trigger                                                          What you can do   Cigarette Smoke  Tobacco smoke can make asthma worse. Do not allow smoking in your home, car or around you.  Be sure no one smokes at a child s day care or school.  If you smoke, ask your health care provider for ways to help you quit.  Ask family members to quit too.  Ask your health care provider for a referral to Quit Plan to help you quit smoking, or call 9-706-803-PLAN.     Colds, Flu, Bronchitis  These are common triggers of asthma. Wash your hands often.  Don t touch your eyes, nose or mouth.  Get a flu shot every year.     Dust Mites  These are tiny bugs that live in cloth or carpet. They are too small to see. Wash sheets and blankets in hot water every week.   Encase pillows and mattress in dust mite proof covers.  Avoid having carpet if you can. If you have carpet, vacuum weekly.   Use a dust mask and HEPA vacuum.   Pollen and Outdoor Mold  Some people are allergic to trees, grass, or  weed pollen, or molds. Try to keep your windows closed.  Limit time out doors when pollen count is high.   Ask you health care provider about taking medicine during allergy season.     Animal Dander  Some people are allergic to skin flakes, urine or saliva from pets with fur or feathers. Keep pets with fur or feathers out of your home.    If you can t keep the pet outdoors, then keep the pet out of your bedroom.  Keep the bedroom door closed.  Keep pets off cloth furniture and away from stuffed toys.     Mice, Rats, and Cockroaches  Some people are allergic to the waste from these pests.   Cover food and garbage.  Clean up spills and food crumbs.  Store grease in the refrigerator.   Keep food out of the bedroom.   Indoor Mold  This can be a trigger if your home has high moisture. Fix leaking faucets, pipes, or other sources of water.   Clean moldy surfaces.  Dehumidify basement if it is damp and smelly.   Smoke, Strong Odors, and Sprays  These can reduce air quality. Stay away from strong odors and sprays, such as perfume, powder, hair spray, paints, smoke incense, paint, cleaning products, candles and new carpet.   Exercise or Sports  Some people with asthma have this trigger. Be active!  Ask your doctor about taking medicine before sports or exercise to prevent symptoms.    Warm up for 5-10 minutes before and after sports or exercise.     Other Triggers of Asthma  Cold air:  Cover your nose and mouth with a scarf.  Sometimes laughing or crying can be a trigger.  Some medicines and food can trigger asthma.

## 2024-03-04 ENCOUNTER — OFFICE VISIT (OUTPATIENT)
Dept: NEUROLOGY | Facility: CLINIC | Age: 54
End: 2024-03-04
Payer: COMMERCIAL

## 2024-03-04 VITALS
DIASTOLIC BLOOD PRESSURE: 93 MMHG | HEART RATE: 82 BPM | WEIGHT: 145 LBS | SYSTOLIC BLOOD PRESSURE: 137 MMHG | HEIGHT: 62 IN | BODY MASS INDEX: 26.68 KG/M2

## 2024-03-04 DIAGNOSIS — G47.00 INSOMNIA, UNSPECIFIED TYPE: ICD-10-CM

## 2024-03-04 DIAGNOSIS — G35 MS (MULTIPLE SCLEROSIS) (H): Primary | ICD-10-CM

## 2024-03-04 DIAGNOSIS — K59.00 CONSTIPATION, UNSPECIFIED CONSTIPATION TYPE: ICD-10-CM

## 2024-03-04 PROCEDURE — 99214 OFFICE O/P EST MOD 30 MIN: CPT | Performed by: PSYCHIATRY & NEUROLOGY

## 2024-03-04 RX ORDER — CALCIUM CARBONATE 750 MG/1
750 TABLET, CHEWABLE ORAL DAILY
COMMUNITY

## 2024-03-04 RX ORDER — BIOTIN 10000 MCG
CAPSULE ORAL
COMMUNITY

## 2024-03-04 NOTE — PATIENT INSTRUCTIONS
Continue glatiramer acetate injections    2.   For constipation, I recommend:  -Consume at least 64 ounces of water daily    -Increased fiber intake (6-8 servings of fruits and vegetables, fiber supplement as needed)    -Consider adding docusate (Colace) once to twice daily    3. MRI scans of the brain and cervical spine in one year and return to clinic after

## 2024-03-04 NOTE — Clinical Note
"    3/4/2024         RE: Renea Trotter  [address] [facility] [address]        Dear Colleague,    Thank you for referring your patient, Renea Trotter, to the [facility]UROLOG[facility] [place]. Please see a copy of my visit note below.    Referral source: Established patient    Chief complaint: Multiple sclerosis    History of the Present Illness: Ms. Renea Trotter is a 53 year old right-handed woman who presents to the Multiple Sclerosis Clinic today for a scheduled follow up visit regarding her diagnosis of multiple sclerosis.    The patient's history is as per my previous notes. She initially developed symptoms of demyelinating disease in 2003 when she developed some numbness of the right hand and difficulty with the right leg that was noticeable with walking. Subsequently, she did well until 2011 when she had recurrent numbness in the right hand. MRI and CSF studies at that time were suggestive of demyelinating disease of the type seen in multiple sclerosis. She has been on disease modifying therapy with glatiramer acetate for more than 10 years and MRI imaging has remained stable, most recently in early 2022.     Today, she describes her overall status is \"pretty much the same\".  Right-sided weakness fluctuates somewhat, but is always present to some degree.  She does not think that there is anything that she cannot do now that she would have been able to do 1 to 2 years ago.    Her sleep continues to be poor.  The primary issue is that she will wake up, for unclear reasons, and not be able to get back to sleep.  A sleep study was previously recommended for her after she was seen in the sleep clinic, but she still has not done that.    She has also been struggling with constipation and her primary care provider recently advised her to add polyethylene glycol (MiraLAX) powder.    PHYSICAL EXAMINATION:  VITAL SIGNS: Blood pressure 137/93; pulse 82; weight 6520 kg; height 1.58 " m.  GENERAL: Well-nourished woman who presents to the examination alone, awake and alert and in no acute distress.    NEUROLOGIC EXAMINATION:  CRANIAL NERVES: Visual fields are full to confrontation.  Extraocular movements are intact with no internuclear ophthalmoplegia.  Facial strength is normal.  Palate elevation and tongue protrusion are normal.  POWER: There is weakness in an upper motor neuron pattern in the right upper and lower limbs, with wrist extensors grading 4+, finger extensors and finger interossei 4, and hip flexors and anterior tibialis 4+.  Strength is normal on the left.  REFLEXES: Reflexes are increased in the right arm and leg.  MOTOR/CEREBELLAR: There is no appendicular ataxia on finger-to-nose testing.  Rapid alternating movements are mildly to moderately slowed in the right hand and fingers. There is no pronator drift in the arms.  GAIT: The patient is able to ambulate on a flat, level surface with no gross loss of postural stability, and able to walk on heels and toes. Tandem gait is moderately impaired for age.    Assessment/plan:    1. Multiple sclerosis  The patient is clinically stable as regards any evidence of active inflammatory demyelination on current disease modifying therapy with glatiramer acetate, which she will continue.  I am going to see her back in 1 year for a review with MRI scans of the brain and cervical spine to be performed prior to that visit in order to monitor the radiologic stability of her condition.    2. Insomnia  She is currently being prescribed temazepam as needed.  We briefly discussed adding another sleep aid that she can take more consistently, such as trazodone.  First, however, I would recommend proceeding with sleep study to rule out sleep disordered breathing as previously recommended by our colleagues in sleep medicine.    3. Constipation  I outlined a stepwise management strategy for constipation.  First, she should make sure to take in a minimum of  64 ounces of fluids daily.  She should increase fiber intake, ideally through consuming 6-8 servings of fruits and vegetables daily.  An over-the-counter fiber supplement can also be used if this is not feasible.  I would also consider adding docusate (stool softener) once to twice daily.  Polyethylene glycol powder can also be used daily if she continues to have symptoms despite the above interventions.    I spent a total of 33 minutes on patient care activities related to this encounter on the date of service, including time spent in reviewing the chart, obtaining history and examination from, and in counseling the patient.      Again, thank you for allowing me to participate in the care of your patient.        Sincerely,        David Herrera MD

## 2024-03-04 NOTE — LETTER
"3/4/2024      RE: Renea Trotter  [address] [facility] [address]     Referral source: Established patient    Chief complaint: Multiple sclerosis    History of the Present Illness: Ms. Renea Trotter is a 53 year old right-handed woman who presents to the Multiple Sclerosis Clinic today for a scheduled follow up visit regarding her diagnosis of multiple sclerosis.    The patient's history is as per my previous notes. She initially developed symptoms of demyelinating disease in 2003 when she developed some numbness of the right hand and difficulty with the right leg that was noticeable with walking. Subsequently, she did well until 2011 when she had recurrent numbness in the right hand. MRI and CSF studies at that time were suggestive of demyelinating disease of the type seen in multiple sclerosis. She has been on disease modifying therapy with glatiramer acetate for more than 10 years and MRI imaging has remained stable, most recently in early 2022.     Today, she describes her overall status is \"pretty much the same\".  Right-sided weakness fluctuates somewhat, but is always present to some degree.  She does not think that there is anything that she cannot do now that she would have been able to do 1 to 2 years ago.    Her sleep continues to be poor.  The primary issue is that she will wake up, for unclear reasons, and not be able to get back to sleep.  A sleep study was previously recommended for her after she was seen in the sleep clinic, but she still has not done that.    She has also been struggling with constipation and her primary care provider recently advised her to add polyethylene glycol (MiraLAX) powder.    PHYSICAL EXAMINATION:  VITAL SIGNS: Blood pressure 137/93; pulse 82; weight 6520 kg; height 1.58 m.  GENERAL: Well-nourished woman who presents to the examination alone, awake and alert and in no acute distress.    NEUROLOGIC EXAMINATION:  CRANIAL NERVES: Visual fields are full to " confrontation.  Extraocular movements are intact with no internuclear ophthalmoplegia.  Facial strength is normal.  Palate elevation and tongue protrusion are normal.  POWER: There is weakness in an upper motor neuron pattern in the right upper and lower limbs, with wrist extensors grading 4+, finger extensors and finger interossei 4, and hip flexors and anterior tibialis 4+.  Strength is normal on the left.  REFLEXES: Reflexes are increased in the right arm and leg.  MOTOR/CEREBELLAR: There is no appendicular ataxia on finger-to-nose testing.  Rapid alternating movements are mildly to moderately slowed in the right hand and fingers. There is no pronator drift in the arms.  GAIT: The patient is able to ambulate on a flat, level surface with no gross loss of postural stability, and able to walk on heels and toes. Tandem gait is moderately impaired for age.    Assessment/plan:    1. Multiple sclerosis  The patient is clinically stable as regards any evidence of active inflammatory demyelination on current disease modifying therapy with glatiramer acetate, which she will continue.  I am going to see her back in 1 year for a review with MRI scans of the brain and cervical spine to be performed prior to that visit in order to monitor the radiologic stability of her condition.    2. Insomnia  She is currently being prescribed temazepam as needed.  We briefly discussed adding another sleep aid that she can take more consistently, such as trazodone.  First, however, I would recommend proceeding with sleep study to rule out sleep disordered breathing as previously recommended by our colleagues in sleep medicine.    3. Constipation  I outlined a stepwise management strategy for constipation.  First, she should make sure to take in a minimum of 64 ounces of fluids daily.  She should increase fiber intake, ideally through consuming 6-8 servings of fruits and vegetables daily.  An over-the-counter fiber supplement can also be  used if this is not feasible.  I would also consider adding docusate (stool softener) once to twice daily.  Polyethylene glycol powder can also be used daily if she continues to have symptoms despite the above interventions.    I spent a total of 33 minutes on patient care activities related to this encounter on the date of service, including time spent in reviewing the chart, obtaining history and examination from, and in counseling the patient.    David Herrera MD   of Neurology  [facility]    Cc:  Cara Clements MD (PCP)  Patient

## 2024-03-04 NOTE — NURSING NOTE
"Renea Trotter's goals for this visit include:   Chief Complaint   Patient presents with    RECHECK      1 Year follow up       She requests these members of her care team be copied on today's visit information: yes    PCP: Cara Clements    Referring Provider:  No referring provider defined for this encounter.    BP (!) 137/93   Pulse 82   Ht 1.575 m (5' 2\")   Wt 65.8 kg (145 lb)   LMP 08/01/2020   BMI 26.52 kg/m      Do you need any medication refills at today's visit? No    DESIREE Barrios, CMA ([facility])        "

## 2024-03-06 LAB
BKR LAB AP GYN ADEQUACY: NORMAL
BKR LAB AP GYN INTERPRETATION: NORMAL
BKR LAB AP HPV REFLEX: NORMAL
BKR LAB AP PREVIOUS ABNORMAL: NORMAL
PATH REPORT.COMMENTS IMP SPEC: NORMAL
PATH REPORT.COMMENTS IMP SPEC: NORMAL
PATH REPORT.RELEVANT HX SPEC: NORMAL

## 2024-03-07 LAB
HUMAN PAPILLOMA VIRUS 16 DNA: NEGATIVE
HUMAN PAPILLOMA VIRUS 18 DNA: NEGATIVE
HUMAN PAPILLOMA VIRUS FINAL DIAGNOSIS: NORMAL
HUMAN PAPILLOMA VIRUS OTHER HR: NEGATIVE

## 2024-03-15 ENCOUNTER — ANCILLARY PROCEDURE (OUTPATIENT)
Dept: ULTRASOUND IMAGING | Facility: CLINIC | Age: 54
End: 2024-03-15
Attending: FAMILY MEDICINE
Payer: COMMERCIAL

## 2024-03-15 DIAGNOSIS — Z87.42 HISTORY OF OVARIAN CYST: ICD-10-CM

## 2024-03-15 PROCEDURE — 76856 US EXAM PELVIC COMPLETE: CPT | Performed by: STUDENT IN AN ORGANIZED HEALTH CARE EDUCATION/TRAINING PROGRAM

## 2024-03-15 PROCEDURE — 76830 TRANSVAGINAL US NON-OB: CPT | Performed by: STUDENT IN AN ORGANIZED HEALTH CARE EDUCATION/TRAINING PROGRAM

## 2024-03-15 NOTE — RESULT ENCOUNTER NOTE
Shantell Trotter,    Attached are your test results.  (I am covering for Dr Clements  who is away from the office today.)  Your ultrasound showed multiple fibroids   However does not appear much change but good news is no ovarian cysts noted      Please contact us if you have any questions.    Milana Mcgregor, CNP

## 2024-03-16 ENCOUNTER — MYC MEDICAL ADVICE (OUTPATIENT)
Dept: FAMILY MEDICINE | Facility: CLINIC | Age: 54
End: 2024-03-16
Payer: COMMERCIAL

## 2024-03-18 NOTE — TELEPHONE ENCOUNTER
Routing to provider to review and advise.     Leidy Mosley, GABRIELAN, RN   [facility]

## 2024-03-25 NOTE — PROGRESS NOTES
"Referral source: Established patient    Chief complaint: Multiple sclerosis    History of the Present Illness: Ms. Renea Trotter is a 53 year old right-handed woman who presents to the Multiple Sclerosis Clinic today for a scheduled follow up visit regarding her diagnosis of multiple sclerosis.    The patient's history is as per my previous notes. She initially developed symptoms of demyelinating disease in 2003 when she developed some numbness of the right hand and difficulty with the right leg that was noticeable with walking. Subsequently, she did well until 2011 when she had recurrent numbness in the right hand. MRI and CSF studies at that time were suggestive of demyelinating disease of the type seen in multiple sclerosis. She has been on disease modifying therapy with glatiramer acetate for more than 10 years and MRI imaging has remained stable, most recently in early 2022.     Today, she describes her overall status is \"pretty much the same\".  Right-sided weakness fluctuates somewhat, but is always present to some degree.  She does not think that there is anything that she cannot do now that she would have been able to do 1 to 2 years ago.    Her sleep continues to be poor.  The primary issue is that she will wake up, for unclear reasons, and not be able to get back to sleep.  A sleep study was previously recommended for her after she was seen in the sleep clinic, but she still has not done that.    She has also been struggling with constipation and her primary care provider recently advised her to add polyethylene glycol (MiraLAX) powder.    PHYSICAL EXAMINATION:  VITAL SIGNS: Blood pressure 137/93; pulse 82; weight 6520 kg; height 1.58 m.  GENERAL: Well-nourished woman who presents to the examination alone, awake and alert and in no acute distress.    NEUROLOGIC EXAMINATION:  CRANIAL NERVES: Visual fields are full to confrontation.  Extraocular movements are intact with no internuclear ophthalmoplegia.  " Facial strength is normal.  Palate elevation and tongue protrusion are normal.  POWER: There is weakness in an upper motor neuron pattern in the right upper and lower limbs, with wrist extensors grading 4+, finger extensors and finger interossei 4, and hip flexors and anterior tibialis 4+.  Strength is normal on the left.  REFLEXES: Reflexes are increased in the right arm and leg.  MOTOR/CEREBELLAR: There is no appendicular ataxia on finger-to-nose testing.  Rapid alternating movements are mildly to moderately slowed in the right hand and fingers. There is no pronator drift in the arms.  GAIT: The patient is able to ambulate on a flat, level surface with no gross loss of postural stability, and able to walk on heels and toes. Tandem gait is moderately impaired for age.    Assessment/plan:    1. Multiple sclerosis  The patient is clinically stable as regards any evidence of active inflammatory demyelination on current disease modifying therapy with glatiramer acetate, which she will continue.  I am going to see her back in 1 year for a review with MRI scans of the brain and cervical spine to be performed prior to that visit in order to monitor the radiologic stability of her condition.    2. Insomnia  She is currently being prescribed temazepam as needed.  We briefly discussed adding another sleep aid that she can take more consistently, such as trazodone.  First, however, I would recommend proceeding with sleep study to rule out sleep disordered breathing as previously recommended by our colleagues in sleep medicine.    3. Constipation  I outlined a stepwise management strategy for constipation.  First, she should make sure to take in a minimum of 64 ounces of fluids daily.  She should increase fiber intake, ideally through consuming 6-8 servings of fruits and vegetables daily.  An over-the-counter fiber supplement can also be used if this is not feasible.  I would also consider adding docusate (stool softener) once  to twice daily.  Polyethylene glycol powder can also be used daily if she continues to have symptoms despite the above interventions.    I spent a total of 33 minutes on patient care activities related to this encounter on the date of service, including time spent in reviewing the chart, obtaining history and examination from, and in counseling the patient.

## 2024-03-27 ENCOUNTER — TELEPHONE (OUTPATIENT)
Dept: FAMILY MEDICINE | Facility: CLINIC | Age: 54
End: 2024-03-27
Payer: COMMERCIAL

## 2024-03-27 NOTE — TELEPHONE ENCOUNTER
Patient Quality Outreach    Patient is due for the following:   Hypertension -  BP check    Next Steps:   Pt was seen recently.    Type of outreach:    Chart review performed, no outreach needed.      Questions for provider review:    None           Jordyn Anderson

## 2024-05-10 ENCOUNTER — LAB (OUTPATIENT)
Dept: LAB | Facility: CLINIC | Age: 54
End: 2024-05-10
Payer: COMMERCIAL

## 2024-05-10 DIAGNOSIS — R79.9 ELEVATED BUN: ICD-10-CM

## 2024-05-10 LAB
ALBUMIN SERPL BCG-MCNC: 4.7 G/DL (ref 3.5–5.2)
ANION GAP SERPL CALCULATED.3IONS-SCNC: 10 MMOL/L (ref 7–15)
BUN SERPL-MCNC: 30.3 MG/DL (ref 6–20)
CALCIUM SERPL-MCNC: 9.9 MG/DL (ref 8.6–10)
CHLORIDE SERPL-SCNC: 101 MMOL/L (ref 98–107)
CREAT SERPL-MCNC: 0.86 MG/DL (ref 0.51–0.95)
DEPRECATED HCO3 PLAS-SCNC: 29 MMOL/L (ref 22–29)
EGFRCR SERPLBLD CKD-EPI 2021: 80 ML/MIN/1.73M2
GLUCOSE SERPL-MCNC: 92 MG/DL (ref 70–99)
PHOSPHATE SERPL-MCNC: 4.2 MG/DL (ref 2.5–4.5)
POTASSIUM SERPL-SCNC: 4.2 MMOL/L (ref 3.4–5.3)
SODIUM SERPL-SCNC: 140 MMOL/L (ref 135–145)

## 2024-05-10 PROCEDURE — 80069 RENAL FUNCTION PANEL: CPT

## 2024-05-10 PROCEDURE — 36415 COLL VENOUS BLD VENIPUNCTURE: CPT

## 2024-05-13 NOTE — RESULT ENCOUNTER NOTE
Basil Meier,  Your recent labs showed normal electrolytes, creatinine.  Your blood urine nitrogen is elevated again slightly worse from before.  I would recommend to follow-up in 3 months for recheck.  We will consider switching blood pressure medications at that time if this gets worse  Continue with good hydration.   Let me know if you have any questions. Take care.  Cara Clements MD

## 2024-05-23 NOTE — PATIENT INSTRUCTIONS
Patient Education       Proper skin care from [facility]:    -Eliminate harsh soaps as they strip the natural oils from the skin, often resulting in dry itchy skin ( i.e. Dial, Zest, Italian Spring)  -Use mild soaps such as Cetaphil or Dove Sensitive Skin in the shower. You do not need to use soap on arms, legs, and trunk every time you shower unless visibly soiled.   -Avoid hot or cold showers.  -After showering, lightly dry off and apply moisturizing within 2-3 minutes. This will help trap moisture in the skin.   -Aggressive use of a moisturizer at least 1-2 times a day to the entire body (including -Vanicream, Cetaphil, Aquaphor or Cerave) and moisturize hands after every washing.  -We recommend using moisturizers that come in a tub that needs to be scooped out, not a pump. This has more of an oil base. It will hold moisture in your skin much better than a water base moisturizer. The above recommended are non-pore clogging.      Wear a sunscreen with at least SPF 30 on your face, ears, neck and V of the chest daily. Wear sunscreen on other areas of the body if those areas are exposed to the sun throughout the day. Sunscreens can contain physical and/or chemical blockers. Physical blockers are less likely to clog pores, these include zinc oxide and titanium dioxide. Reapply every two hour and after swimming.     Sunscreen examples: https://www.ewg.org/sunscreen/    UV radiation  UVA radiation remains constant throughout the day and throughout the year. It is a longer wavelength than UVB and therefore penetrates deeper into the skin leading to immediate and delayed tanning, photoaging, and skin cancer. 70-80% of UVA and UVB radiation occurs between the hours of 10am-2pm.  UVB radiation  UVB radiation causes the most harmful effects and is more significant during the summer months. However, snow and ice can reflect UVB radiation leading to skin damage during the winter months as well. UVB radiation is  responsible for tanning, burning, inflammation, delayed erythema (pinkness), pigmentation (brown spots), and skin cancer.     I recommend self monthly full body exams and yearly full body exams with a dermatology provider. If you develop a new or changing lesion please follow up for examination. Most skin cancers are pink and scaly or pink and pearly. However, we do see blue/brown/black skin cancers.  Consider the ABCDEs of melanoma when giving yourself your monthly full body exam ( don't forget the groin, buttocks, feet, toes, etc). A-asymmetry, B-borders, C-color, D-diameter, E-elevation or evolving. If you see any of these changes please follow up in clinic. If you cannot see your back I recommend purchasing a hand held mirror to use with a larger wall mirror.       Checking for Skin Cancer  You can find cancer early by checking your skin each month. There are 3 kinds of skin cancer. They are melanoma, basal cell carcinoma, and squamous cell carcinoma. Doing monthly skin checks is the best way to find new marks or skin changes. Follow the instructions below for checking your skin.   The ABCDEs of checking moles for melanoma   Check your moles or growths for signs of melanoma using ABCDE:   Asymmetry: the sides of the mole or growth don t match  Border: the edges are ragged, notched, or blurred  Color: the color within the mole or growth varies  Diameter: the mole or growth is larger than 6 mm (size of a pencil eraser)  Evolving: the size, shape, or color of the mole or growth is changing (evolving is not shown in the images below)    Checking for other types of skin cancer  Basal cell carcinoma or squamous cell carcinoma have symptoms such as:     A spot or mole that looks different from all other marks on your skin  Changes in how an area feels, such as itching, tenderness, or pain  Changes in the skin's surface, such as oozing, bleeding, or scaliness  A sore that does not heal  New swelling or redness beyond  the border of a mole    Who s at risk?  Anyone can get skin cancer. But you are at greater risk if you have:   Fair skin, light-colored hair, or light-colored eyes  Many moles or abnormal moles on your skin  A history of sunburns from sunlight or tanning beds  A family history of skin cancer  A history of exposure to radiation or chemicals  A weakened immune system  If you have had skin cancer in the past, you are at risk for recurring skin cancer.   How to check your skin  Do your monthly skin checkups in front of a full-length mirror. Check all parts of your body, including your:   Head (ears, face, neck, and scalp)  Torso (front, back, and sides)  Arms (tops, undersides, upper, and lower armpits)  Hands (palms, backs, and fingers, including under the nails)  Buttocks and genitals  Legs (front, back, and sides)  Feet (tops, soles, toes, including under the nails, and between toes)  If you have a lot of moles, take digital photos of them each month. Make sure to take photos both up close and from a distance. These can help you see if any moles change over time.   Most skin changes are not cancer. But if you see any changes in your skin, call your doctor right away. Only he or she can diagnose a problem. If you have skin cancer, seeing your doctor can be the first step toward getting the treatment that could save your life.   [employer] last reviewed this educational content on 4/1/2019 2000-2020 [employer]. [address] [place] [address], [place] [address]. All rights reserved. This information is not intended as a substitute for professional medical care. Always follow your healthcare professional's instructions.       When should I call my doctor?  If you are worsening or not improving, please, contact us or seek urgent care as noted below.     Who should I call with questions (adults)?  [facility] (adult and pediatric): [phone]  [facility]  [place] (adult): [phone]  [facility] ([place], [place], [place] and [place]) [phone]  For urgent needs outside of business hours call the [facility] at [phone] and ask for the dermatology resident on call to be paged  If this is a medical emergency and you are unable to reach an ER, Call 911      If you need a prescription refill, please contact your pharmacy. Refills are approved or denied by our Physicians during normal business hours, Monday through Fridays    Per office policy, refills will not be granted if you have not been seen within the past year (or sooner depending on your condition)

## 2024-05-29 ENCOUNTER — OFFICE VISIT (OUTPATIENT)
Dept: DERMATOLOGY | Facility: CLINIC | Age: 54
End: 2024-05-29
Payer: COMMERCIAL

## 2024-05-29 DIAGNOSIS — B35.3 TINEA PEDIS OF RIGHT FOOT: Primary | ICD-10-CM

## 2024-05-29 DIAGNOSIS — D18.01 CHERRY ANGIOMA: ICD-10-CM

## 2024-05-29 DIAGNOSIS — L73.8 SEBACEOUS HYPERPLASIA: ICD-10-CM

## 2024-05-29 DIAGNOSIS — D22.9 MULTIPLE BENIGN NEVI: ICD-10-CM

## 2024-05-29 DIAGNOSIS — L81.4 LENTIGO: ICD-10-CM

## 2024-05-29 DIAGNOSIS — L82.1 SEBORRHEIC KERATOSIS: ICD-10-CM

## 2024-05-29 DIAGNOSIS — L81.2 EPHELIDES: ICD-10-CM

## 2024-05-29 PROCEDURE — 99213 OFFICE O/P EST LOW 20 MIN: CPT | Performed by: PHYSICIAN ASSISTANT

## 2024-05-29 RX ORDER — KETOCONAZOLE 20 MG/ML
SHAMPOO TOPICAL
Qty: 120 ML | Refills: 6 | Status: SHIPPED | OUTPATIENT
Start: 2024-05-29

## 2024-05-29 RX ORDER — KETOCONAZOLE 20 MG/G
CREAM TOPICAL
Qty: 60 G | Refills: 11 | Status: SHIPPED | OUTPATIENT
Start: 2024-05-29

## 2024-05-29 NOTE — LETTER
5/29/2024         RE: Renea Trotter  [id number] [facility] [address]        Dear Colleague,    Thank you for referring your patient, Renea Trotter, to the [facility]. Please see a copy of my visit note below.    Renea Trotter is an extremely pleasant 53 year old year old female patient here today for skin check. She denies any painful or bleeding skin lesions. No changing nevi. She notes recurrent tinea on right foot, improved with ketoconazole but then returns. She notes that fluconazole caused side effects. Patient has no other skin complaints today.  Remainder of the HPI, Meds, PMH, Allergies, FH, and SH was reviewed in chart.    Pertinent Hx:   History of AK  Past Medical History:   Diagnosis Date     HTN (hypertension)      Hypercholesterolemia      Mild intermittent asthma      MS (multiple sclerosis) (H)        Past Surgical History:   Procedure Laterality Date     APPENDECTOMY       COLONOSCOPY WITH CO2 INSUFFLATION N/A 08/05/2022    Procedure: COLONOSCOPY, WITH CO2 INSUFFLATION;  Surgeon: Hernan Vasquez MD;  Location:  OR      TOOTH EXTRACTION W/FORCEP       LAPAROSCOPIC SALPINGO-OOPHORECTOMY Bilateral 11/11/2022    Procedure: LAPAROSCOPIC RIGHT SALPINGO-OOPHORECTOMY, LEFT SALPINGECTOMY;  Surgeon: Savana Valle MD;  Location:  OR        Family History   Problem Relation Age of Onset     Hypertension Mother      Lipids Mother      Hyperlipidemia Mother      Eye Disorder Father      Hyperlipidemia Father      Sleep Apnea Father      Skin Cancer Father      Coronary Artery Disease Sister      Hyperlipidemia Sister      Myocardial Infarction Sister      Cerebrovascular Disease Maternal Grandmother      Cerebrovascular Disease Maternal Grandfather      Prostate Cancer Maternal Grandfather      Blood Disease Paternal Grandmother      Alzheimer Disease Paternal Grandfather      Pulmonary Embolism Daughter      Multiple Sclerosis Cousin        Social  History     Socioeconomic History     Marital status:      Spouse name: Not on file     Number of children: Not on file     Years of education: Not on file     Highest education level: Not on file   Occupational History     Not on file   Tobacco Use     Smoking status: Never     Passive exposure: Never     Smokeless tobacco: Never   Vaping Use     Vaping status: Never Used   Substance and Sexual Activity     Alcohol use: Yes     Alcohol/week: 1.0 - 2.0 standard drink of alcohol     Comment: RARELY     Drug use: No     Sexual activity: Yes     Partners: Male     Birth control/protection: Condom   Other Topics Concern     Parent/sibling w/ CABG, MI or angioplasty before 65F 55M? Yes     Comment: 47 sister   Social History Narrative     Not on file     Social Determinants of Health     Financial Resource Strain: High Risk (3/1/2024)    Financial Resource Strain      Within the past 12 months, have you or your family members you live with been unable to get utilities (heat, electricity) when it was really needed?: Yes   Food Insecurity: Low Risk  (3/1/2024)    Food Insecurity      Within the past 12 months, did you worry that your food would run out before you got money to buy more?: No      Within the past 12 months, did the food you bought just not last and you didn t have money to get more?: No   Transportation Needs: Low Risk  (3/1/2024)    Transportation Needs      Within the past 12 months, has lack of transportation kept you from medical appointments, getting your medicines, non-medical meetings or appointments, work, or from getting things that you need?: No   Physical Activity: Unknown (3/1/2024)    Exercise Vital Sign      Days of Exercise per Week: 1 day      Minutes of Exercise per Session: Not on file   Stress: Stress Concern Present (3/1/2024)    [employer] [place] [employer] - Occupational Stress Questionnaire      Feeling of Stress : To some extent   Social Connections: Unknown  (3/1/2024)    Social Connection and Isolation Panel [NHANES]      Frequency of Communication with Friends and Family: Not on file      Frequency of Social Gatherings with Friends and Family: Once a week      Attends Oriental orthodox Services: Not on file      Active Member of Clubs or Organizations: Not on file      Attends Club or Organization Meetings: Not on file      Marital Status: Not on file   Interpersonal Safety: Not on file   Housing Stability: Low Risk  (3/1/2024)    Housing Stability      Do you have housing? : Yes      Are you worried about losing your housing?: No       Outpatient Encounter Medications as of 5/29/2024   Medication Sig Dispense Refill     ketoconazole (NIZORAL) 2 % external cream Apply twice daily to foot. 60 g 11     ketoconazole (NIZORAL) 2 % external shampoo Leave on for 5 minutes then rinse. Use 1-2 times weekly. 120 mL 6     acetaminophen (TYLENOL) 325 MG tablet Take 2 tablets (650 mg) by mouth every 6 hours as needed for mild pain 24 tablet 0     albuterol (PROAIR HFA/PROVENTIL HFA/VENTOLIN HFA) 108 (90 Base) MCG/ACT inhaler Inhale 2 puffs into the lungs every 4 hours as needed for shortness of breath or wheezing Profile Rx 8.5 g 3     Biotin 10 MG CAPS        calcium carbonate 750 MG CHEW Take 750 mg by mouth daily       Cholecalciferol (VITAMIN D3 PO) Take 5,000 Units by mouth daily       glatiramer acetate 40 MG/ML injection INJECT 40 MG UNDER THE SKIN THREE TIMES A WEEK 12 mL 11     ibuprofen (ADVIL/MOTRIN) 600 MG tablet Take 1 tablet (600 mg) by mouth every 6 hours as needed for other (mile and/or inflammatory pain.) 12 tablet 0     lisinopril (ZESTRIL) 20 MG tablet Take 1 tablet (20 mg) by mouth daily 30 tablet 1     MULTIPLE VITAMIN PO Take 1 tablet by mouth daily.       polyethylene glycol (MIRALAX) 17 GM/Dose powder Take 17 g (1 Capful) by mouth daily 510 g 5     pravastatin (PRAVACHOL) 20 MG tablet Take 1 tablet (20 mg) by mouth every evening 90 tablet 3     senna-docusate  (SENOKOT-S/PERICOLACE) 8.6-50 MG tablet Take 1-2 tablets by mouth 2 times daily (Patient not taking: Reported on 3/4/2024) 30 tablet 0     temazepam (RESTORIL) 15 MG capsule TAKE 1 TO 2 CAPSULES BY MOUTH NIGHTLY AS NEEDED FOR SLEEP 15 capsule 0     [DISCONTINUED] ketoconazole (NIZORAL) 2 % external cream Apply thin layer to the right third toe and surrounding area. 30 g 11     No facility-administered encounter medications on file as of 5/29/2024.             O:   NAD, WDWN, Alert & Oriented, Mood & Affect wnl, Vitals stable   Here today alone   LMP 08/01/2020    General appearance normal   Vitals stable   Alert, oriented and in no acute distress      Brown macules on face, yellow lobulated papules on face  Stuck on papules and brown macules on trunk and ext   Red papules on trunk  Brown papules and macules with regular pigment network and borders on torso and extremities  Serpiginous plaque on foot with scale      The remainder of skin exam is normal       Eyes: Conjunctivae/lids:Normal     ENT: Lips normal    MSK:Normal    Cardiovascular: peripheral edema none    Pulm: Breathing Normal    Neuro/Psych: Orientation:Alert and Orientedx3 ; Mood/Affect:normal   A/P:  1. Tinea Pedis  Apply ketoconazole cream twice daily.   Use ketoconazole shampoo use 1-2 times weekly.   2. Seborrheic keratosis, lentigo, angioma, Bengin nevi, ephelides, sebaceous hyperplasia   It was a pleasure speaking to Renea Trotter today.  BENIGN LESIONS DISCUSSED WITH PATIENT:  I discussed the specifics of tumor, prognosis, and genetics of benign lesions.  I explained that treatment of these lesions would be purely cosmetic and not medically neccessary.  I discussed with patient different removal options including excision, cautery and /or laser.      Nature and genetics of benign skin lesions dicussed with patient.  Signs and Symptoms of skin cancer discussed with patient.  ABCDEs of melanoma reviewed with patient.  Patient encouraged to  perform monthly skin exams.  UV precautions reviewed with patient.  Risks of non-melanoma skin cancer discussed with patient   Return to clinic in one year or sooner if needed.       Again, thank you for allowing me to participate in the care of your patient.        Sincerely,        Judith Yoder PA-C

## 2024-05-29 NOTE — PROGRESS NOTES
Renea Trotter is an extremely pleasant 53 year old year old female patient here today for skin check. She denies any painful or bleeding skin lesions. No changing nevi. She notes recurrent tinea on right foot, improved with ketoconazole but then returns. She notes that fluconazole caused side effects. Patient has no other skin complaints today.  Remainder of the HPI, Meds, PMH, Allergies, FH, and SH was reviewed in chart.    Pertinent Hx:   History of AK  Past Medical History:   Diagnosis Date    HTN (hypertension)     Hypercholesterolemia     Mild intermittent asthma     MS (multiple sclerosis) (H)        Past Surgical History:   Procedure Laterality Date    APPENDECTOMY      COLONOSCOPY WITH CO2 INSUFFLATION N/A 08/05/2022    Procedure: COLONOSCOPY, WITH CO2 INSUFFLATION;  Surgeon: Hernan Vasquez MD;  Location:  OR     TOOTH EXTRACTION W/FORCEP      LAPAROSCOPIC SALPINGO-OOPHORECTOMY Bilateral 11/11/2022    Procedure: LAPAROSCOPIC RIGHT SALPINGO-OOPHORECTOMY, LEFT SALPINGECTOMY;  Surgeon: Savana Valle MD;  Location:  OR        Family History   Problem Relation Age of Onset    Hypertension Mother     Lipids Mother     Hyperlipidemia Mother     Eye Disorder Father     Hyperlipidemia Father     Sleep Apnea Father     Skin Cancer Father     Coronary Artery Disease Sister     Hyperlipidemia Sister     Myocardial Infarction Sister     Cerebrovascular Disease Maternal Grandmother     Cerebrovascular Disease Maternal Grandfather     Prostate Cancer Maternal Grandfather     Blood Disease Paternal Grandmother     Alzheimer Disease Paternal Grandfather     Pulmonary Embolism Daughter     Multiple Sclerosis Cousin        Social History     Socioeconomic History    Marital status:      Spouse name: Not on file    Number of children: Not on file    Years of education: Not on file    Highest education level: Not on file   Occupational History    Not on file   Tobacco Use    Smoking status: Never      Passive exposure: Never    Smokeless tobacco: Never   Vaping Use    Vaping status: Never Used   Substance and Sexual Activity    Alcohol use: Yes     Alcohol/week: 1.0 - 2.0 standard drink of alcohol     Comment: RARELY    Drug use: No    Sexual activity: Yes     Partners: Male     Birth control/protection: Condom   Other Topics Concern    Parent/sibling w/ CABG, MI or angioplasty before 65F 55M? Yes     Comment: 47 sister   Social History Narrative    Not on file     Social Determinants of Health     Financial Resource Strain: High Risk (3/1/2024)    Financial Resource Strain     Within the past 12 months, have you or your family members you live with been unable to get utilities (heat, electricity) when it was really needed?: Yes   Food Insecurity: Low Risk  (3/1/2024)    Food Insecurity     Within the past 12 months, did you worry that your food would run out before you got money to buy more?: No     Within the past 12 months, did the food you bought just not last and you didn t have money to get more?: No   Transportation Needs: Low Risk  (3/1/2024)    Transportation Needs     Within the past 12 months, has lack of transportation kept you from medical appointments, getting your medicines, non-medical meetings or appointments, work, or from getting things that you need?: No   Physical Activity: Unknown (3/1/2024)    Exercise Vital Sign     Days of Exercise per Week: 1 day     Minutes of Exercise per Session: Not on file   Stress: Stress Concern Present (3/1/2024)    [employer] [place] [employer] - Occupational Stress Questionnaire     Feeling of Stress : To some extent   Social Connections: Unknown (3/1/2024)    Social Connection and Isolation Panel [NHANES]     Frequency of Communication with Friends and Family: Not on file     Frequency of Social Gatherings with Friends and Family: Once a week     Attends Oriental orthodox Services: Not on file     Active Member of Clubs or Organizations: Not on file      Attends Club or Organization Meetings: Not on file     Marital Status: Not on file   Interpersonal Safety: Not on file   Housing Stability: Low Risk  (3/1/2024)    Housing Stability     Do you have housing? : Yes     Are you worried about losing your housing?: No       Outpatient Encounter Medications as of 5/29/2024   Medication Sig Dispense Refill    ketoconazole (NIZORAL) 2 % external cream Apply twice daily to foot. 60 g 11    ketoconazole (NIZORAL) 2 % external shampoo Leave on for 5 minutes then rinse. Use 1-2 times weekly. 120 mL 6    acetaminophen (TYLENOL) 325 MG tablet Take 2 tablets (650 mg) by mouth every 6 hours as needed for mild pain 24 tablet 0    albuterol (PROAIR HFA/PROVENTIL HFA/VENTOLIN HFA) 108 (90 Base) MCG/ACT inhaler Inhale 2 puffs into the lungs every 4 hours as needed for shortness of breath or wheezing Profile Rx 8.5 g 3    Biotin 10 MG CAPS       calcium carbonate 750 MG CHEW Take 750 mg by mouth daily      Cholecalciferol (VITAMIN D3 PO) Take 5,000 Units by mouth daily      glatiramer acetate 40 MG/ML injection INJECT 40 MG UNDER THE SKIN THREE TIMES A WEEK 12 mL 11    ibuprofen (ADVIL/MOTRIN) 600 MG tablet Take 1 tablet (600 mg) by mouth every 6 hours as needed for other (mile and/or inflammatory pain.) 12 tablet 0    lisinopril (ZESTRIL) 20 MG tablet Take 1 tablet (20 mg) by mouth daily 30 tablet 1    MULTIPLE VITAMIN PO Take 1 tablet by mouth daily.      polyethylene glycol (MIRALAX) 17 GM/Dose powder Take 17 g (1 Capful) by mouth daily 510 g 5    pravastatin (PRAVACHOL) 20 MG tablet Take 1 tablet (20 mg) by mouth every evening 90 tablet 3    senna-docusate (SENOKOT-S/PERICOLACE) 8.6-50 MG tablet Take 1-2 tablets by mouth 2 times daily (Patient not taking: Reported on 3/4/2024) 30 tablet 0    temazepam (RESTORIL) 15 MG capsule TAKE 1 TO 2 CAPSULES BY MOUTH NIGHTLY AS NEEDED FOR SLEEP 15 capsule 0    [DISCONTINUED] ketoconazole (NIZORAL) 2 % external cream Apply thin layer to the  right third toe and surrounding area. 30 g 11     No facility-administered encounter medications on file as of 5/29/2024.             O:   NAD, WDWN, Alert & Oriented, Mood & Affect wnl, Vitals stable   Here today alone   LMP 08/01/2020    General appearance normal   Vitals stable   Alert, oriented and in no acute distress      Brown macules on face, yellow lobulated papules on face  Stuck on papules and brown macules on trunk and ext   Red papules on trunk  Brown papules and macules with regular pigment network and borders on torso and extremities  Serpiginous plaque on foot with scale      The remainder of skin exam is normal       Eyes: Conjunctivae/lids:Normal     ENT: Lips normal    MSK:Normal    Cardiovascular: peripheral edema none    Pulm: Breathing Normal    Neuro/Psych: Orientation:Alert and Orientedx3 ; Mood/Affect:normal   A/P:  1. Tinea Pedis  Apply ketoconazole cream twice daily.   Use ketoconazole shampoo use 1-2 times weekly.   2. Seborrheic keratosis, lentigo, angioma, Bengin nevi, ephelides, sebaceous hyperplasia   It was a pleasure speaking to Renea Trotter today.  BENIGN LESIONS DISCUSSED WITH PATIENT:  I discussed the specifics of tumor, prognosis, and genetics of benign lesions.  I explained that treatment of these lesions would be purely cosmetic and not medically neccessary.  I discussed with patient different removal options including excision, cautery and /or laser.      Nature and genetics of benign skin lesions dicussed with patient.  Signs and Symptoms of skin cancer discussed with patient.  ABCDEs of melanoma reviewed with patient.  Patient encouraged to perform monthly skin exams.  UV precautions reviewed with patient.  Risks of non-melanoma skin cancer discussed with patient   Return to clinic in one year or sooner if needed.

## 2024-05-31 ENCOUNTER — ALLIED HEALTH/NURSE VISIT (OUTPATIENT)
Dept: FAMILY MEDICINE | Facility: CLINIC | Age: 54
End: 2024-05-31
Payer: COMMERCIAL

## 2024-05-31 ENCOUNTER — LAB (OUTPATIENT)
Dept: LAB | Facility: CLINIC | Age: 54
End: 2024-05-31
Payer: COMMERCIAL

## 2024-05-31 VITALS — SYSTOLIC BLOOD PRESSURE: 130 MMHG | DIASTOLIC BLOOD PRESSURE: 82 MMHG

## 2024-05-31 DIAGNOSIS — R79.9 ELEVATED BUN: ICD-10-CM

## 2024-05-31 DIAGNOSIS — I10 ESSENTIAL HYPERTENSION WITH GOAL BLOOD PRESSURE LESS THAN 140/90: ICD-10-CM

## 2024-05-31 DIAGNOSIS — I10 ESSENTIAL HYPERTENSION WITH GOAL BLOOD PRESSURE LESS THAN 140/90: Primary | ICD-10-CM

## 2024-05-31 PROCEDURE — 99207 PR NO CHARGE NURSE ONLY: CPT

## 2024-05-31 PROCEDURE — 80048 BASIC METABOLIC PNL TOTAL CA: CPT

## 2024-05-31 PROCEDURE — 36415 COLL VENOUS BLD VENIPUNCTURE: CPT

## 2024-05-31 NOTE — NURSING NOTE
Renea Trotter is a 53 year old patient who comes in today for a Blood Pressure check.  Initial BP:  /82 (BP Location: Left arm, Patient Position: Chair, Cuff Size: Adult Regular)   LMP 08/01/2020      Disposition: results routed to provider

## 2024-06-01 LAB
ANION GAP SERPL CALCULATED.3IONS-SCNC: 11 MMOL/L (ref 7–15)
BUN SERPL-MCNC: 22.6 MG/DL (ref 6–20)
CALCIUM SERPL-MCNC: 9.9 MG/DL (ref 8.6–10)
CHLORIDE SERPL-SCNC: 100 MMOL/L (ref 98–107)
CREAT SERPL-MCNC: 0.74 MG/DL (ref 0.51–0.95)
DEPRECATED HCO3 PLAS-SCNC: 25 MMOL/L (ref 22–29)
EGFRCR SERPLBLD CKD-EPI 2021: >90 ML/MIN/1.73M2
GLUCOSE SERPL-MCNC: 92 MG/DL (ref 70–99)
POTASSIUM SERPL-SCNC: 4.2 MMOL/L (ref 3.4–5.3)
SODIUM SERPL-SCNC: 136 MMOL/L (ref 135–145)

## 2024-06-04 NOTE — RESULT ENCOUNTER NOTE
Basil Meier,  Your blood urine nitrogen has improved..  Your lab results on potassium, creatinine are both in normal range, this is good.  Let us continue with current medications with no change.   Let me know if you have any questions. Take care.  Cara Clements MD

## 2024-07-08 ENCOUNTER — MYC REFILL (OUTPATIENT)
Dept: FAMILY MEDICINE | Facility: CLINIC | Age: 54
End: 2024-07-08
Payer: COMMERCIAL

## 2024-07-08 DIAGNOSIS — I10 ESSENTIAL HYPERTENSION WITH GOAL BLOOD PRESSURE LESS THAN 140/90: ICD-10-CM

## 2024-07-09 RX ORDER — LISINOPRIL 20 MG/1
20 TABLET ORAL DAILY
Qty: 30 TABLET | Refills: 5 | Status: SHIPPED | OUTPATIENT
Start: 2024-07-09

## 2024-10-07 DIAGNOSIS — G35 MULTIPLE SCLEROSIS (H): ICD-10-CM

## 2024-10-07 RX ORDER — GLATIRAMER 40 MG/ML
INJECTION, SOLUTION SUBCUTANEOUS
Qty: 12 ML | Refills: 11 | Status: SHIPPED | OUTPATIENT
Start: 2024-10-07

## 2024-10-07 NOTE — CONFIDENTIAL NOTE
Medication: glatiramer acetate 40 MG/ML injection   Sig: INJECT 40 MG UNDER THE SKIN THREE TIMES A WEEK   Date last written: 10/24/23  Dispensed amount: 12  Refills: 11    Requested Pharmacy:   [address]       Pt's last office visit: 3/3/24  Next scheduled office visit: 3/3/25      Per the RN/LPN medication refill protocol, writer is able to refill this request.       Damari Rowe, RN Care Coordinator   Neurology/Neurosurgery/PM&R/ Pain Management

## 2024-10-15 ENCOUNTER — TELEPHONE (OUTPATIENT)
Dept: NEUROLOGY | Facility: CLINIC | Age: 54
End: 2024-10-15
Payer: COMMERCIAL

## 2024-10-15 NOTE — TELEPHONE ENCOUNTER
[facility] Call Center    Phone Message    May a detailed message be left on voicemail: yes     Reason for Call: Medication Question or concern regarding medication   Prescription Clarification  Name of Medication: Glatopa  Prescribing Provider: Dr. Herrera   Pharmacy: [facility]   What on the order needs clarification? Karissa from [facility] calls in stating a form was faxed to provider to either fill out & fax back or clinic can call [phone] for verbal PA for Glatopa prescribed by Dr. Herrera. Karissa states that if they do not receive a response within 48 business hours, the script will go on hold.    Karissa can be reached at [phone]      Action Taken: Message routed to:  [facility]    Travel Screening: Not Applicable

## 2024-10-15 NOTE — TELEPHONE ENCOUNTER
Writer returned call to [facility] and spoke with Leticia. Provided the verbal prescription that was signed on 10/7/24 for glatiramer acetate 40 MG/ML injection, as well as supporting clinical information. The medication was approved through 10/15/25. Case #[id number].    Writer contacted the patient to let her know that the PA was approved and her prescription should be covered through 10/15/25.     Writer contacted [facility], per patient's request to get clarification on how to receive a new injector (Whisper Jet). Dwayne will be faxing a form to the clinic at some point today for Dr. Herrera to fill out/sign. Writer attempted to call patient back to provide the update, left a detailed VM with a request for a call back.      Addendum:     Autoinjector order from was received from [facility] via fax. The form has been filled out and placed in Dr. Herrera's folder for review/signature. Once signed, the form will need to be faxed back to [facility] at [phone].      GABRIELA NarayanN RN Care Coordinator  Neurology/Neurosurgery/PM&R/Pain Management

## 2024-10-21 NOTE — TELEPHONE ENCOUNTER
Prescription for Whisper Jet autoinjection has been signed by Dr. Herrera and faxed to [facility] at [phone]. Confirmation of successful fax delivery received via RightFax.      MACK Narayan RN Care Coordinator  Neurology/Neurosurgery/PM&R/Pain Management

## 2024-11-15 NOTE — PATIENT INSTRUCTIONS
Patient Education       Proper skin care from [facility]:    -Eliminate harsh soaps as they strip the natural oils from the skin, often resulting in dry itchy skin ( i.e. Dial, Zest, [place])  -Use mild soaps such as Cetaphil or Dove Sensitive Skin in the shower. You do not need to use soap on arms, legs, and trunk every time you shower unless visibly soiled.   -Avoid hot or cold showers.  -After showering, lightly dry off and apply moisturizing within 2-3 minutes. This will help trap moisture in the skin.   -Aggressive use of a moisturizer at least 1-2 times a day to the entire body (including -Vanicream, Cetaphil, Aquaphor or Cerave) and moisturize hands after every washing.  -We recommend using moisturizers that come in a tub that needs to be scooped out, not a pump. This has more of an oil base. It will hold moisture in your skin much better than a water base moisturizer. The above recommended are non-pore clogging.      Wear a sunscreen with at least SPF 30 on your face, ears, neck and V of the chest daily. Wear sunscreen on other areas of the body if those areas are exposed to the sun throughout the day. Sunscreens can contain physical and/or chemical blockers. Physical blockers are less likely to clog pores, these include zinc oxide and titanium dioxide. Reapply every two hour and after swimming.     Sunscreen examples: https://www.ewg.org/sunscreen/    UV radiation  UVA radiation remains constant throughout the day and throughout the year. It is a longer wavelength than UVB and therefore penetrates deeper into the skin leading to immediate and delayed tanning, photoaging, and skin cancer. 70-80% of UVA and UVB radiation occurs between the hours of 10am-2pm.  UVB radiation  UVB radiation causes the most harmful effects and is more significant during the summer months. However, snow and ice can reflect UVB radiation leading to skin damage during the winter months as well. UVB radiation is  responsible for tanning, burning, inflammation, delayed erythema (pinkness), pigmentation (brown spots), and skin cancer.     I recommend self monthly full body exams and yearly full body exams with a dermatology provider. If you develop a new or changing lesion please follow up for examination. Most skin cancers are pink and scaly or pink and pearly. However, we do see blue/brown/black skin cancers.  Consider the ABCDEs of melanoma when giving yourself your monthly full body exam ( don't forget the groin, buttocks, feet, toes, etc). A-asymmetry, B-borders, C-color, D-diameter, E-elevation or evolving. If you see any of these changes please follow up in clinic. If you cannot see your back I recommend purchasing a hand held mirror to use with a larger wall mirror.       Checking for Skin Cancer  You can find cancer early by checking your skin each month. There are 3 kinds of skin cancer. They are melanoma, basal cell carcinoma, and squamous cell carcinoma. Doing monthly skin checks is the best way to find new marks or skin changes. Follow the instructions below for checking your skin.   The ABCDEs of checking moles for melanoma   Check your moles or growths for signs of melanoma using ABCDE:   Asymmetry: the sides of the mole or growth don t match  Border: the edges are ragged, notched, or blurred  Color: the color within the mole or growth varies  Diameter: the mole or growth is larger than 6 mm (size of a pencil eraser)  Evolving: the size, shape, or color of the mole or growth is changing (evolving is not shown in the images below)    Checking for other types of skin cancer  Basal cell carcinoma or squamous cell carcinoma have symptoms such as:     A spot or mole that looks different from all other marks on your skin  Changes in how an area feels, such as itching, tenderness, or pain  Changes in the skin's surface, such as oozing, bleeding, or scaliness  A sore that does not heal  New swelling or redness beyond  the border of a mole    Who s at risk?  Anyone can get skin cancer. But you are at greater risk if you have:   Fair skin, light-colored hair, or light-colored eyes  Many moles or abnormal moles on your skin  A history of sunburns from sunlight or tanning beds  A family history of skin cancer  A history of exposure to radiation or chemicals  A weakened immune system  If you have had skin cancer in the past, you are at risk for recurring skin cancer.   How to check your skin  Do your monthly skin checkups in front of a full-length mirror. Check all parts of your body, including your:   Head (ears, face, neck, and scalp)  Torso (front, back, and sides)  Arms (tops, undersides, upper, and lower armpits)  Hands (palms, backs, and fingers, including under the nails)  Buttocks and genitals  Legs (front, back, and sides)  Feet (tops, soles, toes, including under the nails, and between toes)  If you have a lot of moles, take digital photos of them each month. Make sure to take photos both up close and from a distance. These can help you see if any moles change over time.   Most skin changes are not cancer. But if you see any changes in your skin, call your doctor right away. Only he or she can diagnose a problem. If you have skin cancer, seeing your doctor can be the first step toward getting the treatment that could save your life.   [employer] last reviewed this educational content on 4/1/2019 2000-2020 [employer]. [address] [place] [address], [place] [address]. All rights reserved. This information is not intended as a substitute for professional medical care. Always follow your healthcare professional's instructions.       When should I call my doctor?  If you are worsening or not improving, please, contact us or seek urgent care as noted below.     Who should I call with questions (adults)?  [facility] (adult and pediatric): [phone]  [facility]  [place] (adult): [phone]  [facility] ([place], [place], [place] and [place]) [phone]  For urgent needs outside of business hours call the [facility] at [phone] and ask for the dermatology resident on call to be paged  If this is a medical emergency and you are unable to reach an ER, Call 911      If you need a prescription refill, please contact your pharmacy. Refills are approved or denied by our Physicians during normal business hours, Monday through Fridays    Per office policy, refills will not be granted if you have not been seen within the past year (or sooner depending on your condition)

## 2024-11-20 ENCOUNTER — OFFICE VISIT (OUTPATIENT)
Dept: DERMATOLOGY | Facility: CLINIC | Age: 54
End: 2024-11-20
Payer: COMMERCIAL

## 2024-11-20 DIAGNOSIS — L81.2 EPHELIDES: ICD-10-CM

## 2024-11-20 DIAGNOSIS — L81.4 LENTIGO: ICD-10-CM

## 2024-11-20 DIAGNOSIS — L73.8 SEBACEOUS HYPERPLASIA: ICD-10-CM

## 2024-11-20 DIAGNOSIS — D18.01 CHERRY ANGIOMA: Primary | ICD-10-CM

## 2024-11-20 NOTE — LETTER
11/20/2024      Renea Trotter  [address] [facility] [address]      Dear Colleague,    Thank you for referring your patient, Renea Trotter, to the [facility]. Please see a copy of my visit note below.    Renea Trotter is a pleasant 54 year old year old female patient here today for spot on right cheek. She notes as tender a few weeks ago but has since resolved. She is no longer feeling spot. She is concerned since her father has had a lot of skin cancer.  Patient has no other skin complaints today.  Remainder of the HPI, Meds, PMH, Allergies, FH, and SH was reviewed in chart.    Pertinent Hx:   History of AK  Past Medical History:   Diagnosis Date     HTN (hypertension)      Hypercholesterolemia      Mild intermittent asthma      MS (multiple sclerosis) (H)        Past Surgical History:   Procedure Laterality Date     APPENDECTOMY       COLONOSCOPY WITH CO2 INSUFFLATION N/A 08/05/2022    Procedure: COLONOSCOPY, WITH CO2 INSUFFLATION;  Surgeon: Hernan Vasquez MD;  Location:  OR      TOOTH EXTRACTION W/FORCEP       LAPAROSCOPIC SALPINGO-OOPHORECTOMY Bilateral 11/11/2022    Procedure: LAPAROSCOPIC RIGHT SALPINGO-OOPHORECTOMY, LEFT SALPINGECTOMY;  Surgeon: Savana Valle MD;  Location:  OR        Family History   Problem Relation Age of Onset     Hypertension Mother      Lipids Mother      Hyperlipidemia Mother      Eye Disorder Father      Hyperlipidemia Father      Sleep Apnea Father      Skin Cancer Father      Coronary Artery Disease Sister      Hyperlipidemia Sister      Myocardial Infarction Sister      Cerebrovascular Disease Maternal Grandmother      Cerebrovascular Disease Maternal Grandfather      Prostate Cancer Maternal Grandfather      Blood Disease Paternal Grandmother      Alzheimer Disease Paternal Grandfather      Pulmonary Embolism Daughter      Multiple Sclerosis Cousin        Social History     Socioeconomic History     Marital status:       Spouse name: Not on file     Number of children: Not on file     Years of education: Not on file     Highest education level: Not on file   Occupational History     Not on file   Tobacco Use     Smoking status: Never     Passive exposure: Never     Smokeless tobacco: Never   Vaping Use     Vaping status: Never Used   Substance and Sexual Activity     Alcohol use: Yes     Alcohol/week: 1.0 - 2.0 standard drink of alcohol     Comment: RARELY     Drug use: No     Sexual activity: Yes     Partners: Male     Birth control/protection: Condom   Other Topics Concern     Parent/sibling w/ CABG, MI or angioplasty before 65F 55M? Yes     Comment: 47 sister   Social History Narrative     Not on file     Social Drivers of Health     Financial Resource Strain: High Risk (3/1/2024)    Financial Resource Strain      Within the past 12 months, have you or your family members you live with been unable to get utilities (heat, electricity) when it was really needed?: Yes   Food Insecurity: Low Risk  (3/1/2024)    Food Insecurity      Within the past 12 months, did you worry that your food would run out before you got money to buy more?: No      Within the past 12 months, did the food you bought just not last and you didn t have money to get more?: No   Transportation Needs: Low Risk  (3/1/2024)    Transportation Needs      Within the past 12 months, has lack of transportation kept you from medical appointments, getting your medicines, non-medical meetings or appointments, work, or from getting things that you need?: No   Physical Activity: Unknown (3/1/2024)    Exercise Vital Sign      Days of Exercise per Week: 1 day      Minutes of Exercise per Session: Not on file   Stress: Stress Concern Present (3/1/2024)    [place] [employer] - Occupational Stress Questionnaire      Feeling of Stress : To some extent   Social Connections: Unknown (3/1/2024)    Social Connection and Isolation Panel [NHANES]       Frequency of Communication with Friends and Family: Not on file      Frequency of Social Gatherings with Friends and Family: Once a week      Attends Hindu Services: Not on file      Active Member of Clubs or Organizations: Not on file      Attends Club or Organization Meetings: Not on file      Marital Status: Not on file   Interpersonal Safety: Not on file   Housing Stability: Low Risk  (3/1/2024)    Housing Stability      Do you have housing? : Yes      Are you worried about losing your housing?: No       Outpatient Encounter Medications as of 11/20/2024   Medication Sig Dispense Refill     acetaminophen (TYLENOL) 325 MG tablet Take 2 tablets (650 mg) by mouth every 6 hours as needed for mild pain 24 tablet 0     albuterol (PROAIR HFA/PROVENTIL HFA/VENTOLIN HFA) 108 (90 Base) MCG/ACT inhaler Inhale 2 puffs into the lungs every 4 hours as needed for shortness of breath or wheezing Profile Rx 8.5 g 3     Biotin 10 MG CAPS        calcium carbonate 750 MG CHEW Take 750 mg by mouth daily       Cholecalciferol (VITAMIN D3 PO) Take 5,000 Units by mouth daily       glatiramer acetate 40 MG/ML injection INJECT 40 MG UNDER THE SKIN THREE TIMES A WEEK 12 mL 11     ibuprofen (ADVIL/MOTRIN) 600 MG tablet Take 1 tablet (600 mg) by mouth every 6 hours as needed for other (mile and/or inflammatory pain.) 12 tablet 0     ketoconazole (NIZORAL) 2 % external cream Apply twice daily to foot. 60 g 11     ketoconazole (NIZORAL) 2 % external shampoo Leave on for 5 minutes then rinse. Use 1-2 times weekly. 120 mL 6     lisinopril (ZESTRIL) 20 MG tablet Take 1 tablet (20 mg) by mouth daily 30 tablet 5     MULTIPLE VITAMIN PO Take 1 tablet by mouth daily.       polyethylene glycol (MIRALAX) 17 GM/Dose powder Take 17 g (1 Capful) by mouth daily 510 g 5     pravastatin (PRAVACHOL) 20 MG tablet Take 1 tablet (20 mg) by mouth every evening 90 tablet 3     senna-docusate (SENOKOT-S/PERICOLACE) 8.6-50 MG tablet Take 1-2 tablets by mouth 2  times daily (Patient not taking: Reported on 3/4/2024) 30 tablet 0     temazepam (RESTORIL) 15 MG capsule TAKE 1 TO 2 CAPSULES BY MOUTH NIGHTLY AS NEEDED FOR SLEEP 15 capsule 0     No facility-administered encounter medications on file as of 11/20/2024.             O:   NAD, WDWN, Alert & Oriented, Mood & Affect wnl, Vitals stable   Here today alone   LMP 08/01/2020    General appearance normal   Vitals stable   Alert, oriented and in no acute distress      Brown macules on face, yellow lobulated papules on face  Red papule near left lateral brow      The remainder of skin exam is normal       Eyes: Conjunctivae/lids:Normal     ENT: Lips normal    MSK:Normal    Cardiovascular: peripheral edema none    Pulm: Breathing Normal    Neuro/Psych: Orientation:Alert and Orientedx3 ; Mood/Affect:normal   A/P:  1. lentigo, angioma, ephelides, sebaceous hyperplasia   It was a pleasure speaking to Renea Trotter today.  BENIGN LESIONS DISCUSSED WITH PATIENT:  I discussed the specifics of tumor, prognosis, and genetics of benign lesions.  I explained that treatment of these lesions would be purely cosmetic and not medically neccessary.  I discussed with patient different removal options including excision, cautery and /or laser.      Nature and genetics of benign skin lesions dicussed with patient.  Signs and Symptoms of skin cancer discussed with patient.  ABCDEs of melanoma reviewed with patient.  Patient encouraged to perform monthly skin exams.  UV precautions reviewed with patient.  Risks of non-melanoma skin cancer discussed with patient   Return to clinic for skin check as planned.       Again, thank you for allowing me to participate in the care of your patient.        Sincerely,        Judith Yoder PA-C

## 2024-11-20 NOTE — PROGRESS NOTES
Renea Trotter is a pleasant 54 year old year old female patient here today for spot on right cheek. She notes as tender a few weeks ago but has since resolved. She is no longer feeling spot. She is concerned since her father has had a lot of skin cancer.  Patient has no other skin complaints today.  Remainder of the HPI, Meds, PMH, Allergies, FH, and SH was reviewed in chart.    Pertinent Hx:   History of AK  Past Medical History:   Diagnosis Date    HTN (hypertension)     Hypercholesterolemia     Mild intermittent asthma     MS (multiple sclerosis) (H)        Past Surgical History:   Procedure Laterality Date    APPENDECTOMY      COLONOSCOPY WITH CO2 INSUFFLATION N/A 08/05/2022    Procedure: COLONOSCOPY, WITH CO2 INSUFFLATION;  Surgeon: Hernan Vasquez MD;  Location:  OR     TOOTH EXTRACTION W/FORCEP      LAPAROSCOPIC SALPINGO-OOPHORECTOMY Bilateral 11/11/2022    Procedure: LAPAROSCOPIC RIGHT SALPINGO-OOPHORECTOMY, LEFT SALPINGECTOMY;  Surgeon: Savana Valle MD;  Location:  OR        Family History   Problem Relation Age of Onset    Hypertension Mother     Lipids Mother     Hyperlipidemia Mother     Eye Disorder Father     Hyperlipidemia Father     Sleep Apnea Father     Skin Cancer Father     Coronary Artery Disease Sister     Hyperlipidemia Sister     Myocardial Infarction Sister     Cerebrovascular Disease Maternal Grandmother     Cerebrovascular Disease Maternal Grandfather     Prostate Cancer Maternal Grandfather     Blood Disease Paternal Grandmother     Alzheimer Disease Paternal Grandfather     Pulmonary Embolism Daughter     Multiple Sclerosis Cousin        Social History     Socioeconomic History    Marital status:      Spouse name: Not on file    Number of children: Not on file    Years of education: Not on file    Highest education level: Not on file   Occupational History    Not on file   Tobacco Use    Smoking status: Never     Passive exposure: Never    Smokeless tobacco:  Never   Vaping Use    Vaping status: Never Used   Substance and Sexual Activity    Alcohol use: Yes     Alcohol/week: 1.0 - 2.0 standard drink of alcohol     Comment: RARELY    Drug use: No    Sexual activity: Yes     Partners: Male     Birth control/protection: Condom   Other Topics Concern    Parent/sibling w/ CABG, MI or angioplasty before 65F 55M? Yes     Comment: 47 sister   Social History Narrative    Not on file     Social Drivers of Health     Financial Resource Strain: High Risk (3/1/2024)    Financial Resource Strain     Within the past 12 months, have you or your family members you live with been unable to get utilities (heat, electricity) when it was really needed?: Yes   Food Insecurity: Low Risk  (3/1/2024)    Food Insecurity     Within the past 12 months, did you worry that your food would run out before you got money to buy more?: No     Within the past 12 months, did the food you bought just not last and you didn t have money to get more?: No   Transportation Needs: Low Risk  (3/1/2024)    Transportation Needs     Within the past 12 months, has lack of transportation kept you from medical appointments, getting your medicines, non-medical meetings or appointments, work, or from getting things that you need?: No   Physical Activity: Unknown (3/1/2024)    Exercise Vital Sign     Days of Exercise per Week: 1 day     Minutes of Exercise per Session: Not on file   Stress: Stress Concern Present (3/1/2024)    [place] [employer] - Occupational Stress Questionnaire     Feeling of Stress : To some extent   Social Connections: Unknown (3/1/2024)    Social Connection and Isolation Panel [NHANES]     Frequency of Communication with Friends and Family: Not on file     Frequency of Social Gatherings with Friends and Family: Once a week     Attends Synagogue Services: Not on file     Active Member of Clubs or Organizations: Not on file     Attends Club or Organization Meetings: Not on file      Marital Status: Not on file   Interpersonal Safety: Not on file   Housing Stability: Low Risk  (3/1/2024)    Housing Stability     Do you have housing? : Yes     Are you worried about losing your housing?: No       Outpatient Encounter Medications as of 11/20/2024   Medication Sig Dispense Refill    acetaminophen (TYLENOL) 325 MG tablet Take 2 tablets (650 mg) by mouth every 6 hours as needed for mild pain 24 tablet 0    albuterol (PROAIR HFA/PROVENTIL HFA/VENTOLIN HFA) 108 (90 Base) MCG/ACT inhaler Inhale 2 puffs into the lungs every 4 hours as needed for shortness of breath or wheezing Profile Rx 8.5 g 3    Biotin 10 MG CAPS       calcium carbonate 750 MG CHEW Take 750 mg by mouth daily      Cholecalciferol (VITAMIN D3 PO) Take 5,000 Units by mouth daily      glatiramer acetate 40 MG/ML injection INJECT 40 MG UNDER THE SKIN THREE TIMES A WEEK 12 mL 11    ibuprofen (ADVIL/MOTRIN) 600 MG tablet Take 1 tablet (600 mg) by mouth every 6 hours as needed for other (mile and/or inflammatory pain.) 12 tablet 0    ketoconazole (NIZORAL) 2 % external cream Apply twice daily to foot. 60 g 11    ketoconazole (NIZORAL) 2 % external shampoo Leave on for 5 minutes then rinse. Use 1-2 times weekly. 120 mL 6    lisinopril (ZESTRIL) 20 MG tablet Take 1 tablet (20 mg) by mouth daily 30 tablet 5    MULTIPLE VITAMIN PO Take 1 tablet by mouth daily.      polyethylene glycol (MIRALAX) 17 GM/Dose powder Take 17 g (1 Capful) by mouth daily 510 g 5    pravastatin (PRAVACHOL) 20 MG tablet Take 1 tablet (20 mg) by mouth every evening 90 tablet 3    senna-docusate (SENOKOT-S/PERICOLACE) 8.6-50 MG tablet Take 1-2 tablets by mouth 2 times daily (Patient not taking: Reported on 3/4/2024) 30 tablet 0    temazepam (RESTORIL) 15 MG capsule TAKE 1 TO 2 CAPSULES BY MOUTH NIGHTLY AS NEEDED FOR SLEEP 15 capsule 0     No facility-administered encounter medications on file as of 11/20/2024.             O:   NAD, WDWN, Alert & Oriented, Mood & Affect wnl,  Vitals stable   Here today alone   [facility] 08/01/2020    General appearance normal   Vitals stable   Alert, oriented and in no acute distress      Brown macules on face, yellow lobulated papules on face  Red papule near left lateral brow      The remainder of skin exam is normal       Eyes: Conjunctivae/lids:Normal     ENT: Lips normal    MSK:Normal    Cardiovascular: peripheral edema none    Pulm: Breathing Normal    Neuro/Psych: Orientation:Alert and Orientedx3 ; Mood/Affect:normal   A/P:  1. lentigo, angioma, ephelides, sebaceous hyperplasia   It was a pleasure speaking to Renea Trotter today.  BENIGN LESIONS DISCUSSED WITH PATIENT:  I discussed the specifics of tumor, prognosis, and genetics of benign lesions.  I explained that treatment of these lesions would be purely cosmetic and not medically neccessary.  I discussed with patient different removal options including excision, cautery and /or laser.      Nature and genetics of benign skin lesions dicussed with patient.  Signs and Symptoms of skin cancer discussed with patient.  ABCDEs of melanoma reviewed with patient.  Patient encouraged to perform monthly skin exams.  UV precautions reviewed with patient.  Risks of non-melanoma skin cancer discussed with patient   Return to clinic for skin check as planned.

## 2024-12-05 ENCOUNTER — TELEPHONE (OUTPATIENT)
Dept: NEUROLOGY | Facility: CLINIC | Age: 54
End: 2024-12-05
Payer: COMMERCIAL

## 2024-12-05 NOTE — TELEPHONE ENCOUNTER
Prior Authorization Approval    Medication: GLATIRAMER ACETATE 40 MG/ML SC SOSY  Authorization Effective Date: 12/5/2024  Authorization Expiration Date: 10/15/2025  Approved Dose/Quantity: 28 days  Reference #:     Insurance Company: [employer] - Phone [phone] Fax [phone]  Expected CoPay: $    CoPay Card Available:      Financial Assistance Needed:   Which Pharmacy is filling the prescription: [address]  Pharmacy Notified: Yes  Patient Notified: Yes          Thank you,    Mary Jane Bourgeois [facility]  Specialty Pharmacy Clinic Liaison - CardiologyNeurologyMultiple Sclerosis  [facility]  [address] [place] [address]  Ph: [phone] Fax: [phone]  [email or web address]

## 2025-01-06 DIAGNOSIS — I10 ESSENTIAL HYPERTENSION WITH GOAL BLOOD PRESSURE LESS THAN 140/90: ICD-10-CM

## 2025-01-07 RX ORDER — LISINOPRIL 20 MG/1
20 TABLET ORAL DAILY
Qty: 90 TABLET | Refills: 0 | Status: SHIPPED | OUTPATIENT
Start: 2025-01-07

## 2025-01-07 NOTE — TELEPHONE ENCOUNTER
Patient is due for annual physical, med recheck and fasting labs in 3/2025  Please call to schedule for physical and previsit labs okay to use any available same-day/new patient slots on my schedule  Once done, please route back to me to put the lab orders-

## 2025-03-01 ENCOUNTER — ANCILLARY PROCEDURE (OUTPATIENT)
Dept: MRI IMAGING | Facility: CLINIC | Age: 55
End: 2025-03-01
Attending: PSYCHIATRY & NEUROLOGY
Payer: COMMERCIAL

## 2025-03-01 DIAGNOSIS — G35 MS (MULTIPLE SCLEROSIS) (H): ICD-10-CM

## 2025-03-01 PROCEDURE — 70553 MRI BRAIN STEM W/O & W/DYE: CPT | Performed by: RADIOLOGY

## 2025-03-01 PROCEDURE — 72156 MRI NECK SPINE W/O & W/DYE: CPT | Performed by: RADIOLOGY

## 2025-03-01 PROCEDURE — A9585 GADOBUTROL INJECTION: HCPCS | Performed by: RADIOLOGY

## 2025-03-01 RX ORDER — GADOBUTROL 604.72 MG/ML
6.5 INJECTION INTRAVENOUS ONCE
Status: COMPLETED | OUTPATIENT
Start: 2025-03-01 | End: 2025-03-01

## 2025-03-01 RX ADMIN — GADOBUTROL 6.5 ML: 604.72 INJECTION INTRAVENOUS at 09:13

## 2025-03-03 ENCOUNTER — OFFICE VISIT (OUTPATIENT)
Dept: NEUROLOGY | Facility: CLINIC | Age: 55
End: 2025-03-03
Payer: COMMERCIAL

## 2025-03-03 VITALS
SYSTOLIC BLOOD PRESSURE: 120 MMHG | HEIGHT: 62 IN | DIASTOLIC BLOOD PRESSURE: 78 MMHG | WEIGHT: 145 LBS | BODY MASS INDEX: 26.68 KG/M2 | HEART RATE: 78 BPM

## 2025-03-03 DIAGNOSIS — D49.7 PITUITARY INCIDENTALOMA: ICD-10-CM

## 2025-03-03 DIAGNOSIS — G35 MULTIPLE SCLEROSIS (H): Primary | Chronic | ICD-10-CM

## 2025-03-03 PROCEDURE — G2211 COMPLEX E/M VISIT ADD ON: HCPCS | Performed by: PSYCHIATRY & NEUROLOGY

## 2025-03-03 PROCEDURE — 3078F DIAST BP <80 MM HG: CPT | Performed by: PSYCHIATRY & NEUROLOGY

## 2025-03-03 PROCEDURE — 99214 OFFICE O/P EST MOD 30 MIN: CPT | Performed by: PSYCHIATRY & NEUROLOGY

## 2025-03-03 PROCEDURE — 3074F SYST BP LT 130 MM HG: CPT | Performed by: PSYCHIATRY & NEUROLOGY

## 2025-03-03 RX ORDER — TESTOSTERONE 4 MG/D
1 PATCH TRANSDERMAL DAILY
COMMUNITY

## 2025-03-03 RX ORDER — PROGESTERONE 100 MG/1
75 CAPSULE ORAL DAILY
COMMUNITY
Start: 2024-11-04

## 2025-03-03 NOTE — NURSING NOTE
"Renea Trotter's goals for this visit include:   Chief Complaint   Patient presents with    RECHECK     1 Year follow up        She requests these members of her care team be copied on today's visit information: yes    PCP: Cara Clements    Referring Provider:  Referred Self, MD  No address on file    /78 (BP Location: Right arm, Patient Position: Sitting, Cuff Size: Adult Regular)   Pulse 78   Ht 1.575 m (5' 2\")   Wt 65.8 kg (145 lb)   LMP 08/01/2020   BMI 26.52 kg/m      Do you need any medication refills at today's visit? No  DESIREE Barrios, ROM ([facility])      "

## 2025-03-03 NOTE — LETTER
3/3/2025      RE: Renea Trotter  [address] [place] [address]     Referral source: Established patient    Chief complaint: Multiple sclerosis    History of the Present Illness: Ms. Renea Trotter is a 54 year old right-handed woman who presents to the [facility] today for a scheduled follow up visit after earlier MRI scans of the brain and cervical spine.    The patient's history is as per my previous notes. She initially developed symptoms of demyelinating disease in 2003 when she developed some numbness of the right hand and difficulty with the right leg that was noticeable with walking. Subsequently, she did well until 2011 when she had recurrent numbness in the right hand. MRI and CSF studies at that time were suggestive of demyelinating disease of the type seen in multiple sclerosis. She started disease modifying therapy with glatiramer acetate, and has remained on that treatment up until the present time, currently injecting 40 mg 3 times weekly.    Today, she continues to deny any new episodic changes in vision, balance, strength, or sensation suggestive of relapse of multiple sclerosis since she was last seen in this clinic.    She reports that a few weeks ago she had an intermittent sharp pain in the right eye lasting for no more than a few seconds.  I reassured her that this is not suggestive of optic neuritis.  Presently, this symptom has resolved.    PHYSICAL EXAMINATION:  VITAL SIGNS: Blood pressure 120/78; pulse 78; weight 65.8 kg; height 1.58 m.  GENERAL: Well-nourished woman who presents to the examination alone, awake and alert and in no acute distress.    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  Visual fields are full in the right eye.  There is alternating exotropia, but no internuclear ophthalmoplegia. Facial strength is normal.  Palate elevation and tongue protrusion are normal.  POWER: There is weakness in an upper motor neuron  pattern on the right with finger extensors, finger interossei, and anterior tibialis grading 4/5.  REFLEXES: Reflexes are increased in the right arm and leg.  MOTOR/CEREBELLAR: There is no appendicular ataxia on finger-to-nose testing. Rapid alternating movements are within normal limits in the hands and fingers. There is no pronator drift in the arms.  GAIT: The patient is able to ambulate on a flat, level surface with no gross loss of postural stability. She has mild difficulty walking on toes on the right side. Heel walking is mildly to moderately impaired on the right as well. Tandem gait is mildly impaired for age.    Investigations:  I reviewed images of MRI scan of the brain and cervical spine performed earlier on today's date, and the following is my independent interpretation of those images.    There is no abnormal enhancement with gadolinium contrast throughout the brain and cord parenchyma. Multiple short segment foci of T2 hyperintensity in the cervical cord are unchanged in comparison to previous MRI of 2/15/2022. Likewise, periventricular, juxtacortical, and infratentorial foci of T2 hyperintensity in the brain are also unchanged from 2/15/2022.  No new lesions are seen. Incidental note is made of small cystic lesion in the anterior pituitary.    Assessment/plan:    1. Multiple sclerosis  The patient is clinically and radiologically stable as regards any evidence of active inflammatory demyelination on current disease modifying therapy with glatiramer acetate.    I reviewed the results of the recently reported DISCO-MS trial with the patient.  This trial enrolled subjects with multiple sclerosis aged 55 and older with several years of clinical and radiologic stability as regards any evidence of active inflammatory demyelination on disease modifying treatment.(DMT).  Subjects were randomized to either remaining on current treatment or discontinuing therapy.     Key findings from this trial included the  followin) A large majority of individuals who stopped treatment (~88%) maintained stability with no new clinical or MRI evidence of active demyelination off of disease modifying therapy.    2) No difference in disability outcomes between the 2 groups was detected.    3)  However, the trial did not demonstrate non-inferiority of stopping DMT versus continuing it, with~95% of subjects who remained on treatment remaining stable as regards evidence of new demyelination.    My interpretation of the results is that remaining on DMT may be associated with a slightly reduced likelihood of breakthrough demyelination, but a large majority of individuals who would have fit the criteria for this trial would presumably remain stable with, or without, treatment. Whether to continue DMT is a personal decision based on individual preference as well as the risk profile of the particular DMT being used.    She indicates that, at present, it is her preference to simply remain on glatiramer acetate.  Given the very benign safety profile of this medication, that is very reasonable.    We will plan to see her back in 1 year for a review.    2.  Pituitary incidentaloma  This is stable on MRI surveillance over greater than 3 years and is likely of no clinical significance. There is no clinical indication of hormonal hypersecretion and I do not think that further laboratory evaluation is needed.    I spent a total of 34 minutes on patient care activities related to this encounter on the date of service, including time spent reviewing the chart, performing my own independent review of neuroimaging, obtaining history and examination from, and in counseling the patient.    The longitudinal plans of care for the diagnoses as documented were addressed during this visit. Due to the added complexity in care, I will continue to support the patient in subsequent management and with ongoing continuity of care.      David Herrera,  MD   of Neurology  [facility]    Cc:  Patient

## 2025-03-03 NOTE — Clinical Note
3/3/2025      Renea Trotter  [address] [place] [address]      Dear Colleague,    Thank you for referring your patient, Renea Trotter, to the [facility] [place]. Please see a copy of my visit note below.    Referral source: Established patient    Chief complaint: Multiple sclerosis    History of the Present Illness: Ms. Renea Trotter is a 54 year old right-handed woman who presents to the [facility]clerosis Clinic today for a scheduled follow up visit after earlier MRI scans of the brain and cervical spine.    The patient's history is as per my previous notes. She initially developed symptoms of demyelinating disease in 2003 when she developed some numbness of the right hand and difficulty with the right leg that was noticeable with walking. Subsequently, she did well until 2011 when she had recurrent numbness in the right hand. MRI and CSF studies at that time were suggestive of demyelinating disease of the type seen in multiple sclerosis. She started disease modifying therapy with glatiramer acetate, and has remained on that treatment up until the present time, currently injecting 40 mg 3 times weekly.    Today, she continues to deny any new episodic changes in vision, balance, strength, or sensation suggestive of relapse of multiple sclerosis since she was last seen in this clinic.    She reports that a few weeks ago she had an intermittent sharp pain in the right eye lasting for no more than a few seconds.  I reassured her that this is not suggestive of optic neuritis.  Presently, this symptom has resolved.    PHYSICAL EXAMINATION:  VITAL SIGNS: Blood pressure 120/78; pulse 78; weight 65.8 kg; height 1.58 m.  GENERAL: Well-nourished woman who presents to the examination alone, awake and alert and in no acute distress.    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  Visual fields are full in the right eye.  There is alternating exotropia,  but no internuclear ophthalmoplegia. Facial strength is normal.  Palate elevation and tongue protrusion are normal.  POWER: There is weakness in an upper motor neuron pattern on the right with finger extensors, finger interossei, and anterior tibialis grading 4/5.  REFLEXES: Reflexes are increased in the right arm and leg.  MOTOR/CEREBELLAR: There is no appendicular ataxia on finger-to-nose testing. Rapid alternating movements are within normal limits in the hands and fingers. There is no pronator drift in the arms.  GAIT: The patient is able to ambulate on a flat, level surface with no gross loss of postural stability. She has mild difficulty walking on toes on the right side. Heel walking is mildly to moderately impaired on the right as well. Tandem gait is mildly impaired for age.    Investigations:  I reviewed images of MRI scan of the brain and cervical spine performed earlier on today's date, and the following is my independent interpretation of those images.    There is no abnormal enhancement with gadolinium contrast throughout the brain and cord parenchyma. Multiple short segment foci of T2 hyperintensity in the cervical cord are unchanged in comparison to previous MRI of 2/15/2022. Likewise, periventricular, juxtacortical, and infratentorial foci of T2 hyperintensity in the brain are also unchanged from 2/15/2022.  No new lesions are seen. Incidental note is made of small cystic lesion in the anterior pituitary.    Assessment/plan:    1. Multiple sclerosis  The patient is clinically and radiologically stable as regards any evidence of active inflammatory demyelination on current disease modifying therapy with glatiramer acetate.    I reviewed the results of the recently reported DISCO-MS trial with the patient.  This trial enrolled subjects with multiple sclerosis aged 55 and older with several years of clinical and radiologic stability as regards any evidence of active inflammatory demyelination on  disease modifying treatment.(DMT).  Subjects were randomized to either remaining on current treatment or discontinuing therapy.     Key findings from this trial included the followin) A large majority of individuals who stopped treatment (~88%) maintained stability with no new clinical or MRI evidence of active demyelination off of disease modifying therapy.    2) No difference in disability outcomes between the 2 groups was detected.    3)  However, the trial did not demonstrate non-inferiority of stopping DMT versus continuing it, with~95% of subjects who remained on treatment remaining stable as regards evidence of new demyelination.    My interpretation of the results is that remaining on DMT may be associated with a slightly reduced likelihood of breakthrough demyelination, but a large majority of individuals who would have fit the criteria for this trial would presumably remain stable with, or without, treatment. Whether to continue DMT is a personal decision based on individual preference as well as the risk profile of the particular DMT being used.    She indicates that, at present, it is her preference to simply remain on glatiramer acetate.  Given the very benign safety profile of this medication, that is very reasonable.    We will plan to see her back in 1 year for a review.    2.  Pituitary incidentaloma  This is stable on MRI surveillance over greater than 3 years and is likely of no clinical significance. There is no clinical indication of hormonal hypersecretion and I do not think that further laboratory evaluation is needed.    I spent a total of 34 minutes on patient care activities related to this encounter on the date of service, including time spent reviewing the chart, performing my own independent review of neuroimaging, obtaining history and examination from, and in counseling the patient.    The longitudinal plans of care for the diagnoses as documented were addressed during this  visit. Due to the added complexity in care, I will continue to support the patient in subsequent management and with ongoing continuity of care.      Again, thank you for allowing me to participate in the care of your patient.        Sincerely,        David Herrera MD    Electronically signed

## 2025-03-04 ENCOUNTER — TRANSFERRED RECORDS (OUTPATIENT)
Dept: HEALTH INFORMATION MANAGEMENT | Facility: CLINIC | Age: 55
End: 2025-03-04
Payer: COMMERCIAL

## 2025-03-08 NOTE — PROGRESS NOTES
Referral source: Established patient    Chief complaint: Multiple sclerosis    History of the Present Illness: Ms. Renea Trotter is a 54 year old right-handed woman who presents to the [facility]clerosis Clinic today for a scheduled follow up visit after earlier MRI scans of the brain and cervical spine.    The patient's history is as per my previous notes. She initially developed symptoms of demyelinating disease in 2003 when she developed some numbness of the right hand and difficulty with the right leg that was noticeable with walking. Subsequently, she did well until 2011 when she had recurrent numbness in the right hand. MRI and CSF studies at that time were suggestive of demyelinating disease of the type seen in multiple sclerosis. She started disease modifying therapy with glatiramer acetate, and has remained on that treatment up until the present time, currently injecting 40 mg 3 times weekly.    Today, she continues to deny any new episodic changes in vision, balance, strength, or sensation suggestive of relapse of multiple sclerosis since she was last seen in this clinic.    She reports that a few weeks ago she had an intermittent sharp pain in the right eye lasting for no more than a few seconds.  I reassured her that this is not suggestive of optic neuritis.  Presently, this symptom has resolved.    PHYSICAL EXAMINATION:  VITAL SIGNS: Blood pressure 120/78; pulse 78; weight 65.8 kg; height 1.58 m.  GENERAL: Well-nourished woman who presents to the examination alone, awake and alert and in no acute distress.    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  Visual fields are full in the right eye.  There is alternating exotropia, but no internuclear ophthalmoplegia. Facial strength is normal.  Palate elevation and tongue protrusion are normal.  POWER: There is weakness in an upper motor neuron pattern on the right with finger extensors, finger interossei, and anterior  tibialis grading 4/5.  REFLEXES: Reflexes are increased in the right arm and leg.  MOTOR/CEREBELLAR: There is no appendicular ataxia on finger-to-nose testing. Rapid alternating movements are within normal limits in the hands and fingers. There is no pronator drift in the arms.  GAIT: The patient is able to ambulate on a flat, level surface with no gross loss of postural stability. She has mild difficulty walking on toes on the right side. Heel walking is mildly to moderately impaired on the right as well. Tandem gait is mildly impaired for age.    Investigations:  I reviewed images of MRI scan of the brain and cervical spine performed earlier on today's date, and the following is my independent interpretation of those images.    There is no abnormal enhancement with gadolinium contrast throughout the brain and cord parenchyma. Multiple short segment foci of T2 hyperintensity in the cervical cord are unchanged in comparison to previous MRI of 2/15/2022. Likewise, periventricular, juxtacortical, and infratentorial foci of T2 hyperintensity in the brain are also unchanged from 2/15/2022.  No new lesions are seen. Incidental note is made of small cystic lesion in the anterior pituitary.    Assessment/plan:    1. Multiple sclerosis  The patient is clinically and radiologically stable as regards any evidence of active inflammatory demyelination on current disease modifying therapy with glatiramer acetate.    I reviewed the results of the recently reported DISCO-MS trial with the patient.  This trial enrolled subjects with multiple sclerosis aged 55 and older with several years of clinical and radiologic stability as regards any evidence of active inflammatory demyelination on disease modifying treatment.(DMT).  Subjects were randomized to either remaining on current treatment or discontinuing therapy.     Key findings from this trial included the followin) A large majority of individuals who stopped treatment  (~88%) maintained stability with no new clinical or MRI evidence of active demyelination off of disease modifying therapy.    2) No difference in disability outcomes between the 2 groups was detected.    3)  However, the trial did not demonstrate non-inferiority of stopping DMT versus continuing it, with~95% of subjects who remained on treatment remaining stable as regards evidence of new demyelination.    My interpretation of the results is that remaining on DMT may be associated with a slightly reduced likelihood of breakthrough demyelination, but a large majority of individuals who would have fit the criteria for this trial would presumably remain stable with, or without, treatment. Whether to continue DMT is a personal decision based on individual preference as well as the risk profile of the particular DMT being used.    She indicates that, at present, it is her preference to simply remain on glatiramer acetate.  Given the very benign safety profile of this medication, that is very reasonable.    We will plan to see her back in 1 year for a review.    2.  Pituitary incidentaloma  This is stable on MRI surveillance over greater than 3 years and is likely of no clinical significance. There is no clinical indication of hormonal hypersecretion and I do not think that further laboratory evaluation is needed.    I spent a total of 34 minutes on patient care activities related to this encounter on the date of service, including time spent reviewing the chart, performing my own independent review of neuroimaging, obtaining history and examination from, and in counseling the patient.    The longitudinal plans of care for the diagnoses as documented were addressed during this visit. Due to the added complexity in care, I will continue to support the patient in subsequent management and with ongoing continuity of care.

## 2025-03-11 NOTE — ANESTHESIA CARE TRANSFER NOTE
Patient: Renea Trotter    Procedure: Procedure(s):  LAPAROSCOPIC RIGHT SALPINGO-OOPHORECTOMY, LEFT SALPINGECTOMY       Diagnosis: Right ovarian cyst [N83.201]  Pelvic pain in female [R10.2]  Diagnosis Additional Information: No value filed.    Anesthesia Type:   General     Note:    Oropharynx: oropharynx clear of all foreign objects and spontaneously breathing  Level of Consciousness: drowsy  Oxygen Supplementation: face mask  Level of Supplemental Oxygen (L/min / FiO2): 6  Independent Airway: airway patency satisfactory and stable  Dentition: dentition unchanged  Vital Signs Stable: post-procedure vital signs reviewed and stable  Report to RN Given: handoff report given  Patient transferred to: PACU    Handoff Report: Identifed the Patient, Identified the Reponsible Provider, Reviewed the pertinent medical history, Discussed the surgical course, Reviewed Intra-OP anesthesia mangement and issues during anesthesia, Set expectations for post-procedure period and Allowed opportunity for questions and acknowledgement of understanding      Vitals:  Vitals Value Taken Time   /83 11/11/22 1438   Temp     Pulse 70 11/11/22 1442   Resp 0 11/11/22 1442   SpO2 100 % 11/11/22 1442   Vitals shown include unvalidated device data.    Electronically Signed By: Soo Altamirano MD  November 11, 2022  2:43 PM   Right arm;

## 2025-03-14 ENCOUNTER — ANCILLARY PROCEDURE (OUTPATIENT)
Dept: MAMMOGRAPHY | Facility: CLINIC | Age: 55
End: 2025-03-14
Attending: FAMILY MEDICINE
Payer: COMMERCIAL

## 2025-03-14 DIAGNOSIS — Z12.31 VISIT FOR SCREENING MAMMOGRAM: ICD-10-CM

## 2025-03-14 PROCEDURE — 77067 SCR MAMMO BI INCL CAD: CPT | Performed by: RADIOLOGY

## 2025-03-14 PROCEDURE — 77063 BREAST TOMOSYNTHESIS BI: CPT | Performed by: RADIOLOGY

## 2025-03-23 SDOH — HEALTH STABILITY: PHYSICAL HEALTH: ON AVERAGE, HOW MANY DAYS PER WEEK DO YOU ENGAGE IN MODERATE TO STRENUOUS EXERCISE (LIKE A BRISK WALK)?: 2 DAYS

## 2025-03-23 SDOH — HEALTH STABILITY: PHYSICAL HEALTH: ON AVERAGE, HOW MANY MINUTES DO YOU ENGAGE IN EXERCISE AT THIS LEVEL?: 50 MIN

## 2025-03-23 ASSESSMENT — ASTHMA QUESTIONNAIRES
QUESTION_2 LAST FOUR WEEKS HOW OFTEN HAVE YOU HAD SHORTNESS OF BREATH: NOT AT ALL
QUESTION_1 LAST FOUR WEEKS HOW MUCH OF THE TIME DID YOUR ASTHMA KEEP YOU FROM GETTING AS MUCH DONE AT WORK, SCHOOL OR AT HOME: NONE OF THE TIME
QUESTION_5 LAST FOUR WEEKS HOW WOULD YOU RATE YOUR ASTHMA CONTROL: COMPLETELY CONTROLLED
ACT_TOTALSCORE: 25
QUESTION_3 LAST FOUR WEEKS HOW OFTEN DID YOUR ASTHMA SYMPTOMS (WHEEZING, COUGHING, SHORTNESS OF BREATH, CHEST TIGHTNESS OR PAIN) WAKE YOU UP AT NIGHT OR EARLIER THAN USUAL IN THE MORNING: NOT AT ALL
QUESTION_4 LAST FOUR WEEKS HOW OFTEN HAVE YOU USED YOUR RESCUE INHALER OR NEBULIZER MEDICATION (SUCH AS ALBUTEROL): NOT AT ALL

## 2025-03-23 ASSESSMENT — SOCIAL DETERMINANTS OF HEALTH (SDOH): HOW OFTEN DO YOU GET TOGETHER WITH FRIENDS OR RELATIVES?: ONCE A WEEK

## 2025-03-24 ENCOUNTER — OFFICE VISIT (OUTPATIENT)
Dept: FAMILY MEDICINE | Facility: CLINIC | Age: 55
End: 2025-03-24
Payer: COMMERCIAL

## 2025-03-24 VITALS
TEMPERATURE: 98 F | OXYGEN SATURATION: 97 % | RESPIRATION RATE: 14 BRPM | WEIGHT: 141.7 LBS | HEIGHT: 62 IN | SYSTOLIC BLOOD PRESSURE: 114 MMHG | HEART RATE: 94 BPM | DIASTOLIC BLOOD PRESSURE: 80 MMHG | BODY MASS INDEX: 26.07 KG/M2

## 2025-03-24 DIAGNOSIS — E55.9 VITAMIN D DEFICIENCY: ICD-10-CM

## 2025-03-24 DIAGNOSIS — N95.1 VAGINAL DRYNESS, MENOPAUSAL: ICD-10-CM

## 2025-03-24 DIAGNOSIS — I10 ESSENTIAL HYPERTENSION WITH GOAL BLOOD PRESSURE LESS THAN 140/90: ICD-10-CM

## 2025-03-24 DIAGNOSIS — G47.9 SLEEP DISORDER: ICD-10-CM

## 2025-03-24 DIAGNOSIS — B35.1 ONYCHOMYCOSIS: ICD-10-CM

## 2025-03-24 DIAGNOSIS — J45.20 MILD INTERMITTENT ASTHMA WITHOUT COMPLICATION: ICD-10-CM

## 2025-03-24 DIAGNOSIS — Z00.00 ROUTINE GENERAL MEDICAL EXAMINATION AT A HEALTH CARE FACILITY: Primary | ICD-10-CM

## 2025-03-24 DIAGNOSIS — R10.11 RUQ ABDOMINAL PAIN: ICD-10-CM

## 2025-03-24 DIAGNOSIS — G35 MS (MULTIPLE SCLEROSIS) (H): ICD-10-CM

## 2025-03-24 DIAGNOSIS — E78.5 HYPERLIPIDEMIA LDL GOAL <130: ICD-10-CM

## 2025-03-24 LAB
ALBUMIN SERPL BCG-MCNC: 4.5 G/DL (ref 3.5–5.2)
ALP SERPL-CCNC: 62 U/L (ref 40–150)
ALT SERPL W P-5'-P-CCNC: 19 U/L (ref 0–50)
ANION GAP SERPL CALCULATED.3IONS-SCNC: 14 MMOL/L (ref 7–15)
AST SERPL W P-5'-P-CCNC: 26 U/L (ref 0–45)
BILIRUB SERPL-MCNC: 0.5 MG/DL
BUN SERPL-MCNC: 27.4 MG/DL (ref 6–20)
CALCIUM SERPL-MCNC: 10.3 MG/DL (ref 8.8–10.4)
CHLORIDE SERPL-SCNC: 103 MMOL/L (ref 98–107)
CHOLEST SERPL-MCNC: 192 MG/DL
CREAT SERPL-MCNC: 0.94 MG/DL (ref 0.51–0.95)
EGFRCR SERPLBLD CKD-EPI 2021: 72 ML/MIN/1.73M2
ERYTHROCYTE [DISTWIDTH] IN BLOOD BY AUTOMATED COUNT: 11.7 % (ref 10–15)
FASTING STATUS PATIENT QL REPORTED: YES
FASTING STATUS PATIENT QL REPORTED: YES
GLUCOSE SERPL-MCNC: 92 MG/DL (ref 70–99)
HCO3 SERPL-SCNC: 24 MMOL/L (ref 22–29)
HCT VFR BLD AUTO: 39.8 % (ref 35–47)
HDLC SERPL-MCNC: 49 MG/DL
HGB BLD-MCNC: 14.1 G/DL (ref 11.7–15.7)
LDLC SERPL CALC-MCNC: 129 MG/DL
MCH RBC QN AUTO: 30.4 PG (ref 26.5–33)
MCHC RBC AUTO-ENTMCNC: 35.4 G/DL (ref 31.5–36.5)
MCV RBC AUTO: 86 FL (ref 78–100)
NONHDLC SERPL-MCNC: 143 MG/DL
PLATELET # BLD AUTO: 250 10E3/UL (ref 150–450)
POTASSIUM SERPL-SCNC: 4 MMOL/L (ref 3.4–5.3)
PROT SERPL-MCNC: 7.6 G/DL (ref 6.4–8.3)
RBC # BLD AUTO: 4.64 10E6/UL (ref 3.8–5.2)
SODIUM SERPL-SCNC: 141 MMOL/L (ref 135–145)
TRIGL SERPL-MCNC: 72 MG/DL
VIT D+METAB SERPL-MCNC: 74 NG/ML (ref 20–50)
WBC # BLD AUTO: 5.3 10E3/UL (ref 4–11)

## 2025-03-24 PROCEDURE — 99214 OFFICE O/P EST MOD 30 MIN: CPT | Mod: 25 | Performed by: PHYSICIAN ASSISTANT

## 2025-03-24 PROCEDURE — 85027 COMPLETE CBC AUTOMATED: CPT | Performed by: PHYSICIAN ASSISTANT

## 2025-03-24 PROCEDURE — 1125F AMNT PAIN NOTED PAIN PRSNT: CPT | Performed by: PHYSICIAN ASSISTANT

## 2025-03-24 PROCEDURE — 80061 LIPID PANEL: CPT | Performed by: PHYSICIAN ASSISTANT

## 2025-03-24 PROCEDURE — 99396 PREV VISIT EST AGE 40-64: CPT | Mod: 25 | Performed by: PHYSICIAN ASSISTANT

## 2025-03-24 PROCEDURE — 80053 COMPREHEN METABOLIC PANEL: CPT | Performed by: PHYSICIAN ASSISTANT

## 2025-03-24 PROCEDURE — 3074F SYST BP LT 130 MM HG: CPT | Performed by: PHYSICIAN ASSISTANT

## 2025-03-24 PROCEDURE — 82306 VITAMIN D 25 HYDROXY: CPT | Performed by: PHYSICIAN ASSISTANT

## 2025-03-24 PROCEDURE — 3079F DIAST BP 80-89 MM HG: CPT | Performed by: PHYSICIAN ASSISTANT

## 2025-03-24 PROCEDURE — 36415 COLL VENOUS BLD VENIPUNCTURE: CPT | Performed by: PHYSICIAN ASSISTANT

## 2025-03-24 RX ORDER — ALBUTEROL SULFATE 90 UG/1
2 INHALANT RESPIRATORY (INHALATION) EVERY 4 HOURS PRN
Qty: 8.5 G | Refills: 3 | Status: SHIPPED | OUTPATIENT
Start: 2025-03-24

## 2025-03-24 RX ORDER — CHOLECALCIFEROL (VITAMIN D3) 50 MCG
1 TABLET ORAL DAILY
COMMUNITY
Start: 2025-03-24

## 2025-03-24 RX ORDER — LISINOPRIL 20 MG/1
20 TABLET ORAL DAILY
Qty: 90 TABLET | Refills: 0 | Status: SHIPPED | OUTPATIENT
Start: 2025-03-24

## 2025-03-24 RX ORDER — ESTRADIOL 0.1 MG/G
2 CREAM VAGINAL
Qty: 42.5 G | Status: SHIPPED | OUTPATIENT
Start: 2025-03-24

## 2025-03-24 RX ORDER — PRAVASTATIN SODIUM 20 MG
20 TABLET ORAL EVERY EVENING
Qty: 90 TABLET | Refills: 3 | Status: SHIPPED | OUTPATIENT
Start: 2025-03-24

## 2025-03-24 RX ORDER — TEMAZEPAM 15 MG/1
CAPSULE ORAL
Qty: 15 CAPSULE | Refills: 0 | Status: SHIPPED | OUTPATIENT
Start: 2025-03-24

## 2025-03-24 ASSESSMENT — PAIN SCALES - GENERAL: PAINLEVEL_OUTOF10: MILD PAIN (2)

## 2025-03-24 NOTE — PATIENT INSTRUCTIONS
Patient Education   Preventive Care Advice   This is general advice given by our system to help you stay healthy. However, your care team may have specific advice just for you. Please talk to your care team about your preventive care needs.  Nutrition  Eat 5 or more servings of fruits and vegetables each day.  Try wheat bread, brown rice and whole grain pasta (instead of white bread, rice, and pasta).  Get enough calcium and vitamin D. Check the label on foods and aim for 100% of the RDA (recommended daily allowance).  Lifestyle  Exercise at least 150 minutes each week  (30 minutes a day, 5 days a week).  Do muscle strengthening activities 2 days a week. These help control your weight and prevent disease.  No smoking.  Wear sunscreen to prevent skin cancer.  Have a dental exam and cleaning every 6 months.  Yearly exams  See your health care team every year to talk about:  Any changes in your health.  Any medicines your care team has prescribed.  Preventive care, family planning, and ways to prevent chronic diseases.  Shots (vaccines)   HPV shots (up to age 26), if you've never had them before.  Hepatitis B shots (up to age 59), if you've never had them before.  COVID-19 shot: Get this shot when it's due.  Flu shot: Get a flu shot every year.  Tetanus shot: Get a tetanus shot every 10 years.  Pneumococcal, hepatitis A, and RSV shots: Ask your care team if you need these based on your risk.  Shingles shot (for age 50 and up)  General health tests  Diabetes screening:  Starting at age 35, Get screened for diabetes at least every 3 years.  If you are younger than age 35, ask your care team if you should be screened for diabetes.  Cholesterol test: At age 39, start having a cholesterol test every 5 years, or more often if advised.  Bone density scan (DEXA): At age 50, ask your care team if you should have this scan for osteoporosis (brittle bones).  Hepatitis C: Get tested at least once in your life.  STIs (sexually  transmitted infections)  Before age 24: Ask your care team if you should be screened for STIs.  After age 24: Get screened for STIs if you're at risk. You are at risk for STIs (including HIV) if:  You are sexually active with more than one person.  You don't use condoms every time.  You or a partner was diagnosed with a sexually transmitted infection.  If you are at risk for HIV, ask about PrEP medicine to prevent HIV.  Get tested for HIV at least once in your life, whether you are at risk for HIV or not.  Cancer screening tests  Cervical cancer screening: If you have a cervix, begin getting regular cervical cancer screening tests starting at age 21.  Breast cancer scan (mammogram): If you've ever had breasts, begin having regular mammograms starting at age 40. This is a scan to check for breast cancer.  Colon cancer screening: It is important to start screening for colon cancer at age 45.  Have a colonoscopy test every 10 years (or more often if you're at risk) Or, ask your provider about stool tests like a FIT test every year or Cologuard test every 3 years.  To learn more about your testing options, visit:   .  For help making a decision, visit:   https://bit.ly/ak84355.  Prostate cancer screening test: If you have a prostate, ask your care team if a prostate cancer screening test (PSA) at age 55 is right for you.  Lung cancer screening: If you are a current or former smoker ages 50 to 80, ask your care team if ongoing lung cancer screenings are right for you.  For informational purposes only. Not to replace the advice of your health care provider. Copyright   2023 [facility]. All rights reserved. Clinically reviewed by the [facility] Transitions Program. [employer] 827261 - REV 01/24.  Preventing Falls: Care Instructions  Injuries and health problems such as trouble walking or poor eyesight can increase your risk of falling. So can some medicines. But there are things you can do to help  "prevent falls. You can exercise to get stronger. You can also arrange your home to make it safer.    Talk to your doctor about the medicines you take. Ask if any of them increase the risk of falls and whether they can be changed or stopped.   Try to exercise regularly. It can help improve your strength and balance. This can help lower your risk of falling.         Practice fall safety and prevention.   Wear low-heeled shoes that fit well and give your feet good support. Talk to your doctor if you have foot problems that make this hard.  Carry a cellphone or wear a medical alert device that you can use to call for help.  Use stepladders instead of chairs to reach high objects. Don't climb if you're at risk for falls. Ask for help, if needed.  Wear the correct eyeglasses, if you need them.        Make your home safer.   Remove rugs, cords, clutter, and furniture from walkways.  Keep your house well lit. Use night-lights in hallways and bathrooms.  Install and use sturdy handrails on stairways.  Wear nonskid footwear, even inside. Don't walk barefoot or in socks without shoes.        Be safe outside.   Use handrails, curb cuts, and ramps whenever possible.  Keep your hands free by using a shoulder bag or backpack.  Try to walk in well-lit areas. Watch out for uneven ground, changes in pavement, and debris.  Be careful in the winter. Walk on the grass or gravel when sidewalks are slippery. Use de-icer on steps and walkways. Add non-slip devices to shoes.    Put grab bars and nonskid mats in your shower or tub and near the toilet. Try to use a shower chair or bath bench when bathing.   Get into a tub or shower by putting in your weaker leg first. Get out with your strong side first. Have a phone or medical alert device in the bathroom with you.   Where can you learn more?  Go to https://www.VYouwise.net/patiented  Enter G117 in the search box to learn more about \"Preventing Falls: Care Instructions.\"  Current as of: " July 31, 2024  Content Version: 14.4    8509-9815 [employer]   Care instructions adapted under license by your healthcare professional. If you have questions about a medical condition or this instruction, always ask your healthcare professional. [employer] disclaims any warranty or liability for your use of this information.

## 2025-03-24 NOTE — PROGRESS NOTES
Preventive Care Visit  [facility] JOSE RAMON Flowers PA-C, Family Medicine  Mar 24, 2025      Assessment & Plan     Routine general medical examination at a health care facility  Recommend routine annual visits.  Cancer screening will be updated as below.  Her Pap smear is up-to-date till , mammogram is up-to-date and colonoscopy is up-to-date till     Hyperlipidemia LDL goal <130  Await results titrate dosage as needed  - Lipid panel reflex to direct LDL Fasting; Future  - pravastatin (PRAVACHOL) 20 MG tablet; Take 1 tablet (20 mg) by mouth every evening.  - Lipid panel reflex to direct LDL Fasting  Recheck 1 year  Essential hypertension with goal blood pressure less than 140/90  Well-controlled, continue current meds  - lisinopril (ZESTRIL) 20 MG tablet; Take 1 tablet (20 mg) by mouth daily.  Recheck 1 year  Mild intermittent asthma without complication  Rare use of albuterol, updating albuterol refill since it has   - albuterol (PROAIR HFA/PROVENTIL HFA/VENTOLIN HFA) 108 (90 Base) MCG/ACT inhaler; Inhale 2 puffs into the lungs every 4 hours as needed for shortness of breath or wheezing. Profile Rx    Sleep disorder  Refilling medication for patient to use as needed - temazepam (RESTORIL) 15 MG capsule; TAKE 1 TO 2 CAPSULES BY MOUTH NIGHTLY AS NEEDED FOR SLEEP    Vaginal dryness, menopausal-  she had been seen in [place] at an [facility] and prescribed progesterone testosterone and vaginal estrogen.  I told patient that I am not comfortable continuing the progesterone or the testosterone prescriptions but I would be willing to prescribe the vaginal estrogen cream for her to use instead of the one that they prescribed previously-their prescription was from a compounding pharmacy .  Though she seemed annoyed that I would not continue her integrative compounded medications she is willing to try the vaginal estrogen.  If this is unsuccessful to manage her symptoms,  "she has the option of discontinuing this prescription and going back to her integrative medical clinic.  - estradiol (ESTRACE) 0.1 MG/GM vaginal cream; Place 2 g vaginally twice a week.    RUQ abdominal pain  This is a new concern for her today, we discussed the classic signs and symptoms of gallbladder disease.  She will avoid fatty foods as we await the testing below.  They will call her to schedule the abdominal ultrasound  - Comprehensive metabolic panel (BMP + Alb, Alk Phos, ALT, AST, Total. Bili, TP); Future  - CBC with platelets; Future  - US Abdomen Limited; Future  - Comprehensive metabolic panel (BMP + Alb, Alk Phos, ALT, AST, Total. Bili, TP)  - CBC with platelets    MS (multiple sclerosis) (H)  Follows with neurology    Vitamin D deficiency  Currently taking 2000 international units a day  - Vitamin D Deficiency; Future  - Vitamin D Deficiency    Onychomycosis-   She will continue to use tea tree oil, also gave her the option of refilling her ketoconazole cream but she does not need this currently.  She has been seen previously by Ortho for right shoulder AC joint separation she will follow-up with them if she is not continuing to improve in the next 1 to 2 weeks.  Patient has been advised of split billing requirements and indicates understanding: Yes        BMI  Estimated body mass index is 25.68 kg/m  as calculated from the following:    Height as of this encounter: 1.582 m (5' 2.28\").    Weight as of this encounter: 64.3 kg (141 lb 11.2 oz).   Weight management plan: Discussed healthy diet and exercise guidelines    Counseling  Appropriate preventive services were addressed with this patient via screening, questionnaire, or discussion as appropriate for fall prevention, nutrition, physical activity, Tobacco-use cessation, social engagement, weight loss and cognition.  Checklist reviewing preventive services available has been given to the patient.  Reviewed patient's diet, addressing concerns and/or " questions.   She is at risk for lack of exercise and has been provided with information to increase physical activity for the benefit of her well-being.     NIDHI Mohamud   Renea is a 54 year old, presenting for the following:  Physical           HPI  She is requesting I refill her hormone replacement medications as prescribed by an integrated medicine facility.  The prescriber was Allison though she is uncertain of her last name who has left the office.  Her last menstrual period was in 2020.  She primarily is struggling with insomnia difficulty losing weight and vaginal dryness.  She has MS and follows with neurology.  Her MS does make it difficult for her to exercise and to lose weight she is currently following a very strict diet plan that has worked to help her lose about 13 pounds since January.    She has used temazepam in the past for insomnia but had not been needing it as frequently with her hormone replacement.  If she is going off of this however she is concerned she may struggle with sleep once again.  She was getting 15 pills/month.     Hyperlipidemia Follow-Up    Are you regularly taking any medication or supplement to lower your cholesterol?   Yes- pravastatin  Are you having muscle aches or other side effects that you think could be caused by your cholesterol lowering medication?  No    Hypertension Follow-up    Do you check your blood pressure regularly outside of the clinic?    Are you following a low salt diet? N/A  Are your blood pressures ever more than 140 on the top number (systolic) OR more   than 90 on the bottom number (diastolic), for example 140/90?     Asthma      3/23/2025     5:04 PM   ACT Total Scores   ACT TOTAL SCORE (Goal Greater than or Equal to 20) 25    In the past 12 months, how many times did you visit the emergency room for your asthma without being admitted to the hospital? 0   In the past 12 months, how many times were you hospitalized overnight  because of your asthma? 0       Patient-reported     Do you have any of the following symptoms? None of these symptoms (cough/noisy breathing/trouble with breathing)  What makes your asthma/breathing worse?    Do you want more information about how to use your inhaler? No    She was seen [facility] for right shoulder AC joint separation she had a fall tripping on a curb several weeks ago.  It is still aching but it is better just slowly improving.    Also for the last 2 days she has had right upper quadrant nonradiating abdominal pain.  No associated nausea vomiting or diarrhea.  She rates it a 2 out of 10.  She has no pain now but tends to get this in the evening.  There is no heartburn or burning sensation.    She has toenails on her right foot that are thickened and white she has been treating them with topical ketoconazole and recently tea tree oil.  She thinks that it has been helping.  Advance Care Planning  Patient does not have a Health Care Directive: Discussed advance care planning with patient; however, patient declined at this time.      3/23/2025   General Health   How would you rate your overall physical health? Good   Feel stress (tense, anxious, or unable to sleep) Not at all         3/23/2025   Nutrition   Three or more servings of calcium each day? (!) NO   Diet: Carbohydrate counting   How many servings of fruit and vegetables per day? (!) 0-1, goal is 5 per day   How many sweetened beverages each day? 0-1         3/23/2025   Exercise   Days per week of moderate/strenous exercise 2 days   Average minutes spent exercising at this level 50 min- goal is 150 min   (!) EXERCISE CONCERN      3/23/2025   Social Factors   Frequency of gathering with friends or relatives Once a week   Worry food won't last until get money to buy more No   Food not last or not have enough money for food? No   Do you have housing? (Housing is defined as stable permanent housing and does not include staying [facility]  in a car, in a tent, in an abandoned building, in an overnight shelter, or couch-surfing.) Yes   Are you worried about losing your housing? No   Lack of transportation? No   Unable to get utilities (heat,electricity)? No         3/24/2025   Fall Risk   Gait Speed Test (Document in seconds) 3   Gait Speed Test Interpretation Less than or equal to 5.00 seconds - PASS          3/23/2025   Dental   Dentist two times every year? Yes           Today's PHQ-2 Score:       3/23/2025     5:03 PM   PHQ-2 ( 1999 [employer])   Q1: Little interest or pleasure in doing things 0   Q2: Feeling down, depressed or hopeless 0   PHQ-2 Score 0    Q1: Little interest or pleasure in doing things Not at all   Q2: Feeling down, depressed or hopeless Not at all   PHQ-2 Score 0       Patient-reported           3/23/2025   Substance Use   Alcohol more than 3/day or more than 7/wk No   Do you use any other substances recreationally? No     Social History     Tobacco Use    Smoking status: Never     Passive exposure: Never    Smokeless tobacco: Never   Vaping Use    Vaping status: Never Used   Substance Use Topics    Alcohol use: Yes     Alcohol/week: 1.0 - 2.0 standard drink of alcohol     Comment: RARELY    Drug use: No           3/14/2025   LAST FHS-7 RESULTS   1st degree relative breast or ovarian cancer No   Any relative bilateral breast cancer No   Any male have breast cancer No   Any ONE woman have BOTH breast AND ovarian cancer No   Any woman with breast cancer before 50yrs No   2 or more relatives with breast AND/OR ovarian cancer No   2 or more relatives with breast AND/OR bowel cancer No        Mammogram Screening - Mammogram every 1-2 years updated in Health Maintenance based on mutual decision making        3/23/2025   STI Screening   New sexual partner(s) since last STI/HIV test? No     History of abnormal Pap smear: No - age 30- 64 PAP with HPV every 5 years recommended        Latest Ref Rng & Units 3/1/2024     7:27 AM 9/18/2020      8:18 AM 9/18/2020     8:11 AM   PAP / HPV   PAP  Negative for Intraepithelial Lesion or Malignancy (NILM)      PAP (Historical)    NIL    HPV 16 DNA Negative Negative  Negative     HPV 18 DNA Negative Negative  Negative     Other HR HPV Negative Negative  Negative       ASCVD Risk   The 10-year ASCVD risk score (Dean PORTER, et al., 2019) is: 1.8%    Values used to calculate the score:      Age: 54 years      Sex: Female      Is Non- : No      Diabetic: No      Tobacco smoker: No      Systolic Blood Pressure: 114 mmHg      Is BP treated: Yes      HDL Cholesterol: 43 mg/dL      Total Cholesterol: 153 mg/dL           Reviewed and updated as needed this visit by Provider        Surg Hx             Lab work is in process  Labs reviewed in EPIC  BP Readings from Last 3 Encounters:   03/24/25 114/80   03/03/25 120/78   05/31/24 130/82    Wt Readings from Last 3 Encounters:   03/24/25 64.3 kg (141 lb 11.2 oz)   03/03/25 65.8 kg (145 lb)   03/04/24 65.8 kg (145 lb)                  Patient Active Problem List   Diagnosis    MS (multiple sclerosis) (H)    CARDIOVASCULAR SCREENING; LDL GOAL LESS THAN 160    BMI 26.0-26.9,adult    Urinary urgency    Headache    Low back pain without sciatica, unspecified back pain laterality    Sleep disorder    Plantar warts    Essential hypertension with goal blood pressure less than 140/90    Hemorrhoids, unspecified hemorrhoid type    Seborrheic keratosis    Mild intermittent asthma    Hypoactive sexual desire    Menopause    Tinea pedis of right foot    Vitamin D deficiency    Acute appendicitis    Elevated BUN    History of ovarian cyst    Snoring    Hyperlipidemia LDL goal <130    Chronic constipation     Past Surgical History:   Procedure Laterality Date    APPENDECTOMY      COLONOSCOPY WITH CO2 INSUFFLATION N/A 08/05/2022    Procedure: COLONOSCOPY, WITH CO2 INSUFFLATION;  Surgeon: Hernan Vasquez MD;  Location: [facility] OR    HC TOOTH EXTRACTION  W/FORCEP      LAPAROSCOPIC SALPINGO-OOPHORECTOMY Bilateral 11/11/2022    Procedure: LAPAROSCOPIC RIGHT SALPINGO-OOPHORECTOMY, LEFT SALPINGECTOMY;  Surgeon: Savana Valle MD;  Location:  OR       Social History     Tobacco Use    Smoking status: Never     Passive exposure: Never    Smokeless tobacco: Never   Substance Use Topics    Alcohol use: Yes     Alcohol/week: 1.0 - 2.0 standard drink of alcohol     Comment: RARELY     Family History   Problem Relation Age of Onset    Hypertension Mother     Lipids Mother     Hyperlipidemia Mother     Eye Disorder Father     Hyperlipidemia Father     Sleep Apnea Father     Skin Cancer Father     Coronary Artery Disease Sister     Hyperlipidemia Sister     Myocardial Infarction Sister     Cerebrovascular Disease Maternal Grandmother     Cerebrovascular Disease Maternal Grandfather     Prostate Cancer Maternal Grandfather     Blood Disease Paternal Grandmother     Alzheimer Disease Paternal Grandfather     Pulmonary Embolism Daughter     Multiple Sclerosis Cousin          Current Outpatient Medications   Medication Sig Dispense Refill    acetaminophen (TYLENOL) 325 MG tablet Take 2 tablets (650 mg) by mouth every 6 hours as needed for mild pain 24 tablet 0    albuterol (PROAIR HFA/PROVENTIL HFA/VENTOLIN HFA) 108 (90 Base) MCG/ACT inhaler Inhale 2 puffs into the lungs every 4 hours as needed for shortness of breath or wheezing. Profile Rx 8.5 g 3    Biotin 10 MG CAPS       calcium carbonate 750 MG CHEW Take 750 mg by mouth daily      estradiol (ESTRACE) 0.1 MG/GM vaginal cream Place 2 g vaginally twice a week. 42.5 g PRN    glatiramer acetate 40 MG/ML injection INJECT 40 MG UNDER THE SKIN THREE TIMES A WEEK 12 mL 11    ibuprofen (ADVIL/MOTRIN) 600 MG tablet Take 1 tablet (600 mg) by mouth every 6 hours as needed for other (mile and/or inflammatory pain.) 12 tablet 0    lisinopril (ZESTRIL) 20 MG tablet Take 1 tablet (20 mg) by mouth daily. 90 tablet 0    MULTIPLE VITAMIN PO  "Take 1 tablet by mouth daily.      polyethylene glycol (MIRALAX) 17 GM/Dose powder Take 17 g (1 Capful) by mouth daily 510 g 5    pravastatin (PRAVACHOL) 20 MG tablet Take 1 tablet (20 mg) by mouth every evening. 90 tablet 3    progesterone (PROMETRIUM) 100 MG capsule Take 75 mg by mouth daily.      senna-docusate (SENOKOT-S/PERICOLACE) 8.6-50 MG tablet Take 1-2 tablets by mouth 2 times daily 30 tablet 0    temazepam (RESTORIL) 15 MG capsule TAKE 1 TO 2 CAPSULES BY MOUTH NIGHTLY AS NEEDED FOR SLEEP 15 capsule 0    testosterone (ANDRODERM) 4 MG/24HR 24 hr patch Apply 1 mg topically daily. Daily use      VERSATILE CREAM BASE EX       vitamin D3 (CHOLECALCIFEROL) 50 mcg (2000 units) tablet Take 1 tablet (50 mcg) by mouth daily.       No Known Allergies  Recent Labs   Lab Test 05/31/24  1317 05/10/24  0710 02/28/24  1155 01/20/23  0734 01/20/23  0734 12/14/21  1229 12/14/21  1229 09/18/20  0723 06/01/19  0803   LDL  --   --  95  --  70  --  141* 141*  --    HDL  --   --  43*  --  59  --  60 56  --    TRIG  --   --  75  --  111  --  127 134  --    ALT  --   --  17  --  30  --  26  --  22   CR 0.74 0.86 0.90  --  0.88   < > 0.94 0.82 0.85   GFRESTIMATED >90 80 76  --  79   < > 70 84 81   GFRESTBLACK  --   --   --   --   --   --   --  >90 >90   POTASSIUM 4.2 4.2 4.1   < > 3.9   < > 3.9 3.8 4.0   TSH  --   --  1.48  --   --   --   --   --  1.63    < > = values in this interval not displayed.          Review of Systems  Constitutional, HEENT, cardiovascular, pulmonary, gi and gu systems are negative, except as otherwise noted.     Objective    Exam  /80   Pulse 94   Temp 98  F (36.7  C) (Temporal)   Resp 14   Ht 1.582 m (5' 2.28\")   Wt 64.3 kg (141 lb 11.2 oz)   LMP 08/01/2020   SpO2 97%   BMI 25.68 kg/m     Estimated body mass index is 25.68 kg/m  as calculated from the following:    Height as of this encounter: 1.582 m (5' 2.28\").    Weight as of this encounter: 64.3 kg (141 lb 11.2 oz).    Physical " Exam  GENERAL: alert and no distress  EYES: Eyes grossly normal to inspection, PERRL and conjunctivae and sclerae normal  HENT: ear canals and TM's normal, nose and mouth without ulcers or lesions  NECK: no adenopathy, no asymmetry, masses, or scars  RESP: lungs clear to auscultation - no rales, rhonchi or wheezes  BREAST: declined by pt, mammo UTD  CV: regular rate and rhythm, normal S1 S2, no S3 or S4, no murmur, click or rub, no peripheral edema  ABDOMEN: soft, nontender, no hepatosplenomegaly, no masses and bowel sounds normal  MS: no gross musculoskeletal defects noted, no edema  SKIN: no suspicious lesions or rashes  SKIN: Toenails of her right great toenail, second and middle toenails are thickened and discolored she does not have debris under her nails.  NEURO: Normal strength and tone, mentation intact and speech normal  PSYCH: mentation appears normal, affect normal/bright        Signed Electronically by: Loretta Flowers PA-C

## 2025-03-31 ENCOUNTER — MYC REFILL (OUTPATIENT)
Dept: FAMILY MEDICINE | Facility: CLINIC | Age: 55
End: 2025-03-31
Payer: COMMERCIAL

## 2025-03-31 DIAGNOSIS — G47.9 SLEEP DISORDER: ICD-10-CM

## 2025-03-31 DIAGNOSIS — I10 ESSENTIAL HYPERTENSION WITH GOAL BLOOD PRESSURE LESS THAN 140/90: ICD-10-CM

## 2025-03-31 DIAGNOSIS — E78.5 HYPERLIPIDEMIA LDL GOAL <130: ICD-10-CM

## 2025-03-31 RX ORDER — PRAVASTATIN SODIUM 20 MG
20 TABLET ORAL EVERY EVENING
Qty: 90 TABLET | Refills: 3 | OUTPATIENT
Start: 2025-03-31

## 2025-03-31 RX ORDER — TEMAZEPAM 15 MG/1
CAPSULE ORAL
Qty: 15 CAPSULE | Refills: 0 | OUTPATIENT
Start: 2025-03-31

## 2025-03-31 RX ORDER — LISINOPRIL 20 MG/1
20 TABLET ORAL DAILY
Qty: 90 TABLET | Refills: 0 | OUTPATIENT
Start: 2025-03-31

## 2025-07-09 DIAGNOSIS — I10 ESSENTIAL HYPERTENSION WITH GOAL BLOOD PRESSURE LESS THAN 140/90: ICD-10-CM

## 2025-07-09 RX ORDER — LISINOPRIL 20 MG/1
20 TABLET ORAL DAILY
Qty: 90 TABLET | Refills: 1 | Status: SHIPPED | OUTPATIENT
Start: 2025-07-09

## 2025-08-18 ENCOUNTER — TELEPHONE (OUTPATIENT)
Dept: NEUROLOGY | Facility: CLINIC | Age: 55
End: 2025-08-18
Payer: COMMERCIAL

## (undated) DEVICE — SUCTION IRR STRYKERFLOW II W/TIP 250-070-520

## (undated) DEVICE — LINEN TOWEL PACK X6 WHITE 5487

## (undated) DEVICE — ENDO TROCAR FIRST ENTRY KII FIOS Z-THRD 12X100MM CTF73

## (undated) DEVICE — SUCTION MANIFOLD NEPTUNE 2 SYS 4 PORT 0702-020-000

## (undated) DEVICE — GOWN LG DISP 9515

## (undated) DEVICE — JELLY LUBRICATING SURGILUBE 2OZ TUBE

## (undated) DEVICE — GLOVE PROTEXIS BLUE W/NEU-THERA 6.5  2D73EB65

## (undated) DEVICE — SOL WATER IRRIG 1000ML BOTTLE 2F7114

## (undated) DEVICE — SOL WATER IRRIG 1000ML BOTTLE 07139-09

## (undated) DEVICE — PREP CHLORAPREP 26ML TINTED ORANGE  260815

## (undated) DEVICE — TUBING SMOKE EVAC PNEUMOCLEAR HIGH FLOW 0620050250

## (undated) DEVICE — ENDO POUCH UNIV RETRIEVAL SYSTEM INZII 10MM CD001

## (undated) DEVICE — SU VICRYL 0 TIE 54" J608H

## (undated) DEVICE — NDL INSUFFLATION 13GA 120MM C2201

## (undated) DEVICE — WIPES FOLEY CARE SURESTEP PROVON DFC100

## (undated) DEVICE — GLOVE PROTEXIS MICRO 6.0  2D73PM60

## (undated) DEVICE — ANTIFOG SOLUTION W/FOAM PAD 31142527

## (undated) DEVICE — ENDO SCOPE WARMER SEAL  C3101

## (undated) DEVICE — SOL NACL 0.9% INJ 1000ML BAG 2B1324X

## (undated) DEVICE — PROTECTOR ARM ONE-STEP TRENDELENBURG 40418

## (undated) DEVICE — DEVICE SUTURE GRASPER TROCAR CLOSURE 14GA PMITCSG

## (undated) DEVICE — ENDO POUCH UNIVERSAL RETRIEVAL SYSTEM INZII 5MM CD003

## (undated) DEVICE — SOL NACL 0.9% IRRIG 1000ML BOTTLE 2F7124

## (undated) DEVICE — PREP SCRUB SOL EXIDINE 4% CHG 4OZ 29002-404

## (undated) DEVICE — KIT PATIENT POSITIONING PIGAZZI LATEX FREE 40580

## (undated) DEVICE — ESU LIGASURE MARYLAND VESSEL LAP 44CM XLONG LF1944

## (undated) DEVICE — LINEN TOWEL PACK X30 5481

## (undated) DEVICE — PREP CHLORAPREP 26ML TINTED HI-LITE ORANGE 930815

## (undated) DEVICE — Device

## (undated) DEVICE — SPECIMEN TRAP MUCOUS 40ML LUKI C30200A

## (undated) DEVICE — SU VICRYL 4-0 PS-2 18" UND J496H

## (undated) DEVICE — ENDO TROCAR SLEEVE KII Z-THREADED 05X100MM CTS02

## (undated) DEVICE — ESU ENDO SCISSORS 5MM CVD 5DCS

## (undated) DEVICE — SU VICRYL 0 UR-6 27" J603H

## (undated) DEVICE — ENDO TROCAR FIRST ENTRY KII FIOS Z-THRD 05X100MM CTF03

## (undated) RX ORDER — HYDROMORPHONE HYDROCHLORIDE 1 MG/ML
INJECTION, SOLUTION INTRAMUSCULAR; INTRAVENOUS; SUBCUTANEOUS
Status: DISPENSED
Start: 2022-11-11

## (undated) RX ORDER — METRONIDAZOLE 500 MG/100ML
INJECTION, SOLUTION INTRAVENOUS
Status: DISPENSED
Start: 2022-11-11

## (undated) RX ORDER — FENTANYL CITRATE 50 UG/ML
INJECTION, SOLUTION INTRAMUSCULAR; INTRAVENOUS
Status: DISPENSED
Start: 2022-08-05

## (undated) RX ORDER — FENTANYL CITRATE 50 UG/ML
INJECTION, SOLUTION INTRAMUSCULAR; INTRAVENOUS
Status: DISPENSED
Start: 2022-11-11

## (undated) RX ORDER — ACETAMINOPHEN 325 MG/1
TABLET ORAL
Status: DISPENSED
Start: 2022-11-11

## (undated) RX ORDER — HYDROMORPHONE HCL IN WATER/PF 6 MG/30 ML
PATIENT CONTROLLED ANALGESIA SYRINGE INTRAVENOUS
Status: DISPENSED
Start: 2022-11-11

## (undated) RX ORDER — CEFAZOLIN SODIUM/WATER 2 G/20 ML
SYRINGE (ML) INTRAVENOUS
Status: DISPENSED
Start: 2022-11-11

## (undated) RX ORDER — OXYCODONE HYDROCHLORIDE 5 MG/1
TABLET ORAL
Status: DISPENSED
Start: 2022-11-11

## (undated) RX ORDER — PHENAZOPYRIDINE HYDROCHLORIDE 200 MG/1
TABLET, FILM COATED ORAL
Status: DISPENSED
Start: 2022-11-11

## (undated) RX ORDER — SIMETHICONE 40MG/0.6ML
SUSPENSION, DROPS(FINAL DOSAGE FORM)(ML) ORAL
Status: DISPENSED
Start: 2022-08-05